# Patient Record
Sex: FEMALE | Race: BLACK OR AFRICAN AMERICAN | HISPANIC OR LATINO | Employment: UNEMPLOYED | URBAN - METROPOLITAN AREA
[De-identification: names, ages, dates, MRNs, and addresses within clinical notes are randomized per-mention and may not be internally consistent; named-entity substitution may affect disease eponyms.]

---

## 2017-01-19 ENCOUNTER — ALLSCRIPTS OFFICE VISIT (OUTPATIENT)
Dept: OTHER | Facility: OTHER | Age: 37
End: 2017-01-19

## 2017-01-19 DIAGNOSIS — M79.601 PAIN OF RIGHT ARM: ICD-10-CM

## 2017-02-17 ENCOUNTER — HOSPITAL ENCOUNTER (OUTPATIENT)
Dept: RADIOLOGY | Facility: HOSPITAL | Age: 37
Discharge: HOME | End: 2017-02-17
Payer: COMMERCIAL

## 2017-02-17 DIAGNOSIS — M79.601 PAIN OF RIGHT ARM: ICD-10-CM

## 2017-02-17 PROCEDURE — 72050 X-RAY EXAM NECK SPINE 4/5VWS: CPT

## 2017-02-17 PROCEDURE — 73030 X-RAY EXAM OF SHOULDER: CPT

## 2017-02-21 ENCOUNTER — GENERIC CONVERSION - ENCOUNTER (OUTPATIENT)
Dept: OTHER | Facility: OTHER | Age: 37
End: 2017-02-21

## 2017-04-27 ENCOUNTER — LAB REQUISITION (OUTPATIENT)
Dept: LAB | Facility: HOSPITAL | Age: 37
End: 2017-04-27
Payer: COMMERCIAL

## 2017-04-27 ENCOUNTER — ALLSCRIPTS OFFICE VISIT (OUTPATIENT)
Dept: OTHER | Facility: OTHER | Age: 37
End: 2017-04-27

## 2017-04-27 DIAGNOSIS — N92.6 IRREGULAR MENSTRUATION: ICD-10-CM

## 2017-04-27 DIAGNOSIS — R35.0 FREQUENCY OF MICTURITION: ICD-10-CM

## 2017-04-27 DIAGNOSIS — R63.1 POLYDIPSIA: ICD-10-CM

## 2017-04-27 DIAGNOSIS — R63.4 ABNORMAL WEIGHT LOSS: ICD-10-CM

## 2017-04-27 LAB
BILIRUB UR QL STRIP: NEGATIVE
BILIRUB UR QL STRIP: NORMAL
CLARITY UR: NORMAL
CLARITY UR: NORMAL
COLOR UR: YELLOW
COLOR UR: YELLOW
GLUCOSE (HISTORICAL): NORMAL
GLUCOSE UR STRIP-MCNC: NEGATIVE MG/DL
HGB UR QL STRIP.AUTO: NEGATIVE
HGB UR QL STRIP.AUTO: NORMAL
KETONES UR STRIP-MCNC: NEGATIVE MG/DL
KETONES UR STRIP-MCNC: NORMAL MG/DL
LEUKOCYTE ESTERASE UR QL STRIP: NEGATIVE
LEUKOCYTE ESTERASE UR QL STRIP: NORMAL
NITRITE UR QL STRIP: NEGATIVE
NITRITE UR QL STRIP: NORMAL
PH UR STRIP.AUTO: 7 [PH] (ref 4.5–8)
PH UR STRIP.AUTO: 7.5 [PH]
PROT UR STRIP-MCNC: NEGATIVE MG/DL
PROT UR STRIP-MCNC: NORMAL MG/DL
SP GR UR STRIP.AUTO: 1.01
SP GR UR STRIP.AUTO: 1.01 (ref 1–1.03)
UROBILINOGEN UR QL STRIP.AUTO: 0.2
UROBILINOGEN UR QL STRIP.AUTO: 0.2 E.U./DL

## 2017-04-27 PROCEDURE — 81003 URINALYSIS AUTO W/O SCOPE: CPT | Performed by: PHYSICIAN ASSISTANT

## 2017-04-28 ENCOUNTER — APPOINTMENT (OUTPATIENT)
Dept: LAB | Facility: CLINIC | Age: 37
End: 2017-04-28
Payer: COMMERCIAL

## 2017-04-28 ENCOUNTER — TRANSCRIBE ORDERS (OUTPATIENT)
Dept: LAB | Facility: CLINIC | Age: 37
End: 2017-04-28

## 2017-04-28 DIAGNOSIS — N92.6 IRREGULAR MENSTRUATION: ICD-10-CM

## 2017-04-28 DIAGNOSIS — R63.4 ABNORMAL WEIGHT LOSS: ICD-10-CM

## 2017-04-28 DIAGNOSIS — R63.1 POLYDIPSIA: ICD-10-CM

## 2017-04-28 LAB
ALBUMIN SERPL BCP-MCNC: 3.2 G/DL (ref 3.5–5)
ALP SERPL-CCNC: 90 U/L (ref 46–116)
ALT SERPL W P-5'-P-CCNC: 30 U/L (ref 12–78)
ANION GAP SERPL CALCULATED.3IONS-SCNC: 9 MMOL/L (ref 4–13)
AST SERPL W P-5'-P-CCNC: 13 U/L (ref 5–45)
BASOPHILS # BLD AUTO: 0.03 THOUSANDS/ΜL (ref 0–0.1)
BASOPHILS NFR BLD AUTO: 1 % (ref 0–1)
BILIRUB SERPL-MCNC: 0.42 MG/DL (ref 0.2–1)
BUN SERPL-MCNC: 10 MG/DL (ref 5–25)
CALCIUM SERPL-MCNC: 9 MG/DL (ref 8.3–10.1)
CHLORIDE SERPL-SCNC: 105 MMOL/L (ref 100–108)
CO2 SERPL-SCNC: 25 MMOL/L (ref 21–32)
CREAT SERPL-MCNC: 0.72 MG/DL (ref 0.6–1.3)
EOSINOPHIL # BLD AUTO: 0.15 THOUSAND/ΜL (ref 0–0.61)
EOSINOPHIL NFR BLD AUTO: 2 % (ref 0–6)
ERYTHROCYTE [DISTWIDTH] IN BLOOD BY AUTOMATED COUNT: 12.3 % (ref 11.6–15.1)
GFR SERPL CREATININE-BSD FRML MDRD: >60 ML/MIN/1.73SQ M
GLUCOSE P FAST SERPL-MCNC: 67 MG/DL (ref 65–99)
HCT VFR BLD AUTO: 39 % (ref 34.8–46.1)
HGB BLD-MCNC: 13 G/DL (ref 11.5–15.4)
LYMPHOCYTES # BLD AUTO: 1.44 THOUSANDS/ΜL (ref 0.6–4.47)
LYMPHOCYTES NFR BLD AUTO: 23 % (ref 14–44)
MCH RBC QN AUTO: 30.3 PG (ref 26.8–34.3)
MCHC RBC AUTO-ENTMCNC: 33.3 G/DL (ref 31.4–37.4)
MCV RBC AUTO: 91 FL (ref 82–98)
MONOCYTES # BLD AUTO: 0.81 THOUSAND/ΜL (ref 0.17–1.22)
MONOCYTES NFR BLD AUTO: 13 % (ref 4–12)
NEUTROPHILS # BLD AUTO: 3.86 THOUSANDS/ΜL (ref 1.85–7.62)
NEUTS SEG NFR BLD AUTO: 61 % (ref 43–75)
NRBC BLD AUTO-RTO: 0 /100 WBCS
PLATELET # BLD AUTO: 337 THOUSANDS/UL (ref 149–390)
PMV BLD AUTO: 11.7 FL (ref 8.9–12.7)
POTASSIUM SERPL-SCNC: 4.4 MMOL/L (ref 3.5–5.3)
PROT SERPL-MCNC: 7.6 G/DL (ref 6.4–8.2)
RBC # BLD AUTO: 4.29 MILLION/UL (ref 3.81–5.12)
SODIUM SERPL-SCNC: 139 MMOL/L (ref 136–145)
TSH SERPL DL<=0.05 MIU/L-ACNC: 1.02 UIU/ML (ref 0.36–3.74)
WBC # BLD AUTO: 6.3 THOUSAND/UL (ref 4.31–10.16)

## 2017-04-28 PROCEDURE — 85025 COMPLETE CBC W/AUTO DIFF WBC: CPT

## 2017-04-28 PROCEDURE — 36415 COLL VENOUS BLD VENIPUNCTURE: CPT

## 2017-04-28 PROCEDURE — 80053 COMPREHEN METABOLIC PANEL: CPT

## 2017-04-28 PROCEDURE — 84443 ASSAY THYROID STIM HORMONE: CPT

## 2017-07-24 ENCOUNTER — APPOINTMENT (EMERGENCY)
Dept: RADIOLOGY | Facility: HOSPITAL | Age: 37
End: 2017-07-24
Payer: COMMERCIAL

## 2017-07-24 ENCOUNTER — HOSPITAL ENCOUNTER (EMERGENCY)
Facility: HOSPITAL | Age: 37
Discharge: HOME | End: 2017-07-24
Admitting: EMERGENCY MEDICINE
Payer: COMMERCIAL

## 2017-07-24 VITALS
TEMPERATURE: 98.3 F | WEIGHT: 91.6 LBS | DIASTOLIC BLOOD PRESSURE: 82 MMHG | RESPIRATION RATE: 16 BRPM | OXYGEN SATURATION: 100 % | HEART RATE: 92 BPM | SYSTOLIC BLOOD PRESSURE: 136 MMHG

## 2017-07-24 DIAGNOSIS — M62.838 MUSCLE SPASMS OF NECK: Primary | ICD-10-CM

## 2017-07-24 DIAGNOSIS — M54.12 CERVICAL RADICULOPATHY: ICD-10-CM

## 2017-07-24 LAB — HCG UR QL: NEGATIVE

## 2017-07-24 PROCEDURE — 72040 X-RAY EXAM NECK SPINE 2-3 VW: CPT

## 2017-07-24 PROCEDURE — 96372 THER/PROPH/DIAG INJ SC/IM: CPT

## 2017-07-24 PROCEDURE — 99284 EMERGENCY DEPT VISIT MOD MDM: CPT

## 2017-07-24 PROCEDURE — 81025 URINE PREGNANCY TEST: CPT | Performed by: PHYSICIAN ASSISTANT

## 2017-07-24 RX ORDER — METHOCARBAMOL 500 MG/1
500 TABLET, FILM COATED ORAL ONCE
Status: COMPLETED | OUTPATIENT
Start: 2017-07-24 | End: 2017-07-24

## 2017-07-24 RX ORDER — KETOROLAC TROMETHAMINE 30 MG/ML
30 INJECTION, SOLUTION INTRAMUSCULAR; INTRAVENOUS ONCE
Status: COMPLETED | OUTPATIENT
Start: 2017-07-24 | End: 2017-07-24

## 2017-07-24 RX ORDER — METHOCARBAMOL 500 MG/1
500 TABLET, FILM COATED ORAL 2 TIMES DAILY PRN
Qty: 10 TABLET | Refills: 0 | Status: SHIPPED | OUTPATIENT
Start: 2017-07-24 | End: 2017-11-29

## 2017-07-24 RX ORDER — TRAMADOL HYDROCHLORIDE 50 MG/1
50 TABLET ORAL EVERY 6 HOURS PRN
Qty: 30 TABLET | Refills: 0 | Status: SHIPPED | OUTPATIENT
Start: 2017-07-24 | End: 2017-08-03

## 2017-07-24 RX ADMIN — METHOCARBAMOL 500 MG: 500 TABLET ORAL at 12:30

## 2017-07-24 RX ADMIN — KETOROLAC TROMETHAMINE 30 MG: 30 INJECTION, SOLUTION INTRAMUSCULAR at 12:30

## 2017-07-27 ENCOUNTER — HOSPITAL ENCOUNTER (EMERGENCY)
Facility: HOSPITAL | Age: 37
Discharge: HOME | End: 2017-07-28
Attending: EMERGENCY MEDICINE | Admitting: EMERGENCY MEDICINE
Payer: COMMERCIAL

## 2017-07-27 ENCOUNTER — GENERIC CONVERSION - ENCOUNTER (OUTPATIENT)
Dept: OTHER | Facility: OTHER | Age: 37
End: 2017-07-27

## 2017-07-27 VITALS
OXYGEN SATURATION: 100 % | RESPIRATION RATE: 16 BRPM | WEIGHT: 91 LBS | TEMPERATURE: 98.4 F | HEART RATE: 99 BPM | DIASTOLIC BLOOD PRESSURE: 69 MMHG | SYSTOLIC BLOOD PRESSURE: 135 MMHG

## 2017-07-27 DIAGNOSIS — M67.911 TENDINOPATHY OF RIGHT ROTATOR CUFF: ICD-10-CM

## 2017-07-27 DIAGNOSIS — M25.511 RIGHT SHOULDER PAIN: Primary | ICD-10-CM

## 2017-07-28 LAB
ATRIAL RATE: 75 BPM
BILIRUB UR QL STRIP: NEGATIVE
CLARITY UR: CLEAR
COLOR UR: YELLOW
COLOR, POC: NEGATIVE
GLUCOSE UR STRIP-MCNC: NEGATIVE MG/DL
HCG UR QL: NEGATIVE
HGB UR QL STRIP.AUTO: NEGATIVE
KETONES UR STRIP-MCNC: ABNORMAL MG/DL
LEUKOCYTE ESTERASE UR QL STRIP: NEGATIVE
NITRITE UR QL STRIP: NEGATIVE
P AXIS: 68 DEGREES
PH UR STRIP.AUTO: 6.5 [PH] (ref 4.5–8)
PR INTERVAL: 156 MS
PROT UR STRIP-MCNC: NEGATIVE MG/DL
QRS AXIS: 80 DEGREES
QRSD INTERVAL: 80 MS
QT INTERVAL: 378 MS
QTC INTERVAL: 422 MS
SP GR UR STRIP.AUTO: 1.01 (ref 1–1.03)
T WAVE AXIS: 49 DEGREES
UROBILINOGEN UR QL STRIP.AUTO: 1 E.U./DL
VENTRICULAR RATE: 75 BPM

## 2017-07-28 PROCEDURE — 99284 EMERGENCY DEPT VISIT MOD MDM: CPT

## 2017-07-28 PROCEDURE — 96372 THER/PROPH/DIAG INJ SC/IM: CPT

## 2017-07-28 PROCEDURE — 93005 ELECTROCARDIOGRAM TRACING: CPT | Performed by: EMERGENCY MEDICINE

## 2017-07-28 PROCEDURE — 81025 URINE PREGNANCY TEST: CPT | Performed by: EMERGENCY MEDICINE

## 2017-07-28 PROCEDURE — 81002 URINALYSIS NONAUTO W/O SCOPE: CPT | Performed by: EMERGENCY MEDICINE

## 2017-07-28 PROCEDURE — 81003 URINALYSIS AUTO W/O SCOPE: CPT

## 2017-07-28 RX ORDER — ACETAMINOPHEN 325 MG/1
650 TABLET ORAL EVERY 6 HOURS PRN
Qty: 30 TABLET | Refills: 0 | Status: SHIPPED | OUTPATIENT
Start: 2017-07-28 | End: 2017-11-29

## 2017-07-28 RX ORDER — MORPHINE SULFATE 15 MG/1
7.5 TABLET ORAL EVERY 6 HOURS PRN
Qty: 5 TABLET | Refills: 0 | Status: SHIPPED | OUTPATIENT
Start: 2017-07-28 | End: 2017-07-30

## 2017-07-28 RX ORDER — OXYCODONE HYDROCHLORIDE 5 MG/1
5 TABLET ORAL ONCE
Status: COMPLETED | OUTPATIENT
Start: 2017-07-28 | End: 2017-07-28

## 2017-07-28 RX ORDER — CYCLOBENZAPRINE HCL 10 MG
10 TABLET ORAL 2 TIMES DAILY PRN
Qty: 20 TABLET | Refills: 0 | Status: SHIPPED | OUTPATIENT
Start: 2017-07-28 | End: 2017-11-29

## 2017-07-28 RX ORDER — ACETAMINOPHEN 325 MG/1
975 TABLET ORAL ONCE
Status: COMPLETED | OUTPATIENT
Start: 2017-07-28 | End: 2017-07-28

## 2017-07-28 RX ORDER — KETOROLAC TROMETHAMINE 30 MG/ML
30 INJECTION, SOLUTION INTRAMUSCULAR; INTRAVENOUS ONCE
Status: COMPLETED | OUTPATIENT
Start: 2017-07-28 | End: 2017-07-28

## 2017-07-28 RX ORDER — DIAZEPAM 5 MG/1
5 TABLET ORAL ONCE
Status: COMPLETED | OUTPATIENT
Start: 2017-07-28 | End: 2017-07-28

## 2017-07-28 RX ORDER — DICLOFENAC POTASSIUM 50 MG/1
50 TABLET, FILM COATED ORAL 3 TIMES DAILY
Qty: 30 TABLET | Refills: 0 | Status: SHIPPED | OUTPATIENT
Start: 2017-07-28 | End: 2017-11-29

## 2017-07-28 RX ADMIN — ACETAMINOPHEN 975 MG: 325 TABLET, FILM COATED ORAL at 02:15

## 2017-07-28 RX ADMIN — OXYCODONE HYDROCHLORIDE 5 MG: 5 TABLET ORAL at 02:16

## 2017-07-28 RX ADMIN — KETOROLAC TROMETHAMINE 30 MG: 30 INJECTION, SOLUTION INTRAMUSCULAR at 02:17

## 2017-07-28 RX ADMIN — DIAZEPAM 5 MG: 5 TABLET ORAL at 02:16

## 2017-11-21 ENCOUNTER — APPOINTMENT (EMERGENCY)
Dept: CT IMAGING | Facility: HOSPITAL | Age: 37
End: 2017-11-21
Payer: COMMERCIAL

## 2017-11-21 ENCOUNTER — HOSPITAL ENCOUNTER (EMERGENCY)
Facility: HOSPITAL | Age: 37
Discharge: HOME | End: 2017-11-21
Attending: EMERGENCY MEDICINE | Admitting: EMERGENCY MEDICINE
Payer: COMMERCIAL

## 2017-11-21 VITALS
HEART RATE: 76 BPM | SYSTOLIC BLOOD PRESSURE: 105 MMHG | WEIGHT: 110 LBS | TEMPERATURE: 97.6 F | RESPIRATION RATE: 18 BRPM | OXYGEN SATURATION: 98 % | DIASTOLIC BLOOD PRESSURE: 55 MMHG

## 2017-11-21 DIAGNOSIS — R10.10 UPPER ABDOMINAL PAIN: ICD-10-CM

## 2017-11-21 DIAGNOSIS — K29.70 GASTRITIS: ICD-10-CM

## 2017-11-21 DIAGNOSIS — R11.2 NAUSEA & VOMITING: Primary | ICD-10-CM

## 2017-11-21 DIAGNOSIS — R55 VASOVAGAL NEAR-SYNCOPE: ICD-10-CM

## 2017-11-21 LAB
ALBUMIN SERPL BCP-MCNC: 3.1 G/DL (ref 3.5–5)
ALP SERPL-CCNC: 64 U/L (ref 46–116)
ALT SERPL W P-5'-P-CCNC: 21 U/L (ref 12–78)
ANION GAP SERPL CALCULATED.3IONS-SCNC: 13 MMOL/L (ref 4–13)
AST SERPL W P-5'-P-CCNC: 29 U/L (ref 5–45)
ATRIAL RATE: 81 BPM
ATRIAL RATE: 83 BPM
BACTERIA UR QL AUTO: ABNORMAL /HPF
BASOPHILS # BLD AUTO: 0.03 THOUSANDS/ΜL (ref 0–0.1)
BASOPHILS NFR BLD AUTO: 0 % (ref 0–1)
BILIRUB SERPL-MCNC: 0.51 MG/DL (ref 0.2–1)
BILIRUB UR QL STRIP: ABNORMAL
BUN SERPL-MCNC: 12 MG/DL (ref 5–25)
CALCIUM SERPL-MCNC: 8.2 MG/DL (ref 8.3–10.1)
CHLORIDE SERPL-SCNC: 105 MMOL/L (ref 100–108)
CLARITY UR: ABNORMAL
CO2 SERPL-SCNC: 21 MMOL/L (ref 21–32)
COLOR UR: YELLOW
CREAT SERPL-MCNC: 0.58 MG/DL (ref 0.6–1.3)
EOSINOPHIL # BLD AUTO: 0.15 THOUSAND/ΜL (ref 0–0.61)
EOSINOPHIL NFR BLD AUTO: 2 % (ref 0–6)
ERYTHROCYTE [DISTWIDTH] IN BLOOD BY AUTOMATED COUNT: 12.3 % (ref 11.6–15.1)
EXT PREG TEST URINE: NORMAL
GFR SERPL CREATININE-BSD FRML MDRD: 119 ML/MIN/1.73SQ M
GLUCOSE SERPL-MCNC: 125 MG/DL (ref 65–140)
GLUCOSE UR STRIP-MCNC: NEGATIVE MG/DL
HCT VFR BLD AUTO: 34.5 % (ref 34.8–46.1)
HGB BLD-MCNC: 12 G/DL (ref 11.5–15.4)
HGB UR QL STRIP.AUTO: NEGATIVE
KETONES UR STRIP-MCNC: ABNORMAL MG/DL
LACTATE SERPL-SCNC: 1.2 MMOL/L (ref 0.5–2)
LEUKOCYTE ESTERASE UR QL STRIP: NEGATIVE
LIPASE SERPL-CCNC: 452 U/L (ref 73–393)
LYMPHOCYTES # BLD AUTO: 4 THOUSANDS/ΜL (ref 0.6–4.47)
LYMPHOCYTES NFR BLD AUTO: 41 % (ref 14–44)
MCH RBC QN AUTO: 31.3 PG (ref 26.8–34.3)
MCHC RBC AUTO-ENTMCNC: 34.8 G/DL (ref 31.4–37.4)
MCV RBC AUTO: 90 FL (ref 82–98)
MONOCYTES # BLD AUTO: 0.82 THOUSAND/ΜL (ref 0.17–1.22)
MONOCYTES NFR BLD AUTO: 8 % (ref 4–12)
MUCOUS THREADS UR QL AUTO: ABNORMAL
NEUTROPHILS # BLD AUTO: 4.71 THOUSANDS/ΜL (ref 1.85–7.62)
NEUTS SEG NFR BLD AUTO: 49 % (ref 43–75)
NITRITE UR QL STRIP: NEGATIVE
NON-SQ EPI CELLS URNS QL MICRO: ABNORMAL /HPF
NRBC BLD AUTO-RTO: 0 /100 WBCS
P AXIS: 53 DEGREES
P AXIS: 63 DEGREES
PH UR STRIP.AUTO: 7 [PH] (ref 4.5–8)
PLATELET # BLD AUTO: 259 THOUSANDS/UL (ref 149–390)
PMV BLD AUTO: 11.4 FL (ref 8.9–12.7)
POTASSIUM SERPL-SCNC: 4 MMOL/L (ref 3.5–5.3)
PR INTERVAL: 146 MS
PR INTERVAL: 152 MS
PROT SERPL-MCNC: 6.8 G/DL (ref 6.4–8.2)
PROT UR STRIP-MCNC: ABNORMAL MG/DL
QRS AXIS: 90 DEGREES
QRS AXIS: 92 DEGREES
QRSD INTERVAL: 72 MS
QRSD INTERVAL: 78 MS
QT INTERVAL: 346 MS
QT INTERVAL: 348 MS
QTC INTERVAL: 401 MS
QTC INTERVAL: 408 MS
RBC # BLD AUTO: 3.84 MILLION/UL (ref 3.81–5.12)
RBC #/AREA URNS AUTO: ABNORMAL /HPF
SODIUM SERPL-SCNC: 139 MMOL/L (ref 136–145)
SP GR UR STRIP.AUTO: 1.02 (ref 1–1.03)
T WAVE AXIS: 43 DEGREES
T WAVE AXIS: 51 DEGREES
UROBILINOGEN UR QL STRIP.AUTO: >=8 E.U./DL
VENTRICULAR RATE: 81 BPM
VENTRICULAR RATE: 83 BPM
WBC # BLD AUTO: 9.71 THOUSAND/UL (ref 4.31–10.16)
WBC #/AREA URNS AUTO: ABNORMAL /HPF

## 2017-11-21 PROCEDURE — 96375 TX/PRO/DX INJ NEW DRUG ADDON: CPT

## 2017-11-21 PROCEDURE — 83690 ASSAY OF LIPASE: CPT

## 2017-11-21 PROCEDURE — 99284 EMERGENCY DEPT VISIT MOD MDM: CPT

## 2017-11-21 PROCEDURE — 96361 HYDRATE IV INFUSION ADD-ON: CPT

## 2017-11-21 PROCEDURE — 36415 COLL VENOUS BLD VENIPUNCTURE: CPT

## 2017-11-21 PROCEDURE — 83605 ASSAY OF LACTIC ACID: CPT | Performed by: EMERGENCY MEDICINE

## 2017-11-21 PROCEDURE — 80053 COMPREHEN METABOLIC PANEL: CPT

## 2017-11-21 PROCEDURE — 81001 URINALYSIS AUTO W/SCOPE: CPT

## 2017-11-21 PROCEDURE — 81002 URINALYSIS NONAUTO W/O SCOPE: CPT

## 2017-11-21 PROCEDURE — 81025 URINE PREGNANCY TEST: CPT

## 2017-11-21 PROCEDURE — 93005 ELECTROCARDIOGRAM TRACING: CPT

## 2017-11-21 PROCEDURE — 74177 CT ABD & PELVIS W/CONTRAST: CPT

## 2017-11-21 PROCEDURE — 96374 THER/PROPH/DIAG INJ IV PUSH: CPT

## 2017-11-21 PROCEDURE — 85025 COMPLETE CBC W/AUTO DIFF WBC: CPT

## 2017-11-21 RX ORDER — OMEPRAZOLE 20 MG/1
20 CAPSULE, DELAYED RELEASE ORAL DAILY
Qty: 30 CAPSULE | Refills: 0 | Status: SHIPPED | OUTPATIENT
Start: 2017-11-21 | End: 2017-11-29

## 2017-11-21 RX ORDER — ONDANSETRON 4 MG/1
4 TABLET, ORALLY DISINTEGRATING ORAL EVERY 8 HOURS PRN
Qty: 20 TABLET | Refills: 0 | Status: SHIPPED | OUTPATIENT
Start: 2017-11-21 | End: 2017-11-29

## 2017-11-21 RX ORDER — MORPHINE SULFATE 10 MG/ML
6 INJECTION, SOLUTION INTRAMUSCULAR; INTRAVENOUS ONCE
Status: COMPLETED | OUTPATIENT
Start: 2017-11-21 | End: 2017-11-21

## 2017-11-21 RX ORDER — KETOROLAC TROMETHAMINE 30 MG/ML
15 INJECTION, SOLUTION INTRAMUSCULAR; INTRAVENOUS ONCE
Status: COMPLETED | OUTPATIENT
Start: 2017-11-21 | End: 2017-11-21

## 2017-11-21 RX ORDER — ONDANSETRON 2 MG/ML
4 INJECTION INTRAMUSCULAR; INTRAVENOUS ONCE
Status: COMPLETED | OUTPATIENT
Start: 2017-11-21 | End: 2017-11-21

## 2017-11-21 RX ADMIN — ONDANSETRON 4 MG: 2 INJECTION INTRAMUSCULAR; INTRAVENOUS at 00:49

## 2017-11-21 RX ADMIN — SODIUM CHLORIDE 1000 ML: 0.9 INJECTION, SOLUTION INTRAVENOUS at 00:49

## 2017-11-21 RX ADMIN — FAMOTIDINE 20 MG: 10 INJECTION, SOLUTION INTRAVENOUS at 00:49

## 2017-11-21 RX ADMIN — IOHEXOL 85 ML: 350 INJECTION, SOLUTION INTRAVENOUS at 01:21

## 2017-11-21 RX ADMIN — MORPHINE SULFATE 6 MG: 10 INJECTION, SOLUTION INTRAMUSCULAR; INTRAVENOUS at 00:49

## 2017-11-21 RX ADMIN — KETOROLAC TROMETHAMINE 15 MG: 30 INJECTION, SOLUTION INTRAMUSCULAR at 02:28

## 2017-11-21 NOTE — ED PROVIDER NOTES
History  Chief Complaint   Patient presents with    Abdominal Pain     patient transported via EMS from home; patient is complaining of upper gastric pain that started tonight; patient also complaining of nausea and dizziness with movement     Pt is a 41 yo female who presents with 4 hours of epigastric pain and nausea  Pt states pain began around 2130 and worsened slowly, now crying out in pain  Pt had similar but milder episode in past after shrimp and interesting ate shrimp amina at 1500 today  Per  she had near syncopal episode during pain while attempting to throw up where she got lightheaded and pale and laid down on bed and appeared to almost pass out  History provided by:  Patient, EMS personnel, spouse and relative   used: No    Abdominal Pain   Pain location:  Epigastric  Pain quality: aching and gnawing    Pain radiates to:  Does not radiate  Pain severity:  Severe  Onset quality:  Gradual  Duration:  4 hours  Timing:  Constant  Progression:  Worsening  Chronicity:  New  Relieved by:  Nothing  Worsened by:  Nothing  Ineffective treatments:  None tried  Associated symptoms: anorexia and nausea    Associated symptoms: no chest pain, no chills, no diarrhea, no dysuria, no fatigue, no fever, no shortness of breath, no sore throat, no vaginal bleeding, no vaginal discharge and no vomiting        Prior to Admission Medications   Prescriptions Last Dose Informant Patient Reported?  Taking?   acetaminophen (TYLENOL) 325 mg tablet   No No   Sig: Take 2 tablets by mouth every 6 (six) hours as needed for mild pain or fever   cyclobenzaprine (FLEXERIL) 10 mg tablet   No No   Sig: Take 1 tablet by mouth 2 (two) times a day as needed for muscle spasms   diclofenac potassium (CATAFLAM) 50 mg tablet   No No   Sig: Take 1 tablet by mouth 3 (three) times a day for 10 days   meclizine (ANTIVERT) 25 mg tablet   No No   Sig: Take 1 tablet by mouth 3 (three) times a day as needed for dizziness   meloxicam (MOBIC) 7 5 mg tablet   No No   Sig: Take 1 tablet (7 5 mg total) by mouth daily  methocarbamol (ROBAXIN) 500 mg tablet   No No   Sig: Take 1 tablet by mouth 2 (two) times a day as needed for muscle spasms for up to 5 days   methocarbamol (ROBAXIN) 750 mg tablet   No No   Sig: Take 1 tablet (750 mg total) by mouth 3 (three) times a day as needed for muscle spasms  ondansetron (ZOFRAN-ODT) 4 mg disintegrating tablet   No No   Sig: Take 1 tablet by mouth every 8 (eight) hours as needed for nausea or vomiting      Facility-Administered Medications: None       Past Medical History:   Diagnosis Date    Anxiety     Migraine     Panic attack     Vertigo        Past Surgical History:   Procedure Laterality Date    ABDOMINAL SURGERY       SECTION      TUBAL LIGATION         History reviewed  No pertinent family history  I have reviewed and agree with the history as documented  Social History   Substance Use Topics    Smoking status: Never Smoker    Smokeless tobacco: Never Used    Alcohol use Yes      Comment: occsionally        Review of Systems   Constitutional: Negative for appetite change, chills, fatigue and fever  HENT: Negative for congestion and sore throat  Eyes: Negative for visual disturbance  Respiratory: Negative for shortness of breath  Cardiovascular: Negative for chest pain  Gastrointestinal: Positive for abdominal pain (epigastric), anorexia and nausea  Negative for diarrhea and vomiting  Genitourinary: Negative for dysuria, frequency, vaginal bleeding and vaginal discharge  Musculoskeletal: Negative for back pain, neck pain and neck stiffness  Skin: Negative for pallor and rash  Allergic/Immunologic: Negative for immunocompromised state  Neurological: Negative for light-headedness and headaches  Syncope: near syncopal episode  Psychiatric/Behavioral: Negative for confusion  All other systems reviewed and are negative        Physical Exam  ED Triage Vitals [11/21/17 0021]   Temperature Pulse Respirations Blood Pressure SpO2   97 6 °F (36 4 °C) 64 20 106/58 100 %      Temp Source Heart Rate Source Patient Position - Orthostatic VS BP Location FiO2 (%)   Oral Monitor Lying Right arm --      Pain Score       Worst Possible Pain           Orthostatic Vital Signs  Vitals:    11/21/17 0021 11/21/17 0228   BP: 106/58 105/55   Pulse: 64 76   Patient Position - Orthostatic VS: Lying Lying       Physical Exam   Constitutional: She is oriented to person, place, and time  She appears well-developed and well-nourished  She appears distressed (very uncomfortable)  HENT:   Head: Normocephalic and atraumatic  Mouth/Throat: Oropharynx is clear and moist    Eyes: EOM are normal  Pupils are equal, round, and reactive to light  Neck: Normal range of motion  Neck supple  Cardiovascular: Normal rate and regular rhythm  No murmur heard  Pulmonary/Chest: Effort normal and breath sounds normal  No respiratory distress  Abdominal: Soft  Bowel sounds are normal  There is tenderness (epigastric tenderness)  Musculoskeletal: Normal range of motion  Neurological: She is alert and oriented to person, place, and time  Skin: Skin is warm  No rash noted  No pallor  Nursing note and vitals reviewed        ED Medications  Medications   sodium chloride 0 9 % bolus 1,000 mL (0 mL Intravenous Stopped 11/21/17 0231)   ondansetron (ZOFRAN) injection 4 mg (4 mg Intravenous Given 11/21/17 0049)   morphine (PF) 10 mg/mL injection 6 mg (6 mg Intravenous Given 11/21/17 0049)   famotidine (PEPCID) injection 20 mg (20 mg Intravenous Given 11/21/17 0049)   iohexol (OMNIPAQUE) 350 MG/ML injection (SINGLE-DOSE) 85 mL (85 mL Intravenous Given 11/21/17 0121)   ketorolac (TORADOL) 30 mg/mL injection 15 mg (15 mg Intravenous Given 11/21/17 0228)       Diagnostic Studies  Results Reviewed     Procedure Component Value Units Date/Time    Urine Microscopic [[id number]]  (Abnormal) Collected:  11/21/17 0234    Lab Status:  Final result Specimen:  Urine from Urine, Clean Catch Updated:  11/21/17 0137     RBC, UA 0-1 (A) /hpf      WBC, UA 2-4 (A) /hpf      Epithelial Cells Occasional /hpf      Bacteria, UA Occasional /hpf      MUCOUS THREADS Occasional    POCT urinalysis dipstick [[id number]]  (Abnormal) Resulted:  11/21/17 0120    Lab Status:  Final result Specimen:  Urine from Urine, Other Updated:  11/21/17 0120    POCT pregnancy, urine [[id number]]  (Normal) Resulted:  11/21/17 0120    Lab Status:  Final result Specimen:  Urine Updated:  11/21/17 0120     EXT PREG TEST UR (Ref: Negative) negative(-)    ED Urine Macroscopic [[id number]]  (Abnormal) Collected:  11/21/17 0234    Lab Status:  Final result Specimen:  Urine Updated:  11/21/17 0118     Color, UA Yellow     Clarity, UA Cloudy     pH, UA 7 0     Leukocytes, UA Negative     Nitrite, UA Negative     Protein,  (2+) (A) mg/dl      Glucose, UA Negative mg/dl      Ketones, UA Trace (A) mg/dl      Urobilinogen, UA >=8 0 (A) E U /dl      Bilirubin, UA Interference- unable to analyze (A)     Blood, UA Negative     Specific Gravity, UA 1 020    Narrative:       CLINITEK RESULT    Lactic acid, plasma [[id number]]  (Normal) Collected:  11/21/17 0040    Lab Status:  Final result Specimen:  Blood from Arm, Left Updated:  11/21/17 0101     LACTIC ACID 1 2 mmol/L     Narrative:         Result may be elevated if tourniquet was used during collection      Comprehensive metabolic panel [[id number]]  (Abnormal) Collected:  11/21/17 0040    Lab Status:  Final result Specimen:  Blood from Arm, Left Updated:  11/21/17 0058     Sodium 139 mmol/L      Potassium 4 0 mmol/L      Chloride 105 mmol/L      CO2 21 mmol/L      Anion Gap 13 mmol/L      BUN 12 mg/dL      Creatinine 0 58 (L) mg/dL      Glucose 125 mg/dL      Calcium 8 2 (L) mg/dL      AST 29 U/L      ALT 21 U/L      Alkaline Phosphatase 64 U/L      Total Protein 6 8 g/dL      Albumin 3 1 (L) g/dL Total Bilirubin 0 51 mg/dL      eGFR 119 ml/min/1 73sq m     Narrative:         National Kidney Disease Education Program recommendations are as follows:  GFR calculation is accurate only with a steady state creatinine  Chronic Kidney disease less than 60 ml/min/1 73 sq  meters  Kidney failure less than 15 ml/min/1 73 sq  meters  Lipase [[id number]]  (Abnormal) Collected:  11/21/17 0040    Lab Status:  Final result Specimen:  Blood from Arm, Left Updated:  11/21/17 0058     Lipase 452 (H) u/L     CBC and differential [[id number]]  (Abnormal) Collected:  11/21/17 0040    Lab Status:  Final result Specimen:  Blood from Arm, Left Updated:  11/21/17 0043     WBC 9 71 Thousand/uL      RBC 3 84 Million/uL      Hemoglobin 12 0 g/dL      Hematocrit 34 5 (L) %      MCV 90 fL      MCH 31 3 pg      MCHC 34 8 g/dL      RDW 12 3 %      MPV 11 4 fL      Platelets 850 Thousands/uL      nRBC 0 /100 WBCs      Neutrophils Relative 49 %      Lymphocytes Relative 41 %      Monocytes Relative 8 %      Eosinophils Relative 2 %      Basophils Relative 0 %      Neutrophils Absolute 4 71 Thousands/µL      Lymphocytes Absolute 4 00 Thousands/µL      Monocytes Absolute 0 82 Thousand/µL      Eosinophils Absolute 0 15 Thousand/µL      Basophils Absolute 0 03 Thousands/µL                  CT abdomen pelvis with contrast    (Results Pending)              Procedures  Procedures       Phone Contacts  ED Phone Contact    ED Course  ED Course as of Nov 21 0241 Tue Nov 21, 2017   0022 Pt seen and examined  Pt is a 39 yo female who presents with 4 hours of epigastric pain and nausea  Pt states pain began around 2130 and worsened slowly, now crying out in pain  Pt had similar but milder episode in past after shrimp and interesting ate shrimp amina at 1500 today  Per  she had near syncopal episode during pain while attempting to throw up where she got lightheaded and pale and laid down on bed and appeared to almost pass out   Will give morphine, zofran and pepcid with IVF and check labs including lactate and lipase, plan on performing CT a/p     0050 CBC WNL  0105 Lipase mildly elevated at 452  Lactate nml at 1 2  LFT's nml as is rest of CMP  Will go shortly for CT a/p  Feeling much better after meds and IVF infusing  0215 CT neg for lalito or pancreatitis, shows Periportal edema in the liver, as well as a diffusely heterogeneous enhancement pattern of  the liver  No focal liver lesions seen  This constellation of findings can be seen in setting of  acute liver disease such as hepatitis  LFT's WNL  Pt feels much better and wants to go home, just hopes pain does not return  Disussed giving 15mg toradol and d/c home on zofran and pepcid  Pt fine with plan  Will f/u with PCP and GI regarding CT findings  MDM  CritCare Time    Disposition  Final diagnoses:   Nausea & vomiting   Vasovagal near-syncope   Upper abdominal pain   Gastritis     Time reflects when diagnosis was documented in both MDM as applicable and the Disposition within this note     Time User Action Codes Description Comment    11/21/2017  2:27 AM Rayna Crigler M Add [R11 2] Nausea & vomiting     11/21/2017  2:27 AM Rayna Crigler M Add [R55] Vasovagal near-syncope     11/21/2017  2:27 AM Rayna Crigler M Add [R10 10] Upper abdominal pain     11/21/2017  2:27 AM Rayna Crigler M Add [K29 70] Gastritis       ED Disposition     ED Disposition Condition Comment    Discharge  Louie Argueta discharge to home/self care      Condition at discharge: Good        Follow-up Information     Follow up With Specialties Details Why Contact Info    Yoana Plunkett [facility] Call  [facility]  [phone] [facility],[address]  [address] [facility]  Call As needed [address]  [phone]        Discharge Medication List as of 11/21/2017  2:28 AM      START taking these medications    Details   omeprazole (PriLOSEC) 20 mg delayed release capsule Take 1 capsule by mouth daily, Starting Tue 11/21/2017, Print      !! ondansetron (ZOFRAN-ODT) 4 mg disintegrating tablet Take 1 tablet by mouth every 8 (eight) hours as needed for nausea or vomiting for up to 7 days, Starting Tue 11/21/2017, Until Tue 11/28/2017, Print       !! - Potential duplicate medications found  Please discuss with provider  CONTINUE these medications which have NOT CHANGED    Details   acetaminophen (TYLENOL) 325 mg tablet Take 2 tablets by mouth every 6 (six) hours as needed for mild pain or fever, Starting Fri 7/28/2017, Print      cyclobenzaprine (FLEXERIL) 10 mg tablet Take 1 tablet by mouth 2 (two) times a day as needed for muscle spasms, Starting Fri 7/28/2017, Print      diclofenac potassium (CATAFLAM) 50 mg tablet Take 1 tablet by mouth 3 (three) times a day for 10 days, Starting Fri 7/28/2017, Until Mon 8/7/2017, Print      meclizine (ANTIVERT) 25 mg tablet Take 1 tablet by mouth 3 (three) times a day as needed for dizziness, Starting 11/17/2016, Until Discontinued, Print      meloxicam (MOBIC) 7 5 mg tablet Take 1 tablet (7 5 mg total) by mouth daily  , Starting 8/12/2016, Until Discontinued, Print      methocarbamol (ROBAXIN) 750 mg tablet Take 1 tablet (750 mg total) by mouth 3 (three) times a day as needed for muscle spasms  , Starting 8/12/2016, Until Discontinued, Print      !! ondansetron (ZOFRAN-ODT) 4 mg disintegrating tablet Take 1 tablet by mouth every 8 (eight) hours as needed for nausea or vomiting, Starting 11/17/2016, Until Discontinued, Print       !! - Potential duplicate medications found  Please discuss with provider  No discharge procedures on file      ED Provider  Electronically Signed by           Purnima Bertrand DO  11/21/17 7130

## 2017-11-21 NOTE — DISCHARGE INSTRUCTIONS
Acute Abdominal Pain   WHAT YOU NEED TO KNOW:   The cause of your abdominal pain may not be found  If a cause is found, treatment will depend on what the cause is  DISCHARGE INSTRUCTIONS:   Seek care immediately if:   · You vomit blood or cannot stop vomiting  · You have blood in your bowel movement or it looks like tar  · You have bleeding from your rectum  · Your abdomen is larger than usual, more painful, and hard  · You have severe pain in your abdomen  · You stop passing gas and having bowel movements  · You feel weak, dizzy, or faint  Contact your healthcare provider if:   · You have a fever  · You have new signs and symptoms  · Your symptoms do not get better with treatment  · You have questions or concerns about your condition or care  Medicines  may be given to decrease pain, treat an infection, and manage your symptoms  Take your medicine as directed  Call your healthcare provider if you think your medicine is not helping or if you have side effects  Tell him if you are allergic to any medicine  Keep a list of the medicines, vitamins, and herbs you take  Include the amounts, and when and why you take them  Bring the list or the pill bottles to follow-up visits  Carry your medicine list with you in case of an emergency  Manage your symptoms:   · Apply heat  on your abdomen for 20 to 30 minutes every 2 hours for as many days as directed  Heat helps decrease pain and muscle spasms  · Manage your stress  Stress may cause abdominal pain  Your healthcare provider may recommend relaxation techniques and deep breathing exercises to help decrease your stress  Your healthcare provider may recommend you talk to someone about your stress or anxiety, such as a counselor or a trusted friend  Get plenty of sleep and exercise regularly  · Limit or do not drink alcohol  Alcohol can make your abdominal pain worse   Ask your healthcare provider if it is safe for you to drink alcohol  Also ask how much is safe for you to drink  · Do not smoke  Nicotine and other chemicals in cigarettes can damage your esophagus and stomach  Ask your healthcare provider for information if you currently smoke and need help to quit  E-cigarettes or smokeless tobacco still contain nicotine  Talk to your healthcare provider before you use these products  Make changes to the food you eat as directed:  Do not eat foods that cause abdominal pain or other symptoms  Eat small meals more often  · Eat more high-fiber foods if you are constipated  High-fiber foods include fruits, vegetables, whole-grain foods, and legumes  · Do not eat foods that cause gas if you have bloating  Examples include broccoli, cabbage, and cauliflower  Do not drink soda or carbonated drinks, because these may also cause gas  · Do not eat foods or drinks that contain sorbitol or fructose if you have diarrhea and bloating  Some examples are fruit juices, candy, jelly, and sugar-free gum  · Do not eat high-fat foods, such as fried foods, cheeseburgers, hot dogs, and desserts  · Limit or do not drink caffeine  Caffeine may make symptoms, such as heart burn or nausea, worse  · Drink plenty of liquids to prevent dehydration from diarrhea or vomiting  Ask your healthcare provider how much liquid to drink each day and which liquids are best for you  Follow up with your healthcare provider as directed:  Write down your questions so you remember to ask them during your visits  © 2017 [address] Information is for End User's use only and may not be sold, redistributed or otherwise used for commercial purposes  All illustrations and images included in CareNotes® are the copyrighted property of [employer]  or [address]  The above information is an  only  It is not intended as medical advice for individual conditions or treatments   Talk to your doctor, nurse or pharmacist before following any medical regimen to see if it is safe and effective for you  Acute Nausea and Vomiting   WHAT YOU NEED TO KNOW:   Acute nausea and vomiting start suddenly, worsen quickly, and last a short time  DISCHARGE INSTRUCTIONS:   Seek care immediately if:   · You see blood in your vomit or your bowel movements  · You have sudden, severe pain in your chest and upper abdomen after hard vomiting or retching  · You have swelling in your neck and chest      · You are dizzy, cold, and thirsty and your eyes and mouth are dry  · You are urinating very little or not at all  · You have muscle weakness, leg cramps, and trouble breathing  · Your heart is beating much faster than normal      · You continue to vomit for more than 48 hours  Contact your healthcare provider if:   · You have frequent dry heaves (vomiting but nothing comes out)  · Your nausea and vomiting does not get better or go away after you use medicine  · You have questions or concerns about your condition or treatment  Medicines: You may need any of the following:  · Medicines  may be given to calm your stomach and stop your vomiting  You may also need medicines to help you feel more relaxed or to stop nausea and vomiting caused by motion sickness  · Gastrointestinal stimulants  are used to help empty your stomach and bowels  This may help decrease nausea and vomiting  · Take your medicine as directed  Contact your healthcare provider if you think your medicine is not helping or if you have side effects  Tell him or her if you are allergic to any medicine  Keep a list of the medicines, vitamins, and herbs you take  Include the amounts, and when and why you take them  Bring the list or the pill bottles to follow-up visits  Carry your medicine list with you in case of an emergency  Prevent or manage acute nausea and vomiting:   · Do not drink alcohol  Alcohol may upset or irritate your stomach   Too much alcohol can also cause acute nausea and vomiting  · Control stress  Headaches due to stress may cause nausea and vomiting  Find ways to relax and manage your stress  Get more rest and sleep  · Drink more liquids as directed  Vomiting can lead to dehydration  It is important to drink more liquids to help replace lost body fluids  Ask your healthcare provider how much liquid to drink each day and which liquids are best for you  Your provider may recommend that you drink an oral rehydration solution (ORS)  ORS contains water, salts, and sugar that are needed to replace the lost body fluids  Ask what kind of ORS to use, how much to drink, and where to get it  · Eat smaller meals, more often  Eat small amounts of food every 2 to 3 hours, even if you are not hungry  Food in your stomach may decrease your nausea  · Talk to your healthcare provider before you take over-the-counter (OTC) medicines  These medicines can cause serious problems if you use certain other medicines, or you have a medical condition  You may have problems if you use too much or use them for longer than the label says  Follow directions on the label carefully  Follow up with your healthcare provider as directed:  Write down your questions so you remember to ask them during your visits  © 2017 [address]  Information is for End User's use only and may not be sold, redistributed or otherwise used for commercial purposes  All illustrations and images included in CareNotes® are the copyrighted property of [employer]  or [employer] V[employer]  The above information is an  only  It is not intended as medical advice for individual conditions or treatments  Talk to your doctor, nurse or pharmacist before following any medical regimen to see if it is safe and effective for you  Gastritis   WHAT YOU NEED TO KNOW:   Gastritis is inflammation or irritation of the lining of your stomach          DISCHARGE INSTRUCTIONS:   Call 911 for any of the following:   · You develop chest pain or shortness of breath  Seek care immediately if:   · You vomit blood  · You have black or bloody bowel movements  · You have severe stomach or back pain  Contact your healthcare provider if:   · You have a fever  · You have new or worsening symptoms, even after treatment  · You have questions or concerns about your condition or care  Medicines:   · Medicines  may be given to help treat a bacterial infection or decrease stomach acid  · Take your medicine as directed  Contact your healthcare provider if you think your medicine is not helping or if you have side effects  Tell him or her if you are allergic to any medicine  Keep a list of the medicines, vitamins, and herbs you take  Include the amounts, and when and why you take them  Bring the list or the pill bottles to follow-up visits  Carry your medicine list with you in case of an emergency  Manage or prevent gastritis:   · Do not smoke  Nicotine and other chemicals in cigarettes and cigars can make your symptoms worse and cause lung damage  Ask your healthcare provider for information if you currently smoke and need help to quit  E-cigarettes or smokeless tobacco still contain nicotine  Talk to your healthcare provider before you use these products  · Do not drink alcohol  Alcohol can prevent healing and make your gastritis worse  Talk to your healthcare provider if you need help to stop drinking  · Do not take NSAIDs or aspirin unless directed  These and similar medicines can cause irritation  If your healthcare provider says it is okay to take NSAIDs, take them with food  · Do not eat foods that cause irritation  Foods such as oranges and salsa can cause burning or pain  Eat a variety of healthy foods  Examples include fruits (not citrus), vegetables, low-fat dairy products, beans, whole-grain breads, and lean meats and fish   Try to eat small meals, and drink water with your meals  Do not eat for at least 3 hours before you go to bed  · Find ways to relax and decrease stress  Stress can increase stomach acid and make gastritis worse  Activities such as yoga, meditation, or listening to music can help you relax  Spend time with friends, or do things you enjoy  Follow up with your healthcare provider as directed: You may need ongoing tests or treatment, or referral to a gastroenterologist  Write down your questions so you remember to ask them during your visits  © 2017 [address] Information is for End User's use only and may not be sold, redistributed or otherwise used for commercial purposes  All illustrations and images included in CareNotes® are the copyrighted property of A [employer]  or [employer]  The above information is an  only  It is not intended as medical advice for individual conditions or treatments  Talk to your doctor, nurse or pharmacist before following any medical regimen to see if it is safe and effective for you

## 2017-11-29 ENCOUNTER — HOSPITAL ENCOUNTER (EMERGENCY)
Facility: HOSPITAL | Age: 37
Discharge: HOME | End: 2017-11-29
Attending: EMERGENCY MEDICINE | Admitting: EMERGENCY MEDICINE
Payer: COMMERCIAL

## 2017-11-29 VITALS
WEIGHT: 88 LBS | SYSTOLIC BLOOD PRESSURE: 114 MMHG | HEIGHT: 59 IN | TEMPERATURE: 98.3 F | DIASTOLIC BLOOD PRESSURE: 76 MMHG | RESPIRATION RATE: 14 BRPM | OXYGEN SATURATION: 99 % | BODY MASS INDEX: 17.74 KG/M2 | HEART RATE: 65 BPM

## 2017-11-29 DIAGNOSIS — M25.511 RIGHT SHOULDER PAIN: Primary | ICD-10-CM

## 2017-11-29 PROCEDURE — 99283 EMERGENCY DEPT VISIT LOW MDM: CPT

## 2017-11-29 RX ORDER — CYCLOBENZAPRINE HCL 10 MG
10 TABLET ORAL 2 TIMES DAILY PRN
Qty: 20 TABLET | Refills: 0 | Status: SHIPPED | OUTPATIENT
Start: 2017-11-29 | End: 2018-02-01 | Stop reason: ALTCHOICE

## 2017-11-29 RX ORDER — NAPROXEN 500 MG/1
500 TABLET ORAL 2 TIMES DAILY WITH MEALS
Qty: 20 TABLET | Refills: 0 | Status: SHIPPED | OUTPATIENT
Start: 2017-11-29 | End: 2018-01-28

## 2017-11-29 NOTE — DISCHARGE INSTRUCTIONS
Take the Naprosyn regularly for discomfort, you can try the Flexeril again for spasm, be careful as this can make you drowsy  Try a heating pad to help loosen the muscles  Follow up with your orthopedist for further management of your recurrent shoulder pain  Shoulder Pain, Ambulatory Care   GENERAL INFORMATION:   Shoulder pain  is a common problem and can affect your daily activities  Pain can be caused by a problem within your shoulder  Shoulder pain may also be caused by pain that spreads to your shoulder from another part of your body  Seek immediate care for the following symptoms:   · Severe pain    · Inability to move your arm or shoulder    · Numbness or tingling in your shoulder or arm  Treatment for shoulder pain  may include any of the following:  · Acetaminophen  decreases pain and fever  It is available without a doctor's order  Ask how much to take and how often to take it  Follow directions  Acetaminophen can cause liver damage if not taken correctly  · NSAIDs  help decrease swelling and pain or fever  This medicine is available with or without a doctor's order  NSAIDs can cause stomach bleeding or kidney problems in certain people  If you take blood thinner medicine, always ask your healthcare provider if NSAIDs are safe for you  Always read the medicine label and follow directions  · A steroid injection  may help decrease pain and swelling  · Surgery  may be needed for long-term pain and loss of function  Manage your symptoms:   · Apply ice  on your shoulder for 20 to 30 minutes every 2 hours or as directed  Use an ice pack, or put crushed ice in a plastic bag  Cover it with a towel  Ice helps prevent tissue damage and decreases swelling and pain  · Apply heat if ice does not help your symptoms  Apply heat on your shoulder for 20 to 30 minutes every 2 hours for as many days as directed  Heat helps decrease pain and muscle spasms      · Go to physical or occupational therapy as directed  A physical therapist teaches you exercises to help improve movement and strength, and to decrease pain  An occupational therapist teaches you skills to help with your daily activities  Prevent shoulder pain:   · Stretch and strengthen your shoulder  Use proper technique during exercises and sports  · Limit activities as directed  Try to avoid repeated overhead movements  Follow up with your healthcare provider or orthopedist as directed:  Write down your questions so you remember to ask them during your visits  CARE AGREEMENT:   You have the right to help plan your care  Learn about your health condition and how it may be treated  Discuss treatment options with your caregivers to decide what care you want to receive  You always have the right to refuse treatment  The above information is an  only  It is not intended as medical advice for individual conditions or treatments  Talk to your doctor, nurse or pharmacist before following any medical regimen to see if it is safe and effective for you  © 2014 [address] is for End User's use only and may not be sold, redistributed or otherwise used for commercial purposes  All illustrations and images included in CareNotes® are the copyrighted property of A D A M [employer] Inc  or [employer] V[employer]

## 2017-11-29 NOTE — ED PROVIDER NOTES
History  Chief Complaint   Patient presents with    Shoulder Pain     right shoulder pain denies injury yesterday, recurrent  issues with rotator cuff       39 YO female presents with Right shoulder pain  Pt states this is located in the anterior shoulder, constant, worse with movement  Pain started gradually yesterday  States she has had similar pain in the past  Pt has seen her PCP as well as an orthopedic surgeon for this, states she has had an MRI  Notes she continues to have the pain every 3-4 months despite treatments including physical therapy, NSAIDs  Pt was given muscle relaxers in a previous visit that seemed to show some benefit  Pt denies weakness or numbness, no new or different symptoms  Pt denies CP/SOB/F/C/N/V/D/C, no dysuria, burning on urination or blood in urine  History provided by:  Patient   used: No    Shoulder Pain   Location:  Shoulder  Shoulder location:  R shoulder  Injury: no    Pain details:     Quality:  Aching    Radiates to:  Does not radiate    Severity:  Moderate    Onset quality:  Gradual    Duration:  1 day    Timing:  Constant    Progression:  Unchanged  Handedness:  Right-handed  Dislocation: no    Prior injury to area:  No  Relieved by:  Nothing  Worsened by: Movement  Ineffective treatments:  None tried  Associated symptoms: no back pain, no decreased range of motion, no fatigue, no fever, no numbness and no swelling        None       Past Medical History:   Diagnosis Date    Anxiety     Migraine     Panic attack     Vertigo        Past Surgical History:   Procedure Laterality Date    ABDOMINAL SURGERY       SECTION      TUBAL LIGATION         History reviewed  No pertinent family history  I have reviewed and agree with the history as documented      Social History   Substance Use Topics    Smoking status: Former Smoker    Smokeless tobacco: Never Used    Alcohol use Yes      Comment: occsionally        Review of Systems Constitutional: Negative for chills, fatigue and fever  HENT: Negative for dental problem  Eyes: Negative for visual disturbance  Respiratory: Negative for shortness of breath  Cardiovascular: Negative for chest pain  Gastrointestinal: Negative for abdominal pain, diarrhea and vomiting  Genitourinary: Negative for dysuria and frequency  Musculoskeletal: Negative for arthralgias and back pain  Skin: Negative for rash  Neurological: Negative for dizziness, weakness and light-headedness  Psychiatric/Behavioral: Negative for agitation, behavioral problems and confusion  All other systems reviewed and are negative  Physical Exam  ED Triage Vitals [11/29/17 0956]   Temperature Pulse Respirations Blood Pressure SpO2   98 3 °F (36 8 °C) 65 14 114/76 99 %      Temp Source Heart Rate Source Patient Position - Orthostatic VS BP Location FiO2 (%)   Temporal Monitor -- Left arm --      Pain Score       8           Orthostatic Vital Signs  Vitals:    11/29/17 0956   BP: 114/76   Pulse: 65       Physical Exam   Constitutional: She is oriented to person, place, and time  She appears well-developed and well-nourished  HENT:   Head: Normocephalic and atraumatic  Eyes: EOM are normal    Neck: Normal range of motion  Cardiovascular: Normal rate, regular rhythm and normal heart sounds  Pulmonary/Chest: Effort normal and breath sounds normal    Abdominal: Soft  Musculoskeletal: Normal range of motion  Arms:  Neurological: She is alert and oriented to person, place, and time  Skin: Skin is warm and dry  Psychiatric: She has a normal mood and affect  Her behavior is normal  Thought content normal    Nursing note and vitals reviewed        ED Medications  Medications - No data to display    Diagnostic Studies  Results Reviewed     None                 No orders to display              Procedures  Procedures       Phone Contacts  ED Phone Contact    ED Course  ED Course MDM  Number of Diagnoses or Management Options  Right shoulder pain: new and requires workup  Diagnosis management comments: 1  Right shoulder pain - Pt with recurrent pain in the Right shoulder, states symptoms starting yesterday are similar to previous  Pt notes muscle relaxer did previously seem to help, will prescribe flexeril, NSAIDs, instruct pt to follow back up with orthopedist     Patient Progress  Patient progress: stable    CritCare Time    Disposition  Final diagnoses:   Right shoulder pain     Time reflects when diagnosis was documented in both MDM as applicable and the Disposition within this note     Time User Action Codes Description Comment    11/29/2017 10:23 AM Jessika, [facility]       ED Disposition     ED Disposition Condition Comment    Discharge  Zoila Lopez discharge to home/self care  Condition at discharge: Stable        Follow-up Information    None       Discharge Medication List as of 11/29/2017 10:24 AM      START taking these medications    Details   cyclobenzaprine (FLEXERIL) 10 mg tablet Take 1 tablet by mouth 2 (two) times a day as needed for muscle spasms, Starting Wed 11/29/2017, Print      naproxen (NAPROSYN) 500 mg tablet Take 1 tablet by mouth 2 (two) times a day with meals for 10 days, Starting Wed 11/29/2017, Until Sat 12/9/2017, Print           No discharge procedures on file      ED Provider  Electronically Signed by           Alma Becerra MD  11/29/17 1037

## 2017-11-30 ENCOUNTER — GENERIC CONVERSION - ENCOUNTER (OUTPATIENT)
Dept: OTHER | Facility: OTHER | Age: 37
End: 2017-11-30

## 2017-12-01 ENCOUNTER — GENERIC CONVERSION - ENCOUNTER (OUTPATIENT)
Dept: FAMILY MEDICINE CLINIC | Facility: CLINIC | Age: 37
End: 2017-12-01

## 2017-12-05 ENCOUNTER — GENERIC CONVERSION - ENCOUNTER (OUTPATIENT)
Dept: OTHER | Facility: OTHER | Age: 37
End: 2017-12-05

## 2017-12-05 ENCOUNTER — APPOINTMENT (OUTPATIENT)
Dept: LAB | Facility: CLINIC | Age: 37
End: 2017-12-05
Payer: COMMERCIAL

## 2017-12-05 ENCOUNTER — TRANSCRIBE ORDERS (OUTPATIENT)
Dept: LAB | Facility: CLINIC | Age: 37
End: 2017-12-05

## 2017-12-05 DIAGNOSIS — R10.13 EPIGASTRIC PAIN: ICD-10-CM

## 2017-12-05 DIAGNOSIS — R93.2 NONVISUALIZATION OF GALLBLADDER: ICD-10-CM

## 2017-12-05 DIAGNOSIS — R10.13 ABDOMINAL PAIN, EPIGASTRIC: Primary | ICD-10-CM

## 2017-12-05 DIAGNOSIS — R63.4 ABNORMAL WEIGHT LOSS: ICD-10-CM

## 2017-12-05 DIAGNOSIS — R93.2 ABNORMAL FINDINGS ON DIAGNOSTIC IMAGING OF LIVER AND BILIARY TRACT: ICD-10-CM

## 2017-12-05 DIAGNOSIS — R63.4 LOSS OF WEIGHT: ICD-10-CM

## 2017-12-05 LAB
ALBUMIN SERPL BCP-MCNC: 3.8 G/DL (ref 3.5–5)
ALP SERPL-CCNC: 64 U/L (ref 46–116)
ALT SERPL W P-5'-P-CCNC: 16 U/L (ref 12–78)
ANION GAP SERPL CALCULATED.3IONS-SCNC: 6 MMOL/L (ref 4–13)
AST SERPL W P-5'-P-CCNC: 8 U/L (ref 5–45)
BILIRUB SERPL-MCNC: 0.51 MG/DL (ref 0.2–1)
BUN SERPL-MCNC: 15 MG/DL (ref 5–25)
CALCIUM SERPL-MCNC: 8.9 MG/DL (ref 8.3–10.1)
CHLORIDE SERPL-SCNC: 105 MMOL/L (ref 100–108)
CO2 SERPL-SCNC: 24 MMOL/L (ref 21–32)
CREAT SERPL-MCNC: 0.65 MG/DL (ref 0.6–1.3)
GFR SERPL CREATININE-BSD FRML MDRD: 115 ML/MIN/1.73SQ M
GLUCOSE P FAST SERPL-MCNC: 81 MG/DL (ref 65–99)
LIPASE SERPL-CCNC: 110 U/L (ref 73–393)
POTASSIUM SERPL-SCNC: 4.2 MMOL/L (ref 3.5–5.3)
PROT SERPL-MCNC: 7.7 G/DL (ref 6.4–8.2)
SODIUM SERPL-SCNC: 135 MMOL/L (ref 136–145)
TSH SERPL DL<=0.05 MIU/L-ACNC: 1.6 UIU/ML (ref 0.36–3.74)

## 2017-12-05 PROCEDURE — 80053 COMPREHEN METABOLIC PANEL: CPT

## 2017-12-05 PROCEDURE — 84443 ASSAY THYROID STIM HORMONE: CPT

## 2017-12-05 PROCEDURE — 83690 ASSAY OF LIPASE: CPT

## 2017-12-05 PROCEDURE — 80074 ACUTE HEPATITIS PANEL: CPT

## 2017-12-06 LAB
HAV IGM SER QL: NORMAL
HBV CORE IGM SER QL: NORMAL
HBV SURFACE AG SER QL: NORMAL
HCV AB SER QL: NORMAL

## 2017-12-12 ENCOUNTER — HOSPITAL ENCOUNTER (OUTPATIENT)
Dept: ULTRASOUND IMAGING | Facility: HOSPITAL | Age: 37
Discharge: HOME | End: 2017-12-12
Payer: COMMERCIAL

## 2017-12-12 DIAGNOSIS — R93.2 ABNORMAL FINDINGS ON DIAGNOSTIC IMAGING OF LIVER AND BILIARY TRACT: ICD-10-CM

## 2017-12-12 PROCEDURE — 76705 ECHO EXAM OF ABDOMEN: CPT

## 2018-01-11 NOTE — RESULT NOTES
Verified Results  * XR SPINE CERVICAL COMPLETE 4 OR 5 VW NON INJURY [id number] 02:10PM [employer] Order Number: [id number]     Test Name Result Flag Reference   XR SPINE CERVICAL COMPLETE 4 OR 5 VW (Report)     CERVICAL SPINE     INDICATION: Neck pain  COMPARISON: 4/7/2016     VIEWS: 5; 5 images     FINDINGS:     No evidence of fracture or subluxation  The intervertebral disc spaces are preserved  The neural foramina are patent  The prevertebral soft tissues are within normal limits  The lung apices are intact  IMPRESSION:     Unremarkable cervical spine  Workstation performed: [id number]     Signed by:   Lemuel Virk MD   2/20/17     * XR SHOULDER 2+ VIEW RIGHT [id number] 02:10PM [employer] Order Number: [id number]     Test Name Result Flag Reference   XR SHOULDER 2+ VW RIGHT (Report)     RIGHT SHOULDER     INDICATION: Right shoulder pain     COMPARISON: 2/26/2016     VIEWS: 3; 3 images     FINDINGS:     There is no acute fracture or dislocation  Mild degenerative changes of the acromioclavicular joint  No lytic or blastic lesions are seen  Soft tissues are unremarkable  IMPRESSION:     No acute osseous abnormality         Workstation performed: [id number]     Signed by:   Lemuel Virk MD   2/20/17

## 2018-01-12 VITALS
BODY MASS INDEX: 18.03 KG/M2 | HEART RATE: 78 BPM | OXYGEN SATURATION: 99 % | WEIGHT: 89.44 LBS | HEIGHT: 59 IN | DIASTOLIC BLOOD PRESSURE: 62 MMHG | SYSTOLIC BLOOD PRESSURE: 106 MMHG | TEMPERATURE: 97 F | RESPIRATION RATE: 16 BRPM

## 2018-01-14 VITALS
HEART RATE: 94 BPM | RESPIRATION RATE: 18 BRPM | SYSTOLIC BLOOD PRESSURE: 116 MMHG | WEIGHT: 93 LBS | HEIGHT: 59 IN | TEMPERATURE: 97.6 F | BODY MASS INDEX: 18.75 KG/M2 | OXYGEN SATURATION: 98 % | DIASTOLIC BLOOD PRESSURE: 72 MMHG

## 2018-01-18 ENCOUNTER — GENERIC CONVERSION - ENCOUNTER (OUTPATIENT)
Dept: OTHER | Facility: OTHER | Age: 38
End: 2018-01-18

## 2018-01-24 VITALS
WEIGHT: 85.13 LBS | HEART RATE: 92 BPM | SYSTOLIC BLOOD PRESSURE: 108 MMHG | RESPIRATION RATE: 18 BRPM | TEMPERATURE: 97.3 F | OXYGEN SATURATION: 100 % | HEIGHT: 59 IN | BODY MASS INDEX: 17.16 KG/M2 | DIASTOLIC BLOOD PRESSURE: 70 MMHG

## 2018-01-24 VITALS
WEIGHT: 88.56 LBS | BODY MASS INDEX: 17.85 KG/M2 | HEART RATE: 100 BPM | OXYGEN SATURATION: 96 % | SYSTOLIC BLOOD PRESSURE: 90 MMHG | TEMPERATURE: 97.4 F | RESPIRATION RATE: 18 BRPM | HEIGHT: 59 IN | DIASTOLIC BLOOD PRESSURE: 60 MMHG

## 2018-01-28 ENCOUNTER — HOSPITAL ENCOUNTER (EMERGENCY)
Facility: HOSPITAL | Age: 38
Discharge: HOME | End: 2018-01-28
Attending: EMERGENCY MEDICINE | Admitting: EMERGENCY MEDICINE
Payer: COMMERCIAL

## 2018-01-28 VITALS
SYSTOLIC BLOOD PRESSURE: 111 MMHG | DIASTOLIC BLOOD PRESSURE: 78 MMHG | OXYGEN SATURATION: 99 % | HEART RATE: 78 BPM | RESPIRATION RATE: 16 BRPM | TEMPERATURE: 98.3 F

## 2018-01-28 DIAGNOSIS — K80.50 BILIARY COLIC: Primary | ICD-10-CM

## 2018-01-28 LAB
ALBUMIN SERPL BCP-MCNC: 3.6 G/DL (ref 3.5–5)
ALP SERPL-CCNC: 87 U/L (ref 46–116)
ALT SERPL W P-5'-P-CCNC: 22 U/L (ref 12–78)
ANION GAP SERPL CALCULATED.3IONS-SCNC: 8 MMOL/L (ref 4–13)
AST SERPL W P-5'-P-CCNC: 11 U/L (ref 5–45)
BASOPHILS # BLD AUTO: 0.02 THOUSANDS/ΜL (ref 0–0.1)
BASOPHILS NFR BLD AUTO: 0 % (ref 0–1)
BILIRUB SERPL-MCNC: 0.33 MG/DL (ref 0.2–1)
BILIRUB UR QL STRIP: NEGATIVE
BUN SERPL-MCNC: 13 MG/DL (ref 5–25)
CALCIUM SERPL-MCNC: 8.2 MG/DL (ref 8.3–10.1)
CHLORIDE SERPL-SCNC: 103 MMOL/L (ref 100–108)
CLARITY UR: CLEAR
CO2 SERPL-SCNC: 26 MMOL/L (ref 21–32)
COLOR UR: YELLOW
COLOR, POC: YELLOW
CREAT SERPL-MCNC: 0.85 MG/DL (ref 0.6–1.3)
EOSINOPHIL # BLD AUTO: 0.13 THOUSAND/ΜL (ref 0–0.61)
EOSINOPHIL NFR BLD AUTO: 2 % (ref 0–6)
ERYTHROCYTE [DISTWIDTH] IN BLOOD BY AUTOMATED COUNT: 12 % (ref 11.6–15.1)
GFR SERPL CREATININE-BSD FRML MDRD: 88 ML/MIN/1.73SQ M
GLUCOSE SERPL-MCNC: 111 MG/DL (ref 65–140)
GLUCOSE UR STRIP-MCNC: NEGATIVE MG/DL
HCT VFR BLD AUTO: 34 % (ref 34.8–46.1)
HGB BLD-MCNC: 11.7 G/DL (ref 11.5–15.4)
HGB UR QL STRIP.AUTO: NEGATIVE
KETONES UR STRIP-MCNC: NEGATIVE MG/DL
LEUKOCYTE ESTERASE UR QL STRIP: NEGATIVE
LIPASE SERPL-CCNC: 388 U/L (ref 73–393)
LYMPHOCYTES # BLD AUTO: 1.64 THOUSANDS/ΜL (ref 0.6–4.47)
LYMPHOCYTES NFR BLD AUTO: 21 % (ref 14–44)
MCH RBC QN AUTO: 31.1 PG (ref 26.8–34.3)
MCHC RBC AUTO-ENTMCNC: 34.4 G/DL (ref 31.4–37.4)
MCV RBC AUTO: 90 FL (ref 82–98)
MONOCYTES # BLD AUTO: 0.83 THOUSAND/ΜL (ref 0.17–1.22)
MONOCYTES NFR BLD AUTO: 10 % (ref 4–12)
NEUTROPHILS # BLD AUTO: 5.37 THOUSANDS/ΜL (ref 1.85–7.62)
NEUTS SEG NFR BLD AUTO: 67 % (ref 43–75)
NITRITE UR QL STRIP: NEGATIVE
NRBC BLD AUTO-RTO: 0 /100 WBCS
PH UR STRIP.AUTO: 6 [PH] (ref 4.5–8)
PLATELET # BLD AUTO: 288 THOUSANDS/UL (ref 149–390)
PMV BLD AUTO: 11.1 FL (ref 8.9–12.7)
POTASSIUM SERPL-SCNC: 3.8 MMOL/L (ref 3.5–5.3)
PROT SERPL-MCNC: 7.2 G/DL (ref 6.4–8.2)
PROT UR STRIP-MCNC: NEGATIVE MG/DL
RBC # BLD AUTO: 3.76 MILLION/UL (ref 3.81–5.12)
SODIUM SERPL-SCNC: 137 MMOL/L (ref 136–145)
SP GR UR STRIP.AUTO: 1.01 (ref 1–1.03)
UROBILINOGEN UR QL STRIP.AUTO: 0.2 E.U./DL
WBC # BLD AUTO: 7.99 THOUSAND/UL (ref 4.31–10.16)

## 2018-01-28 PROCEDURE — 80053 COMPREHEN METABOLIC PANEL: CPT | Performed by: EMERGENCY MEDICINE

## 2018-01-28 PROCEDURE — 96361 HYDRATE IV INFUSION ADD-ON: CPT

## 2018-01-28 PROCEDURE — 96374 THER/PROPH/DIAG INJ IV PUSH: CPT

## 2018-01-28 PROCEDURE — 81003 URINALYSIS AUTO W/O SCOPE: CPT

## 2018-01-28 PROCEDURE — 81002 URINALYSIS NONAUTO W/O SCOPE: CPT | Performed by: EMERGENCY MEDICINE

## 2018-01-28 PROCEDURE — 85025 COMPLETE CBC W/AUTO DIFF WBC: CPT | Performed by: EMERGENCY MEDICINE

## 2018-01-28 PROCEDURE — 96375 TX/PRO/DX INJ NEW DRUG ADDON: CPT

## 2018-01-28 PROCEDURE — 83690 ASSAY OF LIPASE: CPT | Performed by: EMERGENCY MEDICINE

## 2018-01-28 PROCEDURE — 99284 EMERGENCY DEPT VISIT MOD MDM: CPT

## 2018-01-28 RX ORDER — DICYCLOMINE HYDROCHLORIDE 10 MG/1
10 CAPSULE ORAL
COMMUNITY
End: 2018-02-01 | Stop reason: ALTCHOICE

## 2018-01-28 RX ORDER — KETOROLAC TROMETHAMINE 30 MG/ML
15 INJECTION, SOLUTION INTRAMUSCULAR; INTRAVENOUS ONCE
Status: COMPLETED | OUTPATIENT
Start: 2018-01-28 | End: 2018-01-28

## 2018-01-28 RX ORDER — METOCLOPRAMIDE 10 MG/1
10 TABLET ORAL 3 TIMES DAILY PRN
Qty: 12 TABLET | Refills: 0 | Status: SHIPPED | OUTPATIENT
Start: 2018-01-28 | End: 2018-02-01 | Stop reason: ALTCHOICE

## 2018-01-28 RX ORDER — ONDANSETRON 2 MG/ML
4 INJECTION INTRAMUSCULAR; INTRAVENOUS ONCE
Status: COMPLETED | OUTPATIENT
Start: 2018-01-28 | End: 2018-01-28

## 2018-01-28 RX ORDER — SUCRALFATE 1 G/1
1 TABLET ORAL
Qty: 56 TABLET | Refills: 0 | Status: SHIPPED | OUTPATIENT
Start: 2018-01-28 | End: 2018-02-01 | Stop reason: ALTCHOICE

## 2018-01-28 RX ORDER — CITALOPRAM 10 MG/1
10 TABLET ORAL DAILY
COMMUNITY
End: 2018-02-19

## 2018-01-28 RX ADMIN — ONDANSETRON 4 MG: 2 INJECTION INTRAMUSCULAR; INTRAVENOUS at 21:48

## 2018-01-28 RX ADMIN — KETOROLAC TROMETHAMINE 15 MG: 30 INJECTION, SOLUTION INTRAMUSCULAR at 21:49

## 2018-01-28 RX ADMIN — SODIUM CHLORIDE 1000 ML: 0.9 INJECTION, SOLUTION INTRAVENOUS at 21:50

## 2018-01-29 NOTE — ED PROVIDER NOTES
History  Chief Complaint   Patient presents with    Abdominal Pain     Abdominal pain for 1-2 months secondary to gall stones; today feels weak  Pt is to have a cholecystectomy, however, must recover from hysterectomy she had to have on   History provided by:  Patient  Abdominal Pain   Pain location:  Epigastric and RUQ  Pain quality: aching and cramping    Pain radiates to:  Does not radiate  Onset quality:  Gradual  Duration:  8 weeks  Timing:  Intermittent  Progression:  Waxing and waning  Chronicity:  Recurrent  Relieved by:  Nothing  Worsened by:  Eating  Associated symptoms: fatigue and nausea    Associated symptoms: no chest pain, no diarrhea, no dysuria, no hematemesis, no hematochezia and no vomiting    Risk factors: multiple surgeries (ALEXA/BSO hysterectomy in December for fibroids, ovarian cysts) and recent hospitalization        Prior to Admission Medications   Prescriptions Last Dose Informant Patient Reported? Taking?   citalopram (CeleXA) 10 mg tablet   Yes Yes   Sig: Take 10 mg by mouth daily   cyclobenzaprine (FLEXERIL) 10 mg tablet   No No   Sig: Take 1 tablet by mouth 2 (two) times a day as needed for muscle spasms   dicyclomine (BENTYL) 10 mg capsule   Yes Yes   Sig: Take 10 mg by mouth 4 (four) times a day (before meals and at bedtime)   naproxen (NAPROSYN) 500 mg tablet   No No   Sig: Take 1 tablet by mouth 2 (two) times a day with meals for 10 days      Facility-Administered Medications: None       Past Medical History:   Diagnosis Date    Anxiety     Gallstones     Migraine     Panic attack     Vertigo        Past Surgical History:   Procedure Laterality Date    ABDOMINAL SURGERY       SECTION      HYSTERECTOMY      TUBAL LIGATION         History reviewed  No pertinent family history  I have reviewed and agree with the history as documented      Social History   Substance Use Topics    Smoking status: Former Smoker    Smokeless tobacco: Never Used    Alcohol use No      Comment: occsionally        Review of Systems   Constitutional: Positive for fatigue  Cardiovascular: Negative for chest pain  Gastrointestinal: Positive for abdominal pain and nausea  Negative for diarrhea, hematemesis, hematochezia and vomiting  Genitourinary: Negative for dysuria  Physical Exam  ED Triage Vitals [01/28/18 2036]   Temperature Pulse Respirations Blood Pressure SpO2   98 3 °F (36 8 °C) 75 16 106/72 99 %      Temp Source Heart Rate Source Patient Position - Orthostatic VS BP Location FiO2 (%)   Oral Monitor -- -- --      Pain Score       6           Orthostatic Vital Signs  Vitals:    01/28/18 2036   BP: 106/72   Pulse: 75       Physical Exam   Constitutional: She is oriented to person, place, and time  She appears well-developed and well-nourished  No distress  HENT:   Head: Normocephalic and atraumatic  Right Ear: External ear normal    Left Ear: External ear normal    Nose: Nose normal    Eyes: Conjunctivae and EOM are normal  Pupils are equal, round, and reactive to light  Neck: Normal range of motion  Neck supple  Cardiovascular: Normal rate and regular rhythm  Pulmonary/Chest: Effort normal    Abdominal: Soft  There is generalized tenderness  Musculoskeletal: Normal range of motion  Neurological: She is alert and oriented to person, place, and time  She has normal strength  Gait normal    Skin: Skin is warm and dry  No rash noted  She is not diaphoretic  Psychiatric: She has a normal mood and affect  Her behavior is normal  Judgment and thought content normal    Nursing note and vitals reviewed        ED Medications  Medications   ondansetron (ZOFRAN) injection 4 mg (4 mg Intravenous Given 1/28/18 2148)   ketorolac (TORADOL) injection 15 mg (15 mg Intravenous Given 1/28/18 2149)   sodium chloride 0 9 % bolus 1,000 mL (0 mL Intravenous Stopped 1/28/18 2248)       Diagnostic Studies  Results Reviewed     Procedure Component Value Units Date/Time Lipase [[id number]]  (Normal) Resulted:  01/28/18 2150    Lab Status:  Final result Specimen:  Blood Updated:  01/28/18 2150     Lipase 388 u/L     Comprehensive metabolic panel [[id number]]  (Abnormal) Resulted:  01/28/18 2150    Lab Status:  Final result Specimen:  Blood Updated:  01/28/18 2150     Sodium 137 mmol/L      Potassium 3 8 mmol/L      Chloride 103 mmol/L      CO2 26 mmol/L      Anion Gap 8 mmol/L      BUN 13 mg/dL      Creatinine 0 85 mg/dL      Glucose 111 mg/dL      Calcium 8 2 (L) mg/dL      AST 11 U/L      ALT 22 U/L      Alkaline Phosphatase 87 U/L      Total Protein 7 2 g/dL      Albumin 3 6 g/dL      Total Bilirubin 0 33 mg/dL      eGFR 88 ml/min/1 73sq m     Narrative:         National Kidney Disease Education Program recommendations are as follows:  GFR calculation is accurate only with a steady state creatinine  Chronic Kidney disease less than 60 ml/min/1 73 sq  meters  Kidney failure less than 15 ml/min/1 73 sq  meters      POCT urinalysis dipstick [[id number]]  (Normal) Resulted:  01/28/18 2150    Lab Status:  Final result Specimen:  Urine Updated:  01/28/18 2150     Color, UA yellow    ED Urine Macroscopic [[id number]]  (Normal) Collected:  01/28/18 2141    Lab Status:  Final result Specimen:  Urine Updated:  01/28/18 2142     Color, UA Yellow     Clarity, UA Clear     pH, UA 6 0     Leukocytes, UA Negative     Nitrite, UA Negative     Protein, UA Negative mg/dl      Glucose, UA Negative mg/dl      Ketones, UA Negative mg/dl      Urobilinogen, UA 0 2 E U /dl      Bilirubin, UA Negative     Blood, UA Negative     Specific Gravity, UA 1 010    Narrative:       CLINITEK RESULT    CBC and differential [[id number]]  (Abnormal) Resulted:  01/28/18 2139    Lab Status:  Final result Specimen:  Blood Updated:  01/28/18 2139     WBC 7 99 Thousand/uL      RBC 3 76 (L) Million/uL      Hemoglobin 11 7 g/dL      Hematocrit 34 0 (L) %      MCV 90 fL      MCH 31 1 pg      MCHC 34 4 g/dL      RDW 12 0 %      MPV 11 1 fL      Platelets 069 Thousands/uL      nRBC 0 /100 WBCs      Neutrophils Relative 67 %      Lymphocytes Relative 21 %      Monocytes Relative 10 %      Eosinophils Relative 2 %      Basophils Relative 0 %      Neutrophils Absolute 5 37 Thousands/µL      Lymphocytes Absolute 1 64 Thousands/µL      Monocytes Absolute 0 83 Thousand/µL      Eosinophils Absolute 0 13 Thousand/µL      Basophils Absolute 0 02 Thousands/µL                  No orders to display              Procedures  Procedures       Phone Contacts  ED Phone Contact    ED Course  ED Course as of Jan 28 2255   Sun Jan 28, 2018 2249 ED lab workup is reassuring  She just feels weak and fatigued, but she is otherwise well hydrated, nontoxic appearing  I am inclined to add some medications for her symptoms at home  MDM  CritCare Time    Disposition  Final diagnoses:   Biliary colic     Time reflects when diagnosis was documented in both MDM as applicable and the Disposition within this note     Time User Action Codes Description Comment    1/28/2018 10:54 PM Azra Bernard Add [T64 18] Biliary colic       ED Disposition     ED Disposition Condition Comment    Discharge  Lien Lama discharge to home/self care      Condition at discharge: Good        Follow-up Information     Follow up With Specialties Details Why Contact Info    Mala Oliva PA-C Family Medicine Go to For followup THE [address] [facility]  [phone]          Patient's Medications   Discharge Prescriptions    METOCLOPRAMIDE (REGLAN) 10 MG TABLET    Take 1 tablet (10 mg total) by mouth 3 (three) times a day as needed (nausea)       Start Date: 1/28/2018 End Date: --       Order Dose: 10 mg       Quantity: 12 tablet    Refills: 0    SUCRALFATE (CARAFATE) 1 G TABLET    Take 1 tablet (1 g total) by mouth 4 (four) times a day (before meals and at bedtime) for 14 days       Start Date: 1/28/2018 End Date: 2/11/2018       Order Dose: 1 g       Quantity: 56 tablet    Refills: 0     No discharge procedures on file      ED Provider  Electronically Signed by           Deanna Hernández MD  01/28/18 4997

## 2018-01-30 RX ORDER — ESTAZOLAM 2 MG/1
TABLET ORAL
Refills: 0 | COMMUNITY
Start: 2017-10-24 | End: 2018-02-01 | Stop reason: ALTCHOICE

## 2018-01-30 RX ORDER — NORETHINDRONE ACETATE AND ETHINYL ESTRADIOL 1.5-30(21)
KIT ORAL
COMMUNITY
Start: 2017-11-27 | End: 2018-02-01 | Stop reason: ALTCHOICE

## 2018-01-30 RX ORDER — GABAPENTIN 300 MG/1
CAPSULE ORAL
Refills: 1 | COMMUNITY
Start: 2017-11-30 | End: 2018-02-01 | Stop reason: ALTCHOICE

## 2018-01-30 RX ORDER — MECLIZINE HYDROCHLORIDE 25 MG/1
TABLET ORAL
COMMUNITY
Start: 2017-04-27 | End: 2018-02-01 | Stop reason: ALTCHOICE

## 2018-01-30 RX ORDER — DEXAMETHASONE 2 MG/1
TABLET ORAL
COMMUNITY
Start: 2017-04-27 | End: 2018-02-01 | Stop reason: ALTCHOICE

## 2018-01-30 RX ORDER — FAMOTIDINE 40 MG/1
1 TABLET, FILM COATED ORAL DAILY
COMMUNITY
Start: 2017-12-05 | End: 2018-02-05 | Stop reason: SDUPTHER

## 2018-01-30 RX ORDER — BUTALBITAL, ACETAMINOPHEN AND CAFFEINE 50; 325; 40 MG/1; MG/1; MG/1
1 TABLET ORAL
COMMUNITY
Start: 2017-04-27 | End: 2018-02-01 | Stop reason: ALTCHOICE

## 2018-01-30 RX ORDER — IBUPROFEN 800 MG/1
800 TABLET ORAL EVERY 8 HOURS
COMMUNITY
Start: 2018-01-04 | End: 2018-02-19

## 2018-01-30 RX ORDER — OMEPRAZOLE 20 MG/1
20 CAPSULE, DELAYED RELEASE ORAL
COMMUNITY
End: 2018-02-01 | Stop reason: ALTCHOICE

## 2018-02-01 ENCOUNTER — OFFICE VISIT (OUTPATIENT)
Dept: SURGERY | Facility: CLINIC | Age: 38
End: 2018-02-01
Payer: COMMERCIAL

## 2018-02-01 VITALS
BODY MASS INDEX: 17.14 KG/M2 | TEMPERATURE: 98.2 F | DIASTOLIC BLOOD PRESSURE: 60 MMHG | RESPIRATION RATE: 16 BRPM | HEIGHT: 59 IN | SYSTOLIC BLOOD PRESSURE: 98 MMHG | HEART RATE: 76 BPM | WEIGHT: 85 LBS

## 2018-02-01 DIAGNOSIS — R10.11 RIGHT UPPER QUADRANT ABDOMINAL PAIN: Primary | ICD-10-CM

## 2018-02-01 DIAGNOSIS — K80.00 CALCULUS OF GALLBLADDER WITH ACUTE CHOLECYSTITIS WITHOUT OBSTRUCTION: ICD-10-CM

## 2018-02-01 PROCEDURE — 99243 OFF/OP CNSLTJ NEW/EST LOW 30: CPT | Performed by: SURGERY

## 2018-02-01 RX ORDER — FAMOTIDINE 40 MG/1
TABLET, FILM COATED ORAL
COMMUNITY
Start: 2017-12-05 | End: 2018-02-01 | Stop reason: SDUPTHER

## 2018-02-01 RX ORDER — MECLIZINE HYDROCHLORIDE 25 MG/1
TABLET ORAL
COMMUNITY
Start: 2017-12-05 | End: 2018-10-15 | Stop reason: SDUPTHER

## 2018-02-01 NOTE — LETTER
February 1, 2018     Jesse Gunderson PA-C  [address]    Patient: Shyanne Dodd   YOB: 1980   Date of Visit: 2/1/2018       Dear Dr Herndon Areas: Thank you for referring Shyanne Dodd to me for evaluation  Below are my notes for this consultation  If you have questions, please do not hesitate to call me  I look forward to following your patient along with you  Sincerely,        Pedro Pablo Mosher MD        CC: No Recipients  Pedro Pablo Mosher MD  2/1/2018 11:31 AM  Sign at close encounter  Assessment/Plan:   Shyanne Dodd is a 40 y  o female who is here for The primary encounter diagnosis was Right upper quadrant abdominal pain  A diagnosis of Calculus of gallbladder with acute cholecystitis without obstruction was also pertinent to this visit  Upon evaluation today,  patient has symptomatic cholelithiasis    Plan: Laparoscopic Cholecystectomy with possible Intraoperative Cholangiogram      Ultrasound shows gallstones and sludge and a normal liver at 16 cm  LFTs normal         Preoperative Clearance: None  Recent hysterectomy through a Pfannenstiel incision (12/17)               ____________________________________________________    HPI:  Shyanne Dodd is a 40 y  o female who was referred for evaluation of The primary encounter diagnosis was Right upper quadrant abdominal pain  A diagnosis of Calculus of gallbladder with acute cholecystitis without obstruction was also pertinent to this visit  Abdominal pain Location: in the RUQ without radiation, worse with fatty foods and greasy foods  About 3-4 months in duration  no nausea and no vomiting,   regular bowel movement       Prior Colonoscopy : None     Prior Abdominal Surgery:     Anticoagulation: none    Imaging:   No orders to display           LABS:    Lab Results   Component Value Date     01/28/2018    K 3 8 01/28/2018     01/28/2018    CO2 26 01/28/2018 ANIONGAP 8 01/28/2018    BUN 13 01/28/2018    CREATININE 0 85 01/28/2018    GLUCOSE 111 01/28/2018    GLUF 81 12/05/2017    CALCIUM 8 2 (L) 01/28/2018    AST 11 01/28/2018    ALT 22 01/28/2018    ALKPHOS 87 01/28/2018    PROT 7 2 01/28/2018    BILITOT 0 33 01/28/2018    EGFR 88 01/28/2018       Lab Results   Component Value Date    WBC 7 99 01/28/2018    HGB 11 7 01/28/2018    HCT 34 0 (L) 01/28/2018    MCV 90 01/28/2018     01/28/2018     No results found for: INR, PROTIME  No results found for: HGBA1C        ROS:  General ROS: negative for - chills, fatigue, fever or night sweats, weight loss  Respiratory ROS: no cough, shortness of breath, or wheezing  Cardiovascular ROS: no chest pain or dyspnea on exertion  Genito-Urinary ROS: no dysuria, trouble voiding, or hematuria  Musculoskeletal ROS: negative for - gait disturbance, joint pain or muscle pain  Neurological ROS: no TIA or stroke symptoms  Gastrointestinal ROS: See HPI   GI ROS: see HPI  Skin ROS: no new rashes or lesions   Lymphatic ROS: no new adenopathy noted by pt  GYN ROS: see HPI, no new GYN history or bleeding noted  Psy ROS: no new mental or behavioral disturbances       There is no problem list on file for this patient  Allergies:  Patient has no known allergies        Current Outpatient Prescriptions:     butalbital-acetaminophen-caffeine (FIORICET,ESGIC) -40 mg per tablet, Take 1 tablet by mouth, Disp: , Rfl:     dexamethasone (DECADRON) 2 mg tablet, Take by mouth, Disp: , Rfl:     famotidine (PEPCID) 40 MG tablet, Take 1 tablet by mouth daily, Disp: , Rfl:     ibuprofen (MOTRIN) 800 mg tablet, Take 800 mg by mouth every 8 (eight) hours, Disp: , Rfl:     meclizine (ANTIVERT) 25 mg tablet, Take by mouth, Disp: , Rfl:     norethindrone-ethinyl estradiol-iron (BLISOVI FE 1 5/30) 1 5-30 MG-MCG tablet, Take by mouth, Disp: , Rfl:     citalopram (CeleXA) 10 mg tablet, Take 10 mg by mouth daily, Disp: , Rfl:    cyclobenzaprine (FLEXERIL) 10 mg tablet, Take 1 tablet by mouth 2 (two) times a day as needed for muscle spasms, Disp: 20 tablet, Rfl: 0    diclofenac sodium (VOLTAREN) 50 mg EC tablet, TK 1 T PO TID PRN, Disp: , Rfl: 2    dicyclomine (BENTYL) 10 mg capsule, Take 10 mg by mouth 4 (four) times a day (before meals and at bedtime), Disp: , Rfl:     gabapentin (NEURONTIN) 300 mg capsule, TK 1 C PO AT BEDTIME  INCREASE TO 1 C TID AS TOLERATED, Disp: , Rfl: 1    metoclopramide (REGLAN) 10 mg tablet, Take 1 tablet (10 mg total) by mouth 3 (three) times a day as needed (nausea), Disp: 12 tablet, Rfl: 0    MICROGESTIN 1-20 MG-MCG per tablet, , Disp: , Rfl: 0    omeprazole (PriLOSEC) 20 mg delayed release capsule, Take 20 mg by mouth, Disp: , Rfl:     sucralfate (CARAFATE) 1 g tablet, Take 1 tablet (1 g total) by mouth 4 (four) times a day (before meals and at bedtime) for 14 days, Disp: 56 tablet, Rfl: 0    Past Medical History:   Diagnosis Date    Anxiety     Gallstones     Migraine     Panic attack     Vertigo        Past Surgical History:   Procedure Laterality Date    ABDOMINAL SURGERY       SECTION      HYSTERECTOMY      TUBAL LIGATION         Family History   Problem Relation Age of Onset    No Known Problems Family         reports that she has quit smoking  She has never used smokeless tobacco  She reports that she does not drink alcohol or use drugs  Exam:   There were no vitals filed for this visit      PHYSICAL EXAM  General Appearance:    Alert, cooperative, no distress,    Head:    Normocephalic without obvious abnormality   Eyes:    PERRL, conjunctiva/corneas clear, EOM's intact        Neck:   Supple, no adenopathy, no JVD   Back:     Symmetric, no spinal or CVA tenderness   Lungs:     Clear to auscultation bilaterally, no wheezing or rhonchi   Heart:    Regular rate and rhythm, S1 and S2 normal, no murmur   Abdomen:     abdomen is soft without significant tenderness, masses, organomegaly or guarding Bowel sounds are normal   Incision well healed  Extremities:   Extremities normal  No clubbing, cyanosis or edema   Psych:   Normal Affect, AOx3  Neurologic:  Skin:   CNII-XII intact  Strength symmetric, speech intact    Warm, dry, intact, no visible rashes or lesions      Informed consent for procedure was personally discussed, reviewed, and signed by Dr Santos Zuñiga  Discussion by Dr Santos Zuñiga was carried out regarding risks, benefits, and alternatives with the patient  Risks include but are not limited to:  bleeding, infection, and delayed wound healing or an open wound, pulmonary embolus, leaks from bowel or bile ducts or other viscus, transfusions, death  Discussed in further detail the more common complications and their rates of occurrence   was used if necessary  Patient expressed understanding of the issues discussed and wished/consented to proceed  All questions were answered by Dr Santos Zuñiga  Discussion performed between patient and the provider signing below  Signature:   Shreya Mcclelland MD    Date: 2/1/2018 Time: 11:15 AM                          Some portions of this record may have been generated with voice recognition software  There may be translation, syntax,  or grammatical errors  Occasional wrong word or "sound-a-like" substitutions may have occurred due to the inherent limitations of the voice recognition software  Read the chart carefully and recognize, using context, where substitutions may have occurred  If you have any questions, please contact the dictating provider for clarification or correction, as needed  This encounter has been coded by a non-certified coder

## 2018-02-01 NOTE — LETTER
February 5, 2018     Bethanie Sotelo PA-C  [address]    Patient: Elma Moran   YOB: 1980   Date of Visit: 2/1/2018       Dear Dr Mane Chew: Thank you for referring Elma Moran to me for evaluation  Below are my notes for this consultation  If you have questions, please do not hesitate to call me  I look forward to following your patient along with you  Sincerely,        Modesta Meza MD        CC: No Recipients  Modesta Meza MD  2/1/2018 11:19 AM  Sign at close encounter  Assessment/Plan:   Elma Moran is a 40 y  o female who is here for The primary encounter diagnosis was Right upper quadrant abdominal pain  A diagnosis of Calculus of gallbladder with acute cholecystitis without obstruction was also pertinent to this visit  Upon evaluation today,  patient has symptomatic cholelithiasis    Plan: Laparoscopic Cholecystectomy with possible Intraoperative Cholangiogram      Ultrasound shows gallstones and sludge and a normal liver at 16 cm  LFTs normal         Preoperative Clearance: None                    ____________________________________________________    HPI:  Elma Moran is a 40 y  o female who was referred for evaluation of The primary encounter diagnosis was Right upper quadrant abdominal pain  A diagnosis of Calculus of gallbladder with acute cholecystitis without obstruction was also pertinent to this visit  Abdominal pain Location: in the RUQ without radiation,   no nausea and no vomiting,   regular bowel movement       Prior Colonoscopy : None     Prior Abdominal Surgery:     Anticoagulation: none    Imaging:   No orders to display           LABS:    Lab Results   Component Value Date     01/28/2018    K 3 8 01/28/2018     01/28/2018    CO2 26 01/28/2018    ANIONGAP 8 01/28/2018    BUN 13 01/28/2018    CREATININE 0 85 01/28/2018    GLUCOSE 111 01/28/2018    GLUF 81 12/05/2017 CALCIUM 8 2 (L) 01/28/2018    AST 11 01/28/2018    ALT 22 01/28/2018    ALKPHOS 87 01/28/2018    PROT 7 2 01/28/2018    BILITOT 0 33 01/28/2018    EGFR 88 01/28/2018       Lab Results   Component Value Date    WBC 7 99 01/28/2018    HGB 11 7 01/28/2018    HCT 34 0 (L) 01/28/2018    MCV 90 01/28/2018     01/28/2018     No results found for: INR, PROTIME  No results found for: HGBA1C        ROS:  General ROS: negative for - chills, fatigue, fever or night sweats, weight loss  Respiratory ROS: no cough, shortness of breath, or wheezing  Cardiovascular ROS: no chest pain or dyspnea on exertion  Genito-Urinary ROS: no dysuria, trouble voiding, or hematuria  Musculoskeletal ROS: negative for - gait disturbance, joint pain or muscle pain  Neurological ROS: no TIA or stroke symptoms  Gastrointestinal ROS: See HPI   GI ROS: see HPI  Skin ROS: no new rashes or lesions   Lymphatic ROS: no new adenopathy noted by pt  GYN ROS: see HPI, no new GYN history or bleeding noted  Psy ROS: no new mental or behavioral disturbances       There is no problem list on file for this patient  Allergies:  Patient has no known allergies        Current Outpatient Prescriptions:     butalbital-acetaminophen-caffeine (FIORICET,ESGIC) -40 mg per tablet, Take 1 tablet by mouth, Disp: , Rfl:     dexamethasone (DECADRON) 2 mg tablet, Take by mouth, Disp: , Rfl:     famotidine (PEPCID) 40 MG tablet, Take 1 tablet by mouth daily, Disp: , Rfl:     ibuprofen (MOTRIN) 800 mg tablet, Take 800 mg by mouth every 8 (eight) hours, Disp: , Rfl:     meclizine (ANTIVERT) 25 mg tablet, Take by mouth, Disp: , Rfl:     norethindrone-ethinyl estradiol-iron (BLISOVI FE 1 5/30) 1 5-30 MG-MCG tablet, Take by mouth, Disp: , Rfl:     citalopram (CeleXA) 10 mg tablet, Take 10 mg by mouth daily, Disp: , Rfl:     cyclobenzaprine (FLEXERIL) 10 mg tablet, Take 1 tablet by mouth 2 (two) times a day as needed for muscle spasms, Disp: 20 tablet, Rfl: 0    diclofenac sodium (VOLTAREN) 50 mg EC tablet, TK 1 T PO TID PRN, Disp: , Rfl: 2    dicyclomine (BENTYL) 10 mg capsule, Take 10 mg by mouth 4 (four) times a day (before meals and at bedtime), Disp: , Rfl:     gabapentin (NEURONTIN) 300 mg capsule, TK 1 C PO AT BEDTIME  INCREASE TO 1 C TID AS TOLERATED, Disp: , Rfl: 1    metoclopramide (REGLAN) 10 mg tablet, Take 1 tablet (10 mg total) by mouth 3 (three) times a day as needed (nausea), Disp: 12 tablet, Rfl: 0    MICROGESTIN 1-20 MG-MCG per tablet, , Disp: , Rfl: 0    omeprazole (PriLOSEC) 20 mg delayed release capsule, Take 20 mg by mouth, Disp: , Rfl:     sucralfate (CARAFATE) 1 g tablet, Take 1 tablet (1 g total) by mouth 4 (four) times a day (before meals and at bedtime) for 14 days, Disp: 56 tablet, Rfl: 0    Past Medical History:   Diagnosis Date    Anxiety     Gallstones     Migraine     Panic attack     Vertigo        Past Surgical History:   Procedure Laterality Date    ABDOMINAL SURGERY       SECTION      HYSTERECTOMY      TUBAL LIGATION         Family History   Problem Relation Age of Onset    No Known Problems Family         reports that she has quit smoking  She has never used smokeless tobacco  She reports that she does not drink alcohol or use drugs  Exam:   There were no vitals filed for this visit      PHYSICAL EXAM  General Appearance:    Alert, cooperative, no distress,    Head:    Normocephalic without obvious abnormality   Eyes:    PERRL, conjunctiva/corneas clear, EOM's intact        Neck:   Supple, no adenopathy, no JVD   Back:     Symmetric, no spinal or CVA tenderness   Lungs:     Clear to auscultation bilaterally, no wheezing or rhonchi   Heart:    Regular rate and rhythm, S1 and S2 normal, no murmur   Abdomen:     abdomen is soft without significant tenderness, masses, organomegaly or guarding Bowel sounds are normal      Extremities:   Extremities normal  No clubbing, cyanosis or edema   Psych:   Normal Affect, AOx3  Neurologic:  Skin:   CNII-XII intact  Strength symmetric, speech intact    Warm, dry, intact, no visible rashes or lesions      Informed consent for procedure was personally discussed, reviewed, and signed by Dr Carolann Lopez  Discussion by Dr Carolann Lopez was carried out regarding risks, benefits, and alternatives with the patient  Risks include but are not limited to:  bleeding, infection, and delayed wound healing or an open wound, pulmonary embolus, leaks from bowel or bile ducts or other viscus, transfusions, death  Discussed in further detail the more common complications and their rates of occurrence   was used if necessary  Patient expressed understanding of the issues discussed and wished/consented to proceed  All questions were answered by Dr Carolann Lopez  Discussion performed between patient and the provider signing below  Signature:   Claudio Olivares MD    Date: 2/1/2018 Time: 11:15 AM                          Some portions of this record may have been generated with voice recognition software  There may be translation, syntax,  or grammatical errors  Occasional wrong word or "sound-a-like" substitutions may have occurred due to the inherent limitations of the voice recognition software  Read the chart carefully and recognize, using context, where substitutions may have occurred  If you have any questions, please contact the dictating provider for clarification or correction, as needed  This encounter has been coded by a non-certified coder

## 2018-02-01 NOTE — PROGRESS NOTES
Assessment/Plan:   Sruthi Castillo is a 40 y  o female who is here for The primary encounter diagnosis was Right upper quadrant abdominal pain  A diagnosis of Calculus of gallbladder with acute cholecystitis without obstruction was also pertinent to this visit  Upon evaluation today,  patient has symptomatic cholelithiasis    Plan: Laparoscopic Cholecystectomy with possible Intraoperative Cholangiogram      Ultrasound shows gallstones and sludge and a normal liver at 16 cm  LFTs normal         Preoperative Clearance: None  Recent hysterectomy through a Pfannenstiel incision (12/17)               ____________________________________________________    HPI:  Sruthi Castillo is a 40 y  o female who was referred for evaluation of The primary encounter diagnosis was Right upper quadrant abdominal pain  A diagnosis of Calculus of gallbladder with acute cholecystitis without obstruction was also pertinent to this visit  Abdominal pain Location: in the RUQ without radiation, worse with fatty foods and greasy foods  About 3-4 months in duration  no nausea and no vomiting,   regular bowel movement       Prior Colonoscopy : None     Prior Abdominal Surgery:     Anticoagulation: none    Imaging:   No orders to display           LABS:    Lab Results   Component Value Date     01/28/2018    K 3 8 01/28/2018     01/28/2018    CO2 26 01/28/2018    ANIONGAP 8 01/28/2018    BUN 13 01/28/2018    CREATININE 0 85 01/28/2018    GLUCOSE 111 01/28/2018    GLUF 81 12/05/2017    CALCIUM 8 2 (L) 01/28/2018    AST 11 01/28/2018    ALT 22 01/28/2018    ALKPHOS 87 01/28/2018    PROT 7 2 01/28/2018    BILITOT 0 33 01/28/2018    EGFR 88 01/28/2018       Lab Results   Component Value Date    WBC 7 99 01/28/2018    HGB 11 7 01/28/2018    HCT 34 0 (L) 01/28/2018    MCV 90 01/28/2018     01/28/2018     No results found for: INR, PROTIME  No results found for: HGBA1C        ROS:  General ROS: negative for - chills, fatigue, fever or night sweats, weight loss  Respiratory ROS: no cough, shortness of breath, or wheezing  Cardiovascular ROS: no chest pain or dyspnea on exertion  Genito-Urinary ROS: no dysuria, trouble voiding, or hematuria  Musculoskeletal ROS: negative for - gait disturbance, joint pain or muscle pain  Neurological ROS: no TIA or stroke symptoms  Gastrointestinal ROS: See HPI   GI ROS: see HPI  Skin ROS: no new rashes or lesions   Lymphatic ROS: no new adenopathy noted by pt  GYN ROS: see HPI, no new GYN history or bleeding noted  Psy ROS: no new mental or behavioral disturbances       There is no problem list on file for this patient  Allergies:  Patient has no known allergies  Current Outpatient Prescriptions:     butalbital-acetaminophen-caffeine (FIORICET,ESGIC) -40 mg per tablet, Take 1 tablet by mouth, Disp: , Rfl:     dexamethasone (DECADRON) 2 mg tablet, Take by mouth, Disp: , Rfl:     famotidine (PEPCID) 40 MG tablet, Take 1 tablet by mouth daily, Disp: , Rfl:     ibuprofen (MOTRIN) 800 mg tablet, Take 800 mg by mouth every 8 (eight) hours, Disp: , Rfl:     meclizine (ANTIVERT) 25 mg tablet, Take by mouth, Disp: , Rfl:     norethindrone-ethinyl estradiol-iron (BLISOVI FE 1 5/30) 1 5-30 MG-MCG tablet, Take by mouth, Disp: , Rfl:     citalopram (CeleXA) 10 mg tablet, Take 10 mg by mouth daily, Disp: , Rfl:     cyclobenzaprine (FLEXERIL) 10 mg tablet, Take 1 tablet by mouth 2 (two) times a day as needed for muscle spasms, Disp: 20 tablet, Rfl: 0    diclofenac sodium (VOLTAREN) 50 mg EC tablet, TK 1 T PO TID PRN, Disp: , Rfl: 2    dicyclomine (BENTYL) 10 mg capsule, Take 10 mg by mouth 4 (four) times a day (before meals and at bedtime), Disp: , Rfl:     gabapentin (NEURONTIN) 300 mg capsule, TK 1 C PO AT BEDTIME   INCREASE TO 1 C TID AS TOLERATED, Disp: , Rfl: 1    metoclopramide (REGLAN) 10 mg tablet, Take 1 tablet (10 mg total) by mouth 3 (three) times a day as needed (nausea), Disp: 12 tablet, Rfl: 0    MICROGESTIN 1-20 MG-MCG per tablet, , Disp: , Rfl: 0    omeprazole (PriLOSEC) 20 mg delayed release capsule, Take 20 mg by mouth, Disp: , Rfl:     sucralfate (CARAFATE) 1 g tablet, Take 1 tablet (1 g total) by mouth 4 (four) times a day (before meals and at bedtime) for 14 days, Disp: 56 tablet, Rfl: 0    Past Medical History:   Diagnosis Date    Anxiety     Gallstones     Migraine     Panic attack     Vertigo        Past Surgical History:   Procedure Laterality Date    ABDOMINAL SURGERY       SECTION      HYSTERECTOMY      TUBAL LIGATION         Family History   Problem Relation Age of Onset    No Known Problems Family         reports that she has quit smoking  She has never used smokeless tobacco  She reports that she does not drink alcohol or use drugs  Exam:   There were no vitals filed for this visit  PHYSICAL EXAM  General Appearance:    Alert, cooperative, no distress,    Head:    Normocephalic without obvious abnormality   Eyes:    PERRL, conjunctiva/corneas clear, EOM's intact        Neck:   Supple, no adenopathy, no JVD   Back:     Symmetric, no spinal or CVA tenderness   Lungs:     Clear to auscultation bilaterally, no wheezing or rhonchi   Heart:    Regular rate and rhythm, S1 and S2 normal, no murmur   Abdomen:     abdomen is soft without significant tenderness, masses, organomegaly or guarding Bowel sounds are normal   Incision well healed  Extremities:   Extremities normal  No clubbing, cyanosis or edema   Psych:   Normal Affect, AOx3  Neurologic:  Skin:   CNII-XII intact  Strength symmetric, speech intact    Warm, dry, intact, no visible rashes or lesions      Informed consent for procedure was personally discussed, reviewed, and signed by Dr Melvina Raya  Discussion by Dr Melvina Raya was carried out regarding risks, benefits, and alternatives with the patient   Risks include but are not limited to:  bleeding, infection, and delayed wound healing or an open wound, pulmonary embolus, leaks from bowel or bile ducts or other viscus, transfusions, death  Discussed in further detail the more common complications and their rates of occurrence   was used if necessary  Patient expressed understanding of the issues discussed and wished/consented to proceed  All questions were answered by Dr Vero Arnold  Discussion performed between patient and the provider signing below  Signature:   Nato Toro MD    Date: 2/1/2018 Time: 11:15 AM                          Some portions of this record may have been generated with voice recognition software  There may be translation, syntax,  or grammatical errors  Occasional wrong word or "sound-a-like" substitutions may have occurred due to the inherent limitations of the voice recognition software  Read the chart carefully and recognize, using context, where substitutions may have occurred  If you have any questions, please contact the dictating provider for clarification or correction, as needed  This encounter has been coded by a non-certified coder

## 2018-02-05 DIAGNOSIS — K21.9 GASTROESOPHAGEAL REFLUX DISEASE, ESOPHAGITIS PRESENCE NOT SPECIFIED: Primary | ICD-10-CM

## 2018-02-05 RX ORDER — FAMOTIDINE 40 MG/1
TABLET, FILM COATED ORAL
Qty: 90 TABLET | Refills: 1 | Status: SHIPPED | OUTPATIENT
Start: 2018-02-05 | End: 2018-03-03 | Stop reason: SDUPTHER

## 2018-02-19 RX ORDER — SUCRALFATE 1 G/1
1 TABLET ORAL DAILY
COMMUNITY
End: 2018-10-07 | Stop reason: ALTCHOICE

## 2018-02-19 RX ORDER — ONDANSETRON 4 MG/1
4 TABLET, ORALLY DISINTEGRATING ORAL EVERY 6 HOURS PRN
COMMUNITY
End: 2018-10-07 | Stop reason: ALTCHOICE

## 2018-02-19 RX ORDER — DOCUSATE SODIUM 100 MG/1
100 CAPSULE, LIQUID FILLED ORAL 2 TIMES DAILY
COMMUNITY
End: 2018-10-07 | Stop reason: ALTCHOICE

## 2018-02-19 NOTE — PRE-PROCEDURE INSTRUCTIONS
Pre-Surgery Instructions:   Medication Instructions    docusate sodium (COLACE) 100 mg capsule Instructed patient per Anesthesia Guidelines   famotidine (PEPCID) 40 MG tablet Instructed patient per Anesthesia Guidelines   meclizine (ANTIVERT) 25 mg tablet Instructed patient per Anesthesia Guidelines   ondansetron (ZOFRAN-ODT) 4 mg disintegrating tablet Instructed patient per Anesthesia Guidelines   sucralfate (CARAFATE) 1 g tablet Instructed patient per Anesthesia Guidelines  Instructed to take pepcid am of surgery with sip ofw ater per anesthesia

## 2018-02-20 ENCOUNTER — ANESTHESIA EVENT (OUTPATIENT)
Dept: PERIOP | Facility: HOSPITAL | Age: 38
End: 2018-02-20
Payer: COMMERCIAL

## 2018-02-21 ENCOUNTER — ANESTHESIA (OUTPATIENT)
Dept: PERIOP | Facility: HOSPITAL | Age: 38
End: 2018-02-21
Payer: COMMERCIAL

## 2018-02-21 ENCOUNTER — APPOINTMENT (OUTPATIENT)
Dept: RADIOLOGY | Facility: HOSPITAL | Age: 38
End: 2018-02-21
Payer: COMMERCIAL

## 2018-02-21 ENCOUNTER — HOSPITAL ENCOUNTER (OUTPATIENT)
Facility: HOSPITAL | Age: 38
Setting detail: OUTPATIENT SURGERY
Discharge: HOME | End: 2018-02-21
Attending: SURGERY | Admitting: SURGERY
Payer: COMMERCIAL

## 2018-02-21 VITALS
HEIGHT: 59 IN | DIASTOLIC BLOOD PRESSURE: 74 MMHG | RESPIRATION RATE: 16 BRPM | TEMPERATURE: 97.6 F | WEIGHT: 83 LBS | BODY MASS INDEX: 16.73 KG/M2 | HEART RATE: 81 BPM | OXYGEN SATURATION: 100 % | SYSTOLIC BLOOD PRESSURE: 109 MMHG

## 2018-02-21 DIAGNOSIS — K80.00 CALCULUS OF GALLBLADDER WITH ACUTE CHOLECYSTITIS WITHOUT OBSTRUCTION: ICD-10-CM

## 2018-02-21 PROCEDURE — 88304 TISSUE EXAM BY PATHOLOGIST: CPT | Performed by: PATHOLOGY

## 2018-02-21 PROCEDURE — 88304 TISSUE EXAM BY PATHOLOGIST: CPT | Performed by: SURGERY

## 2018-02-21 PROCEDURE — 47562 LAPAROSCOPIC CHOLECYSTECTOMY: CPT | Performed by: PHYSICIAN ASSISTANT

## 2018-02-21 PROCEDURE — 47562 LAPAROSCOPIC CHOLECYSTECTOMY: CPT | Performed by: SURGERY

## 2018-02-21 RX ORDER — FENTANYL CITRATE 50 UG/ML
INJECTION, SOLUTION INTRAMUSCULAR; INTRAVENOUS AS NEEDED
Status: DISCONTINUED | OUTPATIENT
Start: 2018-02-21 | End: 2018-02-21 | Stop reason: SURG

## 2018-02-21 RX ORDER — ROCURONIUM BROMIDE 10 MG/ML
INJECTION, SOLUTION INTRAVENOUS AS NEEDED
Status: DISCONTINUED | OUTPATIENT
Start: 2018-02-21 | End: 2018-02-21 | Stop reason: SURG

## 2018-02-21 RX ORDER — ONDANSETRON 2 MG/ML
4 INJECTION INTRAMUSCULAR; INTRAVENOUS EVERY 4 HOURS PRN
Status: DISCONTINUED | OUTPATIENT
Start: 2018-02-21 | End: 2018-02-21 | Stop reason: HOSPADM

## 2018-02-21 RX ORDER — MIDAZOLAM HYDROCHLORIDE 1 MG/ML
INJECTION INTRAMUSCULAR; INTRAVENOUS AS NEEDED
Status: DISCONTINUED | OUTPATIENT
Start: 2018-02-21 | End: 2018-02-21 | Stop reason: SURG

## 2018-02-21 RX ORDER — ACETAMINOPHEN 325 MG/1
650 TABLET ORAL EVERY 6 HOURS PRN
Status: DISCONTINUED | OUTPATIENT
Start: 2018-02-21 | End: 2018-02-21 | Stop reason: HOSPADM

## 2018-02-21 RX ORDER — HYDROCODONE BITARTRATE AND ACETAMINOPHEN 5; 325 MG/1; MG/1
1-2 TABLET ORAL EVERY 4 HOURS PRN
Qty: 30 TABLET | Refills: 0 | Status: SHIPPED | OUTPATIENT
Start: 2018-02-21 | End: 2018-10-07 | Stop reason: ALTCHOICE

## 2018-02-21 RX ORDER — SODIUM CHLORIDE 9 MG/ML
125 INJECTION, SOLUTION INTRAVENOUS CONTINUOUS
Status: DISCONTINUED | OUTPATIENT
Start: 2018-02-21 | End: 2018-02-21 | Stop reason: HOSPADM

## 2018-02-21 RX ORDER — MAGNESIUM HYDROXIDE 1200 MG/15ML
LIQUID ORAL AS NEEDED
Status: DISCONTINUED | OUTPATIENT
Start: 2018-02-21 | End: 2018-02-21 | Stop reason: HOSPADM

## 2018-02-21 RX ORDER — ONDANSETRON 4 MG/1
4 TABLET, ORALLY DISINTEGRATING ORAL EVERY 6 HOURS PRN
Status: DISCONTINUED | OUTPATIENT
Start: 2018-02-21 | End: 2018-02-21 | Stop reason: HOSPADM

## 2018-02-21 RX ORDER — DEXTROSE AND SODIUM CHLORIDE 5; .45 G/100ML; G/100ML
80 INJECTION, SOLUTION INTRAVENOUS CONTINUOUS
Status: DISCONTINUED | OUTPATIENT
Start: 2018-02-21 | End: 2018-02-21 | Stop reason: HOSPADM

## 2018-02-21 RX ORDER — PROPOFOL 10 MG/ML
INJECTION, EMULSION INTRAVENOUS AS NEEDED
Status: DISCONTINUED | OUTPATIENT
Start: 2018-02-21 | End: 2018-02-21 | Stop reason: SURG

## 2018-02-21 RX ORDER — ONDANSETRON 2 MG/ML
INJECTION INTRAMUSCULAR; INTRAVENOUS AS NEEDED
Status: DISCONTINUED | OUTPATIENT
Start: 2018-02-21 | End: 2018-02-21 | Stop reason: SURG

## 2018-02-21 RX ORDER — MORPHINE SULFATE 2 MG/ML
2 INJECTION, SOLUTION INTRAMUSCULAR; INTRAVENOUS EVERY 2 HOUR PRN
Status: DISCONTINUED | OUTPATIENT
Start: 2018-02-21 | End: 2018-02-21 | Stop reason: HOSPADM

## 2018-02-21 RX ORDER — HYDROCODONE BITARTRATE AND ACETAMINOPHEN 5; 325 MG/1; MG/1
1 TABLET ORAL EVERY 4 HOURS PRN
Status: DISCONTINUED | OUTPATIENT
Start: 2018-02-21 | End: 2018-02-21 | Stop reason: HOSPADM

## 2018-02-21 RX ORDER — LIDOCAINE HYDROCHLORIDE 10 MG/ML
INJECTION, SOLUTION INFILTRATION; PERINEURAL AS NEEDED
Status: DISCONTINUED | OUTPATIENT
Start: 2018-02-21 | End: 2018-02-21 | Stop reason: SURG

## 2018-02-21 RX ORDER — ONDANSETRON 2 MG/ML
4 INJECTION INTRAMUSCULAR; INTRAVENOUS ONCE AS NEEDED
Status: COMPLETED | OUTPATIENT
Start: 2018-02-21 | End: 2018-02-21

## 2018-02-21 RX ORDER — GLYCOPYRROLATE 0.2 MG/ML
INJECTION INTRAMUSCULAR; INTRAVENOUS AS NEEDED
Status: DISCONTINUED | OUTPATIENT
Start: 2018-02-21 | End: 2018-02-21 | Stop reason: SURG

## 2018-02-21 RX ORDER — FENTANYL CITRATE/PF 50 MCG/ML
50 SYRINGE (ML) INJECTION
Status: DISCONTINUED | OUTPATIENT
Start: 2018-02-21 | End: 2018-02-21 | Stop reason: HOSPADM

## 2018-02-21 RX ADMIN — FENTANYL CITRATE 100 MCG: 50 INJECTION, SOLUTION INTRAMUSCULAR; INTRAVENOUS at 07:26

## 2018-02-21 RX ADMIN — DEXAMETHASONE SODIUM PHOSPHATE 4 MG: 10 INJECTION INTRAMUSCULAR; INTRAVENOUS at 07:48

## 2018-02-21 RX ADMIN — FENTANYL CITRATE 50 MCG: 50 INJECTION INTRAMUSCULAR; INTRAVENOUS at 09:07

## 2018-02-21 RX ADMIN — HYDROCODONE BITARTRATE AND ACETAMINOPHEN 1 TABLET: 5; 325 TABLET ORAL at 10:14

## 2018-02-21 RX ADMIN — FENTANYL CITRATE 50 MCG: 50 INJECTION INTRAMUSCULAR; INTRAVENOUS at 09:36

## 2018-02-21 RX ADMIN — GLYCOPYRROLATE 0.4 MG: 0.2 INJECTION, SOLUTION INTRAMUSCULAR; INTRAVENOUS at 08:26

## 2018-02-21 RX ADMIN — SODIUM CHLORIDE 125 ML/HR: 0.9 INJECTION, SOLUTION INTRAVENOUS at 09:08

## 2018-02-21 RX ADMIN — ONDANSETRON 4 MG: 2 INJECTION INTRAMUSCULAR; INTRAVENOUS at 08:53

## 2018-02-21 RX ADMIN — SODIUM CHLORIDE: 0.9 INJECTION, SOLUTION INTRAVENOUS at 08:07

## 2018-02-21 RX ADMIN — FENTANYL CITRATE 50 MCG: 50 INJECTION INTRAMUSCULAR; INTRAVENOUS at 08:50

## 2018-02-21 RX ADMIN — FENTANYL CITRATE 50 MCG: 50 INJECTION INTRAMUSCULAR; INTRAVENOUS at 09:00

## 2018-02-21 RX ADMIN — CEFAZOLIN SODIUM 1000 MG: 1 SOLUTION INTRAVENOUS at 07:31

## 2018-02-21 RX ADMIN — NEOSTIGMINE METHYLSULFATE 3 MG: 1 INJECTION, SOLUTION INTRAMUSCULAR; INTRAVENOUS; SUBCUTANEOUS at 08:26

## 2018-02-21 RX ADMIN — ONDANSETRON 4 MG: 2 INJECTION INTRAMUSCULAR; INTRAVENOUS at 10:53

## 2018-02-21 RX ADMIN — LIDOCAINE HYDROCHLORIDE 80 MG: 10 INJECTION, SOLUTION INFILTRATION; PERINEURAL at 07:30

## 2018-02-21 RX ADMIN — ROCURONIUM BROMIDE 30 MG: 10 INJECTION INTRAVENOUS at 07:30

## 2018-02-21 RX ADMIN — ONDANSETRON HYDROCHLORIDE 4 MG: 2 INJECTION, SOLUTION INTRAVENOUS at 07:48

## 2018-02-21 RX ADMIN — MIDAZOLAM HYDROCHLORIDE 2 MG: 1 INJECTION, SOLUTION INTRAMUSCULAR; INTRAVENOUS at 07:20

## 2018-02-21 RX ADMIN — SODIUM CHLORIDE 125 ML/HR: 0.9 INJECTION, SOLUTION INTRAVENOUS at 06:01

## 2018-02-21 RX ADMIN — PROPOFOL 150 MG: 10 INJECTION, EMULSION INTRAVENOUS at 07:30

## 2018-02-21 NOTE — ANESTHESIA PREPROCEDURE EVALUATION
Review of Systems/Medical History  Patient summary reviewed        Cardiovascular  Negative cardio ROS    Pulmonary  Negative pulmonary ROS        GI/Hepatic  Negative GI/hepatic ROS          Negative  ROS        Endo/Other  Negative endo/other ROS      GYN  Negative gynecology ROS          Hematology  Negative hematology ROS Anemia ,     Musculoskeletal  Negative musculoskeletal ROS        Neurology  Negative neurology ROS   Headaches,    Psychology   Negative psychology ROS Anxiety, Depression ,              Physical Exam    Airway    Mallampati score: II  TM Distance: >3 FB  Neck ROM: full     Dental   No notable dental hx     Cardiovascular  Comment: Negative ROS, Rhythm: regular, Rate: normal, Cardiovascular exam normal    Pulmonary  Pulmonary exam normal Breath sounds clear to auscultation,     Other Findings        Anesthesia Plan  ASA Score- 2     Anesthesia Type- general with ASA Monitors  Additional Monitors:   Airway Plan: ETT  Plan Factors-    Induction- intravenous  Postoperative Plan-     Informed Consent- Anesthetic plan and risks discussed with patient

## 2018-02-21 NOTE — ANESTHESIA POSTPROCEDURE EVALUATION
Post-Op Assessment Note      CV Status:  Stable    Mental Status:  Alert and awake    Hydration Status:  Euvolemic    PONV Controlled:  Controlled    Airway Patency:  Patent    Post Op Vitals Reviewed: Yes          Staff: Anesthesiologist           /58 (02/21/18 0922)    Temp 97 6 °F (36 4 °C) (02/21/18 0922)    Pulse 68 (02/21/18 0922)   Resp 21 (02/21/18 0922)    SpO2 98 % (02/21/18 0922)

## 2018-02-21 NOTE — DISCHARGE INSTRUCTIONS
[facility] Instructions  Dr Tatiana Watson MD, FACS    1  General: You will feel pulling sensations around the wound or funny aches and pains around the incisions  This is normal  Even minor surgery is a change in your body and this is your bodys way of reaction to it  If you have had abdominal surgery, it may help to support the incision with a small pillow or blanket for comfort when moving or coughing  2  Wound care: Make sure to remove the bandage in about 24 hours, unless instructed otherwise  You usually don't have to redress the wound after 24-48 hours, unless for comfort  Keep the incision clean and dry  Let air get to it  If this Steri-Strips fall off, just keep the wound clean  3  Water: You may shower over the wound, unless there are drain tubes left in place  Do not bathe or use a pool or hot tub until cleared by the physician  You may shower right over the staples or Steri-Strips and packing dry when you are done  4  Activity: You may go up and down stairs, walk as much as you are comfortable, but walk at least 3 times each day  If you have had abdominal surgery, do not lift anything heavier than 15 pounds for at least 2-4 weeks, unless cleared by the doctor  5  Diet: You may resume a regular diet  If you had a same-day surgery or overnight stay surgery, you may wish to eat lightly for a few days: soups, crackers, and sandwiches  You may resume a regular diet when ready  6  Medications: Resume all of your previous medications, unless told otherwise by the doctor  Avoid aspirin or ibuprofen (Advil, Motrin, etc ) products for 2-3 days after the date of surgery  You may, at that time, began to take them again  Tylenol is always fine, unless you are taking any narcotic pain medication containing Tylenol (such as Percocet, Darvocet, Vicodin, or anything containing acetaminophen)  Do not take Tylenol if you're taking these medications   You do not need to take the narcotic pain medications unless you are having significant pain and discomfort  7  Driving: You will need someone to drive you home on the day of surgery  Do not drive or make any important decisions while on narcotic pain medication or 24 hours and after anesthesia or sedation for surgery  Generally, you may drive when your off all narcotic pain medications  8  Upset Stomach: You may take Maalox, Tums, or similar items for an upset stomach  If your narcotic pain medication causes an upset stomach, do not take it on an empty stomach  Try taking it with at least some crackers or toast      9  Constipation: Patients often experienced constipation after surgery  You may take over-the-counter medication for this, such as Metamucil, Senokot, Dulcolax, milk of magnesia, etc  You may take a suppository unless you have had anorectal surgery such as a procedure on your hemorrhoids  If you experience significant nausea or vomiting after abdominal surgery, call the office before trying any of these medications  10  Call the office: If you are experiencing any of the following, fevers above 101 5°, significant nausea or vomiting, if the wound develops drainage and/or is excessive redness around the wound, or if you have significant diarrhea or other worsening symptoms  11  Pain: You may be given a prescription for pain  This will be given to the hospital, the day of surgery  12  Sexual Activity: You may resume sexual activity when you feel ready and comfortable and your incision is sealed and healed without apparent infection risk  13  Urination: If you haven't urinated in 6 hours, go directly to the ER for evaluation for urinary retention       [address]  [place], [address]                                                                                                                      Phone: [phone]

## 2018-02-21 NOTE — H&P (VIEW-ONLY)
Assessment/Plan:   Vance Head is a 40 y  o female who is here for The primary encounter diagnosis was Right upper quadrant abdominal pain  A diagnosis of Calculus of gallbladder with acute cholecystitis without obstruction was also pertinent to this visit  Upon evaluation today,  patient has symptomatic cholelithiasis    Plan: Laparoscopic Cholecystectomy with possible Intraoperative Cholangiogram      Ultrasound shows gallstones and sludge and a normal liver at 16 cm  LFTs normal         Preoperative Clearance: None  Recent hysterectomy through a Pfannenstiel incision (12/17)               ____________________________________________________    HPI:  Vance Head is a 40 y  o female who was referred for evaluation of The primary encounter diagnosis was Right upper quadrant abdominal pain  A diagnosis of Calculus of gallbladder with acute cholecystitis without obstruction was also pertinent to this visit  Abdominal pain Location: in the RUQ without radiation, worse with fatty foods and greasy foods  About 3-4 months in duration  no nausea and no vomiting,   regular bowel movement       Prior Colonoscopy : None     Prior Abdominal Surgery:     Anticoagulation: none    Imaging:   No orders to display           LABS:    Lab Results   Component Value Date     01/28/2018    K 3 8 01/28/2018     01/28/2018    CO2 26 01/28/2018    ANIONGAP 8 01/28/2018    BUN 13 01/28/2018    CREATININE 0 85 01/28/2018    GLUCOSE 111 01/28/2018    GLUF 81 12/05/2017    CALCIUM 8 2 (L) 01/28/2018    AST 11 01/28/2018    ALT 22 01/28/2018    ALKPHOS 87 01/28/2018    PROT 7 2 01/28/2018    BILITOT 0 33 01/28/2018    EGFR 88 01/28/2018       Lab Results   Component Value Date    WBC 7 99 01/28/2018    HGB 11 7 01/28/2018    HCT 34 0 (L) 01/28/2018    MCV 90 01/28/2018     01/28/2018     No results found for: INR, PROTIME  No results found for: HGBA1C        ROS:  General ROS: negative for - chills, fatigue, fever or night sweats, weight loss  Respiratory ROS: no cough, shortness of breath, or wheezing  Cardiovascular ROS: no chest pain or dyspnea on exertion  Genito-Urinary ROS: no dysuria, trouble voiding, or hematuria  Musculoskeletal ROS: negative for - gait disturbance, joint pain or muscle pain  Neurological ROS: no TIA or stroke symptoms  Gastrointestinal ROS: See HPI   GI ROS: see HPI  Skin ROS: no new rashes or lesions   Lymphatic ROS: no new adenopathy noted by pt  GYN ROS: see HPI, no new GYN history or bleeding noted  Psy ROS: no new mental or behavioral disturbances       There is no problem list on file for this patient  Allergies:  Patient has no known allergies  Current Outpatient Prescriptions:     butalbital-acetaminophen-caffeine (FIORICET,ESGIC) -40 mg per tablet, Take 1 tablet by mouth, Disp: , Rfl:     dexamethasone (DECADRON) 2 mg tablet, Take by mouth, Disp: , Rfl:     famotidine (PEPCID) 40 MG tablet, Take 1 tablet by mouth daily, Disp: , Rfl:     ibuprofen (MOTRIN) 800 mg tablet, Take 800 mg by mouth every 8 (eight) hours, Disp: , Rfl:     meclizine (ANTIVERT) 25 mg tablet, Take by mouth, Disp: , Rfl:     norethindrone-ethinyl estradiol-iron (BLISOVI FE 1 5/30) 1 5-30 MG-MCG tablet, Take by mouth, Disp: , Rfl:     citalopram (CeleXA) 10 mg tablet, Take 10 mg by mouth daily, Disp: , Rfl:     cyclobenzaprine (FLEXERIL) 10 mg tablet, Take 1 tablet by mouth 2 (two) times a day as needed for muscle spasms, Disp: 20 tablet, Rfl: 0    diclofenac sodium (VOLTAREN) 50 mg EC tablet, TK 1 T PO TID PRN, Disp: , Rfl: 2    dicyclomine (BENTYL) 10 mg capsule, Take 10 mg by mouth 4 (four) times a day (before meals and at bedtime), Disp: , Rfl:     gabapentin (NEURONTIN) 300 mg capsule, TK 1 C PO AT BEDTIME   INCREASE TO 1 C TID AS TOLERATED, Disp: , Rfl: 1    metoclopramide (REGLAN) 10 mg tablet, Take 1 tablet (10 mg total) by mouth 3 (three) times a day as needed (nausea), Disp: 12 tablet, Rfl: 0    MICROGESTIN 1-20 MG-MCG per tablet, , Disp: , Rfl: 0    omeprazole (PriLOSEC) 20 mg delayed release capsule, Take 20 mg by mouth, Disp: , Rfl:     sucralfate (CARAFATE) 1 g tablet, Take 1 tablet (1 g total) by mouth 4 (four) times a day (before meals and at bedtime) for 14 days, Disp: 56 tablet, Rfl: 0    Past Medical History:   Diagnosis Date    Anxiety     Gallstones     Migraine     Panic attack     Vertigo        Past Surgical History:   Procedure Laterality Date    ABDOMINAL SURGERY       SECTION      HYSTERECTOMY      TUBAL LIGATION         Family History   Problem Relation Age of Onset    No Known Problems Family         reports that she has quit smoking  She has never used smokeless tobacco  She reports that she does not drink alcohol or use drugs  Exam:   There were no vitals filed for this visit  PHYSICAL EXAM  General Appearance:    Alert, cooperative, no distress,    Head:    Normocephalic without obvious abnormality   Eyes:    PERRL, conjunctiva/corneas clear, EOM's intact        Neck:   Supple, no adenopathy, no JVD   Back:     Symmetric, no spinal or CVA tenderness   Lungs:     Clear to auscultation bilaterally, no wheezing or rhonchi   Heart:    Regular rate and rhythm, S1 and S2 normal, no murmur   Abdomen:     abdomen is soft without significant tenderness, masses, organomegaly or guarding Bowel sounds are normal   Incision well healed  Extremities:   Extremities normal  No clubbing, cyanosis or edema   Psych:   Normal Affect, AOx3  Neurologic:  Skin:   CNII-XII intact  Strength symmetric, speech intact    Warm, dry, intact, no visible rashes or lesions      Informed consent for procedure was personally discussed, reviewed, and signed by Dr Alessandro Dorsey  Discussion by Dr Alessandro Dorsey was carried out regarding risks, benefits, and alternatives with the patient   Risks include but are not limited to:  bleeding, infection, and delayed wound healing or an open wound, pulmonary embolus, leaks from bowel or bile ducts or other viscus, transfusions, death  Discussed in further detail the more common complications and their rates of occurrence   was used if necessary  Patient expressed understanding of the issues discussed and wished/consented to proceed  All questions were answered by Dr Melvina Raya  Discussion performed between patient and the provider signing below  Signature:   Antonino Olson MD    Date: 2/1/2018 Time: 11:15 AM                          Some portions of this record may have been generated with voice recognition software  There may be translation, syntax,  or grammatical errors  Occasional wrong word or "sound-a-like" substitutions may have occurred due to the inherent limitations of the voice recognition software  Read the chart carefully and recognize, using context, where substitutions may have occurred  If you have any questions, please contact the dictating provider for clarification or correction, as needed  This encounter has been coded by a non-certified coder

## 2018-02-21 NOTE — OP NOTE
CHOLECYSTECTOMY LAPAROSCOPIC with lysis of adhesions, attempted cholangiogram    Postoperative Note  PATIENT NAME: Sruthi Castillo  : 1980  MRN: [id number]  [facility] OR ROOM 04    Surgery Date: 2018      Pre-Op diagnoisis  Calculus of gallbladder with acute cholecystitis without obstruction [K80 00]    Operative Indications:  Symptomatic gallbladder disease      Consent:  The risks, benefits, and alternatives to the surgery were discussed with the patient and with the family prior to surgery if present, personally by Dr Carolann Lopez  If the consent was obtained by the physician assistant or other representative, the consent was reviewed once again personally by the operating physician  Common complications particular for this procedure as well as unusual complications were discussed, including but not limited to:  bleeding, wound infection, prolonged wound healing, open wounds, reoperation, leak from the bowel or viscus, leak from the bile duct or injury to adjacent or other organs or blood vessels in the abdomen  The significance of bile duct reconstruction was discussed  If the surgery was laparoscopic, it was discussed that possible open surgery could also occur during that same surgery and is always an option in laparoscopic surgery and/or possible reoperation  A  was used if necessary  The patient expressed understanding of the issues discussed and wished and consented to the procedure to proceed  All questions were answered  Dr Carolann Lopez personally discussed the informed consent with this patient  Operative Findings:  Good critical view  Adhesions      Post op diagnosis and findings:  Post-Op Diagnosis Codes:     * Calculus of gallbladder with acute cholecystitis without obstruction [K80 00]      Procedure:     Procedure(s):  CHOLECYSTECTOMY LAPAROSCOPIC lysis of adhesions    Surgeon(s) and Role:     * Jessica Goncalves MD - Primary     * Serafin Thomas PA-C - Assisting    The Physician Assistant was medically necessary for surgical safety the case including suturing, retraction, and hemostasis  No qualified resident was available  I was present for the entire procedure  Drains:       Specimens:  ID Type Source Tests Collected by Time Destination   1 : GALLBLADDER Tissue Gallbladder TISSUE EXAM Eliseo Jenkins MD 2/21/2018 2015        Estimated Blood Loss:   Minimal    Anesthesia Type:   Choice     Procedure: The patient was seen again in the Holding Room  The risks, benefits, complications, treatment options, and expected outcomes were discussed with the patient  The possibilities of reaction to medication, pulmonary aspiration, perforation of viscus, bleeding, recurrent infection, finding a normal gallbladder, the need for additional procedures, failure to diagnose a condition, the possible need to convert to an open procedure, and creating a complication requiring transfusion or operation were discussed with the patient  The patient and/or family concurred with the proposed plan, giving informed consent  The site of surgery properly noted/marked  The patient was taken to Operating Room, identified as Jolene Garcia  and the procedure verified as Laparoscopic Cholecystectomy with possible Intraoperative Cholangiogram  A Time Out was held after prepping and draping in sterile fashion  The above information was confirmed  Prior to the induction of general anesthesia, antibiotic prophylaxis was administered  General endotracheal anesthesia was then administered and tolerated well  After the induction, the abdomen was prepped in the usual sterile fashion  The patient was positioned in the supine position, along with some reverse Trendelenburg  Local anesthetic agent was injected into the skin near the umbilicus and an incision made  The midline fascia was incised and the open technique was used to introduce a  port under direct vision    Pneumoperitoneum was then created with CO2 and tolerated well without any adverse changes in the patient's vital signs  Additional trocars were introduced under direct vision  All skin incisions were infiltrated with a local anesthetic agent before making the incision and placing the trocars  The patient was placed in the head up, left side down position  The gallbladder was identified, the fundus grasped and retracted cephalad and laterally  Adhesions were lysed bluntly and with the electrocautery or harmonic scapel  where indicated, taking care not to injure any adjacent organs or viscus  The infundibulum was grasped and retracted laterally, exposing the peritoneum overlying the triangle of Calot  This was then dissected anteriorily and posteriorly from the gallbladder,  and exposed in a blunt fashion or using cautery or harmonic scapel where appropriate  The cystic duct was clearly identified and  dissected circumferentially, as was the cystic artery, as the only two tubular structures leading into the gallbladder  The critical view of the [address] was identified and opened  The posterior aspect of the gallbladder was lifted off the cystic plate, to ensure that there were no posterior structres behind the gallbladder or leading into the liver  An attempt was made for a cholangiogram   However, due to technical difficulties cholangiogram could not be performed  The critical view was Re ascertained in great detail both medially and laterally  Once this was all clearly identified, the cystic duct was then doubly ligated with Surgical Clips on the patient side and singly clipped on the gallbladder side and divided  The cystic artery was re-identified,  ligated with clips and divided as well  If necessary, the cystic duct was "miked" back of any stones felt in the cystic duct, prior to clipping the patient side of the duct       The gallbladder was dissected from the liver bed in retrograde fashion with the electrocautery and/or harmonic scalpel where appropriate  The gallbladder was removed, via an endopouch, through the epigastric port  The liver bed was irrigated and inspected  Hemostasis was achieved with the electrocautery  Copious irrigation was utilized and was repeatedly aspirated until clear  Pneumoperitoneum was completely reduced after viewing removal of the trocars under direct vision  The wound was thoroughly irrigated and any fascia defect was then closed with a figure of eight suture if necessary; the skin was then closed with 4-0 monocryl and a sterile dressings were applied  Instrument, sponge, and needle counts were correct at closure and at the conclusion of the case  Some portions of this record may have been generated with voice recognition software  There may be translation, syntax,  or grammatical errors  Occasional wrong word or "sound-a-like" substitutions may have occurred due to the inherent limitations of the voice recognition software  Read the chart carefully and recognize, using context, where substations may have occurred  If you have any questions, please contact the dictating provider for clarification or correction, as needed        Complications: None    Condition: Stable to PACU    SIGNATURE: Sangeetha Mejia MD   DATE: February 21, 2018   TIME: 8:34 AM

## 2018-02-22 ENCOUNTER — TELEPHONE (OUTPATIENT)
Dept: SURGERY | Facility: CLINIC | Age: 38
End: 2018-02-22

## 2018-02-23 ENCOUNTER — TELEPHONE (OUTPATIENT)
Dept: SURGERY | Facility: CLINIC | Age: 38
End: 2018-02-23

## 2018-03-03 DIAGNOSIS — K21.9 GASTROESOPHAGEAL REFLUX DISEASE, ESOPHAGITIS PRESENCE NOT SPECIFIED: ICD-10-CM

## 2018-03-05 ENCOUNTER — OFFICE VISIT (OUTPATIENT)
Dept: SURGERY | Facility: CLINIC | Age: 38
End: 2018-03-05

## 2018-03-05 VITALS
TEMPERATURE: 97 F | HEART RATE: 72 BPM | SYSTOLIC BLOOD PRESSURE: 100 MMHG | RESPIRATION RATE: 16 BRPM | DIASTOLIC BLOOD PRESSURE: 60 MMHG | BODY MASS INDEX: 16.69 KG/M2 | HEIGHT: 59 IN | WEIGHT: 82.8 LBS

## 2018-03-05 DIAGNOSIS — Z48.89 ENCOUNTER FOR SURGICAL FOLLOW-UP CARE: Primary | ICD-10-CM

## 2018-03-05 PROCEDURE — 99024 POSTOP FOLLOW-UP VISIT: CPT | Performed by: SURGERY

## 2018-03-05 RX ORDER — FAMOTIDINE 40 MG/1
TABLET, FILM COATED ORAL
Qty: 90 TABLET | Refills: 2 | Status: SHIPPED | OUTPATIENT
Start: 2018-03-05 | End: 2018-10-07 | Stop reason: ALTCHOICE

## 2018-03-05 RX ORDER — IBUPROFEN 800 MG/1
TABLET ORAL
COMMUNITY
Start: 2018-02-20 | End: 2018-10-07 | Stop reason: ALTCHOICE

## 2018-03-05 NOTE — PROGRESS NOTES
Assessment/Plan:   Matthew Stratton is a 40 y  o female who comes in today for postoperative check after laparoscopic cholecystectomy on 02/21/2018  Patient doing well without complaints    Pathology: Reviewed with patient, all questions answered  Pathology benign      ______________________________________________________  HPI:  Matthew Stratton is a 40 y  o female who comes in today for postoperative check after recent  on   Currently doing well without problems, no fever or chills,no nausea and no vomiting  Patient reports they have resumed their normal diet  denies biliary diarrhea  Reports mild bruising along incisions  ROS:  General ROS: negative for - chills, fatigue, fever or night sweats, weight loss  Respiratory ROS: no cough, shortness of breath, or wheezing  Cardiovascular ROS: no chest pain or dyspnea on exertion  Genito-Urinary ROS: no dysuria, trouble voiding, or hematuria  Musculoskeletal ROS: negative for - gait disturbance, joint pain or muscle pain  Neurological ROS: no TIA or stroke symptoms  GI ROS: see HPI  Skin ROS: no new rashes or lesions   Lymphatic ROS: no new adenopathy noted by pt  GYN ROS: see HPI, no new GYN history or bleeding noted  Psy ROS: no new mental or behavioral disturbances       Patient Active Problem List   Diagnosis    Calculus of gallbladder with acute cholecystitis without obstruction           Allergies:  Patient has no known allergies        Current Outpatient Prescriptions:     docusate sodium (COLACE) 100 mg capsule, Take 100 mg by mouth 2 (two) times a day, Disp: , Rfl:     famotidine (PEPCID) 40 MG tablet, TAKE 1 TABLET BY MOUTH EVERY DAY, Disp: 90 tablet, Rfl: 2    HYDROcodone-acetaminophen (NORCO) 5-325 mg per tablet, Take 1-2 tablets by mouth every 4 (four) hours as needed for pain for up to 30 doses Max Daily Amount: 12 tablets, Disp: 30 tablet, Rfl: 0    ibuprofen (MOTRIN) 800 mg tablet, , Disp: , Rfl:     meclizine (ANTIVERT) 25 mg tablet, TK 1 T PO TID PRF VERTIGO, Disp: , Rfl:     ondansetron (ZOFRAN-ODT) 4 mg disintegrating tablet, Take 4 mg by mouth every 6 (six) hours as needed for nausea or vomiting, Disp: , Rfl:     sucralfate (CARAFATE) 1 g tablet, Take 1 g by mouth daily, Disp: , Rfl:     Past Medical History:   Diagnosis Date    Anemia     in past    Anxiety     Depression     Gallstones     Migraine     Nausea     occ    Panic attack     Rotator cuff disorder, right     Vertigo        Past Surgical History:   Procedure Laterality Date    ABDOMINAL SURGERY      scar tissue removed     SECTION      HYSTERECTOMY      [place] LAP,CHOLECYSTECTOMY/GRAPH N/A 2018    Procedure: CHOLECYSTECTOMY LAPAROSCOPIC  WITH  ATTEMPTED IOC; Surgeon: Elisabeth Casey MD;  Location: [facility] OR;  Service: General    TUBAL LIGATION         Family History   Problem Relation Age of Onset    No Known Problems Family         reports that she quit smoking about 5 years ago  She has never used smokeless tobacco  She reports that she does not drink alcohol or use drugs  PHYSICAL EXAM  General: normal, cooperative, no distress  Abdominal: soft, nondistended or nontender  Incision: clean, dry, and intact and healing well      Some portions of this record may have been generated with voice recognition software  There may be translation, syntax,  or grammatical errors  Occasional wrong word or "sound-a-like" substitutions may have occurred due to the inherent limitations of the voice recognition software  Read the chart carefully and recognize, using context, where substitutions may have occurred  If you have any questions, please contact the dictating provider for clarification or correction, as needed  This encounter has been coded by a non-certified coder         Elisabeth Casey MD      Date: 3/5/2018 Time: 10:16 AM

## 2018-03-05 NOTE — LETTER
March 5, 2018     Marilynn Courtney PA-C  [facility]  [address] [place] [address]  [address]    Patient: Tommy Asher   YOB: 1980   Date of Visit: 3/5/2018       Dear Dr Colton Soto: Thank you for referring Tommy Asher to me for evaluation  Below are my notes for this consultation  If you have questions, please do not hesitate to call me  I look forward to following your patient along with you  Sincerely,        Emily Ellis MD        CC: No Recipients  Lindsay Estrada  3/5/2018 10:18 AM  Sign at close encounter  Assessment/Plan:   Tommy Asher is a 40 y  o female who comes in today for postoperative check after laparoscopic cholecystectomy on 02/21/2018  Patient doing well without complaints    Pathology: Reviewed with patient, all questions answered  Pathology benign      ______________________________________________________  HPI:  Tommy Asher is a 40 y  o female who comes in today for postoperative check after recent  on   Currently doing well without problems, no fever or chills,no nausea and no vomiting  Patient reports they have resumed their normal diet  denies biliary diarrhea  Reports mild bruising along incisions  ROS:  General ROS: negative for - chills, fatigue, fever or night sweats, weight loss  Respiratory ROS: no cough, shortness of breath, or wheezing  Cardiovascular ROS: no chest pain or dyspnea on exertion  Genito-Urinary ROS: no dysuria, trouble voiding, or hematuria  Musculoskeletal ROS: negative for - gait disturbance, joint pain or muscle pain  Neurological ROS: no TIA or stroke symptoms  GI ROS: see HPI  Skin ROS: no new rashes or lesions   Lymphatic ROS: no new adenopathy noted by pt     GYN ROS: see HPI, no new GYN history or bleeding noted  Psy ROS: no new mental or behavioral disturbances       Patient Active Problem List   Diagnosis    Calculus of gallbladder with acute cholecystitis without obstruction           Allergies:  Patient has no known allergies  Current Outpatient Prescriptions:     docusate sodium (COLACE) 100 mg capsule, Take 100 mg by mouth 2 (two) times a day, Disp: , Rfl:     famotidine (PEPCID) 40 MG tablet, TAKE 1 TABLET BY MOUTH EVERY DAY, Disp: 90 tablet, Rfl: 2    HYDROcodone-acetaminophen (NORCO) 5-325 mg per tablet, Take 1-2 tablets by mouth every 4 (four) hours as needed for pain for up to 30 doses Max Daily Amount: 12 tablets, Disp: 30 tablet, Rfl: 0    ibuprofen (MOTRIN) 800 mg tablet, , Disp: , Rfl:     meclizine (ANTIVERT) 25 mg tablet, TK 1 T PO TID PRF VERTIGO, Disp: , Rfl:     ondansetron (ZOFRAN-ODT) 4 mg disintegrating tablet, Take 4 mg by mouth every 6 (six) hours as needed for nausea or vomiting, Disp: , Rfl:     sucralfate (CARAFATE) 1 g tablet, Take 1 g by mouth daily, Disp: , Rfl:     Past Medical History:   Diagnosis Date    Anemia     in past    Anxiety     Depression     Gallstones     Migraine     Nausea     occ    Panic attack     Rotator cuff disorder, right     Vertigo        Past Surgical History:   Procedure Laterality Date    ABDOMINAL SURGERY      scar tissue removed     SECTION      HYSTERECTOMY      [place] LAP,CHOLECYSTECTOMY/GRAPH N/A 2018    Procedure: CHOLECYSTECTOMY LAPAROSCOPIC  WITH  ATTEMPTED IOC; Surgeon: Antonino Olson MD;  Location: [facility];  Service: General    TUBAL LIGATION         Family History   Problem Relation Age of Onset    No Known Problems Family         reports that she quit smoking about 5 years ago  She has never used smokeless tobacco  She reports that she does not drink alcohol or use drugs  PHYSICAL EXAM  General: normal, cooperative, no distress  Abdominal: soft, nondistended or nontender  Incision: clean, dry, and intact and healing well      Some portions of this record may have been generated with voice recognition software  There may be translation, syntax,  or grammatical errors   Occasional wrong word or "sound-a-like" substitutions may have occurred due to the inherent limitations of the voice recognition software  Read the chart carefully and recognize, using context, where substitutions may have occurred  If you have any questions, please contact the dictating provider for clarification or correction, as needed  This encounter has been coded by a non-certified coder         Patience Apple MD      Date: 3/5/2018 Time: 10:16 AM

## 2018-05-31 ENCOUNTER — OFFICE VISIT (OUTPATIENT)
Dept: FAMILY MEDICINE CLINIC | Facility: CLINIC | Age: 38
End: 2018-05-31
Payer: COMMERCIAL

## 2018-05-31 VITALS
RESPIRATION RATE: 18 BRPM | DIASTOLIC BLOOD PRESSURE: 72 MMHG | OXYGEN SATURATION: 97 % | HEIGHT: 59 IN | TEMPERATURE: 97 F | WEIGHT: 83.56 LBS | SYSTOLIC BLOOD PRESSURE: 120 MMHG | HEART RATE: 80 BPM | BODY MASS INDEX: 16.84 KG/M2

## 2018-05-31 DIAGNOSIS — M25.561 ARTHRALGIA OF BOTH KNEES: ICD-10-CM

## 2018-05-31 DIAGNOSIS — R53.83 FATIGUE, UNSPECIFIED TYPE: Primary | ICD-10-CM

## 2018-05-31 DIAGNOSIS — R63.4 WEIGHT LOSS: ICD-10-CM

## 2018-05-31 DIAGNOSIS — M25.562 ARTHRALGIA OF BOTH KNEES: ICD-10-CM

## 2018-05-31 PROCEDURE — 3008F BODY MASS INDEX DOCD: CPT | Performed by: PHYSICIAN ASSISTANT

## 2018-05-31 PROCEDURE — 99213 OFFICE O/P EST LOW 20 MIN: CPT | Performed by: PHYSICIAN ASSISTANT

## 2018-05-31 NOTE — ASSESSMENT & PLAN NOTE
Discussed with patient to continue with the high protein diet  Recommend routine smoothies and shakes over the ensures  Recommend nuts, peanut butter, chicken, fish, whole milk products to help with weight gain

## 2018-05-31 NOTE — ASSESSMENT & PLAN NOTE
Will start off with lab work for current symptoms  With concerns of possible gastroparesis may do a gastric emptying test   May also do a CT of chest and abdomen in future  Possibility of referral to GI if everything is coming back negative

## 2018-05-31 NOTE — PROGRESS NOTES
Assessment/Plan:    Weight loss  Discussed with patient to continue with the high protein diet  Recommend routine smoothies and shakes over the ensures  Recommend nuts, peanut butter, chicken, fish, whole milk products to help with weight gain  Fatigue  Will start off with lab work for current symptoms  With concerns of possible gastroparesis may do a gastric emptying test   May also do a CT of chest and abdomen in future  Possibility of referral to GI if everything is coming back negative  Diagnoses and all orders for this visit:    Fatigue, unspecified type  -     CBC and differential; Future  -     Comprehensive metabolic panel; Future  -     URI w/Reflex; Future  -     C3 complement; Future  -     C4 complement; Future  -     C-reactive protein; Future  -     Rheumatoid Arthritis Factor; Future  -     Sedimentation rate, automated; Future  -     Lyme Antibody Profile with reflex to WB; Future  -     UA w Reflex to Microscopic w Reflex to Culture -Lab Collect; Future    Weight loss  -     Lipid panel; Future    Arthralgia of both knees          Subjective:      Patient ID: Emmanuel Blake is a 40 y o  female  71-year-old female presenting for concerns of weight loss and fatigue  Patient states that she has been experiencing slow gradual weight loss over the last year  Patient had cholecystectomy February 2018, and states that afterwards she has been experiencing increased fatigue  She has been putting on weight, and was recommended to try ensures, high-protein foods, protein powders to make smoothies  States she has been trying these without any benefit to her weight  She does admit that she does seem to get full quickly  Is typically eating 3 meals a day  States that she gets roughly 8 hr of sleep a night  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, and states that she plays with her children, but does not do any other forms of exercise    Patient has also been diagnosed with endometriosis, had a hysterectomy 2017  Patient still has both her ovaries, but was told that there endometriosis cells on the ovaries  She has been experiencing abdominal pain in the left and right lower quadrants  Needs to make a follow-up appointment with her OBGYN  She also states that in the morning she has a dysuria sensation  Does not have similar symptoms throughout the day  Denies any fever, chills, night sweats, nausea, vomiting, diarrhea, constipation, blood in stool, increased urinary frequency, hematuria  Does have bilateral knee pain, and has notice some weakness in her legs  States that it is worse when she is climbing stairs  The following portions of the patient's history were reviewed and updated as appropriate: She  has a past medical history of Anemia; Anxiety; Depression; Gallstones; Migraine; Nausea; Panic attack; Rotator cuff disorder, right; and Vertigo  She   Patient Active Problem List    Diagnosis Date Noted    Fatigue 2018    Weight loss 2018    Calculus of gallbladder with acute cholecystitis without obstruction 2018     She  has a past surgical history that includes Tubal ligation;  section; Hysterectomy; Abdominal surgery; and pr lap,cholecystectomy/graph (N/A, 2018)  Her family history includes Diabetes type II in her father; Hypertension in her mother; No Known Problems in her family  She  reports that she quit smoking about 5 years ago  She has never used smokeless tobacco  She reports that she does not drink alcohol or use drugs    Current Outpatient Prescriptions   Medication Sig Dispense Refill    docusate sodium (COLACE) 100 mg capsule Take 100 mg by mouth 2 (two) times a day      famotidine (PEPCID) 40 MG tablet TAKE 1 TABLET BY MOUTH EVERY DAY 90 tablet 2    HYDROcodone-acetaminophen (NORCO) 5-325 mg per tablet Take 1-2 tablets by mouth every 4 (four) hours as needed for pain for up to 30 doses Max Daily Amount: 12 tablets 30 tablet 0    ibuprofen (MOTRIN) 800 mg tablet       meclizine (ANTIVERT) 25 mg tablet TK 1 T PO TID PRF VERTIGO      ondansetron (ZOFRAN-ODT) 4 mg disintegrating tablet Take 4 mg by mouth every 6 (six) hours as needed for nausea or vomiting      sucralfate (CARAFATE) 1 g tablet Take 1 g by mouth daily       No current facility-administered medications for this visit  She has No Known Allergies       Review of Systems   Constitutional: Positive for fatigue and unexpected weight change  Negative for activity change, appetite change, chills, diaphoresis and fever  Eyes: Negative for visual disturbance  Respiratory: Negative for cough, chest tightness, shortness of breath and wheezing  Cardiovascular: Negative for chest pain, palpitations and leg swelling  Gastrointestinal: Positive for abdominal pain  Negative for blood in stool, constipation, diarrhea, nausea and vomiting  Endocrine: Negative for cold intolerance, heat intolerance, polydipsia, polyphagia and polyuria  Genitourinary: Positive for dysuria  Negative for pelvic pain  Musculoskeletal: Positive for arthralgias  Negative for myalgias  Skin: Negative for rash and wound  Neurological: Negative for dizziness, weakness, light-headedness, numbness and headaches  Hematological: Negative for adenopathy  Psychiatric/Behavioral: Negative for dysphoric mood and sleep disturbance  The patient is not nervous/anxious  Objective:      /72 (BP Location: Left arm, Patient Position: Sitting, Cuff Size: Child)   Pulse 80   Temp (!) 97 °F (36 1 °C) (Tympanic)   Resp 18   Ht 4' 11" (1 499 m)   Wt 37 9 kg (83 lb 9 oz)   LMP 07/24/2017   SpO2 97%   BMI 16 88 kg/m²          Physical Exam   Constitutional: She is oriented to person, place, and time  She appears well-developed and well-nourished  No distress     Under weight   HENT:   Right Ear: Hearing, tympanic membrane, external ear and ear canal normal    Left Ear: Hearing, tympanic membrane, external ear and ear canal normal    Nose: Nose normal    Mouth/Throat: Oropharynx is clear and moist and mucous membranes are normal  No oropharyngeal exudate  Eyes: Conjunctivae, EOM and lids are normal  Pupils are equal, round, and reactive to light  Neck: Normal range of motion  Neck supple  Cardiovascular: Normal rate, regular rhythm, normal heart sounds and normal pulses  Exam reveals no gallop and no friction rub  No murmur heard  Pulmonary/Chest: Effort normal and breath sounds normal  No respiratory distress  She has no wheezes  She has no rales  Abdominal: Soft  Normal appearance and bowel sounds are normal  She exhibits no distension  There is tenderness in the right lower quadrant and left lower quadrant  There is no rigidity, no rebound and no guarding  Musculoskeletal: Normal range of motion  She exhibits no edema  Right knee: Normal         Left knee: Normal    Lymphadenopathy:     She has no cervical adenopathy  Neurological: She is alert and oriented to person, place, and time  She has normal reflexes  No cranial nerve deficit  Skin: Skin is warm and dry  No rash noted  She is not diaphoretic  Psychiatric: She has a normal mood and affect   Her behavior is normal  Thought content normal

## 2018-06-01 ENCOUNTER — APPOINTMENT (OUTPATIENT)
Dept: LAB | Facility: CLINIC | Age: 38
End: 2018-06-01
Payer: COMMERCIAL

## 2018-06-01 ENCOUNTER — TRANSCRIBE ORDERS (OUTPATIENT)
Dept: LAB | Facility: CLINIC | Age: 38
End: 2018-06-01

## 2018-06-01 DIAGNOSIS — R63.4 WEIGHT LOSS: ICD-10-CM

## 2018-06-01 DIAGNOSIS — R53.83 FATIGUE, UNSPECIFIED TYPE: ICD-10-CM

## 2018-06-01 LAB
ALBUMIN SERPL BCP-MCNC: 3.9 G/DL (ref 3.5–5)
ALP SERPL-CCNC: 82 U/L (ref 46–116)
ALT SERPL W P-5'-P-CCNC: 16 U/L (ref 12–78)
ANION GAP SERPL CALCULATED.3IONS-SCNC: 6 MMOL/L (ref 4–13)
AST SERPL W P-5'-P-CCNC: 11 U/L (ref 5–45)
BASOPHILS # BLD AUTO: 0.03 THOUSANDS/ΜL (ref 0–0.1)
BASOPHILS NFR BLD AUTO: 1 % (ref 0–1)
BILIRUB SERPL-MCNC: 0.74 MG/DL (ref 0.2–1)
BILIRUB UR QL STRIP: NEGATIVE
BUN SERPL-MCNC: 9 MG/DL (ref 5–25)
C3 SERPL-MCNC: 109 MG/DL (ref 90–180)
C4 SERPL-MCNC: 23 MG/DL (ref 10–40)
CALCIUM SERPL-MCNC: 9.2 MG/DL (ref 8.3–10.1)
CHLORIDE SERPL-SCNC: 106 MMOL/L (ref 100–108)
CHOLEST SERPL-MCNC: 121 MG/DL (ref 50–200)
CLARITY UR: NORMAL
CO2 SERPL-SCNC: 26 MMOL/L (ref 21–32)
COLOR UR: YELLOW
CREAT SERPL-MCNC: 0.69 MG/DL (ref 0.6–1.3)
CRP SERPL QL: <3 MG/L
EOSINOPHIL # BLD AUTO: 0.08 THOUSAND/ΜL (ref 0–0.61)
EOSINOPHIL NFR BLD AUTO: 1 % (ref 0–6)
ERYTHROCYTE [DISTWIDTH] IN BLOOD BY AUTOMATED COUNT: 12 % (ref 11.6–15.1)
ERYTHROCYTE [SEDIMENTATION RATE] IN BLOOD: 23 MM/HOUR (ref 0–20)
GFR SERPL CREATININE-BSD FRML MDRD: 112 ML/MIN/1.73SQ M
GLUCOSE P FAST SERPL-MCNC: 90 MG/DL (ref 65–99)
GLUCOSE UR STRIP-MCNC: NEGATIVE MG/DL
HCT VFR BLD AUTO: 41.3 % (ref 34.8–46.1)
HDLC SERPL-MCNC: 68 MG/DL (ref 40–60)
HGB BLD-MCNC: 13.4 G/DL (ref 11.5–15.4)
HGB UR QL STRIP.AUTO: NEGATIVE
IMM GRANULOCYTES # BLD AUTO: 0.02 THOUSAND/UL (ref 0–0.2)
IMM GRANULOCYTES NFR BLD AUTO: 0 % (ref 0–2)
KETONES UR STRIP-MCNC: NEGATIVE MG/DL
LDLC SERPL CALC-MCNC: 41 MG/DL (ref 0–100)
LEUKOCYTE ESTERASE UR QL STRIP: NEGATIVE
LYMPHOCYTES # BLD AUTO: 1.42 THOUSANDS/ΜL (ref 0.6–4.47)
LYMPHOCYTES NFR BLD AUTO: 25 % (ref 14–44)
MCH RBC QN AUTO: 30.1 PG (ref 26.8–34.3)
MCHC RBC AUTO-ENTMCNC: 32.4 G/DL (ref 31.4–37.4)
MCV RBC AUTO: 93 FL (ref 82–98)
MONOCYTES # BLD AUTO: 0.75 THOUSAND/ΜL (ref 0.17–1.22)
MONOCYTES NFR BLD AUTO: 13 % (ref 4–12)
NEUTROPHILS # BLD AUTO: 3.5 THOUSANDS/ΜL (ref 1.85–7.62)
NEUTS SEG NFR BLD AUTO: 60 % (ref 43–75)
NITRITE UR QL STRIP: NEGATIVE
NONHDLC SERPL-MCNC: 53 MG/DL
NRBC BLD AUTO-RTO: 0 /100 WBCS
PH UR STRIP.AUTO: 6 [PH] (ref 4.5–8)
PLATELET # BLD AUTO: 314 THOUSANDS/UL (ref 149–390)
PMV BLD AUTO: 11.6 FL (ref 8.9–12.7)
POTASSIUM SERPL-SCNC: 4.4 MMOL/L (ref 3.5–5.3)
PROT SERPL-MCNC: 8 G/DL (ref 6.4–8.2)
PROT UR STRIP-MCNC: NEGATIVE MG/DL
RBC # BLD AUTO: 4.45 MILLION/UL (ref 3.81–5.12)
SODIUM SERPL-SCNC: 138 MMOL/L (ref 136–145)
SP GR UR STRIP.AUTO: 1.02 (ref 1–1.03)
TRIGL SERPL-MCNC: 61 MG/DL
UROBILINOGEN UR QL STRIP.AUTO: 0.2 E.U./DL
WBC # BLD AUTO: 5.8 THOUSAND/UL (ref 4.31–10.16)

## 2018-06-01 PROCEDURE — 86160 COMPLEMENT ANTIGEN: CPT

## 2018-06-01 PROCEDURE — 85652 RBC SED RATE AUTOMATED: CPT

## 2018-06-01 PROCEDURE — 86618 LYME DISEASE ANTIBODY: CPT

## 2018-06-01 PROCEDURE — 86430 RHEUMATOID FACTOR TEST QUAL: CPT

## 2018-06-01 PROCEDURE — 80061 LIPID PANEL: CPT

## 2018-06-01 PROCEDURE — 36415 COLL VENOUS BLD VENIPUNCTURE: CPT

## 2018-06-01 PROCEDURE — 80053 COMPREHEN METABOLIC PANEL: CPT

## 2018-06-01 PROCEDURE — 86140 C-REACTIVE PROTEIN: CPT

## 2018-06-01 PROCEDURE — 81003 URINALYSIS AUTO W/O SCOPE: CPT

## 2018-06-01 PROCEDURE — 85025 COMPLETE CBC W/AUTO DIFF WBC: CPT

## 2018-06-01 PROCEDURE — 86038 ANTINUCLEAR ANTIBODIES: CPT

## 2018-06-04 LAB
B BURGDOR IGG SER IA-ACNC: 0.11
B BURGDOR IGM SER IA-ACNC: 0.15
RHEUMATOID FACT SER QL LA: NEGATIVE
RYE IGE QN: NEGATIVE

## 2018-06-06 DIAGNOSIS — R68.81 EARLY SATIETY: Primary | ICD-10-CM

## 2018-06-06 DIAGNOSIS — R63.4 WEIGHT LOSS: ICD-10-CM

## 2018-10-07 ENCOUNTER — HOSPITAL ENCOUNTER (EMERGENCY)
Facility: HOSPITAL | Age: 38
Discharge: HOME | End: 2018-10-08
Attending: EMERGENCY MEDICINE | Admitting: EMERGENCY MEDICINE
Payer: COMMERCIAL

## 2018-10-07 DIAGNOSIS — R42 VERTIGO: Primary | ICD-10-CM

## 2018-10-07 PROCEDURE — 99284 EMERGENCY DEPT VISIT MOD MDM: CPT

## 2018-10-07 RX ORDER — DIAZEPAM 5 MG/1
TABLET ORAL
Status: COMPLETED
Start: 2018-10-07 | End: 2018-10-07

## 2018-10-07 RX ORDER — MECLIZINE HCL 12.5 MG/1
TABLET ORAL
Status: COMPLETED
Start: 2018-10-07 | End: 2018-10-07

## 2018-10-07 RX ORDER — ONDANSETRON 4 MG/1
TABLET, ORALLY DISINTEGRATING ORAL
Status: COMPLETED
Start: 2018-10-07 | End: 2018-10-07

## 2018-10-07 RX ORDER — MECLIZINE HCL 12.5 MG/1
25 TABLET ORAL ONCE
Status: COMPLETED | OUTPATIENT
Start: 2018-10-07 | End: 2018-10-07

## 2018-10-07 RX ORDER — DIAZEPAM 5 MG/1
5 TABLET ORAL ONCE
Status: COMPLETED | OUTPATIENT
Start: 2018-10-07 | End: 2018-10-07

## 2018-10-07 RX ORDER — ONDANSETRON 4 MG/1
4 TABLET, ORALLY DISINTEGRATING ORAL ONCE
Status: COMPLETED | OUTPATIENT
Start: 2018-10-07 | End: 2018-10-07

## 2018-10-07 RX ADMIN — DIAZEPAM 5 MG: 5 TABLET ORAL at 22:10

## 2018-10-07 RX ADMIN — MECLIZINE HCL 25 MG: 12.5 TABLET ORAL at 23:19

## 2018-10-07 RX ADMIN — ONDANSETRON 4 MG: 4 TABLET, ORALLY DISINTEGRATING ORAL at 22:10

## 2018-10-07 RX ADMIN — MECLIZINE HYDROCHLORIDE 25 MG: 12.5 TABLET ORAL at 23:19

## 2018-10-08 ENCOUNTER — APPOINTMENT (EMERGENCY)
Dept: CT IMAGING | Facility: HOSPITAL | Age: 38
End: 2018-10-08
Payer: COMMERCIAL

## 2018-10-08 VITALS
DIASTOLIC BLOOD PRESSURE: 66 MMHG | BODY MASS INDEX: 18.28 KG/M2 | OXYGEN SATURATION: 99 % | TEMPERATURE: 96.4 F | HEART RATE: 83 BPM | WEIGHT: 90.5 LBS | RESPIRATION RATE: 18 BRPM | SYSTOLIC BLOOD PRESSURE: 100 MMHG

## 2018-10-08 LAB
ANION GAP SERPL CALCULATED.3IONS-SCNC: 11 MMOL/L (ref 5–14)
BASOPHILS # BLD AUTO: 0.1 THOUSANDS/ΜL (ref 0–0.1)
BASOPHILS NFR BLD AUTO: 1 % (ref 0–1)
BUN SERPL-MCNC: 14 MG/DL (ref 5–25)
CALCIUM SERPL-MCNC: 8.6 MG/DL (ref 8.4–10.2)
CHLORIDE SERPL-SCNC: 107 MMOL/L (ref 97–108)
CO2 SERPL-SCNC: 21 MMOL/L (ref 22–30)
CREAT SERPL-MCNC: 0.65 MG/DL (ref 0.6–1.2)
EOSINOPHIL # BLD AUTO: 0.2 THOUSAND/ΜL (ref 0–0.4)
EOSINOPHIL NFR BLD AUTO: 2 % (ref 0–6)
ERYTHROCYTE [DISTWIDTH] IN BLOOD BY AUTOMATED COUNT: 12.4 %
GFR SERPL CREATININE-BSD FRML MDRD: 114 ML/MIN/1.73SQ M
GLUCOSE SERPL-MCNC: 103 MG/DL (ref 70–99)
HCT VFR BLD AUTO: 37.3 % (ref 36–46)
HGB BLD-MCNC: 12.5 G/DL (ref 12–16)
LYMPHOCYTES # BLD AUTO: 3.3 THOUSANDS/ΜL (ref 0.5–4)
LYMPHOCYTES NFR BLD AUTO: 25 % (ref 20–50)
MCH RBC QN AUTO: 31.3 PG (ref 26–34)
MCHC RBC AUTO-ENTMCNC: 33.6 G/DL (ref 31–36)
MCV RBC AUTO: 93 FL (ref 80–100)
MONOCYTES # BLD AUTO: 1.3 THOUSAND/ΜL (ref 0.2–0.9)
MONOCYTES NFR BLD AUTO: 10 % (ref 1–10)
NEUTROPHILS # BLD AUTO: 8.5 THOUSANDS/ΜL (ref 1.8–7.8)
NEUTS SEG NFR BLD AUTO: 64 % (ref 45–65)
PLATELET # BLD AUTO: 293 THOUSANDS/UL (ref 150–450)
PMV BLD AUTO: 9.8 FL (ref 8.9–12.7)
POTASSIUM SERPL-SCNC: 3.9 MMOL/L (ref 3.6–5)
RBC # BLD AUTO: 4 MILLION/UL (ref 4–5.2)
SODIUM SERPL-SCNC: 139 MMOL/L (ref 137–147)
WBC # BLD AUTO: 13.5 THOUSAND/UL (ref 4.5–11)

## 2018-10-08 PROCEDURE — 85025 COMPLETE CBC W/AUTO DIFF WBC: CPT | Performed by: EMERGENCY MEDICINE

## 2018-10-08 PROCEDURE — 96374 THER/PROPH/DIAG INJ IV PUSH: CPT

## 2018-10-08 PROCEDURE — 96361 HYDRATE IV INFUSION ADD-ON: CPT

## 2018-10-08 PROCEDURE — 36415 COLL VENOUS BLD VENIPUNCTURE: CPT | Performed by: EMERGENCY MEDICINE

## 2018-10-08 PROCEDURE — 70450 CT HEAD/BRAIN W/O DYE: CPT

## 2018-10-08 PROCEDURE — 80048 BASIC METABOLIC PNL TOTAL CA: CPT | Performed by: EMERGENCY MEDICINE

## 2018-10-08 RX ORDER — DIAZEPAM 5 MG/1
5 TABLET ORAL EVERY 8 HOURS PRN
Qty: 10 TABLET | Refills: 0 | Status: SHIPPED | OUTPATIENT
Start: 2018-10-08 | End: 2018-10-15 | Stop reason: SDUPTHER

## 2018-10-08 RX ORDER — METOCLOPRAMIDE HYDROCHLORIDE 5 MG/ML
INJECTION INTRAMUSCULAR; INTRAVENOUS
Status: COMPLETED
Start: 2018-10-08 | End: 2018-10-08

## 2018-10-08 RX ORDER — METOCLOPRAMIDE HYDROCHLORIDE 5 MG/ML
10 INJECTION INTRAMUSCULAR; INTRAVENOUS ONCE
Status: COMPLETED | OUTPATIENT
Start: 2018-10-08 | End: 2018-10-08

## 2018-10-08 RX ADMIN — METOCLOPRAMIDE 10 MG: 5 INJECTION, SOLUTION INTRAMUSCULAR; INTRAVENOUS at 00:51

## 2018-10-08 RX ADMIN — METOCLOPRAMIDE HYDROCHLORIDE 10 MG: 5 INJECTION INTRAMUSCULAR; INTRAVENOUS at 00:51

## 2018-10-08 RX ADMIN — SODIUM CHLORIDE 1000 ML: 9 INJECTION, SOLUTION INTRAVENOUS at 01:05

## 2018-10-08 NOTE — ED PROVIDER NOTES
History  Chief Complaint   Patient presents with    Dizziness     starting at 0800 today, hx of vertigo, took meclizine this morning with no improvement  Patient vomiting upon arrival     16-year-old female with past medical history of there ago presents for evaluation of intractable vertigo which has been getting worse throughout the day but acutely got worse at 19:00 this evening  She states that she was diagnosed with vertigo 3 years ago and has been taking meclizine intermittently for this  She has never seen a neurologist or ever had any CT or MRI of her head but her vertigo has been relieved with meclizine until now  For the last 3 days she has been having a new symptom of buzzing/muffled hearing  She took meclizine every 1 of these days with relief of her symptoms  This morning she started with her vertigo symptoms which he describes as room spinning around her and took some meclizine which did relieve her symptoms  Since 19:00 she has been having worsening of her symptoms, she has been vomiting multiple times  She is unable to walk secondary to the sensation of spinning  She last took meclizine this morning  Otherwise no fevers no chills no weakness numbness or tingling extremities  No visual changes  Patient uncomfortable in the emergency department, vomiting during evaluation  Otherwise no known past medical history, no new medications  No recent illnesses            Prior to Admission Medications   Prescriptions Last Dose Informant Patient Reported?  Taking?   meclizine (ANTIVERT) 25 mg tablet 10/7/2018 at Unknown time  Yes Yes   Sig: TK 1 T PO TID PRF VERTIGO      Facility-Administered Medications: None       Past Medical History:   Diagnosis Date    Anemia     in past    Anxiety     Depression     Gallstones     Migraine     Nausea     occ    Panic attack     Rotator cuff disorder, right     Vertigo        Past Surgical History:   Procedure Laterality Date    ABDOMINAL SURGERY scar tissue removed     SECTION      last assessed: Oct 23, 2014     HYSTERECTOMY      CO LAP,CHOLECYSTECTOMY/GRAPH N/A 2018    Procedure: CHOLECYSTECTOMY LAPAROSCOPIC  WITH  ATTEMPTED IOC; Surgeon: Pedro Pablo Mosher MD;  Location: [facility];  Service: General    TUBAL LIGATION         Family History   Problem Relation Age of Onset    No Known Problems Family     Hypertension Mother     Diabetes type II Father         mellitus      I have reviewed and agree with the history as documented  Social History   Substance Use Topics    Smoking status: Former Smoker     Quit date: 2013    Smokeless tobacco: Never Used    Alcohol use Yes      Comment: beer occasionally, reports drinking 4-6 beers today        Review of Systems   Constitutional: Negative for appetite change and fever  HENT: Negative for rhinorrhea and sore throat  Eyes: Negative for photophobia and visual disturbance  Respiratory: Negative for cough, chest tightness and wheezing  Cardiovascular: Negative for chest pain, palpitations and leg swelling  Gastrointestinal: Negative for abdominal distention, abdominal pain, blood in stool, constipation and diarrhea  Genitourinary: Negative for dysuria, flank pain, frequency, hematuria and urgency  Musculoskeletal: Negative for back pain  Skin: Negative for rash  Neurological: Positive for dizziness  Negative for weakness and headaches  All other systems reviewed and are negative  Physical Exam  Physical Exam   Constitutional: She is oriented to person, place, and time  She appears well-developed and well-nourished  HENT:   Head: Normocephalic and atraumatic  Eyes: Pupils are equal, round, and reactive to light  EOM are normal    Neck: Normal range of motion  Neck supple  Cardiovascular: Normal rate and regular rhythm  Exam reveals no gallop and no friction rub  No murmur heard  Pulmonary/Chest: Effort normal  She has no wheezes   She has no rales  She exhibits no tenderness  Abdominal: Soft  She exhibits no distension and no mass  There is no rebound and no guarding  Neurological: She is alert and oriented to person, place, and time  Grossly nonfocal neurologic exam, horizontal nystagmus, fatigable when looking to the left  Otherwise pupils equal round and reactive, muscle strength 5/5 bilateral upper and lower extremities   Skin: Skin is warm and dry  Psychiatric: She has a normal mood and affect  Nursing note and vitals reviewed        Vital Signs  ED Triage Vitals [10/07/18 2202]   Temperature Pulse Respirations Blood Pressure SpO2   (!) 96 4 °F (35 8 °C) (!) 109 18 128/83 100 %      Temp Source Heart Rate Source Patient Position - Orthostatic VS BP Location FiO2 (%)   Tympanic Monitor Sitting Left arm --      Pain Score       --           Vitals:    10/07/18 2202 10/08/18 0043   BP: 128/83 100/66   Pulse: (!) 109 83   Patient Position - Orthostatic VS: Sitting Lying       Visual Acuity      ED Medications  Medications   diazepam (VALIUM) tablet 5 mg (5 mg Oral Given 10/7/18 2210)   ondansetron (ZOFRAN-ODT) dispersible tablet 4 mg (4 mg Oral Given 10/7/18 2210)   meclizine (ANTIVERT) tablet 25 mg (25 mg Oral Given 10/7/18 2319)   sodium chloride 0 9 % bolus 1,000 mL (0 mL Intravenous Stopped 10/8/18 0211)   metoclopramide (REGLAN) injection 10 mg (10 mg Intravenous Given 10/8/18 0051)       Diagnostic Studies  Results Reviewed     Procedure Component Value Units Date/Time    Basic metabolic panel [[id number]]  (Abnormal) Collected:  10/08/18 0039    Lab Status:  Final result Specimen:  Blood from Arm, Right Updated:  10/08/18 0057     Sodium 139 mmol/L      Potassium 3 9 mmol/L      Chloride 107 mmol/L      CO2 21 (L) mmol/L      ANION GAP 11 mmol/L      BUN 14 mg/dL      Creatinine 0 65 mg/dL      Glucose 103 (H) mg/dL      Calcium 8 6 mg/dL      eGFR 114 ml/min/1 73sq m     Narrative:         National Kidney Disease Education Program recommendations are as follows:  GFR calculation is accurate only with a steady state creatinine  Chronic Kidney disease less than 60 ml/min/1 73 sq  meters  Kidney failure less than 15 ml/min/1 73 sq  meters  CBC and differential [[id number]]  (Abnormal) Collected:  10/08/18 0039    Lab Status:  Final result Specimen:  Blood from Arm, Right Updated:  10/08/18 0050     WBC 13 50 (H) Thousand/uL      RBC 4 00 Million/uL      Hemoglobin 12 5 g/dL      Hematocrit 37 3 %      MCV 93 fL      MCH 31 3 pg      MCHC 33 6 g/dL      RDW 12 4 %      MPV 9 8 fL      Platelets 318 Thousands/uL      Neutrophils Relative 64 %      Lymphocytes Relative 25 %      Monocytes Relative 10 %      Eosinophils Relative 2 %      Basophils Relative 1 %      Neutrophils Absolute 8 50 (H) Thousands/µL      Lymphocytes Absolute 3 30 Thousands/µL      Monocytes Absolute 1 30 (H) Thousand/µL      Eosinophils Absolute 0 20 Thousand/µL      Basophils Absolute 0 10 Thousands/µL                  CT head without contrast   Final Result by Oneil Bueno MD (10/08 0129)      No acute intracranial abnormality  Workstation performed: [id number]                    Procedures  Procedures       Phone Contacts  ED Phone Contact    ED Course  ED Course as of Oct 08 2249   Rosalino Edmonds Oct 07, 2018   2311 On evaluation patient states that her symptoms are much improved, she is no longer vomiting, resting comfortably in bed, no hearing complaints at this time  Mon Oct 08, 2018   0021 Patient on re-evaluation states she is feeling better however is unable to walk secondary to continued vertigo symptoms  Will obtain CT head and lab work and discuss admission with Internal Medicine team    0100 Patient with history of hysterectomy, no pregnancy test done    0221 On evaluation patient resting comfortably, able to ambulate without assistance to the CT scan at back, states that the dizziness now improved  She does not want to stay for further evaluation  Discussed careful return precautions and patient will follow up with Neurology for further care                                [facility]  Number of Diagnoses or Management Options  Diagnosis management comments: 60-year-old female presents for evaluation of acute onset vertigo  Similar to previous symptoms however much worse in intensity  Will trial Valium and Zofran if no improvement will proceed with CT of the head to evaluate for vascular etiology of her symptoms    CritCare Time    Disposition  Final diagnoses:   Vertigo     Time reflects when diagnosis was documented in both MDM as applicable and the Disposition within this note     Time User Action Codes Description Comment    10/8/2018  2:22 AM Emaline Arbour Add [R42] Vertigo       ED Disposition     ED Disposition Condition Comment    Discharge  Jerome Artist discharge to home/self care  Condition at discharge: Stable        Follow-up Information     Follow up With Specialties Details Why [facility] Emergency Department Emergency Medicine  If symptoms worsen [address] [facility] [id number] [address], [place] Family Medicine  As needed [address] [place] [address] Neurology [facility] Neurology Schedule an appointment as soon as possible for a visit  [facility] [id number]  [address] [facility] ENT Otolaryngology Schedule an appointment as soon as possible for a visit  [address], [address] [facility] [address] [phone]          Discharge Medication List as of 10/8/2018  2:23 AM      CONTINUE these medications which have NOT CHANGED    Details   meclizine (ANTIVERT) 25 mg tablet TK 1 T PO TID PRF VERTIGO, Historical Med           No discharge procedures on file      ED Provider  Electronically Signed by           Jay Herron MD  10/08/18 2954

## 2018-10-08 NOTE — DISCHARGE INSTRUCTIONS
Please follow up with Neurology for further care, if symptoms worsen please return to the emergency department  Dizziness   WHAT YOU NEED TO KNOW:   Dizziness is a feeling of being off balance or unsteady  Common causes of dizziness are an inner ear fluid imbalance or a lack of oxygen in your blood  Dizziness may be acute (lasts 3 days or less) or chronic (lasts longer than 3 days)  You may have dizzy spells that last from seconds to a few hours  DISCHARGE INSTRUCTIONS:   Return to the emergency department if:   · You have a headache and a stiff neck  · You have shaking chills and a fever  · You vomit over and over with no relief  · Your vomit or bowel movements are red or black  · You have pain in your chest, back, or abdomen  · You have numbness, especially in your face, arms, or legs  · You have trouble moving your arms or legs  · You are confused  Contact your healthcare provider if:   · You have a fever  · Your symptoms do not get better with treatment  · You have questions or concerns about your condition or care  Manage your symptoms:   · Do not drive  or operate heavy machinery when you are dizzy  · Get up slowly  from sitting or lying down  · Drink plenty of liquids  Liquids help prevent dehydration  Ask how much liquid to drink each day and which liquids are best for you  Follow up with your healthcare provider as directed:  Write down your questions so you remember to ask them during your visits  © 2017 [facility] [employer] Information is for End User's use only and may not be sold, redistributed or otherwise used for commercial purposes  All illustrations and images included in CareNotes® are the copyrighted property of A D A M , Inc  or Alejandro Vargas  The above information is an  only  It is not intended as medical advice for individual conditions or treatments   Talk to your doctor, nurse or pharmacist before following any medical regimen to see if it is safe and effective for you

## 2018-10-15 ENCOUNTER — OFFICE VISIT (OUTPATIENT)
Dept: FAMILY MEDICINE CLINIC | Facility: CLINIC | Age: 38
End: 2018-10-15
Payer: COMMERCIAL

## 2018-10-15 VITALS
SYSTOLIC BLOOD PRESSURE: 104 MMHG | HEART RATE: 93 BPM | DIASTOLIC BLOOD PRESSURE: 68 MMHG | RESPIRATION RATE: 18 BRPM | WEIGHT: 95.44 LBS | OXYGEN SATURATION: 97 % | HEIGHT: 59 IN | BODY MASS INDEX: 19.24 KG/M2 | TEMPERATURE: 97.4 F

## 2018-10-15 DIAGNOSIS — R42 VERTIGO: ICD-10-CM

## 2018-10-15 PROCEDURE — 99213 OFFICE O/P EST LOW 20 MIN: CPT | Performed by: PHYSICIAN ASSISTANT

## 2018-10-15 RX ORDER — DIAZEPAM 5 MG/1
5 TABLET ORAL EVERY 8 HOURS PRN
Qty: 10 TABLET | Refills: 0 | Status: SHIPPED | OUTPATIENT
Start: 2018-10-15 | End: 2018-10-22 | Stop reason: SDUPTHER

## 2018-10-15 RX ORDER — MECLIZINE HYDROCHLORIDE 25 MG/1
25 TABLET ORAL EVERY 8 HOURS PRN
Qty: 60 TABLET | Refills: 1 | Status: SHIPPED | OUTPATIENT
Start: 2018-10-15 | End: 2021-12-07 | Stop reason: SDUPTHER

## 2018-10-15 RX ORDER — DIAZEPAM 5 MG/1
5 TABLET ORAL EVERY 8 HOURS PRN
Qty: 10 TABLET | Refills: 0 | Status: SHIPPED | OUTPATIENT
Start: 2018-10-15 | End: 2018-10-15 | Stop reason: SDUPTHER

## 2018-10-15 NOTE — PROGRESS NOTES
Assessment/Plan:    No problem-specific Assessment & Plan notes found for this encounter  Problem List Items Addressed This Visit        Other    Vertigo    Relevant Medications    meclizine (ANTIVERT) 25 mg tablet    diazepam (VALIUM) 5 mg tablet    Other Relevant Orders    Ambulatory Referral to Otolaryngology    Ambulatory referral to Physical Therapy            Subjective:      Patient ID: Dewey Waters is a 41 y o  female  HPI 40 y/o F here for follow up from the ED on 10/7 for vertigo  She has a hx of vertigo and was taking meclizine for this  She started to get decreased hearing with this episode which has never had prior  Vertigo is described as room spinning  She is gettig ringing of left ear also  She did have vomiting also  She did have CT scan which was negative  She has been taking meclizine in day and valium at night and she has improved slighlty  She does still feel unstable though    The following portions of the patient's history were reviewed and updated as appropriate:   She  has a past medical history of Anemia; Anxiety; Depression; Gallstones; Migraine; Nausea; Panic attack; Rotator cuff disorder, right; and Vertigo  She   Patient Active Problem List    Diagnosis Date Noted    Vertigo 10/15/2018    Fatigue 2018    Weight loss 2018    Calculus of gallbladder with acute cholecystitis without obstruction 2018     She  has a past surgical history that includes Tubal ligation;  section; Hysterectomy; Abdominal surgery; and pr lap,cholecystectomy/graph (N/A, 2018)  Her family history includes Diabetes type II in her father; Hypertension in her mother; No Known Problems in her family  She  reports that she quit smoking about 5 years ago  She has never used smokeless tobacco  She reports that she does not drink alcohol or use drugs    Current Outpatient Prescriptions   Medication Sig Dispense Refill    diazepam (VALIUM) 5 mg tablet Take 1 tablet (5 mg total) by mouth every 8 (eight) hours as needed (vertigo) 10 tablet 0    meclizine (ANTIVERT) 25 mg tablet Take 1 tablet (25 mg total) by mouth every 8 (eight) hours as needed for dizziness 60 tablet 1     No current facility-administered medications for this visit  No current outpatient prescriptions on file prior to visit  No current facility-administered medications on file prior to visit  She has No Known Allergies       Review of Systems   Constitutional: Negative for activity change, appetite change, chills, fatigue and unexpected weight change  HENT: Negative for dental problem, ear pain, hearing loss and sore throat  Eyes: Negative for visual disturbance  Respiratory: Negative for cough and wheezing  Cardiovascular: Negative for chest pain  Gastrointestinal: Positive for nausea  Negative for abdominal pain, constipation, diarrhea and vomiting  Genitourinary: Negative for difficulty urinating and dysuria  Musculoskeletal: Negative for arthralgias, back pain and myalgias  Skin: Negative for rash  Neurological: Positive for dizziness  Negative for headaches  Psychiatric/Behavioral: Negative for behavioral problems  Objective:      /68 (BP Location: Right arm, Patient Position: Sitting, Cuff Size: Standard)   Pulse 93   Temp (!) 97 4 °F (36 3 °C) (Tympanic)   Resp 18   Ht 4' 11" (1 499 m)   Wt 43 3 kg (95 lb 7 oz)   LMP 07/24/2017   SpO2 97%   BMI 19 28 kg/m²          Physical Exam   Constitutional: She is oriented to person, place, and time  She appears well-developed and well-nourished  No distress  HENT:   Head: Normocephalic and atraumatic  Right Ear: External ear normal    Left Ear: External ear normal    Eyes: Conjunctivae are normal    Neck: Normal range of motion  Neck supple  No thyromegaly present  Cardiovascular: Normal rate, regular rhythm and normal heart sounds  No murmur heard    Pulmonary/Chest: Breath sounds normal  No respiratory distress  She has no wheezes  Lymphadenopathy:     She has no cervical adenopathy  Neurological: She is alert and oriented to person, place, and time  Psychiatric: She has a normal mood and affect  Her behavior is normal    Nursing note and vitals reviewed

## 2018-10-15 NOTE — PATIENT INSTRUCTIONS
Benign Paroxysmal Positional Vertigo   AMBULATORY CARE:   Benign paroxysmal positional vertigo (BPPV)  is an inner ear condition that causes you to suddenly feel dizzy  Benign means it is not serious or life-threatening  BPPV is caused by a problem with the nerves and structure of your inner ear  BPPV happens when small pieces of calcium break loose and lump together in one of your inner ear canals  Common symptoms include the following: You may feel that you or the room is moving or spinning  Turning your head, rolling over in bed, getting up or lying down may lead to sudden vertigo  You may also have any of the following symptoms:  · Nystagmus (quick shaky eye movement that you cannot control)    · Nausea    · Poor balance and feeling unsteady when you walk  Seek care immediately if:   · You fall during a BPPV episode and are injured  · You have a severe headache that does not go away  · You have new changes in your vision or feel weak or confused  · You have problems hearing, or you have ringing or buzzing in your ears  Contact your healthcare provider if:   · Your BPPV symptoms do not go away or they return  · You have problems with your balance, or you are falling often  · You have new or increased nausea or vomiting with vertigo  · You feel anxious or depressed and do not want to leave your home  · You have questions or concerns about your condition or care  Management of BPPV:   · Your healthcare provider will teach you how to move your head and body to prevent symptoms  For example, he or she may teach you certain ways to move your head or body  These movements usually help relieve your symptoms and keep the dizziness from returning  The exercises help move the calcium pieces to a different part of your ear  Do the movements only as directed  · Vestibular and balance rehabilitation therapy (VBRT)  is used to teach you exercises to improve your balance and strength   VBRT may help decrease your dizziness and prevent injuries if you are at risk for falls  · Medicines  may be recommended or prescribed to treat dizziness or nausea  Prevent your symptoms:   · Try to avoid sudden head movements  Stand up and lie down slowly  · Raise and support your head when you lie down  Place pillows under your upper back and head or rest in a recliner  · Change your position often when you are lying down  Try not to lie with your head on the same side for long periods of time  Roll over slowly  · Wear protective gear  when you ride a bike or play sports  A helmet helps protect your head from injury  Follow up with your healthcare provider as directed: You may need to return in 1 month to check the progress of your treatment  Write down your questions so you remember to ask them during your visits  © 2017 [address] Information is for End User's use only and may not be sold, redistributed or otherwise used for commercial purposes  All illustrations and images included in CareNotes® are the copyrighted property of A [employer] M , Inc  or Alejandro Vargas  The above information is an  only  It is not intended as medical advice for individual conditions or treatments  Talk to your doctor, nurse or pharmacist before following any medical regimen to see if it is safe and effective for you

## 2018-10-22 DIAGNOSIS — R42 VERTIGO: ICD-10-CM

## 2018-10-23 RX ORDER — DIAZEPAM 5 MG/1
5 TABLET ORAL EVERY 8 HOURS PRN
Qty: 10 TABLET | Refills: 0 | Status: SHIPPED | OUTPATIENT
Start: 2018-10-23 | End: 2022-02-21

## 2018-10-25 ENCOUNTER — OFFICE VISIT (OUTPATIENT)
Dept: OTOLARYNGOLOGY | Facility: CLINIC | Age: 38
End: 2018-10-25
Payer: COMMERCIAL

## 2018-10-25 VITALS
SYSTOLIC BLOOD PRESSURE: 102 MMHG | HEIGHT: 58 IN | BODY MASS INDEX: 20.87 KG/M2 | DIASTOLIC BLOOD PRESSURE: 70 MMHG | HEART RATE: 80 BPM | WEIGHT: 99.4 LBS

## 2018-10-25 DIAGNOSIS — E83.51 HYPOCALCEMIA: Primary | ICD-10-CM

## 2018-10-25 DIAGNOSIS — R42 VERTIGO: ICD-10-CM

## 2018-10-25 PROCEDURE — 99204 OFFICE O/P NEW MOD 45 MIN: CPT | Performed by: OTOLARYNGOLOGY

## 2018-10-25 RX ORDER — FAMOTIDINE 40 MG/1
40 TABLET, FILM COATED ORAL DAILY
Refills: 2 | COMMUNITY
Start: 2018-10-18 | End: 2021-12-07

## 2018-10-25 NOTE — PROGRESS NOTES
Otolaryngology Head and Neck Surgery History and Physical    Chief complaint    dizziness    History of the Present Illness    Sindy Beavers is a 41 y o  who presents with 2 years history of episodes described as spinning sensation, lasting approximately 10 minutes that are very recurrent  When interrogated she does mention that episodes are present when she stands up but also when she lays down particularly on the left lateral supine position  She is able to sleep on the right side without having significant symptoms  She has been treated chronically with meclizine more recently prescribed Valium  She describes that her hearing is distorted when she is having the cluster of episodes  Review of Systems    Past Medical History:   Diagnosis Date    Anemia     in past    Anxiety     Depression     Gallstones     Migraine     Nausea     occ    Panic attack     Rotator cuff disorder, right     Vertigo        Past Surgical History:   Procedure Laterality Date    ABDOMINAL SURGERY      scar tissue removed     SECTION      last assessed: Oct 23, 2014     HYSTERECTOMY      [place] LAP,CHOLECYSTECTOMY/GRAPH N/A 2018    Procedure: CHOLECYSTECTOMY LAPAROSCOPIC  WITH  ATTEMPTED IOC; Surgeon: Jonathan Quiñonez MD;  Location: [facility];  Service: General    TUBAL LIGATION         Social History     Social History    Marital status: /Civil Union     Spouse name: N/A    Number of children: N/A    Years of education: N/A     Occupational History    Not on file       Social History Main Topics    Smoking status: Former Smoker     Quit date: 2013    Smokeless tobacco: Never Used    Alcohol use No      Comment: beer occasionally, reports drinking 4-6 beers today    Drug use: No    Sexual activity: Not on file     Other Topics Concern    Not on file     Social History Narrative    No narrative on file       Family History   Problem Relation Age of Onset    No Known Problems Family     Hypertension Mother     Diabetes type II Father         mellitus            /70   Pulse 80   Ht 4' 10" (1 473 m)   Wt 45 1 kg (99 lb 6 4 oz)   LMP 07/24/2017   BMI 20 77 kg/m²     Physical Exam    Physical Exam   Constitutional: Oriented to person, place, and time  Well-developed and well-nourished, no apparent distress, non-toxic appearance  Cooperative, able to hear and answer questions without difficulty  Voice: Normal voice quality  Head: Normocephalic, atraumatic  No scars, masses or lesions  Face: Symmetric, no edema, no sinus tenderness  Eyes: Vision grossly intact, extra-ocular movement intact  Right Ear: External ear normal   Auditory canal clear  Tympanic membrane well-appearing, without retraction or scarring  No fluid present  No post-auricular erythema or tenderness  Left Ear: External ear normal   Auditory canal clear  Tympanic membrane well-appearing, without retraction or scarring  No fluid present  No post-auricular erythema or tenderness  Nose: Septum midline, nares clear  Mucosa moist, turbinates well appearing  No crusting, polyps or discharge evident  Oral cavity: Dentition intact  Mucosa moist, lips normal   Tongue mobile, floor of mouth normal   Hard palate unremarkable  No masses or lesions  Oropharynx: Uvula is midline, sot palate normal   Unremarkable oropharyngeal inlet  Tonsils unremarkable  Posterior pharyngeal wall clear  No masses or lesions  Salivary glands:  Parotid glands and submandibular glands symmetric, no enlargement or tenderness  Neck: Normal laryngeal elevation with swallow  Trachea midline  No masses or lesions  No palpable adenopathy  Thyroid: normal in size, unremarkable without tenderness or palpable nodules  Pulmonary/Chest: Normal effort and rate  No respiratory distress  Musculoskeletal: Normal range of motion  Neurological: Cranial nerves 2-12 intact    Vestibular exam:  No spontaneous nystagmus, no gaze nystagmus, head shaking test is negative, Caney-Hallpike test negative  Fakuda test : no rotation  Skin: Skin is warm and dry  Psychiatric: Normal mood and affect  Procedure:      Imaging studies:  Patient had a CT scan head in the emergency room in her recent visit  It is completely normal, it is not a CT scan with contrast       Pertinent laboratory data:  Patient has had hypercalcemia and several of her blood tests, intermittently goes back to low normal value  Assessment and plan:  Patient reports intermittent vertigo it appears to be very severe in some of his of the attacks, in those episodes she does have apparently distortion of hearing level no significant change on tinnitus that is a permanent symptom for her  Clinical examination of vestibular system is completely negative today  She did take Valium that was prescribed in the ER  This might be masking any clinical findings  With the above symptoms I suspect that many years could be in the differential   Recommend restriction of sodium  Request the electronystagmography and audiometry  Of note she has significant intolerance to daily since her cholecystectomy  This might be facilitating the low calcium levels  I request new measurement of calcium and vitamin-D  No diagnosis found

## 2018-10-25 NOTE — PROGRESS NOTES
[facility] Otolaryngology New Patient Visit    Jonathon Carrero is a 41 y o  who presents with a chief complaint of ***    Pertinent elements of the history include:  ***      Review of Systems    Results reviewed; images from any scan have been personally reviewed:    ***    The past medical, surgical, social and family history have been reviewed as documented in today's record  Physical exam: (abnormal findings appear in bold and supercede any conflicting normal findings listed below)    /70   Pulse 80   Ht 4' 10" (1 473 m)   Wt 45 1 kg (99 lb 6 4 oz)   LMP 07/24/2017   BMI 20 77 kg/m²     Constitutional:  Well developed, well nourished and groomed, in no acute distress  ***    Eyes:  Extra-ocular movements intact, pupils equally round and reactive to light and accommodation, the lids and conjunctivae are normal in appearance  Head: Atraumatic, normocephalic, no visible scalp lesions, bony palpation unremarkable without stepoffs, parotid and submandibular salivary glands non-tender to palpation and without masses bilaterally  ***    Ears:  Auricles normal in appearance bilaterally, mastoid prominence non-tender, external auditory canals clear bilaterally, tympanic membranes intact bilaterally without evidence of middle ear effusion or masses, normal appearing ossicles  ***    Nose/Sinuses:  External appearance unremarkable, no maxillary or frontal sinus tenderness to palpation bilaterally  Anterior rhinoscopy reveals: ***     Oral Cavity:  Moist mucus membranes, gums and dentition unremarkable, no oral mucosal masses or lesions, floor of mouth soft, tongue mobile without masses or lesions  ***    Oropharynx:  Base of tongue soft and without masses, tonsils bilaterally unremarkable, soft palate mucosa unremarkable, laryngeal mirror exam unrevealing   ***    Neck:  No visible or palpable cervical lesions or lymphadenopathy, thyroid gland is normal in size and symmetry and without masses, normal laryngeal elevation with swallowing  ***    Cardiovascular:  Normal rate and rhythm, no palpable thrills, no jugulovenous distension observed  Respiratory:  Normal respiratory effort without evidence of retractions or use of accessory muscles  Integument:  Normal appearing without observed masses or lesions  Neurologic:  Cranial nerves II-XII intact bilaterally  Psychiatric:  Alert and oriented to time, place and person, normal affect  Procedures  ***    Assessment:   No diagnosis found  Orders  No orders of the defined types were placed in this encounter  Discussion/Plan:    1  ***        Thank you for allowing me to participate in the care of your patient

## 2018-10-30 ENCOUNTER — LAB (OUTPATIENT)
Dept: LAB | Facility: HOSPITAL | Age: 38
End: 2018-10-30
Attending: OTOLARYNGOLOGY
Payer: COMMERCIAL

## 2018-10-30 DIAGNOSIS — E83.51 HYPOCALCEMIA: Primary | ICD-10-CM

## 2018-10-30 LAB
25(OH)D3 SERPL-MCNC: 17.6 NG/ML (ref 30–100)
ALBUMIN SERPL BCP-MCNC: 4.3 G/DL (ref 3–5.2)
ALP SERPL-CCNC: 69 U/L (ref 43–122)
ALT SERPL W P-5'-P-CCNC: 18 U/L (ref 9–52)
ANION GAP SERPL CALCULATED.3IONS-SCNC: 9 MMOL/L (ref 5–14)
AST SERPL W P-5'-P-CCNC: 15 U/L (ref 14–36)
BILIRUB SERPL-MCNC: 0.6 MG/DL
BUN SERPL-MCNC: 12 MG/DL (ref 5–25)
CALCIUM SERPL-MCNC: 9.4 MG/DL (ref 8.4–10.2)
CHLORIDE SERPL-SCNC: 103 MMOL/L (ref 97–108)
CO2 SERPL-SCNC: 28 MMOL/L (ref 22–30)
CREAT SERPL-MCNC: 0.63 MG/DL (ref 0.6–1.2)
GFR SERPL CREATININE-BSD FRML MDRD: 115 ML/MIN/1.73SQ M
GLUCOSE SERPL-MCNC: 92 MG/DL (ref 70–99)
POTASSIUM SERPL-SCNC: 4.3 MMOL/L (ref 3.6–5)
PROT SERPL-MCNC: 7.4 G/DL (ref 5.9–8.4)
SODIUM SERPL-SCNC: 140 MMOL/L (ref 137–147)

## 2018-10-30 PROCEDURE — 80053 COMPREHEN METABOLIC PANEL: CPT

## 2018-10-30 PROCEDURE — 36415 COLL VENOUS BLD VENIPUNCTURE: CPT

## 2018-10-30 PROCEDURE — 82306 VITAMIN D 25 HYDROXY: CPT

## 2018-10-31 DIAGNOSIS — E55.9 VITAMIN D DEFICIENCY: Primary | ICD-10-CM

## 2018-10-31 DIAGNOSIS — E55.9 VITAMIN D DEFICIENCY: ICD-10-CM

## 2018-10-31 RX ORDER — ERGOCALCIFEROL 1.25 MG/1
50000 CAPSULE ORAL WEEKLY
Qty: 4 CAPSULE | Refills: 0 | Status: SHIPPED | OUTPATIENT
Start: 2018-10-31 | End: 2018-10-31 | Stop reason: SDUPTHER

## 2018-10-31 RX ORDER — ERGOCALCIFEROL 1.25 MG/1
CAPSULE ORAL
Qty: 13 CAPSULE | Refills: 0 | Status: SHIPPED | OUTPATIENT
Start: 2018-10-31

## 2018-10-31 NOTE — PROGRESS NOTES
Vitamin D was low  Prescription vitamin D-ergocalciferol- will be called into pharmacy  Take it 1 time a week for 3 months and then after we will transition to daily vitamin d  Call in 3 months to start daily vitamin d  Vitamin D deficiency can lead to osteoporosis over time  It can also can cause muscle aches and fatigue

## 2020-08-03 ENCOUNTER — TELEPHONE (OUTPATIENT)
Dept: FAMILY MEDICINE CLINIC | Facility: CLINIC | Age: 40
End: 2020-08-03

## 2020-08-03 NOTE — TELEPHONE ENCOUNTER
Pt called left msg stating she needs to be tested for Covid because she will be traveling  As well as her 3 kids  Pt should be scheduled for a virtual visit and the pts 3 children  Thank you     I called pt left msg to call us back so we can assist with scheduling virtual appointments   Thank you

## 2020-11-19 DIAGNOSIS — Z20.822 CLOSE EXPOSURE TO 2019 NOVEL CORONAVIRUS: Primary | ICD-10-CM

## 2020-11-20 ENCOUNTER — TELEMEDICINE (OUTPATIENT)
Dept: FAMILY MEDICINE CLINIC | Facility: CLINIC | Age: 40
End: 2020-11-20

## 2020-11-20 DIAGNOSIS — Z20.822 CLOSE EXPOSURE TO 2019 NOVEL CORONAVIRUS: ICD-10-CM

## 2020-11-20 DIAGNOSIS — Z20.822 SUSPECTED COVID-19 VIRUS INFECTION: Primary | ICD-10-CM

## 2020-11-20 PROCEDURE — 87637 SARSCOV2&INF A&B&RSV AMP PRB: CPT | Performed by: NURSE PRACTITIONER

## 2020-11-20 PROCEDURE — G2012 BRIEF CHECK IN BY MD/QHP: HCPCS | Performed by: FAMILY MEDICINE

## 2020-11-25 LAB
FLUAV RNA NPH QL NAA+PROBE: NOT DETECTED
FLUBV RNA NPH QL NAA+PROBE: NOT DETECTED
RSV RNA NPH QL NAA+PROBE: NOT DETECTED
SARS-COV-2 RNA NPH QL NAA+PROBE: DETECTED

## 2021-04-19 ENCOUNTER — TELEPHONE (OUTPATIENT)
Dept: FAMILY MEDICINE CLINIC | Facility: CLINIC | Age: 41
End: 2021-04-19

## 2021-04-19 NOTE — TELEPHONE ENCOUNTER
----- Message from Daria Berry PA-C sent at 4/19/2021 11:57 AM EDT -----  Regarding: physical  Recommend yearly pe

## 2021-11-18 ENCOUNTER — HOSPITAL ENCOUNTER (EMERGENCY)
Facility: HOSPITAL | Age: 41
Discharge: HOME | End: 2021-11-18
Attending: EMERGENCY MEDICINE
Payer: COMMERCIAL

## 2021-11-18 ENCOUNTER — APPOINTMENT (EMERGENCY)
Dept: CT IMAGING | Facility: HOSPITAL | Age: 41
End: 2021-11-18
Payer: COMMERCIAL

## 2021-11-18 VITALS
DIASTOLIC BLOOD PRESSURE: 60 MMHG | BODY MASS INDEX: 19.95 KG/M2 | WEIGHT: 95.46 LBS | SYSTOLIC BLOOD PRESSURE: 112 MMHG | TEMPERATURE: 98.6 F | RESPIRATION RATE: 16 BRPM | HEART RATE: 85 BPM | OXYGEN SATURATION: 100 %

## 2021-11-18 DIAGNOSIS — K52.9 COLITIS: Primary | ICD-10-CM

## 2021-11-18 LAB
ANION GAP SERPL CALCULATED.3IONS-SCNC: 15 MMOL/L (ref 4–13)
ATRIAL RATE: 84 BPM
BACTERIA UR QL AUTO: ABNORMAL /HPF
BASOPHILS # BLD AUTO: 0.02 THOUSANDS/ΜL (ref 0–0.1)
BASOPHILS NFR BLD AUTO: 0 % (ref 0–1)
BILIRUB UR QL STRIP: NEGATIVE
BUN SERPL-MCNC: 9 MG/DL (ref 5–25)
CALCIUM SERPL-MCNC: 8.8 MG/DL (ref 8.3–10.1)
CHLORIDE SERPL-SCNC: 103 MMOL/L (ref 100–108)
CLARITY UR: CLEAR
CO2 SERPL-SCNC: 22 MMOL/L (ref 21–32)
COLOR UR: YELLOW
CREAT SERPL-MCNC: 0.66 MG/DL (ref 0.6–1.3)
EOSINOPHIL # BLD AUTO: 0.02 THOUSAND/ΜL (ref 0–0.61)
EOSINOPHIL NFR BLD AUTO: 0 % (ref 0–6)
ERYTHROCYTE [DISTWIDTH] IN BLOOD BY AUTOMATED COUNT: 11.8 % (ref 11.6–15.1)
GFR SERPL CREATININE-BSD FRML MDRD: 128 ML/MIN/1.73SQ M
GLUCOSE SERPL-MCNC: 101 MG/DL (ref 65–140)
GLUCOSE UR STRIP-MCNC: NEGATIVE MG/DL
HCT VFR BLD AUTO: 36.8 % (ref 34.8–46.1)
HGB BLD-MCNC: 12.7 G/DL (ref 11.5–15.4)
HGB UR QL STRIP.AUTO: NEGATIVE
IMM GRANULOCYTES # BLD AUTO: 0.04 THOUSAND/UL (ref 0–0.2)
IMM GRANULOCYTES NFR BLD AUTO: 1 % (ref 0–2)
KETONES UR STRIP-MCNC: ABNORMAL MG/DL
LEUKOCYTE ESTERASE UR QL STRIP: NEGATIVE
LYMPHOCYTES # BLD AUTO: 1.22 THOUSANDS/ΜL (ref 0.6–4.47)
LYMPHOCYTES NFR BLD AUTO: 16 % (ref 14–44)
MCH RBC QN AUTO: 31.7 PG (ref 26.8–34.3)
MCHC RBC AUTO-ENTMCNC: 34.5 G/DL (ref 31.4–37.4)
MCV RBC AUTO: 92 FL (ref 82–98)
MONOCYTES # BLD AUTO: 1.08 THOUSAND/ΜL (ref 0.17–1.22)
MONOCYTES NFR BLD AUTO: 14 % (ref 4–12)
NEUTROPHILS # BLD AUTO: 5.48 THOUSANDS/ΜL (ref 1.85–7.62)
NEUTS SEG NFR BLD AUTO: 69 % (ref 43–75)
NITRITE UR QL STRIP: NEGATIVE
NON-SQ EPI CELLS URNS QL MICRO: ABNORMAL /HPF
NRBC BLD AUTO-RTO: 0 /100 WBCS
P AXIS: 69 DEGREES
PH UR STRIP.AUTO: 5 [PH] (ref 4.5–8)
PLATELET # BLD AUTO: 189 THOUSANDS/UL (ref 149–390)
PMV BLD AUTO: 11.2 FL (ref 8.9–12.7)
POTASSIUM SERPL-SCNC: 3.6 MMOL/L (ref 3.5–5.3)
PR INTERVAL: 142 MS
PROT UR STRIP-MCNC: ABNORMAL MG/DL
QRS AXIS: 92 DEGREES
QRSD INTERVAL: 72 MS
QT INTERVAL: 378 MS
QTC INTERVAL: 446 MS
RBC # BLD AUTO: 4.01 MILLION/UL (ref 3.81–5.12)
RBC #/AREA URNS AUTO: ABNORMAL /HPF
SODIUM SERPL-SCNC: 140 MMOL/L (ref 136–145)
SP GR UR STRIP.AUTO: 1.01 (ref 1–1.03)
T WAVE AXIS: 52 DEGREES
UROBILINOGEN UR QL STRIP.AUTO: 0.2 E.U./DL
VENTRICULAR RATE: 84 BPM
WBC # BLD AUTO: 7.86 THOUSAND/UL (ref 4.31–10.16)
WBC #/AREA URNS AUTO: ABNORMAL /HPF

## 2021-11-18 PROCEDURE — G1004 CDSM NDSC: HCPCS

## 2021-11-18 PROCEDURE — 93005 ELECTROCARDIOGRAM TRACING: CPT

## 2021-11-18 PROCEDURE — 85025 COMPLETE CBC W/AUTO DIFF WBC: CPT | Performed by: EMERGENCY MEDICINE

## 2021-11-18 PROCEDURE — 99285 EMERGENCY DEPT VISIT HI MDM: CPT

## 2021-11-18 PROCEDURE — 74177 CT ABD & PELVIS W/CONTRAST: CPT

## 2021-11-18 PROCEDURE — 36415 COLL VENOUS BLD VENIPUNCTURE: CPT | Performed by: EMERGENCY MEDICINE

## 2021-11-18 PROCEDURE — 80048 BASIC METABOLIC PNL TOTAL CA: CPT | Performed by: EMERGENCY MEDICINE

## 2021-11-18 PROCEDURE — 96374 THER/PROPH/DIAG INJ IV PUSH: CPT

## 2021-11-18 PROCEDURE — 81001 URINALYSIS AUTO W/SCOPE: CPT

## 2021-11-18 PROCEDURE — 99285 EMERGENCY DEPT VISIT HI MDM: CPT | Performed by: EMERGENCY MEDICINE

## 2021-11-18 PROCEDURE — 93010 ELECTROCARDIOGRAM REPORT: CPT | Performed by: INTERNAL MEDICINE

## 2021-11-18 RX ORDER — DICYCLOMINE HCL 20 MG
20 TABLET ORAL EVERY 6 HOURS PRN
Qty: 10 TABLET | Refills: 0 | Status: SHIPPED | OUTPATIENT
Start: 2021-11-18 | End: 2021-12-07 | Stop reason: SDUPTHER

## 2021-11-18 RX ORDER — ONDANSETRON 2 MG/ML
4 INJECTION INTRAMUSCULAR; INTRAVENOUS ONCE
Status: COMPLETED | OUTPATIENT
Start: 2021-11-18 | End: 2021-11-18

## 2021-11-18 RX ORDER — ONDANSETRON 4 MG/1
4 TABLET, ORALLY DISINTEGRATING ORAL EVERY 8 HOURS PRN
Qty: 10 TABLET | Refills: 0 | Status: SHIPPED | OUTPATIENT
Start: 2021-11-18 | End: 2021-12-07 | Stop reason: SDUPTHER

## 2021-11-18 RX ORDER — HYDROXYZINE HYDROCHLORIDE 25 MG/1
25 TABLET, FILM COATED ORAL ONCE
Status: COMPLETED | OUTPATIENT
Start: 2021-11-18 | End: 2021-11-18

## 2021-11-18 RX ADMIN — ONDANSETRON 4 MG: 2 INJECTION INTRAMUSCULAR; INTRAVENOUS at 12:45

## 2021-11-18 RX ADMIN — HYDROXYZINE HYDROCHLORIDE 25 MG: 25 TABLET ORAL at 12:45

## 2021-11-18 RX ADMIN — IOHEXOL 85 ML: 350 INJECTION, SOLUTION INTRAVENOUS at 10:52

## 2021-12-07 ENCOUNTER — OFFICE VISIT (OUTPATIENT)
Dept: FAMILY MEDICINE CLINIC | Facility: CLINIC | Age: 41
End: 2021-12-07

## 2021-12-07 ENCOUNTER — APPOINTMENT (OUTPATIENT)
Dept: LAB | Facility: CLINIC | Age: 41
End: 2021-12-07
Payer: COMMERCIAL

## 2021-12-07 VITALS
OXYGEN SATURATION: 98 % | HEIGHT: 59 IN | HEART RATE: 96 BPM | WEIGHT: 95.1 LBS | TEMPERATURE: 98 F | SYSTOLIC BLOOD PRESSURE: 100 MMHG | RESPIRATION RATE: 16 BRPM | BODY MASS INDEX: 19.17 KG/M2 | DIASTOLIC BLOOD PRESSURE: 58 MMHG

## 2021-12-07 DIAGNOSIS — Z11.59 ENCOUNTER FOR HEPATITIS C SCREENING TEST FOR LOW RISK PATIENT: ICD-10-CM

## 2021-12-07 DIAGNOSIS — K52.9 COLITIS: ICD-10-CM

## 2021-12-07 DIAGNOSIS — Z11.4 ENCOUNTER FOR SCREENING FOR HIV: ICD-10-CM

## 2021-12-07 DIAGNOSIS — M25.50 POLYARTHRALGIA: ICD-10-CM

## 2021-12-07 DIAGNOSIS — R42 VERTIGO: ICD-10-CM

## 2021-12-07 DIAGNOSIS — R63.4 WEIGHT LOSS: ICD-10-CM

## 2021-12-07 DIAGNOSIS — G43.109 MIGRAINE WITH AURA AND WITHOUT STATUS MIGRAINOSUS, NOT INTRACTABLE: Primary | ICD-10-CM

## 2021-12-07 LAB
25(OH)D3 SERPL-MCNC: 23.7 NG/ML (ref 30–100)
ALBUMIN SERPL BCP-MCNC: 3.9 G/DL (ref 3.5–5)
ALP SERPL-CCNC: 65 U/L (ref 46–116)
ALT SERPL W P-5'-P-CCNC: 13 U/L (ref 12–78)
ANION GAP SERPL CALCULATED.3IONS-SCNC: 9 MMOL/L (ref 4–13)
AST SERPL W P-5'-P-CCNC: 10 U/L (ref 5–45)
BILIRUB SERPL-MCNC: 0.52 MG/DL (ref 0.2–1)
BUN SERPL-MCNC: 6 MG/DL (ref 5–25)
CALCIUM SERPL-MCNC: 9.9 MG/DL (ref 8.3–10.1)
CHLORIDE SERPL-SCNC: 107 MMOL/L (ref 100–108)
CHOLEST SERPL-MCNC: 112 MG/DL
CO2 SERPL-SCNC: 23 MMOL/L (ref 21–32)
CREAT SERPL-MCNC: 0.62 MG/DL (ref 0.6–1.3)
CRP SERPL QL: 3.5 MG/L
FERRITIN SERPL-MCNC: 61 NG/ML (ref 8–388)
GFR SERPL CREATININE-BSD FRML MDRD: 130 ML/MIN/1.73SQ M
GLUCOSE P FAST SERPL-MCNC: 83 MG/DL (ref 65–99)
HCV AB SER QL: NORMAL
HDLC SERPL-MCNC: 50 MG/DL
IRON SATN MFR SERPL: 13 % (ref 15–50)
IRON SERPL-MCNC: 38 UG/DL (ref 50–170)
LDLC SERPL CALC-MCNC: 52 MG/DL (ref 0–100)
NONHDLC SERPL-MCNC: 62 MG/DL
POTASSIUM SERPL-SCNC: 3.9 MMOL/L (ref 3.5–5.3)
PROT SERPL-MCNC: 7.9 G/DL (ref 6.4–8.2)
SL AMB  POCT GLUCOSE, UA: NORMAL
SL AMB LEUKOCYTE ESTERASE,UA: NEGATIVE
SL AMB POCT BILIRUBIN,UA: NEGATIVE
SL AMB POCT BLOOD,UA: NEGATIVE
SL AMB POCT CLARITY,UA: CLEAR
SL AMB POCT COLOR,UA: YELLOW
SL AMB POCT KETONES,UA: NEGATIVE
SL AMB POCT NITRITE,UA: NEGATIVE
SL AMB POCT PH,UA: 5
SL AMB POCT SPECIFIC GRAVITY,UA: 1.03
SL AMB POCT URINE PROTEIN: NORMAL
SL AMB POCT UROBILINOGEN: NORMAL
SODIUM SERPL-SCNC: 139 MMOL/L (ref 136–145)
TIBC SERPL-MCNC: 304 UG/DL (ref 250–450)
TRIGL SERPL-MCNC: 52 MG/DL
TSH SERPL DL<=0.05 MIU/L-ACNC: 0.94 UIU/ML (ref 0.36–3.74)
VIT B12 SERPL-MCNC: 207 PG/ML (ref 100–900)

## 2021-12-07 PROCEDURE — 84443 ASSAY THYROID STIM HORMONE: CPT

## 2021-12-07 PROCEDURE — 80053 COMPREHEN METABOLIC PANEL: CPT

## 2021-12-07 PROCEDURE — 83036 HEMOGLOBIN GLYCOSYLATED A1C: CPT

## 2021-12-07 PROCEDURE — 83540 ASSAY OF IRON: CPT

## 2021-12-07 PROCEDURE — 81002 URINALYSIS NONAUTO W/O SCOPE: CPT | Performed by: NURSE PRACTITIONER

## 2021-12-07 PROCEDURE — 86038 ANTINUCLEAR ANTIBODIES: CPT

## 2021-12-07 PROCEDURE — 83550 IRON BINDING TEST: CPT

## 2021-12-07 PROCEDURE — 86200 CCP ANTIBODY: CPT

## 2021-12-07 PROCEDURE — 82607 VITAMIN B-12: CPT

## 2021-12-07 PROCEDURE — 36415 COLL VENOUS BLD VENIPUNCTURE: CPT

## 2021-12-07 PROCEDURE — 99214 OFFICE O/P EST MOD 30 MIN: CPT | Performed by: NURSE PRACTITIONER

## 2021-12-07 PROCEDURE — 82728 ASSAY OF FERRITIN: CPT

## 2021-12-07 PROCEDURE — 87389 HIV-1 AG W/HIV-1&-2 AB AG IA: CPT

## 2021-12-07 PROCEDURE — 86430 RHEUMATOID FACTOR TEST QUAL: CPT

## 2021-12-07 PROCEDURE — 86140 C-REACTIVE PROTEIN: CPT

## 2021-12-07 PROCEDURE — 85025 COMPLETE CBC W/AUTO DIFF WBC: CPT

## 2021-12-07 PROCEDURE — 82306 VITAMIN D 25 HYDROXY: CPT

## 2021-12-07 PROCEDURE — 80061 LIPID PANEL: CPT

## 2021-12-07 PROCEDURE — 86803 HEPATITIS C AB TEST: CPT

## 2021-12-07 RX ORDER — FAMOTIDINE 40 MG/1
40 TABLET, FILM COATED ORAL DAILY
Qty: 60 TABLET | Refills: 2 | Status: SHIPPED | OUTPATIENT
Start: 2021-12-07 | End: 2022-07-18

## 2021-12-07 RX ORDER — DICYCLOMINE HCL 20 MG
20 TABLET ORAL EVERY 6 HOURS PRN
Qty: 40 TABLET | Refills: 0 | Status: SHIPPED | OUTPATIENT
Start: 2021-12-07 | End: 2022-02-10

## 2021-12-07 RX ORDER — ONDANSETRON 4 MG/1
4 TABLET, ORALLY DISINTEGRATING ORAL EVERY 8 HOURS PRN
Qty: 10 TABLET | Refills: 0 | Status: SHIPPED | OUTPATIENT
Start: 2021-12-07 | End: 2021-12-07 | Stop reason: SDUPTHER

## 2021-12-07 RX ORDER — MECLIZINE HYDROCHLORIDE 25 MG/1
25 TABLET ORAL EVERY 8 HOURS PRN
Qty: 60 TABLET | Refills: 1 | Status: SHIPPED | OUTPATIENT
Start: 2021-12-07 | End: 2022-03-01

## 2021-12-07 RX ORDER — HYDROCORTISONE ACETATE 25 MG/1
25 SUPPOSITORY RECTAL 2 TIMES DAILY
Qty: 12 SUPPOSITORY | Refills: 0 | Status: SHIPPED | OUTPATIENT
Start: 2021-12-07

## 2021-12-07 RX ORDER — ONDANSETRON 4 MG/1
4 TABLET, ORALLY DISINTEGRATING ORAL EVERY 8 HOURS PRN
Qty: 20 TABLET | Refills: 0 | Status: SHIPPED | OUTPATIENT
Start: 2021-12-07 | End: 2022-02-10

## 2021-12-08 PROBLEM — K52.9 COLITIS: Status: ACTIVE | Noted: 2021-12-08

## 2021-12-08 PROBLEM — M25.50 POLYARTHRALGIA: Status: ACTIVE | Noted: 2021-12-08

## 2021-12-08 PROBLEM — G43.109 MIGRAINE WITH AURA AND WITHOUT STATUS MIGRAINOSUS, NOT INTRACTABLE: Status: ACTIVE | Noted: 2021-12-08

## 2021-12-08 LAB
BASOPHILS # BLD AUTO: 0.02 THOUSANDS/ΜL (ref 0–0.1)
BASOPHILS NFR BLD AUTO: 0 % (ref 0–1)
EOSINOPHIL # BLD AUTO: 0.02 THOUSAND/ΜL (ref 0–0.61)
EOSINOPHIL NFR BLD AUTO: 0 % (ref 0–6)
ERYTHROCYTE [DISTWIDTH] IN BLOOD BY AUTOMATED COUNT: 12.5 % (ref 11.6–15.1)
EST. AVERAGE GLUCOSE BLD GHB EST-MCNC: 114 MG/DL
HBA1C MFR BLD: 5.6 %
HCT VFR BLD AUTO: 40 % (ref 34.8–46.1)
HGB BLD-MCNC: 12.8 G/DL (ref 11.5–15.4)
HIV 1+2 AB+HIV1 P24 AG SERPL QL IA: NORMAL
IMM GRANULOCYTES # BLD AUTO: 0.02 THOUSAND/UL (ref 0–0.2)
IMM GRANULOCYTES NFR BLD AUTO: 0 % (ref 0–2)
LYMPHOCYTES # BLD AUTO: 1.54 THOUSANDS/ΜL (ref 0.6–4.47)
LYMPHOCYTES NFR BLD AUTO: 31 % (ref 14–44)
MCH RBC QN AUTO: 30.8 PG (ref 26.8–34.3)
MCHC RBC AUTO-ENTMCNC: 32 G/DL (ref 31.4–37.4)
MCV RBC AUTO: 96 FL (ref 82–98)
MONOCYTES # BLD AUTO: 0.87 THOUSAND/ΜL (ref 0.17–1.22)
MONOCYTES NFR BLD AUTO: 17 % (ref 4–12)
NEUTROPHILS # BLD AUTO: 2.57 THOUSANDS/ΜL (ref 1.85–7.62)
NEUTS SEG NFR BLD AUTO: 52 % (ref 43–75)
NRBC BLD AUTO-RTO: 0 /100 WBCS
PLATELET # BLD AUTO: 227 THOUSANDS/UL (ref 149–390)
PMV BLD AUTO: 12.1 FL (ref 8.9–12.7)
RBC # BLD AUTO: 4.16 MILLION/UL (ref 3.81–5.12)
RHEUMATOID FACT SER QL LA: NEGATIVE
RYE IGE QN: NEGATIVE
WBC # BLD AUTO: 5.04 THOUSAND/UL (ref 4.31–10.16)

## 2021-12-08 RX ORDER — SUMATRIPTAN 25 MG/1
25 TABLET, FILM COATED ORAL ONCE AS NEEDED
Qty: 9 TABLET | Refills: 0 | Status: SHIPPED | OUTPATIENT
Start: 2021-12-08 | End: 2022-01-28

## 2021-12-10 LAB — CCP AB SER IA-ACNC: 1.2

## 2021-12-14 ENCOUNTER — CLINICAL SUPPORT (OUTPATIENT)
Dept: FAMILY MEDICINE CLINIC | Facility: CLINIC | Age: 41
End: 2021-12-14

## 2021-12-14 DIAGNOSIS — E53.8 VITAMIN B12 DEFICIENCY: Primary | ICD-10-CM

## 2021-12-14 PROCEDURE — 96372 THER/PROPH/DIAG INJ SC/IM: CPT | Performed by: FAMILY MEDICINE

## 2021-12-14 RX ORDER — CYANOCOBALAMIN 1000 UG/ML
1000 INJECTION INTRAMUSCULAR; SUBCUTANEOUS
Status: DISCONTINUED | OUTPATIENT
Start: 2021-12-14 | End: 2022-08-01

## 2021-12-14 RX ADMIN — CYANOCOBALAMIN 1000 MCG: 1000 INJECTION INTRAMUSCULAR; SUBCUTANEOUS at 09:05

## 2021-12-30 ENCOUNTER — OFFICE VISIT (OUTPATIENT)
Dept: GASTROENTEROLOGY | Facility: MEDICAL CENTER | Age: 41
End: 2021-12-30
Payer: COMMERCIAL

## 2021-12-30 ENCOUNTER — TELEPHONE (OUTPATIENT)
Dept: GASTROENTEROLOGY | Facility: MEDICAL CENTER | Age: 41
End: 2021-12-30

## 2021-12-30 VITALS
WEIGHT: 90.8 LBS | HEART RATE: 93 BPM | BODY MASS INDEX: 18.34 KG/M2 | DIASTOLIC BLOOD PRESSURE: 68 MMHG | SYSTOLIC BLOOD PRESSURE: 110 MMHG

## 2021-12-30 DIAGNOSIS — R10.84 GENERALIZED ABDOMINAL PAIN: Primary | ICD-10-CM

## 2021-12-30 DIAGNOSIS — E61.1 IRON DEFICIENCY: ICD-10-CM

## 2021-12-30 DIAGNOSIS — K59.00 CONSTIPATION, UNSPECIFIED CONSTIPATION TYPE: ICD-10-CM

## 2021-12-30 DIAGNOSIS — R93.5 ABNORMAL CT OF THE ABDOMEN: ICD-10-CM

## 2021-12-30 PROCEDURE — 99204 OFFICE O/P NEW MOD 45 MIN: CPT | Performed by: INTERNAL MEDICINE

## 2021-12-30 RX ORDER — DOCUSATE SODIUM 100 MG/1
100 CAPSULE, LIQUID FILLED ORAL DAILY
Qty: 60 CAPSULE | Refills: 2 | Status: SHIPPED | OUTPATIENT
Start: 2021-12-30 | End: 2022-07-18

## 2022-01-07 ENCOUNTER — TELEPHONE (OUTPATIENT)
Dept: FAMILY MEDICINE CLINIC | Facility: CLINIC | Age: 42
End: 2022-01-07

## 2022-01-14 ENCOUNTER — CLINICAL SUPPORT (OUTPATIENT)
Dept: FAMILY MEDICINE CLINIC | Facility: CLINIC | Age: 42
End: 2022-01-14

## 2022-01-14 DIAGNOSIS — E53.8 B12 DEFICIENCY: ICD-10-CM

## 2022-01-14 PROCEDURE — 96372 THER/PROPH/DIAG INJ SC/IM: CPT | Performed by: NURSE PRACTITIONER

## 2022-01-14 RX ADMIN — CYANOCOBALAMIN 1000 MCG: 1000 INJECTION INTRAMUSCULAR; SUBCUTANEOUS at 09:10

## 2022-01-27 ENCOUNTER — TELEPHONE (OUTPATIENT)
Dept: GASTROENTEROLOGY | Facility: MEDICAL CENTER | Age: 42
End: 2022-01-27

## 2022-02-01 ENCOUNTER — TELEPHONE (OUTPATIENT)
Dept: GASTROENTEROLOGY | Facility: MEDICAL CENTER | Age: 42
End: 2022-02-01

## 2022-02-02 ENCOUNTER — ANESTHESIA (OUTPATIENT)
Dept: GASTROENTEROLOGY | Facility: MEDICAL CENTER | Age: 42
End: 2022-02-02

## 2022-02-02 ENCOUNTER — HOSPITAL ENCOUNTER (OUTPATIENT)
Dept: GASTROENTEROLOGY | Facility: MEDICAL CENTER | Age: 42
Setting detail: OUTPATIENT SURGERY
Discharge: HOME | End: 2022-02-02
Admitting: INTERNAL MEDICINE
Payer: COMMERCIAL

## 2022-02-02 ENCOUNTER — ANESTHESIA EVENT (OUTPATIENT)
Dept: GASTROENTEROLOGY | Facility: MEDICAL CENTER | Age: 42
End: 2022-02-02

## 2022-02-02 VITALS
DIASTOLIC BLOOD PRESSURE: 73 MMHG | BODY MASS INDEX: 17.94 KG/M2 | HEIGHT: 59 IN | HEART RATE: 72 BPM | TEMPERATURE: 98.4 F | WEIGHT: 89 LBS | OXYGEN SATURATION: 100 % | RESPIRATION RATE: 18 BRPM | SYSTOLIC BLOOD PRESSURE: 119 MMHG

## 2022-02-02 DIAGNOSIS — E61.1 IRON DEFICIENCY: ICD-10-CM

## 2022-02-02 DIAGNOSIS — R63.4 WEIGHT LOSS: ICD-10-CM

## 2022-02-02 DIAGNOSIS — R10.84 GENERALIZED ABDOMINAL PAIN: ICD-10-CM

## 2022-02-02 DIAGNOSIS — R19.7 DIARRHEA, UNSPECIFIED TYPE: ICD-10-CM

## 2022-02-02 DIAGNOSIS — R93.5 ABNORMAL CT OF THE ABDOMEN: ICD-10-CM

## 2022-02-02 DIAGNOSIS — R10.84 GENERALIZED ABDOMINAL PAIN: Primary | ICD-10-CM

## 2022-02-02 PROCEDURE — 88305 TISSUE EXAM BY PATHOLOGIST: CPT | Performed by: SPECIALIST

## 2022-02-02 PROCEDURE — 43239 EGD BIOPSY SINGLE/MULTIPLE: CPT | Performed by: INTERNAL MEDICINE

## 2022-02-02 PROCEDURE — 45380 COLONOSCOPY AND BIOPSY: CPT | Performed by: INTERNAL MEDICINE

## 2022-02-02 RX ORDER — PROPOFOL 10 MG/ML
INJECTION, EMULSION INTRAVENOUS CONTINUOUS PRN
Status: DISCONTINUED | OUTPATIENT
Start: 2022-02-02 | End: 2022-02-02

## 2022-02-02 RX ORDER — LIDOCAINE HYDROCHLORIDE 20 MG/ML
INJECTION, SOLUTION EPIDURAL; INFILTRATION; INTRACAUDAL; PERINEURAL AS NEEDED
Status: DISCONTINUED | OUTPATIENT
Start: 2022-02-02 | End: 2022-02-02

## 2022-02-02 RX ORDER — SODIUM CHLORIDE 9 MG/ML
125 INJECTION, SOLUTION INTRAVENOUS CONTINUOUS
Status: DISCONTINUED | OUTPATIENT
Start: 2022-02-02 | End: 2022-02-06 | Stop reason: HOSPADM

## 2022-02-02 RX ORDER — PROPOFOL 10 MG/ML
INJECTION, EMULSION INTRAVENOUS AS NEEDED
Status: DISCONTINUED | OUTPATIENT
Start: 2022-02-02 | End: 2022-02-02

## 2022-02-02 RX ORDER — MIDAZOLAM HYDROCHLORIDE 2 MG/2ML
INJECTION, SOLUTION INTRAMUSCULAR; INTRAVENOUS AS NEEDED
Status: DISCONTINUED | OUTPATIENT
Start: 2022-02-02 | End: 2022-02-02

## 2022-02-02 RX ADMIN — Medication 40 MG: at 13:31

## 2022-02-02 RX ADMIN — LIDOCAINE HYDROCHLORIDE 100 MG: 20 INJECTION, SOLUTION EPIDURAL; INFILTRATION; INTRACAUDAL; PERINEURAL at 13:13

## 2022-02-02 RX ADMIN — PROPOFOL 120 MCG/KG/MIN: 10 INJECTION, EMULSION INTRAVENOUS at 13:13

## 2022-02-02 RX ADMIN — PROPOFOL 130 MG: 10 INJECTION, EMULSION INTRAVENOUS at 13:13

## 2022-02-02 RX ADMIN — MIDAZOLAM 2 MG: 1 INJECTION INTRAMUSCULAR; INTRAVENOUS at 13:10

## 2022-02-02 RX ADMIN — SODIUM CHLORIDE 125 ML/HR: 9 INJECTION, SOLUTION INTRAVENOUS at 13:05

## 2022-02-02 NOTE — DISCHARGE INSTRUCTIONS
Colonoscopy   WHAT YOU NEED TO KNOW:   A colonoscopy is a procedure to examine the inside of your colon (intestine) with a scope  Polyps or tissue growths may have been removed during your colonoscopy  It is normal to feel bloated and to have some abdominal discomfort  You should be passing gas  If you have hemorrhoids or you had polyps removed, you may have a small amount of bleeding  DISCHARGE INSTRUCTIONS:   Seek care immediately if:   · You have a large amount of bright red blood in your bowel movements  · Your abdomen is hard and firm and you have severe pain  · You have sudden trouble breathing  Call your doctor if:   · You develop a rash or hives  · You have a fever within 24 hours of your procedure  · You have nausea and vomiting  · You feel anesthesia effects greater than 24 hours  · You have not had a bowel movement for 3 days after your procedure  · You have questions or concerns about your condition or care  After your colonoscopy:   · Do not lift, strain, or run  until your healthcare provider says it is okay  · Rest as much as possible  You have been given medicine to relax you  Do not  drive or make important decisions for at least 24 hours  Return to your normal activity as directed  · Relieve gas and discomfort from bloating  by lying on your left side with a heating pad on your abdomen  You may need to take short walks to help the gas move out  Eat small meals until bloating is relieved  If you had polyps removed: For 7 days after your procedure:  · Do not  take aspirin  · Do not  go on long car rides  Help prevent constipation:   · Eat a variety of healthy foods  Healthy foods include fruit, vegetables, whole-grain breads, low-fat dairy products, beans, lean meat, and fish  Ask if you need to be on a special diet  Your healthcare provider may recommend that you eat high-fiber foods such as cooked beans   Fiber helps you have regular bowel movements  · Drink liquids as directed  Adults should drink between 9 and 13 eight-ounce cups of liquid every day  Ask what amount is best for you  For most people, good liquids to drink are water, juice, and milk  · Exercise as directed  Talk to your healthcare provider about the best exercise plan for you  Exercise can help prevent constipation, decrease your blood pressure and improve your health  Follow up with your doctor as directed:  Write down your questions so you remember to ask them during your visits  © Copyright [employer] 2021 Information is for End User's use only and may not be sold, redistributed or otherwise used for commercial purposes  All illustrations and images included in CareNotes® are the copyrighted property of A D A M , Inc  or [facility] [employer]   The above information is an  only  It is not intended as medical advice for individual conditions or treatments  Talk to your doctor, nurse or pharmacist before following any medical regimen to see if it is safe and effective for you

## 2022-02-02 NOTE — ANESTHESIA PREPROCEDURE EVALUATION
Procedure:  COLONOSCOPY  EGD    Relevant Problems   CARDIO   (+) Migraine with aura and without status migrainosus, not intractable      NEURO/PSYCH   (+) Anxiety   (+) Depression   (+) Migraine with aura and without status migrainosus, not intractable   (+) Panic attack        Physical Exam    Airway    Mallampati score: I  TM Distance: <3 FB  Neck ROM: full     Dental       Cardiovascular  Rhythm: regular, Rate: normal, Cardiovascular exam normal    Pulmonary  Pulmonary exam normal     Other Findings        Anesthesia Plan  ASA Score- 2     Anesthesia Type- IV sedation with anesthesia with ASA Monitors  Additional Monitors:   Airway Plan:           Plan Factors-Exercise tolerance (METS): >4 METS  Chart reviewed  Existing labs reviewed  Patient summary reviewed  Patient is not a current smoker  Patient did not smoke on day of surgery  Obstructive sleep apnea risk education given perioperatively  Induction- intravenous  Postoperative Plan-     Informed Consent- Anesthetic plan and risks discussed with patient

## 2022-02-02 NOTE — H&P
History and Physical - [facility]  Linda Cisneros 37 y o  female MRN: [id number]                  HPI: Linda Cisneros is a 37y o  year old female who presents for iron deficiency and abnormal imaging  REVIEW OF SYSTEMS: Per the HPI, and otherwise unremarkable  Historical Information   Past Medical History:   Diagnosis Date    Anemia     in past    Anxiety     Depression     Gallstones     Migraine     Nausea     occ    Panic attack     Rotator cuff disorder, right     Vertigo      Past Surgical History:   Procedure Laterality Date    ABDOMINAL SURGERY      scar tissue removed     SECTION      last assessed: Oct 23, 2014     HYSTERECTOMY      [place] LAP,CHOLECYSTECTOMY/GRAPH N/A 2018    Procedure: CHOLECYSTECTOMY LAPAROSCOPIC  WITH  ATTEMPTED IOC; Surgeon: Clive Velarde MD;  Location: [facility];  Service: General    TUBAL LIGATION       Social History   Social History     Substance and Sexual Activity   Alcohol Use No    Comment: beer occasionally, reports drinking 4-6 beers today     Social History     Substance and Sexual Activity   Drug Use No     Social History     Tobacco Use   Smoking Status Former Smoker    Quit date: 2013    Years since quittin 9   Smokeless Tobacco Never Used     Family History   Problem Relation Age of Onset    No Known Problems Family     Hypertension Mother     Diabetes type II Father         mellitus        Meds/Allergies     (Not in a hospital admission)      No Known Allergies    Objective     Last menstrual period 2017, not currently breastfeeding  PHYSICAL EXAMINATION:    General Appearance:   Alert, cooperative, no distress   HEENT:  Normocephalic, atraumatic, anicteric  Neck supple, symmetrical, trachea midline  Lungs:   Equal chest rise and unlabored breathing, normal effort, no coughing  Cardiovascular:   No visualized JVD     Abdomen:   No abdominal distension; LLQ ttp   Skin:   No jaundice, rashes, or lesions  Musculoskeletal:   Normal range of motion visualized  Psych:  Normal affect and normal insight  Neuro:  Alert and appropriate  ASSESSMENT/PLAN:  This is a 37y o  year old female here for EGD and colonsocopy, and she is stable and optimized for her procedure

## 2022-02-02 NOTE — ANESTHESIA POSTPROCEDURE EVALUATION
Post-Op Assessment Note    CV Status:  Stable    Pain management: adequate     Mental Status:  Alert and awake   Hydration Status:  Euvolemic   PONV Controlled:  Controlled   Airway Patency:  Patent      Post Op Vitals Reviewed: Yes      Staff: Anesthesiologist         No complications documented      /73 (02/02/22 1406)    Temp      Pulse 72 (02/02/22 1406)   Resp 18 (02/02/22 1406)    SpO2 100 % (02/02/22 1406)

## 2022-02-09 DIAGNOSIS — K52.9 COLITIS: ICD-10-CM

## 2022-02-09 RX ORDER — FAMOTIDINE 40 MG/1
TABLET, FILM COATED ORAL
Qty: 90 TABLET | OUTPATIENT
Start: 2022-02-09

## 2022-02-10 DIAGNOSIS — K52.9 COLITIS: ICD-10-CM

## 2022-02-10 DIAGNOSIS — F41.9 ANXIETY: ICD-10-CM

## 2022-02-10 DIAGNOSIS — G43.109 MIGRAINE WITH AURA AND WITHOUT STATUS MIGRAINOSUS, NOT INTRACTABLE: ICD-10-CM

## 2022-02-10 RX ORDER — HYDROXYZINE HYDROCHLORIDE 25 MG/1
TABLET, FILM COATED ORAL
Qty: 40 TABLET | Refills: 0 | Status: SHIPPED | OUTPATIENT
Start: 2022-02-10 | End: 2022-03-14 | Stop reason: SDUPTHER

## 2022-02-10 RX ORDER — BACLOFEN 10 MG/1
TABLET ORAL
Qty: 10 TABLET | Refills: 0 | Status: SHIPPED | OUTPATIENT
Start: 2022-02-10 | End: 2022-08-08

## 2022-02-10 RX ORDER — ONDANSETRON 4 MG/1
TABLET, ORALLY DISINTEGRATING ORAL
Qty: 20 TABLET | Refills: 0 | Status: SHIPPED | OUTPATIENT
Start: 2022-02-10 | End: 2022-08-08

## 2022-02-10 RX ORDER — DICYCLOMINE HCL 20 MG
TABLET ORAL
Qty: 40 TABLET | Refills: 0 | Status: SHIPPED | OUTPATIENT
Start: 2022-02-10 | End: 2022-08-01

## 2022-02-14 ENCOUNTER — CLINICAL SUPPORT (OUTPATIENT)
Dept: FAMILY MEDICINE CLINIC | Facility: CLINIC | Age: 42
End: 2022-02-14

## 2022-02-14 ENCOUNTER — APPOINTMENT (OUTPATIENT)
Dept: LAB | Facility: CLINIC | Age: 42
End: 2022-02-14
Payer: COMMERCIAL

## 2022-02-14 DIAGNOSIS — R63.4 WEIGHT LOSS: ICD-10-CM

## 2022-02-14 DIAGNOSIS — E53.8 B12 DEFICIENCY: ICD-10-CM

## 2022-02-14 DIAGNOSIS — E61.1 IRON DEFICIENCY: ICD-10-CM

## 2022-02-14 DIAGNOSIS — R19.7 DIARRHEA, UNSPECIFIED TYPE: ICD-10-CM

## 2022-02-14 DIAGNOSIS — R10.84 GENERALIZED ABDOMINAL PAIN: ICD-10-CM

## 2022-02-14 LAB
ALBUMIN SERPL BCP-MCNC: 4.3 G/DL (ref 3.5–5)
ALP SERPL-CCNC: 69 U/L (ref 46–116)
ALT SERPL W P-5'-P-CCNC: 18 U/L (ref 12–78)
ANION GAP SERPL CALCULATED.3IONS-SCNC: 6 MMOL/L (ref 4–13)
AST SERPL W P-5'-P-CCNC: 8 U/L (ref 5–45)
BILIRUB SERPL-MCNC: 0.4 MG/DL (ref 0.2–1)
BUN SERPL-MCNC: 5 MG/DL (ref 5–25)
CALCIUM SERPL-MCNC: 9.2 MG/DL (ref 8.3–10.1)
CHLORIDE SERPL-SCNC: 106 MMOL/L (ref 100–108)
CO2 SERPL-SCNC: 27 MMOL/L (ref 21–32)
CREAT SERPL-MCNC: 0.64 MG/DL (ref 0.6–1.3)
CRP SERPL QL: <3 MG/L
ERYTHROCYTE [DISTWIDTH] IN BLOOD BY AUTOMATED COUNT: 12.7 % (ref 11.6–15.1)
FERRITIN SERPL-MCNC: 79 NG/ML (ref 8–388)
FOLATE SERPL-MCNC: 13 NG/ML (ref 3.1–17.5)
GFR SERPL CREATININE-BSD FRML MDRD: 111 ML/MIN/1.73SQ M
GLUCOSE P FAST SERPL-MCNC: 104 MG/DL (ref 65–99)
HCT VFR BLD AUTO: 42.4 % (ref 34.8–46.1)
HGB BLD-MCNC: 14.1 G/DL (ref 11.5–15.4)
IRON SATN MFR SERPL: 15 % (ref 15–50)
IRON SERPL-MCNC: 40 UG/DL (ref 50–170)
MCH RBC QN AUTO: 30.3 PG (ref 26.8–34.3)
MCHC RBC AUTO-ENTMCNC: 33.3 G/DL (ref 31.4–37.4)
MCV RBC AUTO: 91 FL (ref 82–98)
PLATELET # BLD AUTO: 199 THOUSANDS/UL (ref 149–390)
PMV BLD AUTO: 12 FL (ref 8.9–12.7)
POTASSIUM SERPL-SCNC: 3.9 MMOL/L (ref 3.5–5.3)
PROT SERPL-MCNC: 8.2 G/DL (ref 6.4–8.2)
RBC # BLD AUTO: 4.65 MILLION/UL (ref 3.81–5.12)
SODIUM SERPL-SCNC: 139 MMOL/L (ref 136–145)
TIBC SERPL-MCNC: 266 UG/DL (ref 250–450)
VIT B12 SERPL-MCNC: 371 PG/ML (ref 100–900)
WBC # BLD AUTO: 4.59 THOUSAND/UL (ref 4.31–10.16)

## 2022-02-14 PROCEDURE — 80053 COMPREHEN METABOLIC PANEL: CPT

## 2022-02-14 PROCEDURE — 82746 ASSAY OF FOLIC ACID SERUM: CPT

## 2022-02-14 PROCEDURE — 83550 IRON BINDING TEST: CPT

## 2022-02-14 PROCEDURE — 85027 COMPLETE CBC AUTOMATED: CPT

## 2022-02-14 PROCEDURE — 83540 ASSAY OF IRON: CPT

## 2022-02-14 PROCEDURE — 82728 ASSAY OF FERRITIN: CPT

## 2022-02-14 PROCEDURE — 86140 C-REACTIVE PROTEIN: CPT

## 2022-02-14 PROCEDURE — 96372 THER/PROPH/DIAG INJ SC/IM: CPT | Performed by: NURSE PRACTITIONER

## 2022-02-14 PROCEDURE — 82607 VITAMIN B-12: CPT

## 2022-02-14 PROCEDURE — 36415 COLL VENOUS BLD VENIPUNCTURE: CPT

## 2022-02-14 RX ADMIN — CYANOCOBALAMIN 1000 MCG: 1000 INJECTION INTRAMUSCULAR; SUBCUTANEOUS at 09:13

## 2022-02-15 ENCOUNTER — APPOINTMENT (OUTPATIENT)
Dept: LAB | Facility: CLINIC | Age: 42
End: 2022-02-15
Payer: COMMERCIAL

## 2022-02-15 DIAGNOSIS — R63.4 WEIGHT LOSS: ICD-10-CM

## 2022-02-15 DIAGNOSIS — R19.7 DIARRHEA, UNSPECIFIED TYPE: ICD-10-CM

## 2022-02-15 DIAGNOSIS — R10.84 GENERALIZED ABDOMINAL PAIN: ICD-10-CM

## 2022-02-15 DIAGNOSIS — E61.1 IRON DEFICIENCY: ICD-10-CM

## 2022-02-15 PROCEDURE — 87209 SMEAR COMPLEX STAIN: CPT

## 2022-02-15 PROCEDURE — 87177 OVA AND PARASITES SMEARS: CPT

## 2022-02-15 PROCEDURE — 83993 ASSAY FOR CALPROTECTIN FECAL: CPT

## 2022-02-15 PROCEDURE — 87505 NFCT AGENT DETECTION GI: CPT

## 2022-02-16 LAB — C DIFF TOX GENS STL QL NAA+PROBE: NEGATIVE

## 2022-02-17 LAB
CALPROTECTIN STL-MCNT: 24 UG/G (ref 0–120)
CAMPYLOBACTER DNA SPEC NAA+PROBE: NORMAL
SALMONELLA DNA SPEC QL NAA+PROBE: NORMAL
SHIGA TOXIN STX GENE SPEC NAA+PROBE: NORMAL
SHIGELLA DNA SPEC QL NAA+PROBE: NORMAL

## 2022-02-21 ENCOUNTER — OFFICE VISIT (OUTPATIENT)
Dept: FAMILY MEDICINE CLINIC | Facility: CLINIC | Age: 42
End: 2022-02-21

## 2022-02-21 VITALS
DIASTOLIC BLOOD PRESSURE: 70 MMHG | BODY MASS INDEX: 16.53 KG/M2 | WEIGHT: 82 LBS | HEART RATE: 66 BPM | RESPIRATION RATE: 18 BRPM | OXYGEN SATURATION: 98 % | TEMPERATURE: 97.6 F | SYSTOLIC BLOOD PRESSURE: 118 MMHG | HEIGHT: 59 IN

## 2022-02-21 DIAGNOSIS — F41.9 ANXIETY: Primary | ICD-10-CM

## 2022-02-21 DIAGNOSIS — F33.1 MODERATE EPISODE OF RECURRENT MAJOR DEPRESSIVE DISORDER (HCC): ICD-10-CM

## 2022-02-21 DIAGNOSIS — R63.6 UNDERWEIGHT DUE TO INADEQUATE CALORIC INTAKE: ICD-10-CM

## 2022-02-21 PROCEDURE — 99214 OFFICE O/P EST MOD 30 MIN: CPT | Performed by: NURSE PRACTITIONER

## 2022-02-21 RX ORDER — MIRTAZAPINE 7.5 MG/1
7.5 TABLET, FILM COATED ORAL
Qty: 30 TABLET | Refills: 0 | Status: SHIPPED | OUTPATIENT
Start: 2022-02-21 | End: 2022-03-14

## 2022-02-21 NOTE — PROGRESS NOTES
Assessment/Plan:    Anxiety  She is on the waiting list to establish with psych   Feels that signficant anxiety is contributing to her difficulty with appetite/ weight loss   Was not able to tolerate Lexapro - D/C  Will start mirtazapine HS     Yasmin Sibley was seen today for lab results  Diagnoses and all orders for this visit:    Anxiety  -     mirtazapine (REMERON) 7 5 MG tablet; Take 1 tablet (7 5 mg total) by mouth daily at bedtime    Moderate episode of recurrent major depressive disorder (HCC)  -     mirtazapine (REMERON) 7 5 MG tablet; Take 1 tablet (7 5 mg total) by mouth daily at bedtime    Underweight due to inadequate caloric intake  -     Ambulatory Referral to Nutrition Services; Future      Return in about 4 weeks (around 3/21/2022) for anxiety   Patient Instructions     Gas and Bloating   WHAT YOU NEED TO KNOW:   What is gas and bloating? Gas forms inside your body when you eat certain foods or swallow too much air  Bloating is the tight, full feeling you get from too much gas  What causes gas and bloating? · Vegetables, such as beans, cabbage, and broccoli    · Starches, such as potatoes, corn, wheat, and pasta    · Dairy products    · High-fiber cereals and breads    · High-fat foods    · Carbonated drinks    · Chewing gum, smoking, and loose dentures    What are the symptoms of gas and bloating? · Abdominal pain    · Bloating that is worse during the day and better at night    · Burping or passing gas more often than usual    · Constipation    How is gas and bloating diagnosed? Your healthcare provider will ask about what you eat and examine your abdomen  You may need any of the following tests to check for a medical condition that may be causing your symptoms:  · Blood tests: You may need blood taken to give healthcare providers information about how your body is working  The blood may be taken from your hand, arm, or IV  · Imaging tests:   These tests can help healthcare providers find a blockage in your bowels  · Endoscopy: This test uses a scope to see the inside of your digestive system  A scope is a long, flexible tube with a light and tiny camera on the end  This test is used to check for blockage or other problems  Samples may be taken from your bowels and sent to a lab for tests  How is gas and bloating treated? Gas relief medicines may help decrease gas pain and bloating  These can be bought without a doctor's order  How do I manage my symptoms? · Keep a log:  Write down what you eat and drink and how often you pass gas each day  · Eat and drink slowly:  Choose foods that do not cause gas, such as meat, poultry, fish, and eggs  Avoid high-fat foods and vegetables or starches that can cause gas  Do not drink carbonated drinks  Add foods back into your diet one at a time after 1 week  If the food causes symptoms, avoid it  · Exercise:  Exercise can help relieve gas  · Do not smoke or chew gum: This can cause you to swallow air  · Make sure your dentures fit properly:  Have your dentures fixed if they are loose  Loose dentures can cause you to swallow too much air  When should I call my healthcare provider? · You have a fever  · You vomit or have diarrhea  · You lose weight without trying  · You have questions or concerns about your condition or care  When should I seek immediate care? · You have severe abdominal pain  · You have blood in your bowel movement  CARE AGREEMENT:   You have the right to help plan your care  Learn about your health condition and how it may be treated  Discuss treatment options with your healthcare providers to decide what care you want to receive  You always have the right to refuse treatment  The above information is an  only  It is not intended as medical advice for individual conditions or treatments   Talk to your doctor, nurse or pharmacist before following any medical regimen to see if it is safe and effective for you  © Copyright [employer] 2021 Information is for End User's use only and may not be sold, redistributed or otherwise used for commercial purposes  All illustrations and images included in CareNotes® are the copyrighted property of A D A M , Inc  or June Menchaca  Benefits of an Active Lifestyle   AMBULATORY CARE:   An active lifestyle  means you do physical activity throughout the day  Any activity that gets you up and moving is part of an active lifestyle  Physical activity includes exercise such as walking or lifting weights  It also includes playing sports  Physical activity is different from other kinds of activity, such as reading a book  This kind of activity is called sedentary  A sedentary lifestyle means you sit or do not move much during the day  An active lifestyle has many benefits, such as helping you prevent or manage health conditions  Call your doctor if:   · You notice changes in your health, such as new or worsening shortness of breath  · You have questions or concerns about your condition or care  Benefits of an active lifestyle:   · You may be able to do daily activities more easily  Activity helps condition your heart, lungs, and muscles  This can help you get through your daily activities without feeling tired  · You can help control your weight  Activity helps your body use the calories you eat instead of storing them as fat  Your body continues to burn calories at a higher rate after you are active  · Activity can increase your health  Activity helps lower your risk for cancer, heart disease, diabetes, and stroke  Activity can help you control your blood pressure and blood sugar levels, and lower your cholesterol  If you have arthritis, activity can help your joints move more easily and with less pain  · Your bones and muscles will get stronger  This will help prevent osteoporosis and reduce your risk for falls      · Activity can help improve your mood  Activity can reduce or prevent depression and stress  Activity can also help improve your sleep  Risks of a sedentary lifestyle:  A sedentary lifestyle increases your risk for diseases such as diabetes, high blood pressure, and heart disease  Your immune system also becomes weaker  This means it cannot fight infections well  How much activity you need:  Any activity is better than no activity at all  When you go from being mostly inactive to adding some activity, you will see health benefits  The following are general guidelines:  · Do aerobic activity several days each week  Aerobic activity includes walking, bicycling, dancing, swimming, and raking leaves  Aim for 150 to 300 minutes of moderate activity, or 75 to 150 of vigorous activity each week  You can also do a combination of moderate and vigorous activity  · Do strength training at least 2 times each week  Strength training helps you keep the muscles you have and build new muscles  Strength training includes pushups, yoga, jimmie chi, and weightlifting  If you do not have access to weights, you can lift items around your house  Try to work all the major muscle groups, such as your legs, arms, abdomen, and chest  Do 2 or 3 sets on each area  Use a weight that is slightly heavier than you can lift easily  You can work up to [id number]  You can also use resistance bands instead of weights  Steps you can take to become more active:   · Set goals  Set some long-term goals and some short-term goals  For example, you may want to be able to walk for 30 minutes without becoming short of breath  Try not to put time requirements on your goals  For example, do not think you should reach your goal in a month  Set smaller goals, such as walking a little longer each week, or feeling less shortness of breath  · Be active all day  Activity does not have to mean structured exercise each day   You can be more active by making small changes all day  For example, try parking as far from the entrance of buildings as you can when you run errands  If possible, walk or ride a bike instead of driving  Take the stairs instead of the elevator  · Keep a record of your activity and your progress  You can do this by writing down your daily activity  Include the kind of activity and how long you did it  You can also use a program on your phone or other device that will track activity for you  Also record your progress  You may be doing daily activities more easily, sleeping better, or building muscles  · Step counting can help you monitor activity  A general guide is to take 10,000 steps each day  A pedometer is a device you can wear to track your steps  Some phones have programs that will count and record steps  You may need to work up to 10,000 steps  Start by finding out how many steps you usually take in a day  Then try to take more steps each day than you took the day before  Tips to help you stay on track:   · Start slowly and work up  You do not have to do 30 minutes of activity at one time  You can break the activity up and do a few minutes at a time  Remember that some physical activity is better than none  Stand up during the day, even if you cannot walk around  Your body uses more energy when you stand  You may be able to get a desk that allows you to stand while you type or make phone calls for work  Aim for a speed or intensity that is challenging but not too difficult  You should be able to speak a few words at a time but not be able to sing  · Plan activities you enjoy  Do a variety of activities so you do not become bored and you stay challenged  Include activities that strengthen your bones  These activities are called weight-bearing exercises  Examples include tennis, jumping rope, and running  Swimming, riding a bike, and similar exercises keep weight off your bones   They will not help strengthen bones, but they will help your heart and lungs work better  · Ask for support from the people in your life  Go for a walk after dinner with your family  Meet friends at the park  Take a break with a coworker and walk around  Find someone who likes to go to the gym at the same time you do  You may be more likely to go if you know another person is counting on you  Get involved in community events, such as cleaning a community park  Ask someone to help you stay on track  For example, you can tell the person about your daily or weekly activity  · Treat yourself to a reward when you reach a goal   The rewards can be for activity done for a certain amount of time each day or days each week  Rewards can also be for progress you make  Have rewards that are not food, such as a new clothing item or book  What you need to know about nutrition and activity:  Healthy foods will give you the energy you need to be active  Activity and good nutrition work together to help you reach or maintain a healthy weight  Healthy foods include fruits, vegetables, lean meats, fish, cooked beans, whole-grain breads, and low-fat dairy products  Your healthcare provider can help you create a healthy meal plan  He or she can tell you how many calories you need to stay active and still lose weight if needed  Follow up with your doctor as directed:  Write down your questions so you remember to ask them during your visits  © Copyright [employer] 2021 Information is for End User's use only and may not be sold, redistributed or otherwise used for commercial purposes  All illustrations and images included in CareNotes® are the copyrighted property of A D A M , Inc  or [address] [place], [place] [id number]   The above information is an  only  It is not intended as medical advice for individual conditions or treatments  Talk to your doctor, nurse or pharmacist before following any medical regimen to see if it is safe and effective for you    Heart Healthy Diet   AMBULATORY CARE:   A heart healthy diet  is an eating plan low in unhealthy fats and sodium (salt)  The plan is high in healthy fats and fiber  A heart healthy diet helps improve your cholesterol levels and lowers your risk for heart disease and stroke  A dietitian will teach you how to read and understand food labels  Heart healthy diet guidelines to follow:   · Choose foods that contain healthy fats  ? Unsaturated fats  include monounsaturated and polyunsaturated fats  Unsaturated fat is found in foods such as soybean, canola, olive, corn, and safflower oils  It is also found in soft tub margarine that is made with liquid vegetable oil  ? Omega-3 fat  is found in certain fish, such as salmon, tuna, and trout, and in walnuts and flaxseed  Eat fish high in omega-3 fats at least 2 times a week  · Get 20 to 30 grams of fiber each day  Fruits, vegetables, whole-grain foods, and legumes (cooked beans) are good sources of fiber  · Limit or do not have unhealthy fats  ? Cholesterol  is found in animal foods, such as eggs and lobster, and in dairy products made from whole milk  Limit cholesterol to less than 200 mg each day  ? Saturated fat  is found in meats, such as rodriguez and hamburger  It is also found in chicken or turkey skin, whole milk, and butter  Limit saturated fat to less than 7% of your total daily calories  ? Trans fat  is found in packaged foods, such as potato chips and cookies  It is also in hard margarine, some fried foods, and shortening  Do not eat foods that contain trans fats  · Limit sodium as directed  You may be told to limit sodium to 2,000 to 2,300 mg each day  Choose low-sodium or no-salt-added foods  Add little or no salt to food you prepare  Use herbs and spices in place of salt         Include the following in your heart healthy plan:  Ask your dietitian or healthcare provider how many servings to have from each of the following food groups:  · Grains:      ? Whole-wheat breads, cereals, and pastas, and brown rice    ? Low-fat, low-sodium crackers and chips    · Vegetables:      ? Broccoli, green beans, green peas, and spinach    ? Collards, kale, and lima beans    ? Carrots, sweet potatoes, tomatoes, and peppers    ? Canned vegetables with no salt added    · Fruits:      ? Bananas, peaches, pears, and pineapple    ? Grapes, raisins, and dates    ? Oranges, tangerines, grapefruit, orange juice, and grapefruit juice    ? Apricots, mangoes, melons, and papaya    ? Raspberries and strawberries    ? Canned fruit with no added sugar    · Low-fat dairy:      ? Nonfat (skim) milk, 1% milk, and low-fat almond, cashew, or soy milks fortified with calcium    ? Low-fat cheese, regular or frozen yogurt, and cottage cheese    · Meats and proteins:      ? Lean cuts of beef and pork (loin, leg, round), skinless chicken and turkey    ? Legumes, soy products, egg whites, or nuts    Limit or do not include the following in your heart healthy plan:   · Unhealthy fats and oils:      ? Whole or 2% milk, cream cheese, sour cream, or cheese    ? High-fat cuts of beef (T-bone steaks, ribs), chicken or turkey with skin, and organ meats such as liver    ? Butter, stick margarine, shortening, and cooking oils such as coconut or palm oil    · Foods and liquids high in sodium:      ? Packaged foods, such as frozen dinners, cookies, macaroni and cheese, and cereals with more than 300 mg of sodium per serving    ? Vegetables with added sodium, such as instant potatoes, vegetables with added sauces, or regular canned vegetables    ? Cured or smoked meats, such as hot dogs, rodriguez, and sausage    ? High-sodium ketchup, barbecue sauce, salad dressing, pickles, olives, soy sauce, or miso    · Foods and liquids high in sugar:      ? Candy, cake, cookies, pies, or doughnuts    ? Soft drinks (soda), sports drinks, or sweetened tea    ?  Canned or dry mixes for cakes, soups, sauces, or gravies    Other healthy heart guidelines:   · Do not smoke  Nicotine and other chemicals in cigarettes and cigars can cause lung and heart damage  Ask your healthcare provider for information if you currently smoke and need help to quit  E-cigarettes or smokeless tobacco still contain nicotine  Talk to your healthcare provider before you use these products  · Limit or do not drink alcohol as directed  Alcohol can damage your heart and raise your blood pressure  Your healthcare provider may give you specific daily and weekly limits  The general recommended limit is 1 drink a day for women 21 or older and for men 72 or older  Do not have more than 3 drinks in a day or 7 in a week  The recommended limit is 2 drinks a day for men 24to 59years of age  Do not have more than 4 drinks in a day or 14 in a week  A drink of alcohol is 12 ounces of beer, 5 ounces of wine, or 1½ ounces of liquor  · Exercise regularly  Exercise can help you maintain a healthy weight and improve your blood pressure and cholesterol levels  Regular exercise can also decrease your risk for heart problems  Ask your healthcare provider about the best exercise plan for you  Do not start an exercise program without asking your healthcare provider  Follow up with your doctor or cardiologist as directed:  Write down your questions so you remember to ask them during your visits  © [employer] 2021 Information is for End User's use only and may not be sold, redistributed or otherwise used for commercial purposes  All illustrations and images included in CareNotes® are the copyrighted property of A D A M , Inc  or [facility] Christina Petit   The above information is an  only  It is not intended as medical advice for individual conditions or treatments  Talk to your doctor, nurse or pharmacist before following any medical regimen to see if it is safe and effective for you  Subjective:      Tommy Asher is a 37 y o  female who  has a past medical history of Anemia, Anxiety, Depression, Gallstones, Migraine, Nausea, Panic attack, Rotator cuff disorder, right, and Vertigo  who presented to the office today for follow up  Has re-established with GI and is undergoing stool studies and labs  Has lost more weight since last visit  Feels that her panic/ anxiety is not well controlled and this contributes to decreased appetite  Was not able to tolerate Lexapro, took it 3 x and stopped due to "jittery feeling"       The following portions of the patient's history were reviewed and updated as appropriate: allergies, current medications, past family history, past medical history, past social history, past surgical history and problem list     Current Outpatient Medications on File Prior to Visit   Medication Sig Dispense Refill    baclofen 10 mg tablet TAKE 1 TABLET(10 MG) BY MOUTH THREE TIMES DAILY AS NEEDED FOR MUSCLE SPASMS 10 tablet 0    cholecalciferol (VITAMIN D3) 1,000 units tablet Take 1 tablet (1,000 Units total) by mouth daily 90 tablet 3    dicyclomine (BENTYL) 20 mg tablet TAKE 1 TABLET(20 MG) BY MOUTH EVERY 6 HOURS AS NEEDED FOR PAIN 40 tablet 0    docusate sodium (COLACE) 100 mg capsule Take 1 capsule (100 mg total) by mouth in the morning 60 capsule 2    ergocalciferol (VITAMIN D2) 50,000 units TAKE 1 CAPSULE BY MOUTH 1 TIME A WEEK 13 capsule 0    famotidine (PEPCID) 40 MG tablet Take 1 tablet (40 mg total) by mouth daily 60 tablet 2    ferrous sulfate 324 (65 Fe) mg Take 1 tablet (324 mg total) by mouth daily before breakfast 90 tablet 3    hydrocortisone (ANUSOL-HC) 25 mg suppository Insert 1 suppository (25 mg total) into the rectum 2 (two) times a day (Patient not taking: Reported on 12/30/2021 ) 12 suppository 0    hydrOXYzine HCL (ATARAX) 25 mg tablet TAKE 1 TABLET(25 MG) BY MOUTH EVERY 6 HOURS AS NEEDED FOR ANXIETY 40 tablet 0    meclizine (ANTIVERT) 25 mg tablet Take 1 tablet (25 mg total) by mouth every 8 (eight) hours as needed for dizziness 60 tablet 1    ondansetron (ZOFRAN-ODT) 4 mg disintegrating tablet DISSOLVE 1 TABLET(4 MG) ON THE TONGUE EVERY 8 HOURS AS NEEDED FOR NAUSEA OR VOMITING 20 tablet 0    SUMAtriptan (Imitrex) 50 mg tablet Take 1 tablet (50 mg total) by mouth once as needed for migraine for up to 1 dose 9 tablet 0     Current Facility-Administered Medications on File Prior to Visit   Medication Dose Route Frequency Provider Last Rate Last Admin    cyanocobalamin injection 1,000 mcg  1,000 mcg Intramuscular Q30 Days [place] ANNAMARIE Tinsley   1,000 mcg at 02/14/22 0913       Review of Systems   Constitutional: Positive for appetite change, fatigue and unexpected weight change  Negative for chills and fever  HENT: Negative for ear pain and sore throat  Eyes: Negative for pain and visual disturbance  Respiratory: Negative for cough and shortness of breath  Cardiovascular: Negative for chest pain and palpitations  Gastrointestinal: Positive for abdominal pain and diarrhea  Negative for vomiting  Genitourinary: Negative for difficulty urinating, dysuria, frequency, hematuria and urgency  Musculoskeletal: Negative for arthralgias, back pain and myalgias  Skin: Negative for color change and rash  Neurological: Negative for seizures and syncope  Psychiatric/Behavioral: The patient is nervous/anxious  All other systems reviewed and are negative  BMI Counseling: Body mass index is 16 56 kg/m²  The BMI is below normal  Patient advised to gain weight  Patient referred to nutritionist  Rationale for BMI follow-up plan is due to patient being underweight  Objective:    /70 (BP Location: Left arm, Patient Position: Sitting, Cuff Size: Adult)   Pulse 66   Temp 97 6 °F (36 4 °C) (Temporal)   Resp 18   Ht 4' 11" (1 499 m)   Wt 37 2 kg (82 lb)   LMP 07/24/2017   SpO2 98%   BMI 16 56 kg/m²     Physical Exam  Vitals and nursing note reviewed     Constitutional: General: She is not in acute distress  Appearance: She is underweight  She is not diaphoretic  HENT:      Head: Normocephalic and atraumatic  Eyes:      Conjunctiva/sclera: Conjunctivae normal       Pupils: Pupils are equal, round, and reactive to light  Cardiovascular:      Rate and Rhythm: Normal rate and regular rhythm  Heart sounds: No friction rub  Pulmonary:      Effort: Pulmonary effort is normal  No respiratory distress  Breath sounds: Normal breath sounds  No wheezing  Abdominal:      General: Bowel sounds are normal  There is no distension  Palpations: Abdomen is soft  Tenderness: There is no abdominal tenderness  Musculoskeletal:         General: No deformity  Normal range of motion  Cervical back: Normal range of motion and neck supple  Lymphadenopathy:      Cervical: No cervical adenopathy  Skin:     General: Skin is warm and dry  Capillary Refill: Capillary refill takes less than 2 seconds  Findings: No rash  Neurological:      Mental Status: She is alert and oriented to person, place, and time  Psychiatric:         Mood and Affect: Mood is anxious           ANNAMARIE Kirby  02/22/22  8:33 AM

## 2022-02-21 NOTE — PATIENT INSTRUCTIONS
Gas and Bloating   WHAT YOU NEED TO KNOW:   What is gas and bloating? Gas forms inside your body when you eat certain foods or swallow too much air  Bloating is the tight, full feeling you get from too much gas  What causes gas and bloating? · Vegetables, such as beans, cabbage, and broccoli    · Starches, such as potatoes, corn, wheat, and pasta    · Dairy products    · High-fiber cereals and breads    · High-fat foods    · Carbonated drinks    · Chewing gum, smoking, and loose dentures    What are the symptoms of gas and bloating? · Abdominal pain    · Bloating that is worse during the day and better at night    · Burping or passing gas more often than usual    · Constipation    How is gas and bloating diagnosed? Your healthcare provider will ask about what you eat and examine your abdomen  You may need any of the following tests to check for a medical condition that may be causing your symptoms:  · Blood tests: You may need blood taken to give healthcare providers information about how your body is working  The blood may be taken from your hand, arm, or IV  · Imaging tests: These tests can help healthcare providers find a blockage in your bowels  · Endoscopy: This test uses a scope to see the inside of your digestive system  A scope is a long, flexible tube with a light and tiny camera on the end  This test is used to check for blockage or other problems  Samples may be taken from your bowels and sent to a lab for tests  How is gas and bloating treated? Gas relief medicines may help decrease gas pain and bloating  These can be bought without a doctor's order  How do I manage my symptoms? · Keep a log:  Write down what you eat and drink and how often you pass gas each day  · Eat and drink slowly:  Choose foods that do not cause gas, such as meat, poultry, fish, and eggs  Avoid high-fat foods and vegetables or starches that can cause gas  Do not drink carbonated drinks   Add foods back into your diet one at a time after 1 week  If the food causes symptoms, avoid it  · Exercise:  Exercise can help relieve gas  · Do not smoke or chew gum: This can cause you to swallow air  · Make sure your dentures fit properly:  Have your dentures fixed if they are loose  Loose dentures can cause you to swallow too much air  When should I call my healthcare provider? · You have a fever  · You vomit or have diarrhea  · You lose weight without trying  · You have questions or concerns about your condition or care  When should I seek immediate care? · You have severe abdominal pain  · You have blood in your bowel movement  CARE AGREEMENT:   You have the right to help plan your care  Learn about your health condition and how it may be treated  Discuss treatment options with your healthcare providers to decide what care you want to receive  You always have the right to refuse treatment  The above information is an  only  It is not intended as medical advice for individual conditions or treatments  Talk to your doctor, nurse or pharmacist before following any medical regimen to see if it is safe and effective for you  © Copyright [employer] 2021 Information is for End User's use only and may not be sold, redistributed or otherwise used for commercial purposes  All illustrations and images included in CareNotes® are the copyrighted property of A "[employer]  or [address] [place] [address]  Benefits of an Active Lifestyle   AMBULATORY CARE:   An active lifestyle  means you do physical activity throughout the day  Any activity that gets you up and moving is part of an active lifestyle  Physical activity includes exercise such as walking or lifting weights  It also includes playing sports  Physical activity is different from other kinds of activity, such as reading a book  This kind of activity is called sedentary   A sedentary lifestyle means you sit or do not move much during the day  An active lifestyle has many benefits, such as helping you prevent or manage health conditions  Call your doctor if:   · You notice changes in your health, such as new or worsening shortness of breath  · You have questions or concerns about your condition or care  Benefits of an active lifestyle:   · You may be able to do daily activities more easily  Activity helps condition your heart, lungs, and muscles  This can help you get through your daily activities without feeling tired  · You can help control your weight  Activity helps your body use the calories you eat instead of storing them as fat  Your body continues to burn calories at a higher rate after you are active  · Activity can increase your health  Activity helps lower your risk for cancer, heart disease, diabetes, and stroke  Activity can help you control your blood pressure and blood sugar levels, and lower your cholesterol  If you have arthritis, activity can help your joints move more easily and with less pain  · Your bones and muscles will get stronger  This will help prevent osteoporosis and reduce your risk for falls  · Activity can help improve your mood  Activity can reduce or prevent depression and stress  Activity can also help improve your sleep  Risks of a sedentary lifestyle:  A sedentary lifestyle increases your risk for diseases such as diabetes, high blood pressure, and heart disease  Your immune system also becomes weaker  This means it cannot fight infections well  How much activity you need:  Any activity is better than no activity at all  When you go from being mostly inactive to adding some activity, you will see health benefits  The following are general guidelines:  · Do aerobic activity several days each week  Aerobic activity includes walking, bicycling, dancing, swimming, and raking leaves  Aim for 150 to 300 minutes of moderate activity, or 75 to 150 of vigorous activity each week   You can also do a combination of moderate and vigorous activity  · Do strength training at least 2 times each week  Strength training helps you keep the muscles you have and build new muscles  Strength training includes pushups, yoga, jimmie chi, and weightlifting  If you do not have access to weights, you can lift items around your house  Try to work all the major muscle groups, such as your legs, arms, abdomen, and chest  Do 2 or 3 sets on each area  Use a weight that is slightly heavier than you can lift easily  You can work up to [employer]  You can also use resistance bands instead of weights  Steps you can take to become more active:   · Set goals  Set some long-term goals and some short-term goals  For example, you may want to be able to walk for 30 minutes without becoming short of breath  Try not to put time requirements on your goals  For example, do not think you should reach your goal in a month  Set smaller goals, such as walking a little longer each week, or feeling less shortness of breath  · Be active all day  Activity does not have to mean structured exercise each day  You can be more active by making small changes all day  For example, try parking as far from the entrance of buildings as you can when you run errands  If possible, walk or ride a bike instead of driving  Take the stairs instead of the elevator  · Keep a record of your activity and your progress  You can do this by writing down your daily activity  Include the kind of activity and how long you did it  You can also use a program on your phone or other device that will track activity for you  Also record your progress  You may be doing daily activities more easily, sleeping better, or building muscles  · Step counting can help you monitor activity  A general guide is to take 10,000 steps each day  A pedometer is a device you can wear to track your steps  Some phones have programs that will count and record steps   You may need to work up to 10,000 steps  Start by finding out how many steps you usually take in a day  Then try to take more steps each day than you took the day before  Tips to help you stay on track:   · Start slowly and work up  You do not have to do 30 minutes of activity at one time  You can break the activity up and do a few minutes at a time  Remember that some physical activity is better than none  Stand up during the day, even if you cannot walk around  Your body uses more energy when you stand  You may be able to get a desk that allows you to stand while you type or make phone calls for work  Aim for a speed or intensity that is challenging but not too difficult  You should be able to speak a few words at a time but not be able to sing  · Plan activities you enjoy  Do a variety of activities so you do not become bored and you stay challenged  Include activities that strengthen your bones  These activities are called weight-bearing exercises  Examples include tennis, jumping rope, and running  Swimming, riding a bike, and similar exercises keep weight off your bones  They will not help strengthen bones, but they will help your heart and lungs work better  · Ask for support from the people in your life  Go for a walk after dinner with your family  Meet friends at the park  Take a break with a coworker and walk around  Find someone who likes to go to the gym at the same time you do  You may be more likely to go if you know another person is counting on you  Get involved in community events, such as cleaning a community park  Ask someone to help you stay on track  For example, you can tell the person about your daily or weekly activity  · Treat yourself to a reward when you reach a goal   The rewards can be for activity done for a certain amount of time each day or days each week  Rewards can also be for progress you make   Have rewards that are not food, such as a new clothing item or book     What you need to know about nutrition and activity:  Healthy foods will give you the energy you need to be active  Activity and good nutrition work together to help you reach or maintain a healthy weight  Healthy foods include fruits, vegetables, lean meats, fish, cooked beans, whole-grain breads, and low-fat dairy products  Your healthcare provider can help you create a healthy meal plan  He or she can tell you how many calories you need to stay active and still lose weight if needed  Follow up with your doctor as directed:  Write down your questions so you remember to ask them during your visits  © Copyright [employer] 2021 Information is for End User's use only and may not be sold, redistributed or otherwise used for commercial purposes  All illustrations and images included in CareNotes® are the copyrighted property of A D A M , Inc  or [facility] Christina Petit   The above information is an  only  It is not intended as medical advice for individual conditions or treatments  Talk to your doctor, nurse or pharmacist before following any medical regimen to see if it is safe and effective for you  Heart Healthy Diet   AMBULATORY CARE:   A heart healthy diet  is an eating plan low in unhealthy fats and sodium (salt)  The plan is high in healthy fats and fiber  A heart healthy diet helps improve your cholesterol levels and lowers your risk for heart disease and stroke  A dietitian will teach you how to read and understand food labels  Heart healthy diet guidelines to follow:   · Choose foods that contain healthy fats  ? Unsaturated fats  include monounsaturated and polyunsaturated fats  Unsaturated fat is found in foods such as soybean, canola, olive, corn, and safflower oils  It is also found in soft tub margarine that is made with liquid vegetable oil  ? Omega-3 fat  is found in certain fish, such as salmon, tuna, and trout, and in walnuts and flaxseed   Eat fish high in omega-3 fats at least 2 times a week  · Get 20 to 30 grams of fiber each day  Fruits, vegetables, whole-grain foods, and legumes (cooked beans) are good sources of fiber  · Limit or do not have unhealthy fats  ? Cholesterol  is found in animal foods, such as eggs and lobster, and in dairy products made from whole milk  Limit cholesterol to less than 200 mg each day  ? Saturated fat  is found in meats, such as rodriguez and hamburger  It is also found in chicken or turkey skin, whole milk, and butter  Limit saturated fat to less than 7% of your total daily calories  ? Trans fat  is found in packaged foods, such as potato chips and cookies  It is also in hard margarine, some fried foods, and shortening  Do not eat foods that contain trans fats  · Limit sodium as directed  You may be told to limit sodium to 2,000 to 2,300 mg each day  Choose low-sodium or no-salt-added foods  Add little or no salt to food you prepare  Use herbs and spices in place of salt  Include the following in your heart healthy plan:  Ask your dietitian or healthcare provider how many servings to have from each of the following food groups:  · Grains:      ? Whole-wheat breads, cereals, and pastas, and brown rice    ? Low-fat, low-sodium crackers and chips    · Vegetables:      ? Broccoli, green beans, green peas, and spinach    ? Collards, kale, and lima beans    ? Carrots, sweet potatoes, tomatoes, and peppers    ? Canned vegetables with no salt added    · Fruits:      ? Bananas, peaches, pears, and pineapple    ? Grapes, raisins, and dates    ? Oranges, tangerines, grapefruit, orange juice, and grapefruit juice    ? Apricots, mangoes, melons, and papaya    ? Raspberries and strawberries    ? Canned fruit with no added sugar    · Low-fat dairy:      ? Nonfat (skim) milk, 1% milk, and low-fat almond, cashew, or soy milks fortified with calcium    ?  Low-fat cheese, regular or frozen yogurt, and cottage cheese    · Meats and proteins:      ? Lean cuts of beef and pork (loin, leg, round), skinless chicken and turkey    ? Legumes, soy products, egg whites, or nuts    Limit or do not include the following in your heart healthy plan:   · Unhealthy fats and oils:      ? Whole or 2% milk, cream cheese, sour cream, or cheese    ? High-fat cuts of beef (T-bone steaks, ribs), chicken or turkey with skin, and organ meats such as liver    ? Butter, stick margarine, shortening, and cooking oils such as coconut or palm oil    · Foods and liquids high in sodium:      ? Packaged foods, such as frozen dinners, cookies, macaroni and cheese, and cereals with more than 300 mg of sodium per serving    ? Vegetables with added sodium, such as instant potatoes, vegetables with added sauces, or regular canned vegetables    ? Cured or smoked meats, such as hot dogs, rodriguez, and sausage    ? High-sodium ketchup, barbecue sauce, salad dressing, pickles, olives, soy sauce, or miso    · Foods and liquids high in sugar:      ? Candy, cake, cookies, pies, or doughnuts    ? Soft drinks (soda), sports drinks, or sweetened tea    ? Canned or dry mixes for cakes, soups, sauces, or gravies    Other healthy heart guidelines:   · Do not smoke  Nicotine and other chemicals in cigarettes and cigars can cause lung and heart damage  Ask your healthcare provider for information if you currently smoke and need help to quit  E-cigarettes or smokeless tobacco still contain nicotine  Talk to your healthcare provider before you use these products  · Limit or do not drink alcohol as directed  Alcohol can damage your heart and raise your blood pressure  Your healthcare provider may give you specific daily and weekly limits  The general recommended limit is 1 drink a day for women 21 or older and for men 72 or older  Do not have more than 3 drinks in a day or 7 in a week  The recommended limit is 2 drinks a day for men 24to 59years of age   Do not have more than 4 drinks in a day or 14 in a week  A drink of alcohol is 12 ounces of beer, 5 ounces of wine, or 1½ ounces of liquor  · Exercise regularly  Exercise can help you maintain a healthy weight and improve your blood pressure and cholesterol levels  Regular exercise can also decrease your risk for heart problems  Ask your healthcare provider about the best exercise plan for you  Do not start an exercise program without asking your healthcare provider  Follow up with your doctor or cardiologist as directed:  Write down your questions so you remember to ask them during your visits  © Copyright [employer] 2021 Information is for End User's use only and may not be sold, redistributed or otherwise used for commercial purposes  All illustrations and images included in CareNotes® are the copyrighted property of A [employer]  or June Petit   The above information is an  only  It is not intended as medical advice for individual conditions or treatments  Talk to your doctor, nurse or pharmacist before following any medical regimen to see if it is safe and effective for you

## 2022-02-22 NOTE — ASSESSMENT & PLAN NOTE
She is on the waiting list to establish with psych   Feels that signficant anxiety is contributing to her difficulty with appetite/ weight loss   Was not able to tolerate Lexapro - D/C  Will start mirtazapine HS

## 2022-02-25 DIAGNOSIS — G43.109 MIGRAINE WITH AURA AND WITHOUT STATUS MIGRAINOSUS, NOT INTRACTABLE: ICD-10-CM

## 2022-02-25 RX ORDER — SUMATRIPTAN 50 MG/1
TABLET, FILM COATED ORAL
Qty: 9 TABLET | Refills: 0 | Status: SHIPPED | OUTPATIENT
Start: 2022-02-25 | End: 2022-04-19

## 2022-03-01 DIAGNOSIS — R42 VERTIGO: ICD-10-CM

## 2022-03-01 RX ORDER — MECLIZINE HYDROCHLORIDE 25 MG/1
TABLET ORAL
Qty: 60 TABLET | Refills: 1 | Status: SHIPPED | OUTPATIENT
Start: 2022-03-01

## 2022-03-14 ENCOUNTER — OFFICE VISIT (OUTPATIENT)
Dept: FAMILY MEDICINE CLINIC | Facility: CLINIC | Age: 42
End: 2022-03-14

## 2022-03-14 VITALS
SYSTOLIC BLOOD PRESSURE: 98 MMHG | RESPIRATION RATE: 18 BRPM | HEIGHT: 59 IN | TEMPERATURE: 97.8 F | HEART RATE: 93 BPM | WEIGHT: 88.4 LBS | OXYGEN SATURATION: 99 % | BODY MASS INDEX: 17.82 KG/M2 | DIASTOLIC BLOOD PRESSURE: 70 MMHG

## 2022-03-14 DIAGNOSIS — F33.1 MODERATE EPISODE OF RECURRENT MAJOR DEPRESSIVE DISORDER (HCC): ICD-10-CM

## 2022-03-14 DIAGNOSIS — F41.9 ANXIETY: ICD-10-CM

## 2022-03-14 PROCEDURE — 99214 OFFICE O/P EST MOD 30 MIN: CPT | Performed by: NURSE PRACTITIONER

## 2022-03-14 RX ORDER — BUSPIRONE HYDROCHLORIDE 7.5 MG/1
7.5 TABLET ORAL 2 TIMES DAILY
Qty: 60 TABLET | Refills: 0 | Status: SHIPPED | OUTPATIENT
Start: 2022-03-14 | End: 2022-04-11

## 2022-03-14 RX ORDER — MIRTAZAPINE 15 MG/1
15 TABLET, FILM COATED ORAL
Qty: 90 TABLET | Refills: 0 | Status: SHIPPED | OUTPATIENT
Start: 2022-03-14 | End: 2022-06-20

## 2022-03-14 RX ORDER — HYDROXYZINE HYDROCHLORIDE 25 MG/1
25 TABLET, FILM COATED ORAL EVERY 6 HOURS PRN
Qty: 40 TABLET | Refills: 0 | Status: SHIPPED | OUTPATIENT
Start: 2022-03-14

## 2022-03-14 RX ADMIN — CYANOCOBALAMIN 1000 MCG: 1000 INJECTION INTRAMUSCULAR; SUBCUTANEOUS at 09:23

## 2022-03-14 NOTE — PROGRESS NOTES
Assessment/Plan:    Anxiety  Improvement with mirtazapine 7 5 mg ---- increase to 15 mg HS   Will also start buspar 7 5 mg bid       Emaorrjuma Degroot was seen today for anxiety and b12 injection  Diagnoses and all orders for this visit:    Anxiety  -     busPIRone (BUSPAR) 7 5 mg tablet; Take 1 tablet (7 5 mg total) by mouth 2 (two) times a day  -     mirtazapine (REMERON) 15 mg tablet; Take 1 tablet (15 mg total) by mouth daily at bedtime  -     hydrOXYzine HCL (ATARAX) 25 mg tablet; Take 1 tablet (25 mg total) by mouth every 6 (six) hours as needed for itching    Moderate episode of recurrent major depressive disorder (HCC)  -     mirtazapine (REMERON) 15 mg tablet; Take 1 tablet (15 mg total) by mouth daily at bedtime          Return in about 4 weeks (around 4/11/2022) for anxiety  Patient Instructions     Benefits of an Active Lifestyle   AMBULATORY CARE:   An active lifestyle  means you do physical activity throughout the day  Any activity that gets you up and moving is part of an active lifestyle  Physical activity includes exercise such as walking or lifting weights  It also includes playing sports  Physical activity is different from other kinds of activity, such as reading a book  This kind of activity is called sedentary  A sedentary lifestyle means you sit or do not move much during the day  An active lifestyle has many benefits, such as helping you prevent or manage health conditions  Call your doctor if:   · You notice changes in your health, such as new or worsening shortness of breath  · You have questions or concerns about your condition or care  Benefits of an active lifestyle:   · You may be able to do daily activities more easily  Activity helps condition your heart, lungs, and muscles  This can help you get through your daily activities without feeling tired  · You can help control your weight  Activity helps your body use the calories you eat instead of storing them as fat   Your body continues to burn calories at a higher rate after you are active  · Activity can increase your health  Activity helps lower your risk for cancer, heart disease, diabetes, and stroke  Activity can help you control your blood pressure and blood sugar levels, and lower your cholesterol  If you have arthritis, activity can help your joints move more easily and with less pain  · Your bones and muscles will get stronger  This will help prevent osteoporosis and reduce your risk for falls  · Activity can help improve your mood  Activity can reduce or prevent depression and stress  Activity can also help improve your sleep  Risks of a sedentary lifestyle:  A sedentary lifestyle increases your risk for diseases such as diabetes, high blood pressure, and heart disease  Your immune system also becomes weaker  This means it cannot fight infections well  How much activity you need:  Any activity is better than no activity at all  When you go from being mostly inactive to adding some activity, you will see health benefits  The following are general guidelines:  · Do aerobic activity several days each week  Aerobic activity includes walking, bicycling, dancing, swimming, and raking leaves  Aim for 150 to 300 minutes of moderate activity, or 75 to 150 of vigorous activity each week  You can also do a combination of moderate and vigorous activity  · Do strength training at least 2 times each week  Strength training helps you keep the muscles you have and build new muscles  Strength training includes pushups, yoga, jimmie chi, and weightlifting  If you do not have access to weights, you can lift items around your house  Try to work all the major muscle groups, such as your legs, arms, abdomen, and chest  Do 2 or 3 sets on each area  Use a weight that is slightly heavier than you can lift easily  You can work up to [employer]  You can also use resistance bands instead of weights         Steps you can take to become more active:   · Set goals  Set some long-term goals and some short-term goals  For example, you may want to be able to walk for 30 minutes without becoming short of breath  Try not to put time requirements on your goals  For example, do not think you should reach your goal in a month  Set smaller goals, such as walking a little longer each week, or feeling less shortness of breath  · Be active all day  Activity does not have to mean structured exercise each day  You can be more active by making small changes all day  For example, try parking as far from the entrance of buildings as you can when you run errands  If possible, walk or ride a bike instead of driving  Take the stairs instead of the elevator  · Keep a record of your activity and your progress  You can do this by writing down your daily activity  Include the kind of activity and how long you did it  You can also use a program on your phone or other device that will track activity for you  Also record your progress  You may be doing daily activities more easily, sleeping better, or building muscles  · Step counting can help you monitor activity  A general guide is to take 10,000 steps each day  A pedometer is a device you can wear to track your steps  Some phones have programs that will count and record steps  You may need to work up to 10,000 steps  Start by finding out how many steps you usually take in a day  Then try to take more steps each day than you took the day before  Tips to help you stay on track:   · Start slowly and work up  You do not have to do 30 minutes of activity at one time  You can break the activity up and do a few minutes at a time  Remember that some physical activity is better than none  Stand up during the day, even if you cannot walk around  Your body uses more energy when you stand  You may be able to get a desk that allows you to stand while you type or make phone calls for work   Aim for a speed or intensity that is challenging but not too difficult  You should be able to speak a few words at a time but not be able to sing  · Plan activities you enjoy  Do a variety of activities so you do not become bored and you stay challenged  Include activities that strengthen your bones  These activities are called weight-bearing exercises  Examples include tennis, jumping rope, and running  Swimming, riding a bike, and similar exercises keep weight off your bones  They will not help strengthen bones, but they will help your heart and lungs work better  · Ask for support from the people in your life  Go for a walk after dinner with your family  Meet friends at the park  Take a break with a coworker and walk around  Find someone who likes to go to the gym at the same time you do  You may be more likely to go if you know another person is counting on you  Get involved in community events, such as cleaning a community park  Ask someone to help you stay on track  For example, you can tell the person about your daily or weekly activity  · Treat yourself to a reward when you reach a goal   The rewards can be for activity done for a certain amount of time each day or days each week  Rewards can also be for progress you make  Have rewards that are not food, such as a new clothing item or book  What you need to know about nutrition and activity:  Healthy foods will give you the energy you need to be active  Activity and good nutrition work together to help you reach or maintain a healthy weight  Healthy foods include fruits, vegetables, lean meats, fish, cooked beans, whole-grain breads, and low-fat dairy products  Your healthcare provider can help you create a healthy meal plan  He or she can tell you how many calories you need to stay active and still lose weight if needed  Follow up with your doctor as directed:  Write down your questions so you remember to ask them during your visits     © Copyright [employer] 2022 Information is for [employer] use only and may not be sold, redistributed or otherwise used for commercial purposes  All illustrations and images included in CareNotes® are the copyrighted property of [employer] M [employer]  or June Menchaca  The above information is an  only  It is not intended as medical advice for individual conditions or treatments  Talk to your doctor, nurse or pharmacist before following any medical regimen to see if it is safe and effective for you  Subjective: Parker Gregory is a 37 y o  female who  has a past medical history of Anemia, Anxiety, Depression, Gallstones, Migraine, Nausea, Panic attack, Rotator cuff disorder, right, and Vertigo  who presented to the office today for follow up  She is accompanied by her   She reports that since last visit symptoms have improved with the start of mirtazapine  Her appetite and sleep are better  She has gained 8 lb since last visit  Notes that currently anxiety is ongoing concern  Continues to be on waiting list for Adult Psychiatry      The following portions of the patient's history were reviewed and updated as appropriate: allergies, current medications, past family history, past medical history, past social history, past surgical history and problem list     Current Outpatient Medications on File Prior to Visit   Medication Sig Dispense Refill    baclofen 10 mg tablet TAKE 1 TABLET(10 MG) BY MOUTH THREE TIMES DAILY AS NEEDED FOR MUSCLE SPASMS 10 tablet 0    cholecalciferol (VITAMIN D3) 1,000 units tablet Take 1 tablet (1,000 Units total) by mouth daily 90 tablet 3    dicyclomine (BENTYL) 20 mg tablet TAKE 1 TABLET(20 MG) BY MOUTH EVERY 6 HOURS AS NEEDED FOR PAIN 40 tablet 0    docusate sodium (COLACE) 100 mg capsule Take 1 capsule (100 mg total) by mouth in the morning 60 capsule 2    ergocalciferol (VITAMIN D2) 50,000 units TAKE 1 CAPSULE BY MOUTH 1 TIME A WEEK 13 capsule 0    famotidine (PEPCID) 40 MG tablet Take 1 tablet (40 mg total) by mouth daily 60 tablet 2    ferrous sulfate 324 (65 Fe) mg Take 1 tablet (324 mg total) by mouth daily before breakfast 90 tablet 3    hydrocortisone (ANUSOL-HC) 25 mg suppository Insert 1 suppository (25 mg total) into the rectum 2 (two) times a day (Patient not taking: Reported on 12/30/2021 ) 12 suppository 0    meclizine (ANTIVERT) 25 mg tablet TAKE 1 TABLET(25 MG) BY MOUTH EVERY 8 HOURS AS NEEDED FOR DIZZINESS 60 tablet 1    ondansetron (ZOFRAN-ODT) 4 mg disintegrating tablet DISSOLVE 1 TABLET(4 MG) ON THE TONGUE EVERY 8 HOURS AS NEEDED FOR NAUSEA OR VOMITING 20 tablet 0    SUMAtriptan (IMITREX) 50 mg tablet TAKE 1 TABLET(50 MG) BY MOUTH 1 TIME FOR UP TO 1 DOSE AS NEEDED FOR MIGRAINE 9 tablet 0    [DISCONTINUED] hydrOXYzine HCL (ATARAX) 25 mg tablet TAKE 1 TABLET(25 MG) BY MOUTH EVERY 6 HOURS AS NEEDED FOR ANXIETY 40 tablet 0    [DISCONTINUED] mirtazapine (REMERON) 7 5 MG tablet Take 1 tablet (7 5 mg total) by mouth daily at bedtime 30 tablet 0     Current Facility-Administered Medications on File Prior to Visit   Medication Dose Route Frequency Provider Last Rate Last Admin    cyanocobalamin injection 1,000 mcg  1,000 mcg Intramuscular Q30 Days ANNAMARIE Sherwood   1,000 mcg at 03/14/22 3362       Review of Systems   Constitutional: Negative for chills and fever  HENT: Negative for ear pain and sore throat  Eyes: Negative for pain and visual disturbance  Respiratory: Negative for cough and shortness of breath  Cardiovascular: Negative for chest pain and palpitations  Gastrointestinal: Negative for abdominal pain and vomiting  Genitourinary: Negative for dysuria and hematuria  Musculoskeletal: Negative for arthralgias and back pain  Skin: Negative for color change and rash  Neurological: Negative for seizures and syncope  Psychiatric/Behavioral: Negative for dysphoric mood and sleep disturbance   The patient is nervous/anxious  All other systems reviewed and are negative  Objective:    BP 98/70 (BP Location: Left arm, Patient Position: Sitting, Cuff Size: Standard)   Pulse 93   Temp 97 8 °F (36 6 °C) (Temporal)   Resp 18   Ht 4' 11" (1 499 m)   Wt 40 1 kg (88 lb 6 4 oz)   LMP 07/24/2017   SpO2 99%   BMI 17 85 kg/m²     Physical Exam  Vitals and nursing note reviewed  Constitutional:       General: She is not in acute distress  Appearance: She is underweight  She is not diaphoretic  HENT:      Head: Normocephalic and atraumatic  Eyes:      Conjunctiva/sclera: Conjunctivae normal       Pupils: Pupils are equal, round, and reactive to light  Cardiovascular:      Rate and Rhythm: Normal rate and regular rhythm  Heart sounds: No friction rub  Pulmonary:      Effort: Pulmonary effort is normal  No respiratory distress  Breath sounds: Normal breath sounds  No wheezing  Abdominal:      General: Bowel sounds are normal  There is no distension  Palpations: Abdomen is soft  Tenderness: There is no abdominal tenderness  Musculoskeletal:         General: No deformity  Normal range of motion  Cervical back: Normal range of motion and neck supple  Lymphadenopathy:      Cervical: No cervical adenopathy  Skin:     General: Skin is warm and dry  Capillary Refill: Capillary refill takes less than 2 seconds  Findings: No rash  Neurological:      Mental Status: She is alert and oriented to person, place, and time  Psychiatric:         Mood and Affect: Mood is anxious           ANNAMARIE Castaneda  03/14/22  11:02 AM

## 2022-03-14 NOTE — PATIENT INSTRUCTIONS
Benefits of an Active Lifestyle   AMBULATORY CARE:   An active lifestyle  means you do physical activity throughout the day  Any activity that gets you up and moving is part of an active lifestyle  Physical activity includes exercise such as walking or lifting weights  It also includes playing sports  Physical activity is different from other kinds of activity, such as reading a book  This kind of activity is called sedentary  A sedentary lifestyle means you sit or do not move much during the day  An active lifestyle has many benefits, such as helping you prevent or manage health conditions  Call your doctor if:   · You notice changes in your health, such as new or worsening shortness of breath  · You have questions or concerns about your condition or care  Benefits of an active lifestyle:   · You may be able to do daily activities more easily  Activity helps condition your heart, lungs, and muscles  This can help you get through your daily activities without feeling tired  · You can help control your weight  Activity helps your body use the calories you eat instead of storing them as fat  Your body continues to burn calories at a higher rate after you are active  · Activity can increase your health  Activity helps lower your risk for cancer, heart disease, diabetes, and stroke  Activity can help you control your blood pressure and blood sugar levels, and lower your cholesterol  If you have arthritis, activity can help your joints move more easily and with less pain  · Your bones and muscles will get stronger  This will help prevent osteoporosis and reduce your risk for falls  · Activity can help improve your mood  Activity can reduce or prevent depression and stress  Activity can also help improve your sleep  Risks of a sedentary lifestyle:  A sedentary lifestyle increases your risk for diseases such as diabetes, high blood pressure, and heart disease   Your immune system also becomes weaker  This means it cannot fight infections well  How much activity you need:  Any activity is better than no activity at all  When you go from being mostly inactive to adding some activity, you will see health benefits  The following are general guidelines:  · Do aerobic activity several days each week  Aerobic activity includes walking, bicycling, dancing, swimming, and raking leaves  Aim for 150 to 300 minutes of moderate activity, or 75 to 150 of vigorous activity each week  You can also do a combination of moderate and vigorous activity  · Do strength training at least 2 times each week  Strength training helps you keep the muscles you have and build new muscles  Strength training includes pushups, yoga, jimmie chi, and weightlifting  If you do not have access to weights, you can lift items around your house  Try to work all the major muscle groups, such as your legs, arms, abdomen, and chest  Do 2 or 3 sets on each area  Use a weight that is slightly heavier than you can lift easily  You can work up to [employer]  You can also use resistance bands instead of weights  Steps you can take to become more active:   · Set goals  Set some long-term goals and some short-term goals  For example, you may want to be able to walk for 30 minutes without becoming short of breath  Try not to put time requirements on your goals  For example, do not think you should reach your goal in a month  Set smaller goals, such as walking a little longer each week, or feeling less shortness of breath  · Be active all day  Activity does not have to mean structured exercise each day  You can be more active by making small changes all day  For example, try parking as far from the entrance of buildings as you can when you run errands  If possible, walk or ride a bike instead of driving  Take the stairs instead of the elevator  · Keep a record of your activity and your progress    You can do this by writing down your daily activity  Include the kind of activity and how long you did it  You can also use a program on your phone or other device that will track activity for you  Also record your progress  You may be doing daily activities more easily, sleeping better, or building muscles  · Step counting can help you monitor activity  A general guide is to take 10,000 steps each day  A pedometer is a device you can wear to track your steps  Some phones have programs that will count and record steps  You may need to work up to 10,000 steps  Start by finding out how many steps you usually take in a day  Then try to take more steps each day than you took the day before  Tips to help you stay on track:   · Start slowly and work up  You do not have to do 30 minutes of activity at one time  You can break the activity up and do a few minutes at a time  Remember that some physical activity is better than none  Stand up during the day, even if you cannot walk around  Your body uses more energy when you stand  You may be able to get a desk that allows you to stand while you type or make phone calls for work  Aim for a speed or intensity that is challenging but not too difficult  You should be able to speak a few words at a time but not be able to sing  · Plan activities you enjoy  Do a variety of activities so you do not become bored and you stay challenged  Include activities that strengthen your bones  These activities are called weight-bearing exercises  Examples include tennis, jumping rope, and running  Swimming, riding a bike, and similar exercises keep weight off your bones  They will not help strengthen bones, but they will help your heart and lungs work better  · Ask for support from the people in your life  Go for a walk after dinner with your family  Meet friends at the park  Take a break with a coworker and walk around  Find someone who likes to go to the gym at the same time you do   You may be more likely to go if you know another person is counting on you  Get involved in community events, such as cleaning a community park  Ask someone to help you stay on track  For example, you can tell the person about your daily or weekly activity  · Treat yourself to a reward when you reach a goal   The rewards can be for activity done for a certain amount of time each day or days each week  Rewards can also be for progress you make  Have rewards that are not food, such as a new clothing item or book  What you need to know about nutrition and activity:  Healthy foods will give you the energy you need to be active  Activity and good nutrition work together to help you reach or maintain a healthy weight  Healthy foods include fruits, vegetables, lean meats, fish, cooked beans, whole-grain breads, and low-fat dairy products  Your healthcare provider can help you create a healthy meal plan  He or she can tell you how many calories you need to stay active and still lose weight if needed  Follow up with your doctor as directed:  Write down your questions so you remember to ask them during your visits  © Copyright [employer] 2022 Information is for End User's use only and may not be sold, redistributed or otherwise used for commercial purposes  All illustrations and images included in CareNotes® are the copyrighted property of [employer]  or [facility] Petit   The above information is an  only  It is not intended as medical advice for individual conditions or treatments  Talk to your doctor, nurse or pharmacist before following any medical regimen to see if it is safe and effective for you

## 2022-04-11 ENCOUNTER — OFFICE VISIT (OUTPATIENT)
Dept: FAMILY MEDICINE CLINIC | Facility: CLINIC | Age: 42
End: 2022-04-11

## 2022-04-11 VITALS
DIASTOLIC BLOOD PRESSURE: 72 MMHG | TEMPERATURE: 98.7 F | SYSTOLIC BLOOD PRESSURE: 108 MMHG | HEART RATE: 92 BPM | HEIGHT: 59 IN | BODY MASS INDEX: 17.74 KG/M2 | OXYGEN SATURATION: 98 % | RESPIRATION RATE: 19 BRPM | WEIGHT: 88 LBS

## 2022-04-11 DIAGNOSIS — F41.9 ANXIETY: Primary | ICD-10-CM

## 2022-04-11 DIAGNOSIS — R63.4 WEIGHT LOSS: ICD-10-CM

## 2022-04-11 PROCEDURE — 99214 OFFICE O/P EST MOD 30 MIN: CPT | Performed by: NURSE PRACTITIONER

## 2022-04-11 NOTE — PATIENT INSTRUCTIONS
Anxiety   WHAT YOU NEED TO KNOW:   Anxiety is a condition that causes you to feel extremely worried or nervous  The feelings are so strong that they can cause problems with your daily activities or sleep  Anxiety may be triggered by something you fear, or it may happen without a cause  Family or work stress, smoking, caffeine, and alcohol can increase your risk for anxiety  Certain medicines or health conditions can also increase your risk  Anxiety can become a long-term condition if it is not managed or treated  DISCHARGE INSTRUCTIONS:   Call your local emergency number (911 in the [address]) if:   · You have chest pain, tightness, or heaviness that may spread to your shoulders, arms, jaw, neck, or back  · You feel like hurting yourself or someone else  Call your doctor if:   · Your symptoms get worse or do not get better with treatment  · Your anxiety keeps you from doing your regular daily activities  · You have new symptoms since your last visit  · You have questions or concerns about your condition or care  Medicines:   · Medicines  may be given to help you feel more calm and relaxed, and decrease your symptoms  · Take your medicine as directed  Contact your healthcare provider if you think your medicine is not helping or if you have side effects  Tell him of her if you are allergic to any medicine  Keep a list of the medicines, vitamins, and herbs you take  Include the amounts, and when and why you take them  Bring the list or the pill bottles to follow-up visits  Carry your medicine list with you in case of an emergency  Manage anxiety:   · Talk to someone about your anxiety  Your healthcare provider may suggest counseling  Cognitive behavioral therapy can help you understand and change how you react to events that trigger your symptoms  You might feel more comfortable talking with a friend or family member about your anxiety   Choose someone you know will be supportive and encouraging  · Find ways to relax  Activities such as exercise, meditation, or listening to music can help you relax  Spend time with friends, or do things you enjoy  · Practice deep breathing  Deep breathing can help you relax when you feel anxious  Focus on taking slow, deep breaths several times a day, or during an anxiety attack  Breathe in through your nose and out through your mouth  · Create a regular sleep routine  Regular sleep can help you feel calmer during the day  Go to sleep and wake up at the same times every day  Do not watch television or use the computer right before bed  Your room should be comfortable, dark, and quiet  · Eat a variety of healthy foods  Healthy foods include fruits, vegetables, low-fat dairy products, lean meats, fish, whole-grain breads, and cooked beans  Healthy foods can help you feel less anxious and have more energy  · Exercise regularly  Exercise can increase your energy level  Exercise may also lift your mood and help you sleep better  Your healthcare provider can help you create an exercise plan  · Do not smoke  Nicotine and other chemicals in cigarettes and cigars can increase anxiety  Ask your healthcare provider for information if you currently smoke and need help to quit  E-cigarettes or smokeless tobacco still contain nicotine  Talk to your healthcare provider before you use these products  · Do not have caffeine  Caffeine can make your symptoms worse  Do not have foods or drinks that are meant to increase your energy level  · Limit or do not drink alcohol  Ask your healthcare provider if alcohol is safe for you  You may not be able to drink alcohol if you take certain anxiety or depression medicines  Limit alcohol to 1 drink per day if you are a woman  Limit alcohol to 2 drinks per day if you are a man  A drink of alcohol is 12 ounces of beer, 5 ounces of wine, or 1½ ounces of liquor  · Do not use drugs    Drugs can make your anxiety worse  It can also make anxiety hard to manage  Talk to your healthcare provider if you use drugs and want help to quit  Follow up with your doctor within 2 weeks or as directed:  Write down your questions so you remember to ask them during your visits  © Copyright [employer] 2022 Information is for End User's use only and may not be sold, redistributed or otherwise used for commercial purposes  All illustrations and images included in CareNotes® are the copyrighted property of [employer]  or June Petit   The above information is an  only  It is not intended as medical advice for individual conditions or treatments  Talk to your doctor, nurse or pharmacist before following any medical regimen to see if it is safe and effective for you

## 2022-04-11 NOTE — PROGRESS NOTES
Assessment/Plan:    Anxiety  Did not tolerate Buspar, D/C   Continue mirtazapine 15 mg HS   Defer starting any additional medications   She is on psych wait list, expects to be called this week     Weight loss  Weight is stable     Weight (last 2 days)     Date/Time Weight    04/11/22 [id number] (88)            Shelton Ahumada was seen today for anxiety  Diagnoses and all orders for this visit:    Anxiety    Weight loss        Return for RTC in 4 weeks for weight check and B12 inj w/ nurse and 3 months with me   Patient Instructions     Anxiety   WHAT YOU NEED TO KNOW:   Anxiety is a condition that causes you to feel extremely worried or nervous  The feelings are so strong that they can cause problems with your daily activities or sleep  Anxiety may be triggered by something you fear, or it may happen without a cause  Family or work stress, smoking, caffeine, and alcohol can increase your risk for anxiety  Certain medicines or health conditions can also increase your risk  Anxiety can become a long-term condition if it is not managed or treated  DISCHARGE INSTRUCTIONS:   Call your local emergency number (911 in the [address] [place] [address],[address]) if:   · You have chest pain, tightness, or heaviness that may spread to your shoulders, arms, jaw, neck, or back  · You feel like hurting yourself or someone else  Call your doctor if:   · Your symptoms get worse or do not get better with treatment  · Your anxiety keeps you from doing your regular daily activities  · You have new symptoms since your last visit  · You have questions or concerns about your condition or care  Medicines:   · Medicines  may be given to help you feel more calm and relaxed, and decrease your symptoms  · Take your medicine as directed  Contact your healthcare provider if you think your medicine is not helping or if you have side effects  Tell him of her if you are allergic to any medicine  Keep a list of the medicines, vitamins, and herbs you take  Include the amounts, and when and why you take them  Bring the list or the pill bottles to follow-up visits  Carry your medicine list with you in case of an emergency  Manage anxiety:   · Talk to someone about your anxiety  Your healthcare provider may suggest counseling  Cognitive behavioral therapy can help you understand and change how you react to events that trigger your symptoms  You might feel more comfortable talking with a friend or family member about your anxiety  Choose someone you know will be supportive and encouraging  · Find ways to relax  Activities such as exercise, meditation, or listening to music can help you relax  Spend time with friends, or do things you enjoy  · Practice deep breathing  Deep breathing can help you relax when you feel anxious  Focus on taking slow, deep breaths several times a day, or during an anxiety attack  Breathe in through your nose and out through your mouth  · Create a regular sleep routine  Regular sleep can help you feel calmer during the day  Go to sleep and wake up at the same times every day  Do not watch television or use the computer right before bed  Your room should be comfortable, dark, and quiet  · Eat a variety of healthy foods  Healthy foods include fruits, vegetables, low-fat dairy products, lean meats, fish, whole-grain breads, and cooked beans  Healthy foods can help you feel less anxious and have more energy  · Exercise regularly  Exercise can increase your energy level  Exercise may also lift your mood and help you sleep better  Your healthcare provider can help you create an exercise plan  · Do not smoke  Nicotine and other chemicals in cigarettes and cigars can increase anxiety  Ask your healthcare provider for information if you currently smoke and need help to quit  E-cigarettes or smokeless tobacco still contain nicotine  Talk to your healthcare provider before you use these products      · Do not have caffeine  Caffeine can make your symptoms worse  Do not have foods or drinks that are meant to increase your energy level  · Limit or do not drink alcohol  Ask your healthcare provider if alcohol is safe for you  You may not be able to drink alcohol if you take certain anxiety or depression medicines  Limit alcohol to 1 drink per day if you are a woman  Limit alcohol to 2 drinks per day if you are a man  A drink of alcohol is 12 ounces of beer, 5 ounces of wine, or 1½ ounces of liquor  · Do not use drugs  Drugs can make your anxiety worse  It can also make anxiety hard to manage  Talk to your healthcare provider if you use drugs and want help to quit  Follow up with your doctor within 2 weeks or as directed:  Write down your questions so you remember to ask them during your visits  © Copyright [facility] 2022 Information is for End User's use only and may not be sold, redistributed or otherwise used for commercial purposes  All illustrations and images included in CareNotes® are the copyrighted property of A [employer]  or [facility] Christina Petit   The above information is an  only  It is not intended as medical advice for individual conditions or treatments  Talk to your doctor, nurse or pharmacist before following any medical regimen to see if it is safe and effective for you  Subjective: Blanca Hardwick is a 37 y o  female who  has a past medical history of Anemia, Anxiety, Depression, Gallstones, Migraine, Nausea, Panic attack, Rotator cuff disorder, right, and Vertigo  who presented to the office today for follow up  Reports that she stopped Buspar because she had side effects  Has continued the Remeron and feels that this has helped her sleep, anxiety, depression, and weight loss       The following portions of the patient's history were reviewed and updated as appropriate: allergies, current medications, past family history, past medical history, past social history, past surgical history and problem list     Current Outpatient Medications on File Prior to Visit   Medication Sig Dispense Refill    baclofen 10 mg tablet TAKE 1 TABLET(10 MG) BY MOUTH THREE TIMES DAILY AS NEEDED FOR MUSCLE SPASMS 10 tablet 0    cholecalciferol (VITAMIN D3) 1,000 units tablet Take 1 tablet (1,000 Units total) by mouth daily 90 tablet 3    dicyclomine (BENTYL) 20 mg tablet TAKE 1 TABLET(20 MG) BY MOUTH EVERY 6 HOURS AS NEEDED FOR PAIN 40 tablet 0    docusate sodium (COLACE) 100 mg capsule Take 1 capsule (100 mg total) by mouth in the morning 60 capsule 2    ergocalciferol (VITAMIN D2) 50,000 units TAKE 1 CAPSULE BY MOUTH 1 TIME A WEEK 13 capsule 0    famotidine (PEPCID) 40 MG tablet Take 1 tablet (40 mg total) by mouth daily 60 tablet 2    ferrous sulfate 324 (65 Fe) mg Take 1 tablet (324 mg total) by mouth daily before breakfast 90 tablet 3    hydrocortisone (ANUSOL-HC) 25 mg suppository Insert 1 suppository (25 mg total) into the rectum 2 (two) times a day (Patient not taking: Reported on 12/30/2021 ) 12 suppository 0    hydrOXYzine HCL (ATARAX) 25 mg tablet Take 1 tablet (25 mg total) by mouth every 6 (six) hours as needed for itching 40 tablet 0    meclizine (ANTIVERT) 25 mg tablet TAKE 1 TABLET(25 MG) BY MOUTH EVERY 8 HOURS AS NEEDED FOR DIZZINESS 60 tablet 1    mirtazapine (REMERON) 15 mg tablet Take 1 tablet (15 mg total) by mouth daily at bedtime 90 tablet 0    ondansetron (ZOFRAN-ODT) 4 mg disintegrating tablet DISSOLVE 1 TABLET(4 MG) ON THE TONGUE EVERY 8 HOURS AS NEEDED FOR NAUSEA OR VOMITING 20 tablet 0    SUMAtriptan (IMITREX) 50 mg tablet TAKE 1 TABLET(50 MG) BY MOUTH 1 TIME FOR UP TO 1 DOSE AS NEEDED FOR MIGRAINE 9 tablet 0    [DISCONTINUED] busPIRone (BUSPAR) 7 5 mg tablet Take 1 tablet (7 5 mg total) by mouth 2 (two) times a day 60 tablet 0     Current Facility-Administered Medications on File Prior to Visit   Medication Dose Route Frequency Provider Last Rate Last Admin    cyanocobalamin injection 1,000 mcg  1,000 mcg Intramuscular Q30 Days Mekhi Garcia ANNAMARIE   1,000 mcg at 03/14/22 5000       Review of Systems   Constitutional: Negative for chills and fever  HENT: Negative for ear pain and sore throat  Eyes: Negative for pain and visual disturbance  Respiratory: Negative for cough and shortness of breath  Cardiovascular: Negative for chest pain and palpitations  Gastrointestinal: Negative for abdominal pain and vomiting  Genitourinary: Negative for dysuria and hematuria  Musculoskeletal: Negative for arthralgias and back pain  Skin: Negative for color change and rash  Neurological: Negative for seizures and syncope  All other systems reviewed and are negative  Objective:    /72 (BP Location: Left arm, Patient Position: Sitting, Cuff Size: Adult)   Pulse 92   Temp 98 7 °F (37 1 °C) (Temporal)   Resp 19   Ht 4' 11" (1 499 m)   Wt 39 9 kg (88 lb)   LMP 07/24/2017   SpO2 98%   BMI 17 77 kg/m²     Physical Exam  Vitals and nursing note reviewed  Constitutional:       General: She is not in acute distress  Appearance: She is well-developed  She is not diaphoretic  HENT:      Head: Normocephalic and atraumatic  Eyes:      Conjunctiva/sclera: Conjunctivae normal       Pupils: Pupils are equal, round, and reactive to light  Cardiovascular:      Rate and Rhythm: Normal rate and regular rhythm  Pulmonary:      Effort: Pulmonary effort is normal  No respiratory distress  Breath sounds: Normal breath sounds  No wheezing  Abdominal:      General: Bowel sounds are normal  There is no distension  Palpations: Abdomen is soft  Tenderness: There is no abdominal tenderness  Musculoskeletal:         General: No deformity  Normal range of motion  Cervical back: Normal range of motion and neck supple  Lymphadenopathy:      Cervical: No cervical adenopathy  Skin:     General: Skin is warm and dry        Capillary Refill: Capillary refill takes less than 2 seconds  Findings: No rash  Neurological:      Mental Status: She is alert and oriented to person, place, and time     Psychiatric:         Behavior: Behavior normal          ANNAMARIE Alejandre  04/11/22  12:21 PM

## 2022-04-11 NOTE — ASSESSMENT & PLAN NOTE
Did not tolerate Buspar, D/C   Continue mirtazapine 15 mg HS   Defer starting any additional medications   She is on psych wait list, expects to be called this week

## 2022-04-13 ENCOUNTER — TELEPHONE (OUTPATIENT)
Dept: ADMINISTRATIVE | Facility: OTHER | Age: 42
End: 2022-04-13

## 2022-04-13 NOTE — TELEPHONE ENCOUNTER
----- Message from Kaushal Cavazos sent at 4/13/2022  8:19 AM EDT -----  04/13/22 8:19 AM    Hello, our patient Easter Caller has had mammogram completed/performed  At [address] [place] [address] in Feb 2022  Please assist in updating the patient chart by  The date of service is feb 2022      Thank you,  Jillian Samaniego, [address] [place] [address] Drive

## 2022-04-13 NOTE — TELEPHONE ENCOUNTER
Upon review of the In Basket request we were able to locate, review, and update the patient chart as requested for Mammogram     Any additional questions or concerns should be emailed to the Practice Liaisons via [email or web address] email, please do not reply via In Basket      Thank you  Brandon Harada, MA

## 2022-04-19 DIAGNOSIS — G43.109 MIGRAINE WITH AURA AND WITHOUT STATUS MIGRAINOSUS, NOT INTRACTABLE: ICD-10-CM

## 2022-04-19 RX ORDER — SUMATRIPTAN 50 MG/1
TABLET, FILM COATED ORAL
Qty: 9 TABLET | Refills: 0 | Status: SHIPPED | OUTPATIENT
Start: 2022-04-19 | End: 2022-05-19

## 2022-05-16 ENCOUNTER — CLINICAL SUPPORT (OUTPATIENT)
Dept: FAMILY MEDICINE CLINIC | Facility: CLINIC | Age: 42
End: 2022-05-16

## 2022-05-16 DIAGNOSIS — E53.8 B12 DEFICIENCY: ICD-10-CM

## 2022-05-16 DIAGNOSIS — R63.4 UNINTENDED WEIGHT LOSS: Primary | ICD-10-CM

## 2022-05-16 PROCEDURE — 96372 THER/PROPH/DIAG INJ SC/IM: CPT | Performed by: NURSE PRACTITIONER

## 2022-05-16 RX ADMIN — CYANOCOBALAMIN 1000 MCG: 1000 INJECTION INTRAMUSCULAR; SUBCUTANEOUS at 09:20

## 2022-05-19 DIAGNOSIS — G43.109 MIGRAINE WITH AURA AND WITHOUT STATUS MIGRAINOSUS, NOT INTRACTABLE: ICD-10-CM

## 2022-05-19 RX ORDER — SUMATRIPTAN 50 MG/1
TABLET, FILM COATED ORAL
Qty: 9 TABLET | Refills: 0 | Status: SHIPPED | OUTPATIENT
Start: 2022-05-19 | End: 2022-06-20

## 2022-06-02 ENCOUNTER — APPOINTMENT (OUTPATIENT)
Dept: LAB | Facility: CLINIC | Age: 42
End: 2022-06-02
Payer: COMMERCIAL

## 2022-06-02 ENCOUNTER — OFFICE VISIT (OUTPATIENT)
Dept: GASTROENTEROLOGY | Facility: MEDICAL CENTER | Age: 42
End: 2022-06-02
Payer: COMMERCIAL

## 2022-06-02 VITALS
DIASTOLIC BLOOD PRESSURE: 78 MMHG | BODY MASS INDEX: 17.09 KG/M2 | WEIGHT: 84.6 LBS | HEART RATE: 68 BPM | SYSTOLIC BLOOD PRESSURE: 127 MMHG | TEMPERATURE: 97.8 F

## 2022-06-02 DIAGNOSIS — E61.1 IRON DEFICIENCY: ICD-10-CM

## 2022-06-02 DIAGNOSIS — R63.4 UNINTENDED WEIGHT LOSS: Primary | ICD-10-CM

## 2022-06-02 DIAGNOSIS — R11.0 NAUSEA: ICD-10-CM

## 2022-06-02 LAB
FERRITIN SERPL-MCNC: 81 NG/ML (ref 8–388)
HGB BLD-MCNC: 13.6 G/DL (ref 11.5–15.4)
IRON SATN MFR SERPL: 24 % (ref 15–50)
IRON SERPL-MCNC: 73 UG/DL (ref 50–170)
TIBC SERPL-MCNC: 308 UG/DL (ref 250–450)

## 2022-06-02 PROCEDURE — 85018 HEMOGLOBIN: CPT

## 2022-06-02 PROCEDURE — 99214 OFFICE O/P EST MOD 30 MIN: CPT | Performed by: PHYSICIAN ASSISTANT

## 2022-06-02 PROCEDURE — 82728 ASSAY OF FERRITIN: CPT

## 2022-06-02 PROCEDURE — 36415 COLL VENOUS BLD VENIPUNCTURE: CPT

## 2022-06-02 PROCEDURE — 83540 ASSAY OF IRON: CPT

## 2022-06-02 PROCEDURE — 83550 IRON BINDING TEST: CPT

## 2022-06-02 NOTE — PROGRESS NOTES
[facility] - Outpatient Follow-up Note  Hulan Najjar 37 y o  female MRN: [id number]  Encounter: [id number]          ASSESSMENT AND PLAN:      1  Unintended weight loss  2  Nausea:  She admits to nausea without vomiting, decreased appetite, early satiety  She continues to complain of abnormal weight loss  She normally weighs closer to 100 pound she weighs 84 pounds here today  She states that she has lost 8 pounds over the past month  She did have EGD and colonoscopy with no etiology for her symptoms found  CT in November 2021 showing at that time nonspecific enteritis and proctocolitis however this was prior to her colonoscopy  Will check gastric emptying study  - Ambulatory Referral to Gastroenterology  - NM gastric emptying; Future  -small frequent meals  -encourage adding meal supplements such as boost or Ensure    3  Iron deficiency:  She does have a history of iron deficiency anemia  Her most recent iron panel is from February which showed mild iron deficiency with an iron level of 40 with a normal TIBC and iron saturation of 15 percent  She is on oral iron supplementation  EGD and colonoscopy without any source of GI bleeding  Will repeat hemoglobin and iron panel  If iron deficiency persists, could consider capsule endoscopy for further evaluation of the small bowel  - Hemoglobin; Future  - Iron Panel (Includes Ferritin, Iron Sat%, Iron, and TIBC); Future    ______________________________________________________________________    SUBJECTIVE:  Hulan Najjar is a 56-year-old female here for follow-up after EGD and colonoscopy  She was seen in the office in December 2021 with generalized abdominal pain, iron deficiency and abnormal CT scan of the abdomen as well as constipation  Her CT scan was done in the emergency room for abdominal pain, rectal bleeding and diarrhea  At that time it showed nonspecific enteritis and proctocolitis    Fortunately the symptoms had resolved  She underwent EGD and colonoscopy in 2022  Both of these were normal with no source of bleeding found  Her most recent iron panel was from February which did continue to show mild iron deficiency with iron saturation of 15 percent, normal total iron binding capacity and iron level of 40  She is on oral iron supplementation  She was previously requiring Colace for constipation but she states she uses MiraLax to help given starting oral iron  She now has 2-3 bowel movements daily that are soft  Her main complaint today is that she is having early satiety, decreased appetite and poor oral intake  She admits to continued weight loss previously being over 100 pounds, now weighing 84 pounds  She admits that she lost a pound since last month  She does admit to nausea without vomiting  She denies any melena or hematochezia      REVIEW OF SYSTEMS IS OTHERWISE NEGATIVE  Historical Information   Past Medical History:   Diagnosis Date    Anemia     in past    Anxiety     Depression     Gallstones     Migraine     Nausea     occ    Panic attack     Rotator cuff disorder, right     Vertigo      Past Surgical History:   Procedure Laterality Date    ABDOMINAL SURGERY      scar tissue removed     SECTION      last assessed: Oct 23, 2014     HYSTERECTOMY      WI LAP,CHOLECYSTECTOMY/GRAPH N/A 2018    Procedure: CHOLECYSTECTOMY LAPAROSCOPIC  WITH  ATTEMPTED IOC;   Surgeon: Allison Mendez MD;  Location: [facility];  Service: General    TUBAL LIGATION       Social History   Social History     Substance and Sexual Activity   Alcohol Use No    Comment: none since 2021     Social History     Substance and Sexual Activity   Drug Use No     Social History     Tobacco Use   Smoking Status Former Smoker    Quit date: 2013    Years since quittin 2   Smokeless Tobacco Never Used     Family History   Problem Relation Age of Onset    No Known Problems Family     Hypertension Mother     Diabetes type II Father         mellitus        Meds/Allergies       Current Outpatient Medications:     baclofen 10 mg tablet    cholecalciferol (VITAMIN D3) 1,000 units tablet    dicyclomine (BENTYL) 20 mg tablet    docusate sodium (COLACE) 100 mg capsule    ergocalciferol (VITAMIN D2) 50,000 units    famotidine (PEPCID) 40 MG tablet    ferrous sulfate 324 (65 Fe) mg    hydrOXYzine HCL (ATARAX) 25 mg tablet    meclizine (ANTIVERT) 25 mg tablet    mirtazapine (REMERON) 15 mg tablet    ondansetron (ZOFRAN-ODT) 4 mg disintegrating tablet    SUMAtriptan (IMITREX) 50 mg tablet    hydrocortisone (ANUSOL-HC) 25 mg suppository    Current Facility-Administered Medications:     cyanocobalamin injection 1,000 mcg, 1,000 mcg, Intramuscular, Q30 Days, 1,000 mcg at 05/16/22 0920    No Known Allergies        Objective     Blood pressure 127/78, pulse 68, temperature 97 8 °F (36 6 °C), weight 38 4 kg (84 lb 9 6 oz), last menstrual period 07/24/2017, not currently breastfeeding  Body mass index is 17 09 kg/m²  PHYSICAL EXAM:      General Appearance:   Alert, cooperative, no distress   HEENT:   Normocephalic, atraumatic, anicteric      Neck:  Supple, symmetrical, trachea midline   Lungs:   Clear to auscultation bilaterally; no rales, rhonchi or wheezing; respirations unlabored    Heart[de-identified]   Regular rate and rhythm; no murmur, rub, or gallop  Abdomen:   Soft, non-tender, non-distended; normal bowel sounds; no masses, no organomegaly    Genitalia:   Deferred    Rectal:   Deferred    Extremities:  No cyanosis, clubbing or edema    Pulses:  2+ and symmetric    Skin:  No jaundice, rashes, or lesions    Lymph nodes:  No palpable cervical lymphadenopathy        Lab Results:   No visits with results within 1 Day(s) from this visit     Latest known visit with results is:   Appointment on 02/15/2022   Component Date Value    Calprotectin 02/15/2022 24     C difficile toxin by PCR 02/15/2022 Negative     Salmonella sp PCR 02/15/2022 None Detected     Shigella sp/Enteroinvasi* 02/15/2022 None Detected     Campylobacter sp (jejuni* 02/15/2022 None Detected     Shiga toxin 1/Shiga toxi* 02/15/2022 None Detected          Radiology Results:   No results found

## 2022-06-18 DIAGNOSIS — F41.9 ANXIETY: ICD-10-CM

## 2022-06-18 DIAGNOSIS — F33.1 MODERATE EPISODE OF RECURRENT MAJOR DEPRESSIVE DISORDER (HCC): ICD-10-CM

## 2022-06-18 DIAGNOSIS — G43.109 MIGRAINE WITH AURA AND WITHOUT STATUS MIGRAINOSUS, NOT INTRACTABLE: ICD-10-CM

## 2022-06-20 ENCOUNTER — TELEPHONE (OUTPATIENT)
Dept: PSYCHIATRY | Facility: CLINIC | Age: 42
End: 2022-06-20

## 2022-06-20 ENCOUNTER — CLINICAL SUPPORT (OUTPATIENT)
Dept: FAMILY MEDICINE CLINIC | Facility: CLINIC | Age: 42
End: 2022-06-20

## 2022-06-20 DIAGNOSIS — E53.8 B12 DEFICIENCY: Primary | ICD-10-CM

## 2022-06-20 PROCEDURE — 96372 THER/PROPH/DIAG INJ SC/IM: CPT | Performed by: FAMILY MEDICINE

## 2022-06-20 RX ORDER — CYANOCOBALAMIN 1000 UG/ML
1000 INJECTION INTRAMUSCULAR; SUBCUTANEOUS
Status: DISCONTINUED | OUTPATIENT
Start: 2022-06-20 | End: 2022-08-01

## 2022-06-20 RX ORDER — MIRTAZAPINE 15 MG/1
TABLET, FILM COATED ORAL
Qty: 90 TABLET | Refills: 0 | Status: SHIPPED | OUTPATIENT
Start: 2022-06-20

## 2022-06-20 RX ORDER — SUMATRIPTAN 50 MG/1
TABLET, FILM COATED ORAL
Qty: 9 TABLET | Refills: 0 | Status: SHIPPED | OUTPATIENT
Start: 2022-06-20

## 2022-06-20 RX ADMIN — CYANOCOBALAMIN 1000 MCG: 1000 INJECTION INTRAMUSCULAR; SUBCUTANEOUS at 09:08

## 2022-06-20 NOTE — TELEPHONE ENCOUNTER
Prudencio Lawton left a message today at 7:42am  She would like to know where she is on the waitlist  Please give patient a call back

## 2022-06-20 NOTE — TELEPHONE ENCOUNTER
I spoke with patient and made her aware that we re working on our wait lists daily and will be reaching out ASAP

## 2022-07-18 DIAGNOSIS — K52.9 COLITIS: ICD-10-CM

## 2022-07-18 DIAGNOSIS — K59.00 CONSTIPATION, UNSPECIFIED CONSTIPATION TYPE: ICD-10-CM

## 2022-07-18 RX ORDER — DOCUSATE SODIUM 100 MG/1
CAPSULE, LIQUID FILLED ORAL
Qty: 90 CAPSULE | Refills: 1 | Status: SHIPPED | OUTPATIENT
Start: 2022-07-18

## 2022-07-18 RX ORDER — FAMOTIDINE 40 MG/1
TABLET, FILM COATED ORAL
Qty: 60 TABLET | Refills: 2 | Status: SHIPPED | OUTPATIENT
Start: 2022-07-18

## 2022-07-18 RX ORDER — DOCUSATE SODIUM 100 MG/1
CAPSULE, LIQUID FILLED ORAL
Qty: 60 CAPSULE | Refills: 2 | Status: SHIPPED | OUTPATIENT
Start: 2022-07-18 | End: 2022-07-18

## 2022-07-20 ENCOUNTER — CLINICAL SUPPORT (OUTPATIENT)
Dept: FAMILY MEDICINE CLINIC | Facility: CLINIC | Age: 42
End: 2022-07-20

## 2022-07-20 DIAGNOSIS — E53.8 B12 DEFICIENCY: Primary | ICD-10-CM

## 2022-07-20 PROCEDURE — 96372 THER/PROPH/DIAG INJ SC/IM: CPT | Performed by: NURSE PRACTITIONER

## 2022-07-20 RX ADMIN — CYANOCOBALAMIN 1000 MCG: 1000 INJECTION, SOLUTION INTRAMUSCULAR; SUBCUTANEOUS at 09:07

## 2022-07-26 ENCOUNTER — HOSPITAL ENCOUNTER (OUTPATIENT)
Dept: NUCLEAR MEDICINE | Facility: HOSPITAL | Age: 42
Discharge: HOME | End: 2022-07-26
Payer: COMMERCIAL

## 2022-07-26 DIAGNOSIS — R11.0 NAUSEA: ICD-10-CM

## 2022-07-26 PROCEDURE — A9541 TC99M SULFUR COLLOID: HCPCS

## 2022-07-26 PROCEDURE — 78264 GASTRIC EMPTYING IMG STUDY: CPT

## 2022-07-27 ENCOUNTER — TELEPHONE (OUTPATIENT)
Dept: PSYCHIATRY | Facility: CLINIC | Age: 42
End: 2022-07-27

## 2022-07-27 NOTE — TELEPHONE ENCOUNTER
returned call to pt to let her know where she was on wait list  Pt was added in Feb 2022 and I explained that we are working daily fro our lists and will reach out As soon as we can to schedule

## 2022-08-01 ENCOUNTER — OFFICE VISIT (OUTPATIENT)
Dept: FAMILY MEDICINE CLINIC | Facility: CLINIC | Age: 42
End: 2022-08-01

## 2022-08-01 VITALS
WEIGHT: 87.7 LBS | BODY MASS INDEX: 17.68 KG/M2 | DIASTOLIC BLOOD PRESSURE: 64 MMHG | HEIGHT: 59 IN | TEMPERATURE: 97.6 F | SYSTOLIC BLOOD PRESSURE: 104 MMHG | HEART RATE: 90 BPM

## 2022-08-01 DIAGNOSIS — M25.50 POLYARTHRALGIA: ICD-10-CM

## 2022-08-01 DIAGNOSIS — E53.8 B12 DEFICIENCY: Primary | ICD-10-CM

## 2022-08-01 PROCEDURE — 99214 OFFICE O/P EST MOD 30 MIN: CPT | Performed by: NURSE PRACTITIONER

## 2022-08-01 NOTE — PROGRESS NOTES
Assessment/Plan:    B12 deficiency  Completed monthly injections, transition to oral supplement   Check serum B12 level in 3 months     Anxiety  Continue with mirtazapine HS at this time   She is on wait list for psych       Polyarthralgia  Bilateral knees are worst pain   Trial topical NSAID   Check x-rays     Leigh Cisneros was seen today for weight check  Diagnoses and all orders for this visit:    B12 deficiency  -     cyanocobalamin (VITAMIN B-12) 1000 MCG tablet; Take 1 tablet (1,000 mcg total) by mouth daily    Polyarthralgia  -     Diclofenac Sodium (VOLTAREN) 1 %; Apply 2 g topically 4 (four) times a day  -     XR knee 3 vw right non injury; Future  -     XR knee 3 vw left non injury; Future        Return in about 3 months (around 11/1/2022) for joint pain   Subjective: Lien Lama is a 37 y o  female who  has a past medical history of Anemia, Anxiety, Depression, Gallstones, Migraine, Nausea, Panic attack, Rotator cuff disorder, right, and Vertigo  who presented to the office today for follow up         The following portions of the patient's history were reviewed and updated as appropriate: allergies, current medications, past family history, past medical history, past social history, past surgical history and problem list     Current Outpatient Medications on File Prior to Visit   Medication Sig Dispense Refill    baclofen 10 mg tablet TAKE 1 TABLET(10 MG) BY MOUTH THREE TIMES DAILY AS NEEDED FOR MUSCLE SPASMS 10 tablet 0    cholecalciferol (VITAMIN D3) 1,000 units tablet Take 1 tablet (1,000 Units total) by mouth daily 90 tablet 3    docusate sodium (COLACE) 100 mg capsule TAKE 1 CAPSULE BY MOUTH EVERY MORNING 90 capsule 1    ergocalciferol (VITAMIN D2) 50,000 units TAKE 1 CAPSULE BY MOUTH 1 TIME A WEEK 13 capsule 0    famotidine (PEPCID) 40 MG tablet TAKE 1 TABLET(40 MG) BY MOUTH DAILY 60 tablet 2    ferrous sulfate 324 (65 Fe) mg Take 1 tablet (324 mg total) by mouth daily before breakfast 90 tablet 3    hydrocortisone (ANUSOL-HC) 25 mg suppository Insert 1 suppository (25 mg total) into the rectum 2 (two) times a day (Patient not taking: No sig reported) 12 suppository 0    hydrOXYzine HCL (ATARAX) 25 mg tablet Take 1 tablet (25 mg total) by mouth every 6 (six) hours as needed for itching 40 tablet 0    meclizine (ANTIVERT) 25 mg tablet TAKE 1 TABLET(25 MG) BY MOUTH EVERY 8 HOURS AS NEEDED FOR DIZZINESS 60 tablet 1    mirtazapine (REMERON) 15 mg tablet TAKE 1 TABLET(15 MG) BY MOUTH DAILY AT BEDTIME 90 tablet 0    ondansetron (ZOFRAN-ODT) 4 mg disintegrating tablet DISSOLVE 1 TABLET(4 MG) ON THE TONGUE EVERY 8 HOURS AS NEEDED FOR NAUSEA OR VOMITING 20 tablet 0    SUMAtriptan (IMITREX) 50 mg tablet TAKE 1 TABLET(50 MG) BY MOUTH 1 TIME FOR UP TO 1 DOSE AS NEEDED FOR MIGRAINE 9 tablet 0    [DISCONTINUED] dicyclomine (BENTYL) 20 mg tablet TAKE 1 TABLET(20 MG) BY MOUTH EVERY 6 HOURS AS NEEDED FOR PAIN 40 tablet 0     Current Facility-Administered Medications on File Prior to Visit   Medication Dose Route Frequency Provider Last Rate Last Admin    [DISCONTINUED] cyanocobalamin injection 1,000 mcg  1,000 mcg Intramuscular Q30 Days ANNAMARIE Bianchi   1,000 mcg at 06/20/22 0908    [DISCONTINUED] cyanocobalamin injection 1,000 mcg  1,000 mcg Intramuscular Q30 Days ANNAMARIE Bianchi   1,000 mcg at 07/20/22 1473       Review of Systems   Constitutional: Positive for fatigue  Negative for chills and fever  HENT: Negative for ear pain and sore throat  Eyes: Negative for pain and visual disturbance  Respiratory: Negative for cough and shortness of breath  Cardiovascular: Negative for chest pain and palpitations  Gastrointestinal: Negative for abdominal pain and vomiting  Genitourinary: Negative for dysuria and hematuria  Musculoskeletal: Positive for arthralgias  Negative for back pain  Skin: Negative for color change and rash     Neurological: Negative for seizures and syncope  Psychiatric/Behavioral: The patient is nervous/anxious  All other systems reviewed and are negative  Objective:    /64 (BP Location: Left arm, Patient Position: Sitting, Cuff Size: Standard)   Pulse 90   Temp 97 6 °F (36 4 °C) (Temporal)   Ht 4' 11" (1 499 m)   Wt 39 8 kg (87 lb 11 2 oz)   LMP 07/24/2017   BMI 17 71 kg/m²     Physical Exam  Vitals and nursing note reviewed  Constitutional:       General: She is not in acute distress  Appearance: She is well-developed  She is not diaphoretic  HENT:      Head: Normocephalic and atraumatic  Eyes:      Conjunctiva/sclera: Conjunctivae normal       Pupils: Pupils are equal, round, and reactive to light  Cardiovascular:      Rate and Rhythm: Normal rate and regular rhythm  Pulmonary:      Effort: Pulmonary effort is normal  No respiratory distress  Breath sounds: Normal breath sounds  No wheezing  Abdominal:      General: Bowel sounds are normal  There is no distension  Palpations: Abdomen is soft  Tenderness: There is no abdominal tenderness  Musculoskeletal:         General: No deformity  Cervical back: Normal range of motion and neck supple  Right knee: Decreased range of motion  Tenderness present over the patellar tendon  Left knee: Decreased range of motion  Tenderness present over the patellar tendon  Lymphadenopathy:      Cervical: No cervical adenopathy  Skin:     General: Skin is warm and dry  Capillary Refill: Capillary refill takes less than 2 seconds  Findings: No rash  Neurological:      Mental Status: She is alert and oriented to person, place, and time     Psychiatric:         Behavior: Behavior normal          Harper PlaceANNAMARIE  08/01/22  12:30 PM

## 2022-08-05 DIAGNOSIS — G43.109 MIGRAINE WITH AURA AND WITHOUT STATUS MIGRAINOSUS, NOT INTRACTABLE: ICD-10-CM

## 2022-08-05 DIAGNOSIS — K52.9 COLITIS: ICD-10-CM

## 2022-08-08 RX ORDER — ONDANSETRON 4 MG/1
TABLET, ORALLY DISINTEGRATING ORAL
Qty: 20 TABLET | Refills: 0 | Status: SHIPPED | OUTPATIENT
Start: 2022-08-08 | End: 2022-08-20

## 2022-08-08 RX ORDER — BACLOFEN 10 MG/1
TABLET ORAL
Qty: 10 TABLET | Refills: 0 | Status: SHIPPED | OUTPATIENT
Start: 2022-08-08

## 2022-08-18 ENCOUNTER — TELEPHONE (OUTPATIENT)
Dept: PSYCHIATRY | Facility: CLINIC | Age: 42
End: 2022-08-18

## 2022-08-20 DIAGNOSIS — R11.0 NAUSEA: Primary | ICD-10-CM

## 2022-08-20 RX ORDER — PROCHLORPERAZINE MALEATE 5 MG/1
5 TABLET ORAL EVERY 6 HOURS PRN
Qty: 30 TABLET | Refills: 0 | Status: SHIPPED | OUTPATIENT
Start: 2022-08-20 | End: 2023-01-17

## 2022-08-25 ENCOUNTER — OFFICE VISIT (OUTPATIENT)
Dept: GASTROENTEROLOGY | Facility: MEDICAL CENTER | Age: 42
End: 2022-08-25
Payer: COMMERCIAL

## 2022-08-25 ENCOUNTER — APPOINTMENT (OUTPATIENT)
Dept: LAB | Facility: MEDICAL CENTER | Age: 42
End: 2022-08-25
Payer: COMMERCIAL

## 2022-08-25 VITALS
DIASTOLIC BLOOD PRESSURE: 64 MMHG | BODY MASS INDEX: 17.26 KG/M2 | OXYGEN SATURATION: 99 % | SYSTOLIC BLOOD PRESSURE: 108 MMHG | HEART RATE: 96 BPM | HEIGHT: 59 IN | WEIGHT: 85.6 LBS

## 2022-08-25 DIAGNOSIS — K59.00 CONSTIPATION, UNSPECIFIED CONSTIPATION TYPE: ICD-10-CM

## 2022-08-25 DIAGNOSIS — E61.1 IRON DEFICIENCY: Primary | ICD-10-CM

## 2022-08-25 DIAGNOSIS — R10.13 EPIGASTRIC PAIN: ICD-10-CM

## 2022-08-25 DIAGNOSIS — E61.1 IRON DEFICIENCY: ICD-10-CM

## 2022-08-25 DIAGNOSIS — R63.4 WEIGHT LOSS: ICD-10-CM

## 2022-08-25 LAB — IGA SERPL-MCNC: 584 MG/DL (ref 70–400)

## 2022-08-25 PROCEDURE — 81382 HLA II TYPING 1 LOC HR: CPT

## 2022-08-25 PROCEDURE — 86364 TISS TRNSGLTMNASE EA IG CLAS: CPT

## 2022-08-25 PROCEDURE — 81376 HLA II TYPING 1 LOCUS LR: CPT

## 2022-08-25 PROCEDURE — 99214 OFFICE O/P EST MOD 30 MIN: CPT | Performed by: INTERNAL MEDICINE

## 2022-08-25 PROCEDURE — 36415 COLL VENOUS BLD VENIPUNCTURE: CPT

## 2022-08-25 PROCEDURE — 82784 ASSAY IGA/IGD/IGG/IGM EACH: CPT

## 2022-08-25 RX ORDER — POLYETHYLENE GLYCOL 3350 17 G/17G
17 POWDER, FOR SOLUTION ORAL DAILY
Qty: 255 G | Refills: 0 | Status: SHIPPED | OUTPATIENT
Start: 2022-08-25

## 2022-08-25 NOTE — PROGRESS NOTES
[facility] - Outpatient Follow-up Note  Sameer Ramirez 37 y o  female MRN: [id number]  Encounter: [id number]          ASSESSMENT AND PLAN:  43-year-old female with history of migraine headaches who presents for follow-up evaluation  1  Iron deficiency  2  Weight loss  3  Constipation, unspecified constipation type  4  Epigastric pain  She was previously evaluated for abdominal pain, unintentional weight loss, low appetite, alternating constipation diarrhea  She underwent EGD and colonoscopy showing increased intraepithelial lymphocytes in the duodenum otherwise normal endoscopic evaluation  Gastric emptying study showed delayed gastric emptying  She continues to have low appetite, constipation, bloating  I have ordered celiac serologies in addition to HLA testing given her intraepithelial lymphocytes on duodenal biopsies  Her weight loss, bowel habits, iron deficiency could be explained by celiac disease     She also reports constipation epigastric pain and constipation may be contributing to her symptoms  I recommend she start MiraLax once daily and increase or decrease as needed to have a soft, formed bowel movement per day  Her gastric emptying study did show delayed gastric emptying  Etiology of this is unclear she does not have risk factors for gastroparesis including no diabetes, narcotic use  She has been using antiemetics for her nausea and if she use these medications when completing the gastric emptying study and may have skew the results  If her symptoms remain unchanged the above workup is negative consider repeating the gastric emptying study off all antiemetics to verify gastroparesis  I provided her handout of the gastroparesis diet today and recommend that she trial smaller, more frequent meals in the interim  Her iron studies have normalized on iron supplementation she should continue daily supplementation       Tissue transglutaminase, IgA; Future  - IgA; Future  - CELIAC HLA-DQ,BLOOD; Future  - polyethylene glycol (GLYCOLAX) 17 GM/SCOOP powder; Take 17 g by mouth daily  Dispense: 255 g; Refill: 0        ______________________________________________________________________    SUBJECTIVE:  70-year-old female with history of migraine headaches who presents for follow-up evaluation  She was previously evaluated for rectal bleeding, watery diarrhea alternating with constipation, unintentional weight loss in addition to iron deficiency  She underwent EGD and colonoscopy February 2022  Her endoscopy was normal   Her colonoscopy was normal and she was recommended repeat in 5 years for colon cancer screening  Gastric biopsy showed chronic gastritis negative for H pylori  Duodenal biopsies had patchy increased intraepithelial lymphocytes without villous blunting  Colonic biopsies were unremarkable    Interval history:  She continues to have low appetite  She notes epigastric abdominal pain which radiates to her left upper quadrant  She has overall lost 20 lb since November of last year, unintentionally  She has nausea without vomiting  She uses Pepcid daily and denies heartburn symptoms  She continues to take her iron supplement  There are certain foods like greasy foods which can make her symptoms worse  She reports constipation with hard and difficult to pass bowel movements, [place] type 1  She denies rectal bleeding     She underwent gastric emptying study showing delayed gastric emptying, at the 4 hour fuentes, 64% was emptied, when normal is greater than 90%    June 2022 iron saturation is 24%, increased from 13% in December 2021, hemoglobin is 13 6, previously 12 7 in November 2021    REVIEW OF SYSTEMS IS OTHERWISE NEGATIVE    Ten point review of systems is negative other than stated as per HPI    Historical Information   Past Medical History:   Diagnosis Date    Anemia     in past    Anxiety     Depression     Gallstones     Migraine     Nausea     occ    Panic attack     Rotator cuff disorder, right     Vertigo      Past Surgical History:   Procedure Laterality Date    ABDOMINAL SURGERY      scar tissue removed     SECTION      last assessed: Oct 23, 2014     HYSTERECTOMY      NE LAP,CHOLECYSTECTOMY/GRAPH N/A 2018    Procedure: CHOLECYSTECTOMY LAPAROSCOPIC  WITH  ATTEMPTED IOC; Surgeon: Carroll Lucas MD;  Location: [facility];  Service: General    TUBAL LIGATION       Social History   Social History     Substance and Sexual Activity   Alcohol Use No    Comment: none since 2021     Social History     Substance and Sexual Activity   Drug Use No     Social History     Tobacco Use   Smoking Status Former Smoker    Quit date: 2013    Years since quittin 5   Smokeless Tobacco Never Used     Family History   Problem Relation Age of Onset    No Known Problems Family     Hypertension Mother     Diabetes type II Father         mellitus        Meds/Allergies       Current Outpatient Medications:     baclofen 10 mg tablet    cholecalciferol (VITAMIN D3) 1,000 units tablet    cyanocobalamin (VITAMIN B-12) 1000 MCG tablet    Diclofenac Sodium (VOLTAREN) 1 %    docusate sodium (COLACE) 100 mg capsule    ergocalciferol (VITAMIN D2) 50,000 units    famotidine (PEPCID) 40 MG tablet    ferrous sulfate 324 (65 Fe) mg    hydrocortisone (ANUSOL-HC) 25 mg suppository    hydrOXYzine HCL (ATARAX) 25 mg tablet    mirtazapine (REMERON) 15 mg tablet    prochlorperazine (COMPAZINE) 5 mg tablet    SUMAtriptan (IMITREX) 50 mg tablet    No Known Allergies        Objective     Last menstrual period 2017, not currently breastfeeding  There is no height or weight on file to calculate BMI  PHYSICAL EXAM:      General Appearance:   Alert, cooperative, no distress   HEENT:   Normocephalic, atraumatic, anicteric       Neck:  Supple, symmetrical, trachea midline   Lungs:   Clear to auscultation bilaterally; no rales, rhonchi or wheezing; respirations unlabored    Heart[de-identified]   Regular rate and rhythm; no murmur, rub, or gallop  Abdomen:   Soft, epigastric tenderness to deep palpation without rebound or guarding non-distended; normal bowel sounds; no masses, no organomegaly    Genitalia:   Deferred    Rectal:   Deferred    Extremities:  No cyanosis, clubbing or edema    Pulses:  2+ and symmetric    Skin:  No jaundice, rashes, or lesions    Lymph nodes:  No palpable cervical lymphadenopathy        Lab Results:   No visits with results within 1 Day(s) from this visit  Latest known visit with results is:   Appointment on 06/02/2022   Component Date Value    Hemoglobin 06/02/2022 13 6     Iron Saturation 06/02/2022 24     TIBC 06/02/2022 308     Iron 06/02/2022 73     Ferritin 06/02/2022 81          Radiology Results:   NM gastric emptying    Result Date: 7/26/2022  Narrative: GASTRIC EMPTYING STUDY INDICATION: R11 [id number]: Nausea COMPARISON:  None available TECHNIQUE:   The study was performed following the oral administration of 1 1 mCi Tc-99m sulfur colloid combined with scrambled eggs, as part of a standard meal   Patient ingested the egg portion of the meal but only half of the toast   Following the meal, one minute anterior and posterior images were obtained immediately and at 0 25 hours, 0 5 hour, 1 hour, 1 5 hour, 2 hour, 3 hour and 4 hour intervals from the time of ingestion  Geometric mean analyses were then performed  FINDINGS: Gastric emptying at 0 5 hours = 18 (N < 30%) Gastric emptying at 1 hour = 18 % (N = 10 - 70%) Gastric emptying at 2 hours = 33 % (N = > 40%) Gastric emptying at 3 hours = 47 % (N = > 70%) Gastric emptying at 4 hours = 64 % (N = >90%) Linear T-1/2 = 188 minutes Gastric emptying is delayed at the 2, 3 and 4 hour time points  Impression: 1  Delayed rate of gastric emptying   Workstation performed: [id number]

## 2022-08-26 LAB — TTG IGA SER-ACNC: <2 U/ML (ref 0–3)

## 2022-09-02 LAB
ANNOTATION COMMENT IMP: NORMAL
HLA-DQ2 QL: NEGATIVE
HLA-DQ8 QL: NEGATIVE
REF LAB TEST METHOD: NORMAL

## 2022-09-08 ENCOUNTER — PATIENT MESSAGE (OUTPATIENT)
Dept: GASTROENTEROLOGY | Facility: MEDICAL CENTER | Age: 42
End: 2022-09-08

## 2022-09-09 DIAGNOSIS — R10.13 EPIGASTRIC PAIN: Primary | ICD-10-CM

## 2022-09-09 RX ORDER — DICYCLOMINE HCL 20 MG
20 TABLET ORAL EVERY 6 HOURS
Qty: 60 TABLET | Refills: 1 | Status: SHIPPED | OUTPATIENT
Start: 2022-09-09

## 2022-09-19 ENCOUNTER — HOSPITAL ENCOUNTER (OUTPATIENT)
Dept: RADIOLOGY | Facility: HOSPITAL | Age: 42
Discharge: HOME | End: 2022-09-19
Payer: COMMERCIAL

## 2022-09-19 DIAGNOSIS — M25.50 POLYARTHRALGIA: ICD-10-CM

## 2022-09-19 DIAGNOSIS — F41.9 ANXIETY: ICD-10-CM

## 2022-09-19 DIAGNOSIS — F33.1 MODERATE EPISODE OF RECURRENT MAJOR DEPRESSIVE DISORDER (HCC): ICD-10-CM

## 2022-09-19 PROCEDURE — 73562 X-RAY EXAM OF KNEE 3: CPT

## 2022-09-20 RX ORDER — MIRTAZAPINE 15 MG/1
TABLET, FILM COATED ORAL
Qty: 90 TABLET | Refills: 0 | Status: SHIPPED | OUTPATIENT
Start: 2022-09-20

## 2022-09-21 ENCOUNTER — HOSPITAL ENCOUNTER (EMERGENCY)
Facility: HOSPITAL | Age: 42
Discharge: HOME | End: 2022-09-21
Attending: EMERGENCY MEDICINE
Payer: COMMERCIAL

## 2022-09-21 VITALS
HEART RATE: 68 BPM | DIASTOLIC BLOOD PRESSURE: 60 MMHG | RESPIRATION RATE: 18 BRPM | TEMPERATURE: 98.2 F | OXYGEN SATURATION: 100 % | SYSTOLIC BLOOD PRESSURE: 95 MMHG

## 2022-09-21 DIAGNOSIS — R11.0 NAUSEA: ICD-10-CM

## 2022-09-21 DIAGNOSIS — R10.84 GENERALIZED ABDOMINAL PAIN: Primary | ICD-10-CM

## 2022-09-21 LAB
ALBUMIN SERPL BCP-MCNC: 3.6 G/DL (ref 3.5–5)
ALP SERPL-CCNC: 60 U/L (ref 46–116)
ALT SERPL W P-5'-P-CCNC: 17 U/L (ref 12–78)
ANION GAP SERPL CALCULATED.3IONS-SCNC: 8 MMOL/L (ref 4–13)
AST SERPL W P-5'-P-CCNC: 13 U/L (ref 5–45)
BASOPHILS # BLD AUTO: 0.03 THOUSANDS/ΜL (ref 0–0.1)
BASOPHILS NFR BLD AUTO: 0 % (ref 0–1)
BILIRUB SERPL-MCNC: 0.62 MG/DL (ref 0.2–1)
BILIRUB UR QL STRIP: NEGATIVE
BUN SERPL-MCNC: 11 MG/DL (ref 5–25)
CALCIUM SERPL-MCNC: 8.7 MG/DL (ref 8.3–10.1)
CHLORIDE SERPL-SCNC: 102 MMOL/L (ref 96–108)
CLARITY UR: CLEAR
CO2 SERPL-SCNC: 29 MMOL/L (ref 21–32)
COLOR UR: ABNORMAL
CREAT SERPL-MCNC: 0.73 MG/DL (ref 0.6–1.3)
EOSINOPHIL # BLD AUTO: 0.07 THOUSAND/ΜL (ref 0–0.61)
EOSINOPHIL NFR BLD AUTO: 1 % (ref 0–6)
ERYTHROCYTE [DISTWIDTH] IN BLOOD BY AUTOMATED COUNT: 12 % (ref 11.6–15.1)
FLUAV RNA RESP QL NAA+PROBE: NEGATIVE
FLUBV RNA RESP QL NAA+PROBE: NEGATIVE
GFR SERPL CREATININE-BSD FRML MDRD: 102 ML/MIN/1.73SQ M
GLUCOSE SERPL-MCNC: 100 MG/DL (ref 65–140)
GLUCOSE UR STRIP-MCNC: NEGATIVE MG/DL
HCT VFR BLD AUTO: 34.2 % (ref 34.8–46.1)
HGB BLD-MCNC: 11.5 G/DL (ref 11.5–15.4)
HGB UR QL STRIP.AUTO: NEGATIVE
IMM GRANULOCYTES # BLD AUTO: 0.05 THOUSAND/UL (ref 0–0.2)
IMM GRANULOCYTES NFR BLD AUTO: 1 % (ref 0–2)
KETONES UR STRIP-MCNC: NEGATIVE MG/DL
LEUKOCYTE ESTERASE UR QL STRIP: NEGATIVE
LIPASE SERPL-CCNC: 78 U/L (ref 73–393)
LYMPHOCYTES # BLD AUTO: 2.31 THOUSANDS/ΜL (ref 0.6–4.47)
LYMPHOCYTES NFR BLD AUTO: 24 % (ref 14–44)
MCH RBC QN AUTO: 31.3 PG (ref 26.8–34.3)
MCHC RBC AUTO-ENTMCNC: 33.6 G/DL (ref 31.4–37.4)
MCV RBC AUTO: 93 FL (ref 82–98)
MONOCYTES # BLD AUTO: 1.03 THOUSAND/ΜL (ref 0.17–1.22)
MONOCYTES NFR BLD AUTO: 11 % (ref 4–12)
NEUTROPHILS # BLD AUTO: 6.26 THOUSANDS/ΜL (ref 1.85–7.62)
NEUTS SEG NFR BLD AUTO: 63 % (ref 43–75)
NITRITE UR QL STRIP: NEGATIVE
NRBC BLD AUTO-RTO: 0 /100 WBCS
PH UR STRIP.AUTO: 8.5 [PH]
PLATELET # BLD AUTO: 212 THOUSANDS/UL (ref 149–390)
PMV BLD AUTO: 10.8 FL (ref 8.9–12.7)
POTASSIUM SERPL-SCNC: 3.7 MMOL/L (ref 3.5–5.3)
PROT SERPL-MCNC: 7.4 G/DL (ref 6.4–8.4)
PROT UR STRIP-MCNC: NEGATIVE MG/DL
RBC # BLD AUTO: 3.68 MILLION/UL (ref 3.81–5.12)
RSV RNA RESP QL NAA+PROBE: NEGATIVE
SARS-COV-2 RNA RESP QL NAA+PROBE: NEGATIVE
SODIUM SERPL-SCNC: 139 MMOL/L (ref 135–147)
SP GR UR STRIP.AUTO: 1.01 (ref 1–1.03)
UROBILINOGEN UR QL STRIP.AUTO: 0.2 E.U./DL
WBC # BLD AUTO: 9.75 THOUSAND/UL (ref 4.31–10.16)

## 2022-09-21 PROCEDURE — 96365 THER/PROPH/DIAG IV INF INIT: CPT

## 2022-09-21 PROCEDURE — 83690 ASSAY OF LIPASE: CPT | Performed by: STUDENT IN AN ORGANIZED HEALTH CARE EDUCATION/TRAINING PROGRAM

## 2022-09-21 PROCEDURE — 96375 TX/PRO/DX INJ NEW DRUG ADDON: CPT

## 2022-09-21 PROCEDURE — 0241U HB NFCT DS VIR RESP RNA 4 TRGT: CPT | Performed by: STUDENT IN AN ORGANIZED HEALTH CARE EDUCATION/TRAINING PROGRAM

## 2022-09-21 PROCEDURE — 81003 URINALYSIS AUTO W/O SCOPE: CPT | Performed by: STUDENT IN AN ORGANIZED HEALTH CARE EDUCATION/TRAINING PROGRAM

## 2022-09-21 PROCEDURE — 99284 EMERGENCY DEPT VISIT MOD MDM: CPT

## 2022-09-21 PROCEDURE — 36415 COLL VENOUS BLD VENIPUNCTURE: CPT | Performed by: STUDENT IN AN ORGANIZED HEALTH CARE EDUCATION/TRAINING PROGRAM

## 2022-09-21 PROCEDURE — 85025 COMPLETE CBC W/AUTO DIFF WBC: CPT | Performed by: STUDENT IN AN ORGANIZED HEALTH CARE EDUCATION/TRAINING PROGRAM

## 2022-09-21 PROCEDURE — 80053 COMPREHEN METABOLIC PANEL: CPT | Performed by: STUDENT IN AN ORGANIZED HEALTH CARE EDUCATION/TRAINING PROGRAM

## 2022-09-21 PROCEDURE — 99284 EMERGENCY DEPT VISIT MOD MDM: CPT | Performed by: EMERGENCY MEDICINE

## 2022-09-21 PROCEDURE — 96366 THER/PROPH/DIAG IV INF ADDON: CPT

## 2022-09-21 RX ORDER — KETOROLAC TROMETHAMINE 30 MG/ML
15 INJECTION, SOLUTION INTRAMUSCULAR; INTRAVENOUS ONCE
Status: COMPLETED | OUTPATIENT
Start: 2022-09-21 | End: 2022-09-21

## 2022-09-21 RX ORDER — METOCLOPRAMIDE HYDROCHLORIDE 5 MG/ML
10 INJECTION INTRAMUSCULAR; INTRAVENOUS ONCE
Status: COMPLETED | OUTPATIENT
Start: 2022-09-21 | End: 2022-09-21

## 2022-09-21 RX ORDER — TROSPIUM CHLORIDE 20 MG/1
20 TABLET, FILM COATED ORAL 2 TIMES DAILY
COMMUNITY
Start: 2022-05-06

## 2022-09-21 RX ORDER — SODIUM CHLORIDE, SODIUM GLUCONATE, SODIUM ACETATE, POTASSIUM CHLORIDE, MAGNESIUM CHLORIDE, SODIUM PHOSPHATE, DIBASIC, AND POTASSIUM PHOSPHATE .53; .5; .37; .037; .03; .012; .00082 G/100ML; G/100ML; G/100ML; G/100ML; G/100ML; G/100ML; G/100ML
1000 INJECTION, SOLUTION INTRAVENOUS ONCE
Status: COMPLETED | OUTPATIENT
Start: 2022-09-21 | End: 2022-09-21

## 2022-09-21 RX ORDER — DICYCLOMINE HCL 20 MG
20 TABLET ORAL 2 TIMES DAILY
Qty: 20 TABLET | Refills: 0 | Status: SHIPPED | OUTPATIENT
Start: 2022-09-21 | End: 2022-10-01

## 2022-09-21 RX ORDER — ONDANSETRON 4 MG/1
4 TABLET, ORALLY DISINTEGRATING ORAL EVERY 6 HOURS PRN
Qty: 20 TABLET | Refills: 0 | Status: SHIPPED | OUTPATIENT
Start: 2022-09-21 | End: 2022-10-01

## 2022-09-21 RX ORDER — DICYCLOMINE HCL 20 MG
20 TABLET ORAL ONCE
Status: COMPLETED | OUTPATIENT
Start: 2022-09-21 | End: 2022-09-21

## 2022-09-21 RX ADMIN — DICYCLOMINE HYDROCHLORIDE 20 MG: 20 TABLET ORAL at 19:38

## 2022-09-21 RX ADMIN — KETOROLAC TROMETHAMINE 15 MG: 30 INJECTION, SOLUTION INTRAMUSCULAR; INTRAVENOUS at 19:38

## 2022-09-21 RX ADMIN — METOCLOPRAMIDE 10 MG: 5 INJECTION, SOLUTION INTRAMUSCULAR; INTRAVENOUS at 19:41

## 2022-09-21 RX ADMIN — SODIUM CHLORIDE, SODIUM GLUCONATE, SODIUM ACETATE, POTASSIUM CHLORIDE, MAGNESIUM CHLORIDE, SODIUM PHOSPHATE, DIBASIC, AND POTASSIUM PHOSPHATE 1000 ML: .53; .5; .37; .037; .03; .012; .00082 INJECTION, SOLUTION INTRAVENOUS at 20:02

## 2022-09-21 NOTE — ED ATTENDING ATTESTATION
9/21/2022  IMundo MD, saw and evaluated the patient  I have discussed the patient with the resident/non-physician practitioner and agree with the resident's/non-physician practitioner's findings, Plan of Care, and MDM as documented in the resident's/non-physician practitioner's note, except where noted  All available labs and Radiology studies were reviewed  I was present for key portions of any procedure(s) performed by the resident/non-physician practitioner and I was immediately available to provide assistance  At this point I agree with the current assessment done in the Emergency Department  I have conducted an independent evaluation of this patient a history and physical is as follows:  Patient is 41 yo female, hx of probable delayed gastric emptying on recent imaging, comes with recurrent abdominal pain, nausea, diarrhea, symptoms going on for past month, recurrent, worse in the past 3 days  Hysterectomy, Cholecystectomy  On exam, no acute distress, VSS, Abd soft, mild tenderness in epigastrium, no resp distress, CVS RRR  Impression: Viral GI illness, Enteritis, we will check labs, give IVF, nausea meds, pain meds, re-evaluate  ED Course     CBC, CMP, lipase results reviewed, no significant acute abnormality  Patient felt better after IV fluids, Bentyl, Toradol, stable for discharge, advised to return to ED for recurrent or worsening symptoms      Critical Care Time  Procedures

## 2022-09-21 NOTE — ED PROVIDER NOTES
History  Chief Complaint   Patient presents with    Abdominal Pain     Patient arrives via EMS c/o mid generalized stabbing intermittent abdominal pain x 1 year  Patient was diagnosed with gastroparesis but states the pain is now unbearable  HPI  78-year-old female with a past medical history of gastric emptying delay as well as chronic abdominal pain presents with complaint of her chronic abdominal pain  Patient states that her pain is the same pain that she always has but got acutely worse over the last 3 days  Patient describes the pain as sharp and is located in epigastric left upper quadrant and lower left quadrant  She denies any chest pain, shortness of breath, vomiting  Patient endorses nonbloody diarrhea  She denies fever, chills, cough or vomiting  Prior to Admission Medications   Prescriptions Last Dose Informant Patient Reported? Taking?    Cyanocobalamin 1000 MCG/ML KIT   Yes Yes   Diclofenac Sodium (VOLTAREN) 1 %   No Yes   Sig: Apply 2 g topically 4 (four) times a day   SUMAtriptan (IMITREX) 50 mg tablet   No Yes   Sig: TAKE 1 TABLET(50 MG) BY MOUTH 1 TIME FOR UP TO 1 DOSE AS NEEDED FOR MIGRAINE   baclofen 10 mg tablet   No Yes   Sig: TAKE 1 TABLET(10 MG) BY MOUTH THREE TIMES DAILY AS NEEDED FOR MUSCLE SPASMS   cholecalciferol (VITAMIN D3) 1,000 units tablet   No Yes   Sig: Take 1 tablet (1,000 Units total) by mouth daily   cyanocobalamin (VITAMIN B-12) 1000 MCG tablet   No Yes   Sig: Take 1 tablet (1,000 mcg total) by mouth daily   dicyclomine (BENTYL) 20 mg tablet   No Yes   Sig: Take 1 tablet (20 mg total) by mouth every 6 (six) hours As needed for abdominal pain   docusate sodium (COLACE) 100 mg capsule   No Yes   Sig: TAKE 1 CAPSULE BY MOUTH EVERY MORNING   ergocalciferol (VITAMIN D2) 50,000 units   No Yes   Sig: TAKE 1 CAPSULE BY MOUTH 1 TIME A WEEK   famotidine (PEPCID) 40 MG tablet   No Yes   Sig: TAKE 1 TABLET(40 MG) BY MOUTH DAILY   ferrous sulfate 324 (65 Fe) mg   No Yes   Sig: Take 1 tablet (324 mg total) by mouth daily before breakfast   mirtazapine (REMERON) 15 mg tablet   No Yes   Sig: TAKE 1 TABLET(15 MG) BY MOUTH DAILY AT BEDTIME   polyethylene glycol (GLYCOLAX) 17 GM/SCOOP powder   No Yes   Sig: Take 17 g by mouth daily   prochlorperazine (COMPAZINE) 5 mg tablet   No Yes   Sig: Take 1 tablet (5 mg total) by mouth every 6 (six) hours as needed for nausea or vomiting   trospium chloride (SANCTURA) 20 mg tablet   Yes Yes   Sig: Take 20 mg by mouth 2 (two) times a day      Facility-Administered Medications: None       Past Medical History:   Diagnosis Date    Anemia     in past    Anxiety     Depression     Gallstones     Migraine     Nausea     occ    Panic attack     Rotator cuff disorder, right     Vertigo        Past Surgical History:   Procedure Laterality Date    ABDOMINAL SURGERY      scar tissue removed     SECTION      last assessed: Oct 23, 2014     HYSTERECTOMY      MI LAP,CHOLECYSTECTOMY/GRAPH N/A 2018    Procedure: CHOLECYSTECTOMY LAPAROSCOPIC  WITH  ATTEMPTED IOC; Surgeon: Greg Anne MD;  Location: [facility];  Service: General    TUBAL LIGATION         Family History   Problem Relation Age of Onset    No Known Problems Family     Hypertension Mother     Diabetes type II Father         mellitus      I have reviewed and agree with the history as documented  E-Cigarette/Vaping    E-Cigarette Use Never User      E-Cigarette/Vaping Substances     Social History     Tobacco Use    Smoking status: Former Smoker     Quit date: 2013     Years since quittin 5    Smokeless tobacco: Never Used   Vaping Use    Vaping Use: Never used   Substance Use Topics    Alcohol use: No     Comment: none since 2021    Drug use: No        Review of Systems   Constitutional: Negative for chills and fever  HENT: Negative for congestion, ear pain, rhinorrhea and sore throat  Eyes: Negative for pain     Respiratory: Negative for apnea, cough, choking, chest tightness, shortness of breath, wheezing and stridor  Cardiovascular: Negative for chest pain, palpitations and leg swelling  Gastrointestinal: Positive for abdominal pain, diarrhea and nausea  Negative for constipation and vomiting  Genitourinary: Negative for hematuria  Musculoskeletal: Negative for arthralgias and back pain  Skin: Negative for rash and wound  Neurological: Negative for dizziness  Psychiatric/Behavioral: Negative for agitation and hallucinations  All other systems reviewed and are negative  Physical Exam  ED Triage Vitals [09/21/22 1905]   Temperature Pulse Respirations Blood Pressure SpO2   98 2 °F (36 8 °C) 94 18 120/77 99 %      Temp Source Heart Rate Source Patient Position - Orthostatic VS BP Location FiO2 (%)   Oral Monitor Lying Right arm --      Pain Score       8             Orthostatic Vital Signs  Vitals:    09/21/22 1905 09/21/22 2141   BP: 120/77 95/60   Pulse: 94 68   Patient Position - Orthostatic VS: Lying Lying       Physical Exam  Vitals reviewed  Constitutional:       General: She is not in acute distress  Appearance: She is well-developed  HENT:      Head: Normocephalic and atraumatic  Right Ear: External ear normal       Left Ear: External ear normal       Nose: Nose normal  No congestion or rhinorrhea  Mouth/Throat:      Mouth: Mucous membranes are moist       Pharynx: No oropharyngeal exudate or posterior oropharyngeal erythema  Eyes:      General:         Right eye: No discharge  Left eye: No discharge  Extraocular Movements: Extraocular movements intact  Conjunctiva/sclera: Conjunctivae normal       Pupils: Pupils are equal, round, and reactive to light  Cardiovascular:      Rate and Rhythm: Normal rate and regular rhythm  Pulses: Normal pulses  Heart sounds: Normal heart sounds  Pulmonary:      Effort: Pulmonary effort is normal  No respiratory distress        Breath sounds: Normal breath sounds  No wheezing or rales  Abdominal:      Palpations: Abdomen is soft  Tenderness: There is generalized abdominal tenderness  Musculoskeletal:         General: No tenderness  Normal range of motion  Cervical back: Normal range of motion and neck supple  No rigidity  Skin:     General: Skin is warm  Findings: No erythema or rash  Neurological:      General: No focal deficit present  Mental Status: She is alert and oriented to person, place, and time  Cranial Nerves: No cranial nerve deficit  Sensory: No sensory deficit  Motor: No weakness  Coordination: Coordination normal    Psychiatric:         Mood and Affect: Mood normal          ED Medications  Medications   dicyclomine (BENTYL) tablet 20 mg (20 mg Oral Given 9/21/22 1938)   metoclopramide (REGLAN) injection 10 mg (10 mg Intravenous Given 9/21/22 1941)   ketorolac (TORADOL) injection 15 mg (15 mg Intravenous Given 9/21/22 1938)   multi-electrolyte (ISOLYTE-S PH 7 4) bolus 1,000 mL (0 mL Intravenous Stopped 9/21/22 2141)       Diagnostic Studies  Results Reviewed     Procedure Component Value Units Date/Time    COVID/FLU/RSV [[id number]]  (Normal) Collected: 09/21/22 1930    Lab Status: Final result Specimen: Nares from Nose Updated: 09/21/22 2030     SARS-CoV-2 Negative     INFLUENZA A PCR Negative     INFLUENZA B PCR Negative     RSV PCR Negative    Narrative:      FOR PEDIATRIC PATIENTS - copy/paste COVID Guidelines URL to browser: [email or web address]    SARS-CoV-2 assay is a Nucleic Acid Amplification assay intended for the  qualitative detection of nucleic acid from SARS-CoV-2 in nasopharyngeal  swabs  Results are for the presumptive identification of SARS-CoV-2 RNA  Positive results are indicative of infection with SARS-CoV-2, the virus  causing COVID-19, but do not rule out bacterial infection or co-infection  with other viruses   Laboratories within the [place] and its  territories are required to report all positive results to the appropriate  public health authorities  Negative results do not preclude SARS-CoV-2  infection and should not be used as the sole basis for treatment or other  patient management decisions  Negative results must be combined with  clinical observations, patient history, and epidemiological information  This test has not been FDA cleared or approved  This test has been authorized by FDA under an Emergency Use Authorization  (EUA)  This test is only authorized for the duration of time the  declaration that circumstances exist justifying the authorization of the  emergency use of an in vitro diagnostic tests for detection of SARS-CoV-2  virus and/or diagnosis of COVID-19 infection under section 564(b)(1) of  the Act, 21 U  S C  567AEP-3(I)(5), unless the authorization is terminated  or revoked sooner  The test has been validated but independent review by FDA  and CLIA is pending  Test performed using [employer] GeneXpert: This RT-PCR assay targets N2,  a region unique to SARS-CoV-2  A conserved region in the E-gene was chosen  for pan-Sarbecovirus detection which includes SARS-CoV-2  According to CMS-2020-01-R, this platform meets the definition of high-throughput technology      [facility] [[id number]] Collected: 09/21/22 1943    Lab Status: Final result Specimen: Blood from Arm, Left Updated: 09/21/22 2024     Sodium 139 mmol/L      Potassium 3 7 mmol/L      Chloride 102 mmol/L      CO2 29 mmol/L      ANION GAP 8 mmol/L      BUN 11 mg/dL      Creatinine 0 73 mg/dL      Glucose 100 mg/dL      Calcium 8 7 mg/dL      AST 13 U/L      ALT 17 U/L      Alkaline Phosphatase 60 U/L      Total Protein 7 4 g/dL      Albumin 3 6 g/dL      Total Bilirubin 0 62 mg/dL      eGFR 102 ml/min/1 73sq m     Narrative:      Gayla guidelines for Chronic Kidney Disease (CKD):     Stage 1 with normal or high GFR (GFR > 90 mL/min/1 73 square meters)    Stage 2 Mild CKD (GFR = 60-89 mL/min/1 73 square meters)    Stage 3A Moderate CKD (GFR = 45-59 mL/min/1 73 square meters)    Stage 3B Moderate CKD (GFR = 30-44 mL/min/1 73 square meters)    Stage 4 Severe CKD (GFR = 15-29 mL/min/1 73 square meters)    Stage 5 End Stage CKD (GFR <15 mL/min/1 73 square meters)  Note: GFR calculation is accurate only with a steady state creatinine    Lipase [[id number]]  (Normal) Collected: 09/21/22 1943    Lab Status: Final result Specimen: Blood from Arm, Left Updated: 09/21/22 2024     Lipase 78 u/L     UA w Reflex to Microscopic w Reflex to Culture [[id number]]  (Abnormal) Collected: 09/21/22 1929    Lab Status: Final result Specimen: Urine, Clean Catch Updated: 09/21/22 2013     Color, UA Light Yellow     Clarity, UA Clear     Specific Gravity, UA 1 010     pH, UA 8 5     Leukocytes, UA Negative     Nitrite, UA Negative     Protein, UA Negative mg/dl      Glucose, UA Negative mg/dl      Ketones, UA Negative mg/dl      Urobilinogen, UA 0 2 E U /dl      Bilirubin, UA Negative     Occult Blood, UA Negative    CBC and differential [[id number]]  (Abnormal) Collected: 09/21/22 1943    Lab Status: Final result Specimen: Blood from Arm, Left Updated: 09/21/22 1953     WBC 9 75 Thousand/uL      RBC 3 68 Million/uL      Hemoglobin 11 5 g/dL      Hematocrit 34 2 %      MCV 93 fL      MCH 31 3 pg      MCHC 33 6 g/dL      RDW 12 0 %      MPV 10 8 fL      Platelets 307 Thousands/uL      nRBC 0 /100 WBCs      Neutrophils Relative 63 %      Immat GRANS % 1 %      Lymphocytes Relative 24 %      Monocytes Relative 11 %      Eosinophils Relative 1 %      Basophils Relative 0 %      Neutrophils Absolute 6 26 Thousands/µL      Immature Grans Absolute 0 05 Thousand/uL      Lymphocytes Absolute 2 31 Thousands/µL      Monocytes Absolute 1 03 Thousand/µL      Eosinophils Absolute 0 07 Thousand/µL      Basophils Absolute 0 03 Thousands/µL                  No orders to display         Procedures  Procedures      ED Course                             SBIRT 20yo+    Flowsheet Row Most Recent Value   SBIRT (23 yo +)    In order to provide better care to our patients, we are screening all of our patients for alcohol and drug use  Would it be okay to ask you these screening questions? No Filed at: 09/21/2022 1905                [facility]  Number of Diagnoses or Management Options  50-year-old female with a past medical history of gastric emptying delay as well as chronic abdominal pain presents with complaint of her chronic abdominal pain  Patient presents with her Typical abdominal pain  Negative workup  Patient is discharged given follow-up and return precautions  Disposition  Final diagnoses:   Generalized abdominal pain   Nausea     Time reflects when diagnosis was documented in both MDM as applicable and the Disposition within this note     Time User Action Codes Description Comment    9/21/2022  8:52 PM Ryne Cintron Add [R10 84] Generalized abdominal pain     9/21/2022  8:52 PM Ryne Cintron Add [R11 0] Nausea       ED Disposition     ED Disposition   Discharge    Condition   Stable    Date/Time   Wed Sep 21, 2022  8:52 PM    [address] discharge to home/self care                 Follow-up Information     Follow up With Specialties Details Why Contact Info Additional [address] [facility] Emergency Department Emergency Medicine Go to  If symptoms worsen or if you have additional concerns [facility] [id number]  [address] [place] [address] Emergency Department, [address] [facility] , [place], [place], [address] [place] [address], [address] [place] [address], Nurse Practitioner Schedule an appointment as soon as possible for a visit   [facility] [place] [facility]  [address] [facility]  Edilson Ybarra   [address]  [phone]  [phone]             Discharge Medication List as of 9/21/2022  9:36 PM      START taking these medications    Details   !! dicyclomine (BENTYL) 20 mg tablet Take 1 tablet (20 mg total) by mouth 2 (two) times a day for 10 days, Starting Wed 9/21/2022, Until Sat 10/1/2022, Normal      ondansetron (Zofran ODT) 4 mg disintegrating tablet Take 1 tablet (4 mg total) by mouth every 6 (six) hours as needed for nausea or vomiting for up to 10 days, Starting Wed 9/21/2022, Until Sat 10/1/2022 at 2359, Normal       !! - Potential duplicate medications found  Please discuss with provider        CONTINUE these medications which have NOT CHANGED    Details   baclofen 10 mg tablet TAKE 1 TABLET(10 MG) BY MOUTH THREE TIMES DAILY AS NEEDED FOR MUSCLE SPASMS, Normal      cholecalciferol (VITAMIN D3) 1,000 units tablet Take 1 tablet (1,000 Units total) by mouth daily, Starting Thu 12/9/2021, Normal      cyanocobalamin (VITAMIN B-12) 1000 MCG tablet Take 1 tablet (1,000 mcg total) by mouth daily, Starting Mon 8/1/2022, Normal      Cyanocobalamin 1000 MCG/ML KIT Starting Tue 12/7/2021, Historical Med      Diclofenac Sodium (VOLTAREN) 1 % Apply 2 g topically 4 (four) times a day, Starting Mon 8/1/2022, Normal      !! dicyclomine (BENTYL) 20 mg tablet Take 1 tablet (20 mg total) by mouth every 6 (six) hours As needed for abdominal pain, Starting Fri 9/9/2022, Normal      docusate sodium (COLACE) 100 mg capsule TAKE 1 CAPSULE BY MOUTH EVERY MORNING, Normal      ergocalciferol (VITAMIN D2) 50,000 units TAKE 1 CAPSULE BY MOUTH 1 TIME A WEEK, Normal      famotidine (PEPCID) 40 MG tablet TAKE 1 TABLET(40 MG) BY MOUTH DAILY, Normal      ferrous sulfate 324 (65 Fe) mg Take 1 tablet (324 mg total) by mouth daily before breakfast, Starting Thu 12/9/2021, Normal      mirtazapine (REMERON) 15 mg tablet TAKE 1 TABLET(15 MG) BY MOUTH DAILY AT BEDTIME, Normal      polyethylene glycol (GLYCOLAX) 17 GM/SCOOP powder Take 17 g by mouth daily, Starting Thu 8/25/2022, Normal      prochlorperazine (COMPAZINE) 5 mg tablet Take 1 tablet (5 mg total) by mouth every 6 (six) hours as needed for nausea or vomiting, Starting Sat 8/20/2022, Normal      SUMAtriptan (IMITREX) 50 mg tablet TAKE 1 TABLET(50 MG) BY MOUTH 1 TIME FOR UP TO 1 DOSE AS NEEDED FOR MIGRAINE, Normal      trospium chloride (SANCTURA) 20 mg tablet Take 20 mg by mouth 2 (two) times a day, Starting Fri 5/6/2022, Historical Med       !! - Potential duplicate medications found  Please discuss with provider  No discharge procedures on file  PDMP Review     None           ED Provider  Attending physically available and evaluated Bravo Spenser QUEZADA managed the patient along with the ED Attending      Electronically Signed by         Susan Martin DO  09/23/22 5168

## 2022-10-01 DIAGNOSIS — M25.50 POLYARTHRALGIA: ICD-10-CM

## 2022-10-03 ENCOUNTER — TELEPHONE (OUTPATIENT)
Dept: PSYCHIATRY | Facility: CLINIC | Age: 42
End: 2022-10-03

## 2022-10-03 NOTE — TELEPHONE ENCOUNTER
Behavorial Health Outpatient Intake Questions    Referred by:Pcp    Please advised interviewee that they need to answer all questions truthfully to allow for best care and any misrepresentations of information may affect their ability to be seen at this clinic   => Was this discussed? Yes     [facility] -     Presenting Problem (in patient's words):   Depression and anxiety    Are there any developmental disabilities? ? If yes, can they speak to you on the phone? If they are too limited to speak to you on phone, refer out No    Are you taking any psychiatric medications? Yes    => If yes, who prescribes? If yes, are they injectable medications? remeron    Does the patient have a language barrier or hearing impairment? No    Have you been treated at [facility] by a therapist or a doctor in the past? If yes, who? Yes  Both through [facility]    Has the patient been hospitalized for mental health? No   If yes, how long ago was last hospitalization and where was it? Do you actively use alcohol or marijuana or illegal substances? If yes, what and how much - refer out to Drug and alcohol treatment if use is excessive or daily use of illegal substances No concerns of substance abuse are reported  Do you have a community treatment team or ? No    Legal History-     Does the patient have any history of arrests, FPC/detention time, or DUIs? No  If Yes-  1) What types of charges? 2) When were they last incarcerated? 3) Are they currently on parole or probation? Minor Child-    Who has custody of the child? Is there a custody agreement? If there is a custody agreement remind parent that they must bring a copy to the first appt or they will not be seen       Intake Team, please check with provider before scheduling if flags come up such as:  - complex case  - legal history (other than DUI)  - communication barrier concerns are present  - if, in your judgment, this needs further review    ACCEPTED as a patient Yes  => Appointment Date: 11/3/22 at 10am with Felicitas Aguilera    Referred Elsewhere? No    Name of [address] [place] [address]#[phone]  Insurance Phone #  If ins is primary or secondary:Primary  If patient is a minor, parents information such as Name, D  O B of guarantor

## 2022-10-05 ENCOUNTER — OFFICE VISIT (OUTPATIENT)
Dept: GASTROENTEROLOGY | Facility: MEDICAL CENTER | Age: 42
End: 2022-10-05
Payer: COMMERCIAL

## 2022-10-05 ENCOUNTER — CLINICAL SUPPORT (OUTPATIENT)
Dept: URGENT CARE | Facility: MEDICAL CENTER | Age: 42
End: 2022-10-05
Payer: COMMERCIAL

## 2022-10-05 VITALS
HEART RATE: 77 BPM | SYSTOLIC BLOOD PRESSURE: 108 MMHG | WEIGHT: 87.2 LBS | DIASTOLIC BLOOD PRESSURE: 66 MMHG | BODY MASS INDEX: 17.58 KG/M2 | HEIGHT: 59 IN | OXYGEN SATURATION: 99 %

## 2022-10-05 DIAGNOSIS — R63.4 WEIGHT LOSS: ICD-10-CM

## 2022-10-05 DIAGNOSIS — K31.84 GASTROPARESIS: Primary | ICD-10-CM

## 2022-10-05 DIAGNOSIS — K31.84 GASTROPARESIS: ICD-10-CM

## 2022-10-05 DIAGNOSIS — R11.0 NAUSEA: ICD-10-CM

## 2022-10-05 DIAGNOSIS — R10.13 EPIGASTRIC PAIN: ICD-10-CM

## 2022-10-05 PROCEDURE — 93005 ELECTROCARDIOGRAM TRACING: CPT

## 2022-10-05 PROCEDURE — 99214 OFFICE O/P EST MOD 30 MIN: CPT | Performed by: PHYSICIAN ASSISTANT

## 2022-10-05 PROCEDURE — 93010 ELECTROCARDIOGRAM REPORT: CPT | Performed by: INTERNAL MEDICINE

## 2022-10-05 NOTE — PROGRESS NOTES
[facility] - Outpatient Follow-up Note  Maryana Garcia 40 y o  female MRN: [id number]  Encounter: [id number]          ASSESSMENT AND PLAN:      1  Gastroparesis  2  Weight loss  3  Nausea  4  Epigastric pain:  She initially presented with abdominal pain, unintentional weight loss, decreased appetite  She underwent EGD and colonoscopy that did show increased intraepithelial lymphocytes in the duodenum but otherwise was normal   Celiac tissue transglutaminase and celiac HLA DQ blood testing were both negative  Fortunately her iron deficiency has resolved  She did have a gastric emptying study that showed delayed gastric emptying  She continues to complain of epigastric abdominal pain, weight loss and nausea  She is currently on Compazine but she denies any real significant improvement in her symptoms  Her symptoms have been ongoing for about 1 year  She does admit to current marijuana use but states that this has only been for 2 months  We did discuss that this could be actually aggravating his symptoms and encouraged her to discontinue  We discussed potential treatment options including EGD with Botox injection, Reglan, domperidone  She is very hesitant to try any of these medications but would like to discuss domperidone  Will plan for her to have an EKG and follow-up with motility specialist  - ECG 12 lead; Future  -discuss domperidone with motility specialist     5  Constipation: This was resolved with daily MiraLax use    ______________________________________________________________________    SUBJECTIVE:  Maryana Garcia is a 27-year-old female here for follow-up of abdominal pain, weight loss, nausea  She was seen in the office in December 2021 with generalized abdominal pain, iron deficiency and abnormal CT scan of the abdomen as well as constipation  Her CT scan was done in the emergency room for abdominal pain, rectal bleeding and diarrhea    At that time it showed nonspecific enteritis and proctocolitis  Fortunately the symptoms had resolved  She underwent EGD and colonoscopy in 2022  Both of these were normal with no source of bleeding found  Her most recent iron panel was from February which did continue to show mild iron deficiency with iron saturation of 15 percent, normal total iron binding capacity and iron level of 40  She is on oral iron supplementation and most recent iron panel and Hgb are WNL  She was previously requiring Colace for constipation but she states she uses MiraLax to help given starting oral iron  constipation resolved with miralax  She also has had HLA DQ celiac testing and tTG IgA, which were both negative  She does continue to complain of significant epigastric abdominal pain that is worse after meals and significant nausea  She did have gastric emptying study showing delayed gastric emptying  She does admit to smoking marijuana 1-2 times per day  She states that she has been doing this for about 2 months, her symptoms have been present for about 1 year  She does state that she is continuing to lose weight from fear of eating  REVIEW OF SYSTEMS IS OTHERWISE NEGATIVE  Historical Information   Past Medical History:   Diagnosis Date    Anemia     in past    Anxiety     Depression     Gallstones     Migraine     Nausea     occ    Panic attack     Rotator cuff disorder, right     Vertigo      Past Surgical History:   Procedure Laterality Date    ABDOMINAL SURGERY      scar tissue removed     SECTION      last assessed: Oct 23, 2014     HYSTERECTOMY      [place] LAP,CHOLECYSTECTOMY/GRAPH N/A 2018    Procedure: CHOLECYSTECTOMY LAPAROSCOPIC  WITH  ATTEMPTED IOC;   Surgeon: Carlos Dawson MD;  Location: [facility];  Service: General    TUBAL LIGATION       Social History   Social History     Substance and Sexual Activity   Alcohol Use No    Comment: none since 2021     Social History     Substance and Sexual Activity   Drug Use No     Social History     Tobacco Use   Smoking Status Former Smoker    Quit date: 2013    Years since quittin 6   Smokeless Tobacco Never Used     Family History   Problem Relation Age of Onset    No Known Problems Family     Hypertension Mother     Diabetes type II Father         mellitus        Meds/Allergies       Current Outpatient Medications:     baclofen 10 mg tablet    cholecalciferol (VITAMIN D3) 1,000 units tablet    cyanocobalamin (VITAMIN B-12) 1000 MCG tablet    Cyanocobalamin 1000 MCG/ML KIT    Diclofenac Sodium (VOLTAREN) 1 %    dicyclomine (BENTYL) 20 mg tablet    docusate sodium (COLACE) 100 mg capsule    ergocalciferol (VITAMIN D2) 50,000 units    famotidine (PEPCID) 40 MG tablet    ferrous sulfate 324 (65 Fe) mg    mirtazapine (REMERON) 15 mg tablet    polyethylene glycol (GLYCOLAX) 17 GM/SCOOP powder    prochlorperazine (COMPAZINE) 5 mg tablet    SUMAtriptan (IMITREX) 50 mg tablet    trospium chloride (SANCTURA) 20 mg tablet    dicyclomine (BENTYL) 20 mg tablet    ondansetron (Zofran ODT) 4 mg disintegrating tablet    No Known Allergies        Objective     Blood pressure 108/66, pulse 77, height 4' 11" (1 499 m), weight 39 6 kg (87 lb 3 2 oz), last menstrual period 2017, SpO2 99 %, not currently breastfeeding  Body mass index is 17 61 kg/m²  PHYSICAL EXAM:      General Appearance:   Alert, cooperative, no distress   HEENT:   Normocephalic, atraumatic, anicteric      Neck:  Supple, symmetrical, trachea midline   Lungs:   Clear to auscultation bilaterally; no rales, rhonchi or wheezing; respirations unlabored    Heart[de-identified]   Regular rate and rhythm; no murmur, rub, or gallop     Abdomen:   Soft, epigastric tenderness, non-distended; normal bowel sounds; no masses, no organomegaly    Genitalia:   Deferred    Rectal:   Deferred    Extremities:  No cyanosis, clubbing or edema        Skin:  No jaundice, rashes, or lesions  Lab Results:   No visits with results within 1 Day(s) from this visit     Latest known visit with results is:   Admission on 09/21/2022, Discharged on 09/21/2022   Component Date Value    WBC 09/21/2022 9 75     RBC 09/21/2022 3 68 (A)    Hemoglobin 09/21/2022 11 5     Hematocrit 09/21/2022 34 2 (A)    MCV 09/21/2022 93     MCH 09/21/2022 31 3     MCHC 09/21/2022 33 6     RDW 09/21/2022 12 0     MPV 09/21/2022 10 8     Platelets 62/63/8715 212     nRBC 09/21/2022 0     Neutrophils Relative 09/21/2022 63     Immat GRANS % 09/21/2022 1     Lymphocytes Relative 09/21/2022 24     Monocytes Relative 09/21/2022 11     Eosinophils Relative 09/21/2022 1     Basophils Relative 09/21/2022 0     Neutrophils Absolute 09/21/2022 6 26     Immature Grans Absolute 09/21/2022 0 05     Lymphocytes Absolute 09/21/2022 2 31     Monocytes Absolute 09/21/2022 1 03     Eosinophils Absolute 09/21/2022 0 07     Basophils Absolute 09/21/2022 0 03     Sodium 09/21/2022 139     Potassium 09/21/2022 3 7     Chloride 09/21/2022 102     CO2 09/21/2022 29     ANION GAP 09/21/2022 8     BUN 09/21/2022 11     Creatinine 09/21/2022 0 73     Glucose 09/21/2022 100     Calcium 09/21/2022 8 7     AST 09/21/2022 13     ALT 09/21/2022 17     Alkaline Phosphatase 09/21/2022 60     Total Protein 09/21/2022 7 4     Albumin 09/21/2022 3 6     Total Bilirubin 09/21/2022 0 62     eGFR 09/21/2022 102     Lipase 09/21/2022 78     Color, UA 09/21/2022 Light Yellow     Clarity, UA 09/21/2022 Clear     Specific Gravity, UA 09/21/2022 1 010     pH, UA 09/21/2022 8 5 (A)    Leukocytes, UA 09/21/2022 Negative     Nitrite, UA 09/21/2022 Negative     Protein, UA 09/21/2022 Negative     Glucose, UA 09/21/2022 Negative     Ketones, UA 09/21/2022 Negative     Urobilinogen, UA 09/21/2022 0 2     Bilirubin, UA 09/21/2022 Negative     Occult Blood, UA 09/21/2022 Negative     SARS-CoV-2 09/21/2022 Negative     INFLUENZA A PCR 09/21/2022 Negative     INFLUENZA B PCR 09/21/2022 Negative     RSV PCR 09/21/2022 Negative          Radiology Results:   XR knee 3 vw left non injury    Result Date: 9/23/2022  Narrative: LEFT KNEE INDICATION:   M25 50: Pain in unspecified joint  COMPARISON:  None VIEWS:  XR KNEE 3 VW LEFT NON INJURY Images: 3 FINDINGS: There is no acute fracture or dislocation  There is no joint effusion  Mild patellofemoral degenerative arthritis No lytic or blastic osseous lesion  Soft tissues are unremarkable  Impression: Mild patellofemoral degenerative arthritis No acute osseous abnormality  Workstation performed: [id number]     XR knee 3 vw right non injury    Result Date: 9/23/2022  Narrative: RIGHT KNEE INDICATION:   M25 50: Pain in unspecified joint  COMPARISON:  None VIEWS:  XR KNEE 3 VW RIGHT NON INJURY Images: 3 FINDINGS: There is no acute fracture or dislocation  There is no joint effusion  No significant degenerative changes  No lytic or blastic osseous lesion  Soft tissues are unremarkable  Impression: No acute osseous abnormality

## 2022-10-12 LAB
ATRIAL RATE: 73 BPM
P AXIS: 68 DEGREES
PR INTERVAL: 154 MS
QRS AXIS: 82 DEGREES
QRSD INTERVAL: 74 MS
QT INTERVAL: 372 MS
QTC INTERVAL: 409 MS
T WAVE AXIS: 42 DEGREES
VENTRICULAR RATE: 73 BPM

## 2022-10-14 DIAGNOSIS — G43.109 MIGRAINE WITH AURA AND WITHOUT STATUS MIGRAINOSUS, NOT INTRACTABLE: ICD-10-CM

## 2022-10-14 RX ORDER — SUMATRIPTAN 50 MG/1
TABLET, FILM COATED ORAL
Qty: 9 TABLET | Refills: 0 | Status: SHIPPED | OUTPATIENT
Start: 2022-10-14

## 2022-10-21 ENCOUNTER — OFFICE VISIT (OUTPATIENT)
Dept: GASTROENTEROLOGY | Facility: CLINIC | Age: 42
End: 2022-10-21
Payer: COMMERCIAL

## 2022-10-21 VITALS
OXYGEN SATURATION: 99 % | BODY MASS INDEX: 17.94 KG/M2 | HEART RATE: 92 BPM | WEIGHT: 89 LBS | HEIGHT: 59 IN | DIASTOLIC BLOOD PRESSURE: 60 MMHG | TEMPERATURE: 98.2 F | SYSTOLIC BLOOD PRESSURE: 100 MMHG

## 2022-10-21 DIAGNOSIS — K52.9 COLITIS: Primary | ICD-10-CM

## 2022-10-21 DIAGNOSIS — K31.84 GASTROPARESIS: ICD-10-CM

## 2022-10-21 PROCEDURE — 99214 OFFICE O/P EST MOD 30 MIN: CPT | Performed by: INTERNAL MEDICINE

## 2022-10-21 RX ORDER — METOCLOPRAMIDE 5 MG/1
5 TABLET ORAL 3 TIMES DAILY
Qty: 135 TABLET | Refills: 0 | Status: SHIPPED | OUTPATIENT
Start: 2022-10-21 | End: 2022-12-05

## 2022-10-21 NOTE — PROGRESS NOTES
[facility] - Outpatient Follow-up Note  Elma Moran 40 y o  female MRN: [id number]  Encounter: [id number]          ASSESSMENT AND PLAN:      1  Gastroparesis  Associated with 30 lbs weight loss unintentionally  Has significant postprandial bloating, epigastric pain, nausea with retching, fortunately no vomiting  Also has noticeable abdominal distention after eating  So far the only medications she has tried is as needed Bentyl which does provide help  Add reglan 5 TID and follow up in 1 month  D/w the pt regarding some potential side effects like qtc prolongation, tremors, involuntary movements  During fup if not better will discuss starting domperidone; discussed some side effects like sudden cardiac death, heart rate abnormalities  Discussed that options like G-POEM which can offer 60% help rate, discussed that there is no definite care for it at this time  And possibility that she might still require medications at this time  Also that she will have to be off marijuana for considering G POEM which we are hopeful [facility] will have in coming months  QTc reviewed and acceptable  Gastroparesis Cardinal Symptom Index score on scale of 0-5 on every parameter puts her at 40/45 which is very high  Also discussed low fodmap diet  2  Marijuana smoking  Has switched from buds to electric; which has been helping and she has been working to try to reduce use even further  Discussed that we would strongly recommend cessation  RTC in 1 month    ______________________________________________________________________    SUBJECTIVE:    Patient is a 43-year-old female who presents today for follow-up of gastroparesis  Patient has been dealing with significant postprandial epigastric pain, nausea, retching, postprandial fullness, bloating, abdominal distension which has significantly affected her daily living    This is also associated with nearly 30 lb of unintentional weight loss which is concerning  She has diagnosis of gastroparesis given evidence of delayed gastric emptying in 2022  She has been trying to comply with small frequent diet, avoiding deep fried food  For example yesterday she had air fried empanadas for lunch  [facility] rice for dinner  Avoids deep fried food  Uses air frier for everything  Takes dicyclomine PRN  REVIEW OF SYSTEMS IS OTHERWISE NEGATIVE  Historical Information   Past Medical History:   Diagnosis Date   • Anemia     in past   • Anxiety    • Depression    • Gallstones    • Migraine    • Nausea     occ   • Panic attack    • Rotator cuff disorder, right    • Vertigo      Past Surgical History:   Procedure Laterality Date   • ABDOMINAL SURGERY      scar tissue removed   •  SECTION      last assessed: Oct 23, 2014    • HYSTERECTOMY     • [place] LAP,CHOLECYSTECTOMY/GRAPH N/A 2018    Procedure: CHOLECYSTECTOMY LAPAROSCOPIC  WITH  ATTEMPTED IOC;   Surgeon: Lisandra Moise MD;  Location: [facility] OR;  Service: General   • TUBAL LIGATION       Social History   Social History     Substance and Sexual Activity   Alcohol Use No    Comment: none since 2021     Social History     Substance and Sexual Activity   Drug Use No     Social History     Tobacco Use   Smoking Status Former Smoker   • Quit date: 2013   • Years since quittin 6   Smokeless Tobacco Never Used     Family History   Problem Relation Age of Onset   • No Known Problems Family    • Hypertension Mother    • Diabetes type II Father         mellitus        Meds/Allergies       Current Outpatient Medications:   •  baclofen 10 mg tablet  •  cholecalciferol (VITAMIN D3) 1,000 units tablet  •  cyanocobalamin (VITAMIN B-12) 1000 MCG tablet  •  Cyanocobalamin 1000 MCG/ML KIT  •  Diclofenac Sodium (VOLTAREN) 1 %  •  dicyclomine (BENTYL) 20 mg tablet  •  docusate sodium (COLACE) 100 mg capsule  •  ergocalciferol (VITAMIN D2) 50,000 units  •  famotidine (PEPCID) 40 MG tablet  •  ferrous sulfate 324 (65 Fe) mg  •  metoclopramide (REGLAN) 5 mg tablet  •  mirtazapine (REMERON) 15 mg tablet  •  polyethylene glycol (GLYCOLAX) 17 GM/SCOOP powder  •  prochlorperazine (COMPAZINE) 5 mg tablet  •  SUMAtriptan (IMITREX) 50 mg tablet  •  trospium chloride (SANCTURA) 20 mg tablet  •  dicyclomine (BENTYL) 20 mg tablet  •  ondansetron (Zofran ODT) 4 mg disintegrating tablet    No Known Allergies        Objective     Blood pressure 100/60, pulse 92, temperature 98 2 °F (36 8 °C), temperature source Tympanic, height 4' 11" (1 499 m), weight 40 4 kg (89 lb), last menstrual period 07/24/2017, SpO2 99 %, not currently breastfeeding  Body mass index is 17 98 kg/m²  PHYSICAL EXAM:      General Appearance:   Alert, cooperative, no distress   HEENT:   Normocephalic    Respi:   No labored breathing, able to speak in full sentences   Heart[de-identified]   Normal rate on pulse palpation   Abdomen:   Non tender, good bowel sounds   Genitalia:   Deferred    Rectal:   Deferred    Extremities:  No cyanosis, clubbing or edema    Pulses:  2+ and symmetric    Skin:  No jaundice   Lymph nodes:  No palpable cervical lymphadenopathy        Lab Results:   No visits with results within 1 Day(s) from this visit  Latest known visit with results is:   Clinical Support on 10/05/2022   Component Date Value   • Ventricular Rate 10/05/2022 73    • Atrial Rate 10/05/2022 73    • CO Interval 10/05/2022 154    • QRSD Interval 10/05/2022 74    • QT Interval 10/05/2022 372    • QTC Interval 10/05/2022 409    • P Axis 10/05/2022 68    • QRS Axis 10/05/2022 82    • T Wave Axis 10/05/2022 42          Radiology Results:   No results found      Lisa Bruno MD  Fellow  Gastroenterology  [facility]    Answers for HPI/ROS submitted by the patient on 10/21/2022  Chronicity: recurrent  Onset: more than 1 year ago  Onset quality: sudden  Frequency: daily  Episode duration: 4 hours  Progression since onset: unchanged  Pain location: LUQ, epigastric region, left flank  Pain - numeric: 10/10  Pain quality: aching, cramping, a sensation of fullness, sharp  Radiates to: LLQ  anorexia: Yes  arthralgias: Yes  belching: Yes  constipation: Yes  diarrhea: Yes  dysuria: No  fever: No  flatus: Yes  frequency: No  headaches: No  hematochezia: No  hematuria: No  melena:  Yes  myalgias: Yes  nausea: Yes  weight loss: Yes  vomiting: No  Aggravated by: bowel movement, certain positions, eating  Relieved by: belching, bowel movements, certain positions, passing flatus, sitting up  Diagnostic workup: GI consult, lower endoscopy, upper endoscopy

## 2022-11-01 ENCOUNTER — OFFICE VISIT (OUTPATIENT)
Dept: FAMILY MEDICINE CLINIC | Facility: CLINIC | Age: 42
End: 2022-11-01

## 2022-11-01 VITALS
SYSTOLIC BLOOD PRESSURE: 114 MMHG | HEART RATE: 89 BPM | DIASTOLIC BLOOD PRESSURE: 66 MMHG | TEMPERATURE: 98.3 F | OXYGEN SATURATION: 98 % | RESPIRATION RATE: 18 BRPM | WEIGHT: 88.2 LBS | BODY MASS INDEX: 17.78 KG/M2 | HEIGHT: 59 IN

## 2022-11-01 DIAGNOSIS — G43.109 MIGRAINE WITH AURA AND WITHOUT STATUS MIGRAINOSUS, NOT INTRACTABLE: ICD-10-CM

## 2022-11-01 DIAGNOSIS — F41.9 ANXIETY: ICD-10-CM

## 2022-11-01 DIAGNOSIS — F32.A DEPRESSION, UNSPECIFIED DEPRESSION TYPE: ICD-10-CM

## 2022-11-01 DIAGNOSIS — R42 VERTIGO: ICD-10-CM

## 2022-11-01 DIAGNOSIS — E61.1 IRON DEFICIENCY: ICD-10-CM

## 2022-11-01 DIAGNOSIS — K31.84 GASTROPARESIS: ICD-10-CM

## 2022-11-01 DIAGNOSIS — M25.50 POLYARTHRALGIA: Primary | ICD-10-CM

## 2022-11-01 DIAGNOSIS — K52.9 COLITIS: ICD-10-CM

## 2022-11-01 DIAGNOSIS — R63.4 WEIGHT LOSS: ICD-10-CM

## 2022-11-01 DIAGNOSIS — R63.6 UNDERWEIGHT DUE TO INADEQUATE CALORIC INTAKE: ICD-10-CM

## 2022-11-01 DIAGNOSIS — R63.4 UNINTENDED WEIGHT LOSS: ICD-10-CM

## 2022-11-01 RX ORDER — METHYLPREDNISOLONE 4 MG/1
TABLET ORAL
Qty: 21 EACH | Refills: 0 | Status: SHIPPED | OUTPATIENT
Start: 2022-11-01

## 2022-11-01 RX ORDER — MECLIZINE HYDROCHLORIDE 25 MG/1
25 TABLET ORAL 3 TIMES DAILY PRN
Qty: 30 TABLET | Refills: 0 | Status: SHIPPED | OUTPATIENT
Start: 2022-11-01

## 2022-11-01 RX ORDER — LIDOCAINE 50 MG/G
1 PATCH TOPICAL DAILY
Qty: 30 PATCH | Refills: 1 | Status: SHIPPED | OUTPATIENT
Start: 2022-11-01

## 2022-11-01 RX ORDER — FERROUS SULFATE TAB EC 324 MG (65 MG FE EQUIVALENT) 324 (65 FE) MG
324 TABLET DELAYED RESPONSE ORAL EVERY OTHER DAY
Qty: 90 TABLET | Refills: 3 | Status: SHIPPED | OUTPATIENT
Start: 2022-11-01

## 2022-11-01 NOTE — ASSESSMENT & PLAN NOTE
Discussed that topical NSAID is not working  Will try lidocaine patch   Patient is completing exercises at home due to difficulty seeing PT

## 2022-11-01 NOTE — PATIENT INSTRUCTIONS
Migraine Headache   WHAT YOU NEED TO KNOW:   What is a migraine headache? A migraine is a severe headache  The pain can be so severe that it interferes with your daily activities  A migraine can last a few hours up to several days  The exact cause of migraines is not known  A family history of migraines increases your risk  Your risk is also higher if you are a woman or take medicines such as estrogen or a vasodilator  What are the warning signs that a migraine headache is about to start? Warning signs usually start 15 to 60 minutes before the headache:  Visual changes (auras), such as blurred vision, temporary blind or bright spots, lines, or hallucinations    Unusual tiredness or frequent yawning    Tingling in an arm or leg    What are the signs and symptoms of a migraine headache? A migraine headache usually begins as a dull ache around the eye or temple  The pain may get worse with movement  You may also have the following:  Pain in your head that may increase to the point that you cannot do everyday activities    Pain on one or both sides of your head    Throbbing, pulsing, or pounding pain in your head    Nausea and vomiting    Sensitivity to light, noise, or smells    What can trigger a migraine headache? Stress, eye strain, oversleeping, or not getting enough sleep    Hormone changes in women from birth control pills, pregnancy, menopause, or during a monthly period    Skipping meals, going too long without eating, or not drinking enough liquids    Certain foods or drinks such as chocolate, hard cheese, alcohol, or drinks that contain caffeine    Foods that contain gluten, nitrates, MSG, or artificial sweeteners    Sunlight, bright or flashing lights, loud noises, smoke, or strong smells    Heat, humidity, or changes in the weather    How is a migraine headache diagnosed? Your healthcare provider will ask about your headaches  Describe the pain and any other symptoms, such as nausea   Tell the provider if you think anything triggered the pain  The provider will also want to know what you ate and drank before the pain started  Tell the provider about any medical conditions you have or that run in your family  Include any recent stressors you have had  You may also need any of the following:  A neurologic exam  is used to check how your pupils react to light  Your healthcare provider may check your memory, hand grasp, and balance  CT or MRI pictures  may be taken of your brain  You may be given contrast liquid to help your brain show up better in the pictures  Tell the healthcare provider if you have ever had an allergic reaction to contrast liquid  Do not enter the MRI room with anything metal  Metal can cause serious injury  Tell the healthcare provider if you have any metal in or on your body  How is a migraine headache treated? Migraines cannot be cured  The goal of treatment is to reduce your symptoms  Medicines  may be given to help you manage migraines  Take medicine as soon as you feel a migraine begin, or as directed  Your healthcare provider may recommend any of the following:    Migraine medicines  are used to help prevent or stop a migraine  NSAIDs  help decrease swelling and pain or fever  This medicine is available with or without a doctor's order  NSAIDs can cause stomach bleeding or kidney problems in certain people  If you take blood thinner medicine, always ask your healthcare provider if NSAIDs are safe for you  Always read the medicine label and follow directions  Acetaminophen  decreases pain and fever  It is available without a doctor's order  Ask how much to take and how often to take it  Follow directions  Read the labels of all other medicines you are using to see if they also contain acetaminophen, or ask your doctor or pharmacist  Acetaminophen can cause liver damage if not taken correctly   Do not use more than 4 grams (4,000 milligrams) total of acetaminophen in one day      Prescription pain medicine  may be given  Ask your healthcare provider how to take this medicine safely  Some prescription pain medicines contain acetaminophen  Do not take other medicines that contain acetaminophen without talking to your healthcare provider  Too much acetaminophen may cause liver damage  Prescription pain medicine may cause constipation  Ask your healthcare provider how to prevent or treat constipation  Antinausea medicine  may be given to calm your stomach and to help prevent vomiting  This medicine can also help relieve pain  Cognitive behavior therapy (CBT)  can help you learn ways to manage and prevent migraines  A therapist can teach you to relax and to reduce stress  You may learn ways to create healthy nutrition, activity, and sleep habits to prevent migraines  The therapist can also help you manage conditions that can affect migraines, such as anxiety or depression  What can I do to manage my symptoms? Rest in a dark, quiet room  This will help decrease your pain  Sleep may also help relieve the pain  Apply ice to decrease pain  Use an ice pack, or put crushed ice in a plastic bag  Cover the ice pack with a towel and place it on your head  Apply ice for 15 to 20 minutes every hour  Apply heat to decrease pain and muscle spasms  Use a small towel dampened with warm water or a heating pad, or sit in a warm bath  Apply heat on the area for 20 to 30 minutes every 2 hours  You may alternate heat and ice  Keep a migraine record  Write down when your migraines start and stop  Include your symptoms and what you were doing when a migraine began  Record what you ate or drank for 24 hours before the migraine started  Keep track of what you did to treat your migraine and if it worked  Bring the migraine record with you to visits with your healthcare provider  What can I do to prevent another migraine headache? Prevent a medicine overuse headache    Take pain medicines only as long as directed  A medicine may be limited to a certain amount each month  Your healthcare provider can help you create a plan so you get a safe amount each month  Do not smoke  Nicotine and other chemicals in cigarettes and cigars can trigger a migraine or make it worse  Ask your healthcare provider for information if you currently smoke and need help to quit  E-cigarettes or smokeless tobacco still contain nicotine  Talk to your healthcare provider before you use these products  Do not drink alcohol  Alcohol can trigger a migraine  It can also keep medicines used to treat your migraines from working  Be physically active  Physical activity, such as exercise, may help prevent migraines  Talk to your healthcare provider about the best activity plan for you  Try to get at least 30 minutes of physical activity on most days  Manage stress  Stress may trigger a migraine  Learn new ways to relax, such as deep breathing  Create a sleep schedule  Go to bed and get up at the same times each day  Do not watch television before bed  Eat a variety of healthy foods  Include healthy foods such as include fruit, vegetables, whole-grain breads, low-fat dairy products, beans, lean meat, and fish  Do not have food or drinks that trigger your migraines  Drink more liquids to prevent dehydration  Your healthcare provider can tell you how much liquid to drink each day and which liquids are best for you  Call your local emergency number (911 in the [address] [facility]) or have someone call if:   You feel like you are going to faint, you become confused, or you have a seizure  When should I seek immediate care? You have a headache that seems different or much worse than your usual migraine headache  You have a severe headache with a fever or a stiff neck  You have new problems with speech, vision, balance, or movement  When should I call my doctor?    Your migraines interfere with your daily activities  Your medicines or treatments stop working  You have questions or concerns about your condition or care  CARE AGREEMENT:   You have the right to help plan your care  Learn about your health condition and how it may be treated  Discuss treatment options with your healthcare providers to decide what care you want to receive  You always have the right to refuse treatment  The above information is an  only  It is not intended as medical advice for individual conditions or treatments  Talk to your doctor, nurse or pharmacist before following any medical regimen to see if it is safe and effective for you  © Copyright [address] 2022 Information is for End User's use only and may not be sold, redistributed or otherwise used for commercial purposes   All illustrations and images included in CareNotes® are the copyrighted property of [employer]  or [address] [place], [place] [address]rom[place] delmy

## 2022-11-01 NOTE — ASSESSMENT & PLAN NOTE
Total of 30 lb weight loss, unintentional   Notes epigastric pain, nausea after eating  Some improvement since starting dicyclomine PRN, recently seen by GI and now starting Reglan   If no improvement GI considering a G POEM procedure

## 2022-11-01 NOTE — PROGRESS NOTES
Assessment/Plan:    Colitis  Following with GI    Migraine with aura and without status migrainosus, not intractable  Patient states that imitrex has been helping  2-3 times per month only  Anxiety  Starting with psych this week, continue with mirtazapine at this time     Polyarthralgia  Discussed that topical NSAID is not working  Will try lidocaine patch  Patient is completing exercises at home due to difficulty seeing PT    Vertigo  Vertigo occurs very infrequent, at this time she would like to continue meclizine PRN     Gastroparesis  Total of 30 lb weight loss, unintentional   Notes epigastric pain, nausea after eating  Some improvement since starting dicyclomine PRN, recently seen by GI and now starting Reglan   If no improvement GI considering a G POEM procedure     Maria De Jesus Jacobo was seen today for follow-up  Diagnoses and all orders for this visit:    Polyarthralgia  -     lidocaine (Lidoderm) 5 %; Apply 1 patch topically daily Remove & Discard patch within 12 hours or as directed by MD  -     methylPREDNISolone 4 MG tablet therapy pack; Use as directed on package    Colitis    Migraine with aura and without status migrainosus, not intractable    Anxiety    Depression, unspecified depression type    Vertigo  -     meclizine (ANTIVERT) 25 mg tablet; Take 1 tablet (25 mg total) by mouth 3 (three) times a day as needed for dizziness    Unintended weight loss  -     Ambulatory Referral to Nutrition Services; Future    Weight loss  -     Ambulatory Referral to Nutrition Services; Future    Underweight due to inadequate caloric intake  -     Ambulatory Referral to Nutrition Services; Future    Iron deficiency  -     ferrous sulfate 324 (65 Fe) mg; Take 1 tablet (324 mg total) by mouth every other day    Gastroparesis        Return in about 4 months (around 3/1/2023) for polyarthralgia   Patient Instructions     Migraine Headache   WHAT YOU NEED TO KNOW:   What is a migraine headache?   A migraine is a severe headache  The pain can be so severe that it interferes with your daily activities  A migraine can last a few hours up to several days  The exact cause of migraines is not known  A family history of migraines increases your risk  Your risk is also higher if you are a woman or take medicines such as estrogen or a vasodilator  What are the warning signs that a migraine headache is about to start? Warning signs usually start 15 to 60 minutes before the headache:  · Visual changes (auras), such as blurred vision, temporary blind or bright spots, lines, or hallucinations    · Unusual tiredness or frequent yawning    · Tingling in an arm or leg    What are the signs and symptoms of a migraine headache? A migraine headache usually begins as a dull ache around the eye or temple  The pain may get worse with movement  You may also have the following:  · Pain in your head that may increase to the point that you cannot do everyday activities    · Pain on one or both sides of your head    · Throbbing, pulsing, or pounding pain in your head    · Nausea and vomiting    · Sensitivity to light, noise, or smells    What can trigger a migraine headache? · Stress, eye strain, oversleeping, or not getting enough sleep    · Hormone changes in women from birth control pills, pregnancy, menopause, or during a monthly period    · Skipping meals, going too long without eating, or not drinking enough liquids    · Certain foods or drinks such as chocolate, hard cheese, alcohol, or drinks that contain caffeine    · Foods that contain gluten, nitrates, MSG, or artificial sweeteners    · Sunlight, bright or flashing lights, loud noises, smoke, or strong smells    · Heat, humidity, or changes in the weather    How is a migraine headache diagnosed? Your healthcare provider will ask about your headaches  Describe the pain and any other symptoms, such as nausea  Tell the provider if you think anything triggered the pain   The provider will also want to know what you ate and drank before the pain started  Tell the provider about any medical conditions you have or that run in your family  Include any recent stressors you have had  You may also need any of the following:  · A neurologic exam  is used to check how your pupils react to light  Your healthcare provider may check your memory, hand grasp, and balance  · CT or MRI pictures  may be taken of your brain  You may be given contrast liquid to help your brain show up better in the pictures  Tell the healthcare provider if you have ever had an allergic reaction to contrast liquid  Do not enter the MRI room with anything metal  Metal can cause serious injury  Tell the healthcare provider if you have any metal in or on your body  How is a migraine headache treated? Migraines cannot be cured  The goal of treatment is to reduce your symptoms  1  Medicines  may be given to help you manage migraines  Take medicine as soon as you feel a migraine begin, or as directed  Your healthcare provider may recommend any of the following:    ? Migraine medicines  are used to help prevent or stop a migraine  ? NSAIDs  help decrease swelling and pain or fever  This medicine is available with or without a doctor's order  NSAIDs can cause stomach bleeding or kidney problems in certain people  If you take blood thinner medicine, always ask your healthcare provider if NSAIDs are safe for you  Always read the medicine label and follow directions  ? Acetaminophen  decreases pain and fever  It is available without a doctor's order  Ask how much to take and how often to take it  Follow directions  Read the labels of all other medicines you are using to see if they also contain acetaminophen, or ask your doctor or pharmacist  Acetaminophen can cause liver damage if not taken correctly  Do not use more than 4 grams (4,000 milligrams) total of acetaminophen in one day  ? Prescription pain medicine  may be given   Ask your healthcare provider how to take this medicine safely  Some prescription pain medicines contain acetaminophen  Do not take other medicines that contain acetaminophen without talking to your healthcare provider  Too much acetaminophen may cause liver damage  Prescription pain medicine may cause constipation  Ask your healthcare provider how to prevent or treat constipation  ? Antinausea medicine  may be given to calm your stomach and to help prevent vomiting  This medicine can also help relieve pain  2  Cognitive behavior therapy (CBT)  can help you learn ways to manage and prevent migraines  A therapist can teach you to relax and to reduce stress  You may learn ways to create healthy nutrition, activity, and sleep habits to prevent migraines  The therapist can also help you manage conditions that can affect migraines, such as anxiety or depression  What can I do to manage my symptoms? · Rest in a dark, quiet room  This will help decrease your pain  Sleep may also help relieve the pain  · Apply ice to decrease pain  Use an ice pack, or put crushed ice in a plastic bag  Cover the ice pack with a towel and place it on your head  Apply ice for 15 to 20 minutes every hour  · Apply heat to decrease pain and muscle spasms  Use a small towel dampened with warm water or a heating pad, or sit in a warm bath  Apply heat on the area for 20 to 30 minutes every 2 hours  You may alternate heat and ice  · Keep a migraine record  Write down when your migraines start and stop  Include your symptoms and what you were doing when a migraine began  Record what you ate or drank for 24 hours before the migraine started  Keep track of what you did to treat your migraine and if it worked  Bring the migraine record with you to visits with your healthcare provider  What can I do to prevent another migraine headache? · Prevent a medicine overuse headache  Take pain medicines only as long as directed   A medicine may be limited to a certain amount each month  Your healthcare provider can help you create a plan so you get a safe amount each month  · Do not smoke  Nicotine and other chemicals in cigarettes and cigars can trigger a migraine or make it worse  Ask your healthcare provider for information if you currently smoke and need help to quit  E-cigarettes or smokeless tobacco still contain nicotine  Talk to your healthcare provider before you use these products  · Do not drink alcohol  Alcohol can trigger a migraine  It can also keep medicines used to treat your migraines from working  · Be physically active  Physical activity, such as exercise, may help prevent migraines  Talk to your healthcare provider about the best activity plan for you  Try to get at least 30 minutes of physical activity on most days  · Manage stress  Stress may trigger a migraine  Learn new ways to relax, such as deep breathing  · Create a sleep schedule  Go to bed and get up at the same times each day  Do not watch television before bed  · Eat a variety of healthy foods  Include healthy foods such as include fruit, vegetables, whole-grain breads, low-fat dairy products, beans, lean meat, and fish  Do not have food or drinks that trigger your migraines  · Drink more liquids to prevent dehydration  Your healthcare provider can tell you how much liquid to drink each day and which liquids are best for you  Call your local emergency number (911 in the [address] [facility]) or have someone call if:   · You feel like you are going to faint, you become confused, or you have a seizure  When should I seek immediate care? · You have a headache that seems different or much worse than your usual migraine headache  · You have a severe headache with a fever or a stiff neck  · You have new problems with speech, vision, balance, or movement  When should I call my doctor?    · Your migraines interfere with your daily activities  · Your medicines or treatments stop working  · You have questions or concerns about your condition or care  CARE AGREEMENT:   You have the right to help plan your care  Learn about your health condition and how it may be treated  Discuss treatment options with your healthcare providers to decide what care you want to receive  You always have the right to refuse treatment  The above information is an  only  It is not intended as medical advice for individual conditions or treatments  Talk to your doctor, nurse or pharmacist before following any medical regimen to see if it is safe and effective for you  © Copyright [employer] 2022 Information is for End User's use only and may not be sold, redistributed or otherwise used for commercial purposes  All illustrations and images included in CareNotes® are the copyrighted property of A D A M , [employer]  or [address] [place] [address] [facility]              Subjective: Preet Huddleston is a 40 y o  female who  has a past medical history of Anemia, Anxiety, Depression, Gallstones, Migraine, Nausea, Panic attack, Rotator cuff disorder, right, and Vertigo  who presented to the office today for follow up         The following portions of the patient's history were reviewed and updated as appropriate: allergies, current medications, past family history, past medical history, past social history, past surgical history and problem list     Current Outpatient Medications on File Prior to Visit   Medication Sig Dispense Refill   • baclofen 10 mg tablet TAKE 1 TABLET(10 MG) BY MOUTH THREE TIMES DAILY AS NEEDED FOR MUSCLE SPASMS 10 tablet 0   • cholecalciferol (VITAMIN D3) 1,000 units tablet Take 1 tablet (1,000 Units total) by mouth daily 90 tablet 3   • cyanocobalamin (VITAMIN B-12) 1000 MCG tablet Take 1 tablet (1,000 mcg total) by mouth daily 90 tablet 2   • Cyanocobalamin 1000 MCG/ML KIT      • Diclofenac Sodium (VOLTAREN) 1 % APPLY 2 GRAMS TOPICALLY TO THE AFFECTED AREA FOUR TIMES DAILY 100 g 2   • dicyclomine (BENTYL) 20 mg tablet Take 1 tablet (20 mg total) by mouth every 6 (six) hours As needed for abdominal pain 60 tablet 1   • dicyclomine (BENTYL) 20 mg tablet Take 1 tablet (20 mg total) by mouth 2 (two) times a day for 10 days 20 tablet 0   • docusate sodium (COLACE) 100 mg capsule TAKE 1 CAPSULE BY MOUTH EVERY MORNING 90 capsule 1   • ergocalciferol (VITAMIN D2) 50,000 units TAKE 1 CAPSULE BY MOUTH 1 TIME A WEEK 13 capsule 0   • famotidine (PEPCID) 40 MG tablet TAKE 1 TABLET(40 MG) BY MOUTH DAILY 60 tablet 2   • metoclopramide (REGLAN) 5 mg tablet Take 1 tablet (5 mg total) by mouth 3 (three) times a day 135 tablet 0   • mirtazapine (REMERON) 15 mg tablet TAKE 1 TABLET(15 MG) BY MOUTH DAILY AT BEDTIME 90 tablet 0   • ondansetron (Zofran ODT) 4 mg disintegrating tablet Take 1 tablet (4 mg total) by mouth every 6 (six) hours as needed for nausea or vomiting for up to 10 days 20 tablet 0   • polyethylene glycol (GLYCOLAX) 17 GM/SCOOP powder Take 17 g by mouth daily 255 g 0   • prochlorperazine (COMPAZINE) 5 mg tablet Take 1 tablet (5 mg total) by mouth every 6 (six) hours as needed for nausea or vomiting 30 tablet 0   • SUMAtriptan (IMITREX) 50 mg tablet TAKE 1 TABLET(50 MG) BY MOUTH 1 TIME FOR UP TO 1 DOSE AS NEEDED FOR MIGRAINE 9 tablet 0   • trospium chloride (SANCTURA) 20 mg tablet Take 20 mg by mouth 2 (two) times a day     • [DISCONTINUED] ferrous sulfate 324 (65 Fe) mg Take 1 tablet (324 mg total) by mouth daily before breakfast 90 tablet 3     No current facility-administered medications on file prior to visit  Review of Systems   Constitutional: Positive for fatigue and unexpected weight change  Negative for chills and fever  HENT: Negative for ear pain and sore throat  Eyes: Negative for pain and visual disturbance  Respiratory: Negative for cough and shortness of breath  Cardiovascular: Negative for chest pain and palpitations  Gastrointestinal: Positive for abdominal distention, abdominal pain, diarrhea and nausea  Negative for vomiting  Genitourinary: Negative for dysuria and hematuria  Musculoskeletal: Positive for arthralgias  Negative for back pain  Skin: Negative for color change and rash  Neurological: Negative for seizures and syncope  Psychiatric/Behavioral: Negative for dysphoric mood, self-injury and suicidal ideas  The patient is not nervous/anxious  All other systems reviewed and are negative  Objective:    /66 (BP Location: Right arm, Patient Position: Sitting, Cuff Size: Standard)   Pulse 89   Temp 98 3 °F (36 8 °C) (Temporal)   Resp 18   Ht 4' 11" (1 499 m)   Wt 40 kg (88 lb 3 2 oz)   LMP 07/24/2017   SpO2 98%   BMI 17 81 kg/m²     Physical Exam  Vitals and nursing note reviewed  Constitutional:       General: She is not in acute distress  Appearance: She is well-developed  She is not diaphoretic  HENT:      Head: Normocephalic and atraumatic  Right Ear: External ear normal       Left Ear: External ear normal    Eyes:      Conjunctiva/sclera: Conjunctivae normal       Pupils: Pupils are equal, round, and reactive to light  Cardiovascular:      Rate and Rhythm: Normal rate and regular rhythm  Pulmonary:      Effort: Pulmonary effort is normal  No respiratory distress  Breath sounds: Normal breath sounds  No wheezing  Musculoskeletal:         General: No deformity  Normal range of motion  Cervical back: Normal range of motion and neck supple  Lymphadenopathy:      Cervical: No cervical adenopathy  Skin:     General: Skin is warm and dry  Capillary Refill: Capillary refill takes less than 2 seconds  Findings: No rash  Neurological:      Mental Status: She is alert and oriented to person, place, and time     Psychiatric:         Behavior: Behavior normal          Humera Ordoñez  11/01/22  4:49 PM

## 2022-11-03 ENCOUNTER — OFFICE VISIT (OUTPATIENT)
Dept: PSYCHIATRY | Facility: CLINIC | Age: 42
End: 2022-11-03

## 2022-11-03 VITALS — WEIGHT: 86.9 LBS | BODY MASS INDEX: 17.55 KG/M2

## 2022-11-03 DIAGNOSIS — F41.9 ANXIETY: ICD-10-CM

## 2022-11-03 DIAGNOSIS — F33.1 MODERATE EPISODE OF RECURRENT MAJOR DEPRESSIVE DISORDER (HCC): ICD-10-CM

## 2022-11-03 RX ORDER — MIRTAZAPINE 30 MG/1
30 TABLET, FILM COATED ORAL
Qty: 30 TABLET | Refills: 2 | Status: SHIPPED | OUTPATIENT
Start: 2022-11-03

## 2022-11-03 NOTE — BH TREATMENT PLAN
TREATMENT PLAN (Medication Management Only)        [facility]    Name and Date of Birth: Ford Valdez 40 y o  1980  Date of Treatment Plan: November 3, 2022  Diagnosis/Diagnoses:    1  Anxiety    2  Moderate episode of recurrent major depressive disorder [facility])      Strengths/Personal Resources for Self-Care: motivation for treatment, taking medications as prescribed, ability to communicate well, ability to listen, ability to reason  Area/Areas of need (in own words): depressive symptoms, anxiety symptoms  1  Long Term Goal: improve anxiety and improve depression  Target Date: 6 months - 5/3/2023  Person/Persons responsible for completion of goal: Ford Courtney  2  Short Term Objective (s) - How will we reach this goal?:   A  Provider new recommended medication/dosage changes and/or continue medication(s): continue current medications as prescribed  B   Attend medication management appointments regularly  C   Attend psychotherapy regularly  Target Date: 6 months - 5/3/2023  Person/Persons Responsible for Completion of Goal: Ford Courtney  Progress Towards Goals: continuing treatment  Treatment Modality: medication management with psychotherapy every 3 months  Review due 90 to 120 days from date of this plan: 6 months - 5/3/2023  Expected length of service: maintenance unless revised  My Physician/PA/NP and I have developed this plan together and I agree to work on the goals and objectives  I understand the treatment goals that were developed for my treatment 
normal...

## 2022-11-03 NOTE — PSYCH
Visit Time    Visit Start Time: 1000  Visit Stop Time: 3436  Total Visit Duration: 30 minutes   This note was not shared with the patient due to reasonable likelihood of causing patient harm    Alecia Wahl    Name and Date of Birth: Alla Ocasio 40 y o  1980 MRN: [id number]    Date of Visit: November 3, 2022    Reason for visit (CC): "I have had anxiety for years    I have been coping with it myself"    No Known Allergies  HPI     Olegario Adam is a 40 y o  female with a history of Major Depressive Disorder, Generalized Anxiety Disorder and Panic attacks who presents for psychiatric evaluation due to worsening depression, anxiety symptoms and panic attacks  Symptoms first started gradually 1 years ago and progressively worsened over the last 1 years  Stressors preceding visit included financial problems, health issues, limited support and everyday stressors  Olegario Adam presents with primary complaint of long-standing anxiety and worsening depression over the past 1 year  Patient reports she is currently feeling down depressed every day and cries every morning  Sleep is adequate with good with mirtazapine but poor without  Endorses anhedonia, hopelessness, low energy, poor concentration and cognition, low appetite  Patient also endorses irritability  Denies suicidal ideation  She also endorses frequent anxiety starting in adolescents  States that anxiety is often triggered in social situations and also sometimes experiences anxiety at home  Experiences feelings of panic hyperventilation chest tightness hysterical crying 2-3 times per week  Denies history of symptoms of hanna psychosis PTSD OCD or eating disorder  Patient's primary stressor is poor health  Reports she was diagnosed with gastroparesis and much of her help difficulties center around poor appetite, stomach pain, weight loss and vitamin deficiency    Patient reports she is now seeing a gastroenterologist but has not yet been able to pinpoint cause of symptoms  Patient is also unable to maintain a job which has caused some financial hardship  Patient feels isolated at home and has limited support  Patient was previously seen at private psychiatrist and individual therapist   After employee turnover patient decided to seek care elsewhere  No previous inpatient hospitalizations, suicide attempts, or history of self-injury  Patient was previously trialed on Atarax which caused restlessness, Lexapro and buspirone which were ineffective  Patient reports she stopped cannabis use for past 1 month after recommendation by gastro  Patient has not had any alcohol in the past 1 year due to worsening some pain when drinking, states she also does not tolerate ibuprofen  No tobacco or other drug use  Medical history significant only for gastroparesis  Reports patient is a high school graduate  Has 4 children 3 are adults with family of own, youngest is 16years old and finishing high school  Patient has previously worked in [employer] and as   Lives with  and 59-year-old daughter  no legal issues  No weapons in the home    No history of  services    Current Outpatient Medications on File Prior to Visit   Medication Sig Dispense Refill   • baclofen 10 mg tablet TAKE 1 TABLET(10 MG) BY MOUTH THREE TIMES DAILY AS NEEDED FOR MUSCLE SPASMS 10 tablet 0   • cholecalciferol (VITAMIN D3) 1,000 units tablet Take 1 tablet (1,000 Units total) by mouth daily 90 tablet 3   • cyanocobalamin (VITAMIN B-12) 1000 MCG tablet Take 1 tablet (1,000 mcg total) by mouth daily 90 tablet 2   • Cyanocobalamin 1000 MCG/ML KIT      • Diclofenac Sodium (VOLTAREN) 1 % APPLY 2 GRAMS TOPICALLY TO THE AFFECTED AREA FOUR TIMES DAILY 100 g 2   • dicyclomine (BENTYL) 20 mg tablet Take 1 tablet (20 mg total) by mouth every 6 (six) hours As needed for abdominal pain 60 tablet 1   • dicyclomine (BENTYL) 20 mg tablet Take 1 tablet (20 mg total) by mouth 2 (two) times a day for 10 days 20 tablet 0   • docusate sodium (COLACE) 100 mg capsule TAKE 1 CAPSULE BY MOUTH EVERY MORNING 90 capsule 1   • ergocalciferol (VITAMIN D2) 50,000 units TAKE 1 CAPSULE BY MOUTH 1 TIME A WEEK 13 capsule 0   • famotidine (PEPCID) 40 MG tablet TAKE 1 TABLET(40 MG) BY MOUTH DAILY 60 tablet 2   • ferrous sulfate 324 (65 Fe) mg Take 1 tablet (324 mg total) by mouth every other day 90 tablet 3   • lidocaine (Lidoderm) 5 % Apply 1 patch topically daily Remove & Discard patch within 12 hours or as directed by MD 30 patch 1   • meclizine (ANTIVERT) 25 mg tablet Take 1 tablet (25 mg total) by mouth 3 (three) times a day as needed for dizziness 30 tablet 0   • methylPREDNISolone 4 MG tablet therapy pack Use as directed on package 21 each 0   • metoclopramide (REGLAN) 5 mg tablet Take 1 tablet (5 mg total) by mouth 3 (three) times a day 135 tablet 0   • ondansetron (Zofran ODT) 4 mg disintegrating tablet Take 1 tablet (4 mg total) by mouth every 6 (six) hours as needed for nausea or vomiting for up to 10 days 20 tablet 0   • polyethylene glycol (GLYCOLAX) 17 GM/SCOOP powder Take 17 g by mouth daily 255 g 0   • prochlorperazine (COMPAZINE) 5 mg tablet Take 1 tablet (5 mg total) by mouth every 6 (six) hours as needed for nausea or vomiting 30 tablet 0   • SUMAtriptan (IMITREX) 50 mg tablet TAKE 1 TABLET(50 MG) BY MOUTH 1 TIME FOR UP TO 1 DOSE AS NEEDED FOR MIGRAINE 9 tablet 0   • trospium chloride (SANCTURA) 20 mg tablet Take 20 mg by mouth 2 (two) times a day     • [DISCONTINUED] mirtazapine (REMERON) 15 mg tablet TAKE 1 TABLET(15 MG) BY MOUTH DAILY AT BEDTIME 90 tablet 0     No current facility-administered medications on file prior to visit         Psychiatric Review Of Systems:    Sleep changes: normal sleep  Appetite changes: decreased appetite, 30 lb weight loss in the past 1 year  Weight changes: recent weight loss (30 lbs)  Energy/anergy: low energy  Interest/pleasure/anhedonia: yes  Somatic symptoms:  Stomach upset stomach pain  Anxiety/panic: yes, panic attacks, worrying daily  Zoila: no  Guilty/hopeless: yes, hopelessness about the future  Self injurious behavior/risky behavior: Patient denies current risk or intent to self harm  Suicidal ideation: Denies suicidal ideation  Homicidal ideation: Patient denies homicidal ideations  Auditory hallucinations: no  Visual hallucinations: no  Other hallucinations: no  Delusional thinking: no  Eating disorder history: no  Obsessive/compulsive symptoms: no    Review Of Systems:    General emotional problems, decreased functioning, sleep disturbances and appetite disturbances   Personality no change in personality   Constitutional negative   ENT negative   Cardiovascular negative   Respiratory negative   Gastrointestinal abdominal pain and as noted in HPI   Genitourinary negative   Musculoskeletal negative   Integumentary negative   Neurological negative   Endocrine negative   Other Symptoms none, all other systems are negative       OBJECTIVE:    Vital signs in last 24 hours:    Vitals:    11/03/22 1024   Weight: 39 4 kg (86 lb 14 4 oz)       Mental Status Evaluation:      Appearance Adequate hygiene and grooming   Behavior calm and cooperative   Mood anxious, depressed and irritable  Depression Scale -  of 10 (0 = No depression)  Anxiety Scale -  of 10 (0 = No anxiety)   Speech Normal rate and volume   Affect mood-congruent and Tearful   Thought Processes Goal directed and coherent   Thought Content Does not verbalize delusional material   Associations Tightly connected   Perceptual Disturbances Denies hallucinations and does not appear to be responding to internal stimuli   Risk Potential Suicidal/Homicidal Ideation - No evidence of suicidal or homicidal ideation and patient does not verbalize suicidal or homicidal ideation  Risk of Violence - No evidence of risk for violence found on assessment  Risk of Self Mutilation - No evidence of risk for self mutilation found on assessment   Orientation oriented to person, place, time/date and situation   Memory recent and remote memory grossly intact   Consciousness alert and awake   Attention/Concentration attention span and concentration appear shorter than expected for age   Intellect appears to be of average intelligence   Insight fair   Judgement fair   Muscle Strength and Gait normal muscle strength and normal muscle tone, normal gait/station and normal balance   Motor Activity no abnormal movements   Language no difficulty naming common objects, no difficulty repeating a phrase, no difficulty writing a sentence   Fund of Knowledge adequate knowledge of current events  adequate fund of knowledge regarding past history  adequate fund of knowledge regarding vocabulary    Pain none   Pain Scale 0       Laboratory Results: I have personally reviewed all pertinent laboratory/tests results  Historical Information     History Review:     The following portions of the patient's history were reviewed and updated as appropriate: allergies, current medications, past family history, past medical history, past social history, past surgical history and problem list     Past Psychiatric History:     Past Inpatient Psychiatric Treatment:   No history of past inpatient psychiatric admissions  Past Outpatient Psychiatric Treatment:    Was in outpatient psychiatric treatment in the past with a psychiatrist  Was in outpatient psychiatric treatment in the past with a therapist  Past Suicide Attempts: No evidence of past suicide attempts  Past Violent Behavior: No evidence of past violent behavior and Patient denies history of violent behavior  Past Psychiatric Medication Trials: Lexapro, Remeron, Buspar and Atarax    Traumatic History:     Abuse: no history of physical or sexual abuse  Other Traumatic Events: N/a     Family Psychiatric History:     Family History Problem Relation Age of Onset   • No Known Problems Family    • Hypertension Mother    • Diabetes type II Father         mellitus      Substance Use History:    Social History     Substance and Sexual Activity   Alcohol Use No    Comment: none since 2021     Social History     Substance and Sexual Activity   Drug Use No     Social History:    Social History     Socioeconomic History   • Marital status: Single     Spouse name: Not on file   • Number of children: Not on file   • Years of education: Not on file   • Highest education level: Not on file   Occupational History   • Not on file   Tobacco Use   • Smoking status: Former Smoker     Quit date: 2013     Years since quittin 7   • Smokeless tobacco: Never Used   Vaping Use   • Vaping Use: Never used   Substance and Sexual Activity   • Alcohol use: No     Comment: none since 2021   • Drug use: No   • Sexual activity: Not on file   Other Topics Concern   • Not on file   Social History Narrative   • Not on file     Social Determinants of Health     Financial Resource Strain: Low Risk    • Difficulty of Paying Living Expenses: Not hard at all   Food Insecurity: No Food Insecurity   • Worried About Running Out of Food in the Last Year: Never true   • Ran Out of Food in the Last Year: Never true   Transportation Needs: No Transportation Needs   • Lack of Transportation (Medical): No   • Lack of Transportation (Non-Medical):  No   Physical Activity: Not on file   Stress: Not on file   Social Connections: Not on file   Intimate Partner Violence: Not on file   Housing Stability: Not on file     Past Medical History:    Past Medical History:   Diagnosis Date   • Anemia     in past   • Anxiety    • Depression    • Gallstones    • Migraine    • Nausea     occ   • Panic attack    • Rotator cuff disorder, right    • Vertigo         Past Surgical History:   Procedure Laterality Date   • ABDOMINAL SURGERY      scar tissue removed   •  SECTION      last assessed: Oct 23, 2014    • HYSTERECTOMY     • HI LAP,CHOLECYSTECTOMY/GRAPH N/A 2/21/2018    Procedure: CHOLECYSTECTOMY LAPAROSCOPIC  WITH  ATTEMPTED IOC; Surgeon: Kelvin Perez MD;  Location: [facility];  Service: General   • TUBAL LIGATION       Suicide/Homicide Risk Assessment:    Risk of Harm to Self:  The following ratings are based on assessment at the time of the interview  Demographic risk factors include: none  Historical Risk Factors include: chronic anxiety symptoms    Risk of Harm to Others: The following ratings are based on assessment at the time of the interview  Demographic Risk Factors include: none  Historical Risk Factors include: none  The following interventions are recommended: no intervention changes needed    Medications Risks/Benefits:      Risks, Benefits And Possible Side Effects Of Medications:    Discussed risks and benefits of treatment with patient including risk of suicidality, serotonin syndrome, increased QTc interval and SIADH related to treatment with antidepressants; Risk of induction of manic symptoms in certain patient populations     Controlled Medication Discussion:     Not applicable     Diagnoses and all orders for this visit:    Anxiety  -     Ambulatory referral to Psychiatry  -     mirtazapine (REMERON) 30 mg tablet; Take 1 tablet (30 mg total) by mouth daily at bedtime    Moderate episode of recurrent major depressive disorder (HCC)  -     mirtazapine (REMERON) 30 mg tablet; Take 1 tablet (30 mg total) by mouth daily at bedtime     Assessment/Plan:   Patient meets criteria for major depressive disorder and generalized anxiety disorder  Depression has worsened over the past year after diagnosis with gastroparesis  Previously failed trial of Lexapro patient felt a was not effective medication after 3 months  Is tolerating mirtazapine well  Will increase to 30 mg   Patient to monitor for any worsening of gastric symptoms constipation, stomach pain or nausea with increasing mirtazapine  Consider retrial of SSRI if mirtazapine ineffective or not well tolerated  Increase mirtazapine to 30 mg p o   Daily  Patient on wait list for individual therapy  Follow-up in 1 month  Aware of 24 hour and weekend coverage for urgent situations accessed by calling [facility] main practice number    Treatment Plan:    Completed and signed during the session: Yes - Treatment Plan done but not signed at time of office visit due to:  Plan reviewed in person and verbal consent given due to Jamilah social ANNAMARIE Juarez 11/03/22

## 2022-11-30 ENCOUNTER — TELEPHONE (OUTPATIENT)
Dept: GASTROENTEROLOGY | Facility: CLINIC | Age: 42
End: 2022-11-30

## 2022-11-30 ENCOUNTER — OFFICE VISIT (OUTPATIENT)
Dept: GASTROENTEROLOGY | Facility: CLINIC | Age: 42
End: 2022-11-30

## 2022-11-30 ENCOUNTER — HOSPITAL ENCOUNTER (EMERGENCY)
Facility: HOSPITAL | Age: 42
Discharge: HOME | End: 2022-11-30
Attending: EMERGENCY MEDICINE

## 2022-11-30 VITALS
DIASTOLIC BLOOD PRESSURE: 82 MMHG | RESPIRATION RATE: 16 BRPM | WEIGHT: 84.88 LBS | SYSTOLIC BLOOD PRESSURE: 115 MMHG | TEMPERATURE: 98.5 F | HEART RATE: 72 BPM | OXYGEN SATURATION: 99 % | BODY MASS INDEX: 17.14 KG/M2

## 2022-11-30 VITALS
DIASTOLIC BLOOD PRESSURE: 80 MMHG | TEMPERATURE: 98.2 F | SYSTOLIC BLOOD PRESSURE: 102 MMHG | WEIGHT: 87 LBS | HEIGHT: 59 IN | BODY MASS INDEX: 17.54 KG/M2

## 2022-11-30 DIAGNOSIS — R19.4 CHANGE IN BOWEL HABIT: ICD-10-CM

## 2022-11-30 DIAGNOSIS — K52.9 GASTROENTERITIS: Primary | ICD-10-CM

## 2022-11-30 DIAGNOSIS — K55.1 SUPERIOR MESENTERIC ARTERY SYNDROME (HCC): Primary | ICD-10-CM

## 2022-11-30 DIAGNOSIS — R10.33 PERIUMBILICAL ABDOMINAL PAIN: ICD-10-CM

## 2022-11-30 DIAGNOSIS — K31.84 GASTROPARESIS: Primary | ICD-10-CM

## 2022-11-30 DIAGNOSIS — R42 VERTIGO: ICD-10-CM

## 2022-11-30 LAB
ALBUMIN SERPL BCP-MCNC: 3.9 G/DL (ref 3.5–5)
ALP SERPL-CCNC: 76 U/L (ref 46–116)
ALT SERPL W P-5'-P-CCNC: 14 U/L (ref 12–78)
ANION GAP SERPL CALCULATED.3IONS-SCNC: 8 MMOL/L (ref 4–13)
AST SERPL W P-5'-P-CCNC: 14 U/L (ref 5–45)
BASOPHILS # BLD AUTO: 0.01 THOUSANDS/ÂΜL (ref 0–0.1)
BASOPHILS NFR BLD AUTO: 0 % (ref 0–1)
BILIRUB SERPL-MCNC: 0.38 MG/DL (ref 0.2–1)
BUN SERPL-MCNC: 8 MG/DL (ref 5–25)
CALCIUM SERPL-MCNC: 8.7 MG/DL (ref 8.3–10.1)
CHLORIDE SERPL-SCNC: 105 MMOL/L (ref 96–108)
CO2 SERPL-SCNC: 25 MMOL/L (ref 21–32)
CREAT SERPL-MCNC: 0.68 MG/DL (ref 0.6–1.3)
EOSINOPHIL # BLD AUTO: 0.02 THOUSAND/ÂΜL (ref 0–0.61)
EOSINOPHIL NFR BLD AUTO: 0 % (ref 0–6)
ERYTHROCYTE [DISTWIDTH] IN BLOOD BY AUTOMATED COUNT: 11.9 % (ref 11.6–15.1)
GFR SERPL CREATININE-BSD FRML MDRD: 108 ML/MIN/1.73SQ M
GLUCOSE SERPL-MCNC: 102 MG/DL (ref 65–140)
HCT VFR BLD AUTO: 39.4 % (ref 34.8–46.1)
HGB BLD-MCNC: 13.6 G/DL (ref 11.5–15.4)
IMM GRANULOCYTES # BLD AUTO: 0.02 THOUSAND/UL (ref 0–0.2)
IMM GRANULOCYTES NFR BLD AUTO: 0 % (ref 0–2)
LIPASE SERPL-CCNC: 182 U/L (ref 73–393)
LYMPHOCYTES # BLD AUTO: 1.5 THOUSANDS/ÂΜL (ref 0.6–4.47)
LYMPHOCYTES NFR BLD AUTO: 21 % (ref 14–44)
MCH RBC QN AUTO: 31.8 PG (ref 26.8–34.3)
MCHC RBC AUTO-ENTMCNC: 34.5 G/DL (ref 31.4–37.4)
MCV RBC AUTO: 92 FL (ref 82–98)
MONOCYTES # BLD AUTO: 0.73 THOUSAND/ÂΜL (ref 0.17–1.22)
MONOCYTES NFR BLD AUTO: 10 % (ref 4–12)
NEUTROPHILS # BLD AUTO: 4.86 THOUSANDS/ÂΜL (ref 1.85–7.62)
NEUTS SEG NFR BLD AUTO: 69 % (ref 43–75)
NRBC BLD AUTO-RTO: 0 /100 WBCS
PLATELET # BLD AUTO: 213 THOUSANDS/UL (ref 149–390)
PMV BLD AUTO: 10.6 FL (ref 8.9–12.7)
POTASSIUM SERPL-SCNC: 3.7 MMOL/L (ref 3.5–5.3)
PROT SERPL-MCNC: 7.7 G/DL (ref 6.4–8.4)
RBC # BLD AUTO: 4.28 MILLION/UL (ref 3.81–5.12)
SODIUM SERPL-SCNC: 138 MMOL/L (ref 135–147)
WBC # BLD AUTO: 7.14 THOUSAND/UL (ref 4.31–10.16)

## 2022-11-30 RX ORDER — ONDANSETRON 2 MG/ML
4 INJECTION INTRAMUSCULAR; INTRAVENOUS ONCE
Status: COMPLETED | OUTPATIENT
Start: 2022-11-30 | End: 2022-11-30

## 2022-11-30 RX ADMIN — ONDANSETRON 4 MG: 2 INJECTION INTRAMUSCULAR; INTRAVENOUS at 14:19

## 2022-11-30 RX ADMIN — SODIUM CHLORIDE 1000 ML: 0.9 INJECTION, SOLUTION INTRAVENOUS at 14:19

## 2022-11-30 NOTE — TELEPHONE ENCOUNTER
Lm for patient to call my direct line about schedule U/S w/ Doppler and being referred to Vascular surgery

## 2022-11-30 NOTE — PROGRESS NOTES
[facility]  Progress Note - Goldie Gallardo 40 y o  female MRN: [id number]    Unit/Bed#:  Encounter: [id number]    Assessment/Plan:  Presents here for 2nd opinion for gastroparesis    1  Gastroparesis  2  Change in bowel habit  - White Blood Cells, Stool by Gram Stain; Future  - Stool Enteric Bacterial Panel by PCR; Future  - H  pylori antigen, stool; Future  - Stool culture; Future  - Calprotectin,Fecal; Future    3  Periumbilical abdominal pain  4  Dehydration  - US abdomen complete with doppler; Future    She presents here for follow-up for history of gastroparesis but also discuss new onset of symptoms with worsening nausea and diarrhea for the last 3-4 days  Suspect she likely has a gastroenteritis  Of concern is she is unable to tolerate oral intake of food or liquids for the last several days  Dehydration may become a major issue and she may benefit from getting IV hydration  She will discuss this with her partner and plan for an ER visit but it is unclear when she is planning on going  She does have chronic nausea as well and has been abstinent from marijuana use for the last 2 weeks  It is possible this could be also component of cyclic vomiting syndrome  I have encouraged her to continue being abstinent from marijuana as well  Due to excessive weight loss of 40 lb I am also concern for SMA syndrome  I will place a referral for evaluation by vascular surgery  I have also ordered ultrasound with Dopplers  She does have history of gastroparesis and has not responded well to Reglan therapy  Domperidone is not an option due to financial constraints  She would like to consider and get further evaluation for G POEM  Will have her evaluated by our advanced endoscopist for further management  Subjective:     She presents here is very fatigued with complaints of diarrhea over the last 3 days    Diarrhea is described as nonbloody with greenish tinge with multiple bowel movements  She states symptoms of nausea started 2 weeks ago and now she has diarrhea which started 3 days ago  Denies any sick contact  Of concern is very limited oral intake of food and liquids  She states she gets with 3 sips of water or less the to ongoing symptoms  She is not able to keep anything down as well  We have prescribed her Reglan therapy for longstanding history gastroparesis  The symptoms have not helped as well over the last several days  She would not be a good candidate for domperidone due to financial constraints  She is interested in g POEM  Her BMI currently is 17-18  There is some concern of SMA syndrome on previous CT scan evaluation  EGD, colonoscopy have been within normal limits  She is debilitated by her symptoms and very emotional in the office today  She is going to discuss with her  going to ER for at least IV hydration due to dehydration  Answers for HPI/ROS submitted by the patient on 11/26/2022  Chronicity: chronic  Onset: more than 1 year ago  Onset quality: gradual  Frequency: 2 to 4 times per day  Episode duration: 2 Days  Progression since onset: waxing and waning  Pain location: LLQ, epigastric region  Pain - numeric: 9/10  Pain quality: aching, cramping, a sensation of fullness, sharp  Radiates to: LLQ, epigastric region, left flank  anorexia: Yes  arthralgias: Yes  belching: Yes  constipation: Yes  diarrhea: Yes  dysuria: No  fever: No  flatus: Yes  frequency: Yes  headaches: No  hematochezia: No  hematuria: No  melena:  Yes  myalgias: Yes  nausea: Yes  weight loss: Yes  vomiting: Yes  Aggravated by: bowel movement, certain positions, deep breathing, drinking alcohol, eating  Relieved by: belching, bowel movements, certain positions, passing flatus, recumbency, sitting up  Diagnostic workup: GI consult, lower endoscopy, upper endoscopy        Objective:     Vitals: Blood pressure 102/80, temperature 98 2 °F (36 8 °C), temperature source Tympanic, height 4' 11" (1 499 m), weight 39 5 kg (87 lb), last menstrual period 07/24/2017, not currently breastfeeding  ,Body mass index is 17 57 kg/m²  [unfilled]    Physical Exam:    GEN: wn/wd, NAD  HEENT: MMM, no cervical or supraclavicular LAD, anciteric  CV: RRR, no m/r/g  CHEST: CTA b/l, no w/r/r  ABD: +BS, soft, NT/ND, no hepatosplenomegaly  EXT: no c/c/e  SKIN: no rashes  NEURO: aaox3      Invasive Devices     None                         Lab, Imaging and other studies:     No visits with results within 1 Day(s) from this visit  Latest known visit with results is:   Clinical Support on 10/05/2022   Component Date Value   • Ventricular Rate 10/05/2022 73    • Atrial Rate 10/05/2022 73    • ND Interval 10/05/2022 154    • QRSD Interval 10/05/2022 74    • QT Interval 10/05/2022 372    • QTC Interval 10/05/2022 409    • P Axis 10/05/2022 68    • QRS Axis 10/05/2022 82    • T Wave Axis 10/05/2022 42          I have personally reviewed pertinent reports  and I have personally reviewed pertinent films in PACS    No current facility-administered medications for this visit

## 2022-11-30 NOTE — ED PROVIDER NOTES
History  Chief Complaint   Patient presents with   • Abdominal Pain     Pt reports n/v/d and loss of appetite for the past 3 days  Has a h/o of gastroparesis  Went to see GI who told patient to come in for evaluation  72-year-old female with history of gastroparesis of unknown etiology presents to the emergency department complaining of a several week history of nausea, abdominal pain and diarrhea  She states she has had multiple episodes of nonbloody, watery diarrhea over the last several weeks  She states the nausea is to a point where she can only take small sips of liquids  She has not been able to eat anything  No fevers or chills  She did see her gastroenterologist and she was given prescriptions to have stool studies done along with an ultrasound of her abdomen  She states she stopped taking her Reglan because it made her jittery  She denies fever  History provided by:  Patient   used: No    Abdominal Pain  Pain location:  Generalized  Pain quality: cramping    Pain radiates to:  Does not radiate  Onset quality:  Gradual  Duration:  2 weeks  Timing:  Intermittent  Progression:  Unchanged  Chronicity:  New  Context: previous surgery    Context: not alcohol use, not diet changes, not eating, not recent travel, not sick contacts and not suspicious food intake    Relieved by:  Nothing  Worsened by:  Nothing  Ineffective treatments: reglan    Associated symptoms: diarrhea and nausea    Associated symptoms: no anorexia, no belching, no chest pain, no chills, no constipation, no cough, no dysuria, no fatigue, no fever, no flatus, no hematemesis, no hematochezia, no hematuria, no melena, no shortness of breath, no sore throat, no vaginal bleeding, no vaginal discharge and no vomiting    Diarrhea:     Quality:  Watery    Number of occurrences:  2 to 3 episodes daily    Duration:  2 weeks    Timing:  Intermittent    Progression:  Unchanged  Risk factors: no alcohol abuse, has not had multiple surgeries, no NSAID use, not obese and not pregnant        Prior to Admission Medications   Prescriptions Last Dose Informant Patient Reported? Taking?    Cyanocobalamin 1000 MCG/ML KIT   Yes No   Diclofenac Sodium (VOLTAREN) 1 %   No No   Sig: APPLY 2 GRAMS TOPICALLY TO THE AFFECTED AREA FOUR TIMES DAILY   SUMAtriptan (IMITREX) 50 mg tablet   No No   Sig: TAKE 1 TABLET(50 MG) BY MOUTH 1 TIME FOR UP TO 1 DOSE AS NEEDED FOR MIGRAINE   baclofen 10 mg tablet   No No   Sig: TAKE 1 TABLET(10 MG) BY MOUTH THREE TIMES DAILY AS NEEDED FOR MUSCLE SPASMS   cholecalciferol (VITAMIN D3) 1,000 units tablet   No No   Sig: Take 1 tablet (1,000 Units total) by mouth daily   cyanocobalamin (VITAMIN B-12) 1000 MCG tablet   No No   Sig: Take 1 tablet (1,000 mcg total) by mouth daily   dicyclomine (BENTYL) 20 mg tablet   No No   Sig: Take 1 tablet (20 mg total) by mouth every 6 (six) hours As needed for abdominal pain   dicyclomine (BENTYL) 20 mg tablet   No No   Sig: Take 1 tablet (20 mg total) by mouth 2 (two) times a day for 10 days   docusate sodium (COLACE) 100 mg capsule   No No   Sig: TAKE 1 CAPSULE BY MOUTH EVERY MORNING   ergocalciferol (VITAMIN D2) 50,000 units   No No   Sig: TAKE 1 CAPSULE BY MOUTH 1 TIME A WEEK   famotidine (PEPCID) 40 MG tablet   No No   Sig: TAKE 1 TABLET(40 MG) BY MOUTH DAILY   ferrous sulfate 324 (65 Fe) mg   No No   Sig: Take 1 tablet (324 mg total) by mouth every other day   lidocaine (Lidoderm) 5 %   No No   Sig: Apply 1 patch topically daily Remove & Discard patch within 12 hours or as directed by MD   meclizine (ANTIVERT) 25 mg tablet   No No   Sig: Take 1 tablet (25 mg total) by mouth 3 (three) times a day as needed for dizziness   methylPREDNISolone 4 MG tablet therapy pack   No No   Sig: Use as directed on package   metoclopramide (REGLAN) 5 mg tablet   No No   Sig: Take 1 tablet (5 mg total) by mouth 3 (three) times a day   mirtazapine (REMERON) 30 mg tablet   No No   Sig: Take 1 tablet (30 mg total) by mouth daily at bedtime   ondansetron (Zofran ODT) 4 mg disintegrating tablet   No No   Sig: Take 1 tablet (4 mg total) by mouth every 6 (six) hours as needed for nausea or vomiting for up to 10 days   polyethylene glycol (GLYCOLAX) 17 GM/SCOOP powder   No No   Sig: Take 17 g by mouth daily   prochlorperazine (COMPAZINE) 5 mg tablet   No No   Sig: Take 1 tablet (5 mg total) by mouth every 6 (six) hours as needed for nausea or vomiting   trospium chloride (SANCTURA) 20 mg tablet   Yes No   Sig: Take 20 mg by mouth 2 (two) times a day      Facility-Administered Medications: None       Past Medical History:   Diagnosis Date   • Anemia     in past   • Anxiety    • Depression    • Gallstones    • Migraine    • Nausea     occ   • Panic attack    • Rotator cuff disorder, right    • Vertigo        Past Surgical History:   Procedure Laterality Date   • ABDOMINAL SURGERY      scar tissue removed   •  SECTION      last assessed: Oct 23, 2014    • HYSTERECTOMY     • NE LAP,CHOLECYSTECTOMY/GRAPH N/A 2018    Procedure: CHOLECYSTECTOMY LAPAROSCOPIC  WITH  ATTEMPTED IOC; Surgeon: Denisa Oneal MD;  Location: [facility] OR;  Service: General   • TUBAL LIGATION         Family History   Problem Relation Age of Onset   • No Known Problems Family    • Hypertension Mother    • Diabetes type II Father         mellitus      I have reviewed and agree with the history as documented  E-Cigarette/Vaping   • E-Cigarette Use Never User      E-Cigarette/Vaping Substances     Social History     Tobacco Use   • Smoking status: Former     Types: Cigarettes     Quit date: 2013     Years since quittin 7   • Smokeless tobacco: Never   Vaping Use   • Vaping Use: Never used   Substance Use Topics   • Alcohol use: No     Comment: none since 2021   • Drug use: No       Review of Systems   Constitutional: Positive for appetite change   Negative for activity change, chills, diaphoresis, fatigue, fever and unexpected weight change  HENT: Negative  Negative for congestion, rhinorrhea and sore throat  Eyes: Negative  Negative for discharge, redness and itching  Respiratory: Negative  Negative for apnea, cough, chest tightness, shortness of breath and wheezing  Cardiovascular: Negative for chest pain, palpitations and leg swelling  Gastrointestinal: Positive for abdominal pain, diarrhea and nausea  Negative for abdominal distention, anorexia, constipation, flatus, hematemesis, hematochezia, melena and vomiting  Endocrine: Negative  Genitourinary: Negative  Negative for dysuria, flank pain, frequency, hematuria, urgency, vaginal bleeding and vaginal discharge  Musculoskeletal: Negative  Negative for back pain  Skin: Negative  Allergic/Immunologic: Negative  Neurological: Negative  Negative for dizziness, syncope, weakness, light-headedness, numbness and headaches  Hematological: Negative  All other systems reviewed and are negative  Physical Exam  Physical Exam  Vitals and nursing note reviewed  Constitutional:       General: She is awake  She is not in acute distress  Appearance: Normal appearance  She is well-developed, normal weight and well-nourished  She is not ill-appearing, toxic-appearing or diaphoretic  HENT:      Head: Normocephalic and atraumatic  Right Ear: External ear normal       Left Ear: External ear normal       Nose: Nose normal       Mouth/Throat:      Mouth: Oropharynx is clear and moist  Mucous membranes are moist       Pharynx: No oropharyngeal exudate  Eyes:      General: No scleral icterus  Right eye: No discharge  Left eye: No discharge  Extraocular Movements: EOM normal       Conjunctiva/sclera: Conjunctivae normal    Neck:      Thyroid: No thyromegaly  Vascular: No JVD  Trachea: No tracheal deviation  Cardiovascular:      Rate and Rhythm: Normal rate and regular rhythm  Heart sounds: Normal heart sounds   No murmur heard  No friction rub  No gallop  Pulmonary:      Effort: Pulmonary effort is normal  No respiratory distress  Breath sounds: Normal breath sounds  No stridor  No wheezing, rhonchi or rales  Chest:      Chest wall: No tenderness  Abdominal:      General: Bowel sounds are normal  There is no distension  Palpations: Abdomen is soft  There is no mass  Tenderness: There is generalized abdominal tenderness  There is no guarding or rebound  Hernia: No hernia is present  Musculoskeletal:         General: No edema  Skin:     General: Skin is warm and dry  Coloration: Skin is not jaundiced or pale  Findings: No bruising, erythema, lesion or rash  Neurological:      General: No focal deficit present  Mental Status: She is alert and oriented to person, place, and time  Motor: No weakness or abnormal muscle tone  Deep Tendon Reflexes: Reflexes are normal and symmetric  Psychiatric:         Mood and Affect: Mood and affect and mood normal          Behavior: Behavior is cooperative           Vital Signs  ED Triage Vitals [11/30/22 1330]   Temperature Pulse Respirations Blood Pressure SpO2   98 5 °F (36 9 °C) 89 18 119/84 98 %      Temp Source Heart Rate Source Patient Position - Orthostatic VS BP Location FiO2 (%)   Oral Monitor Sitting Right arm --      Pain Score       8           Vitals:    11/30/22 1330   BP: 119/84   Pulse: 89   Patient Position - Orthostatic VS: Sitting         Visual Acuity      ED Medications  Medications   sodium chloride 0 9 % bolus 1,000 mL (1,000 mL Intravenous New Bag 11/30/22 1419)   ondansetron (ZOFRAN) injection 4 mg (4 mg Intravenous Given 11/30/22 1419)       Diagnostic Studies  Results Reviewed     Procedure Component Value Units Date/Time    H  pylori antigen, stool [[id number]]     Lab Status: No result Specimen: Stool     Stool Enteric Bacterial Panel by PCR [[id number]]     Lab Status: No result Specimen: Stool Clostridium difficile toxin by PCR with EIA [[id number]]     Lab Status: No result Specimen: Stool     Comprehensive metabolic panel [[id number]] Collected: 11/30/22 1335    Lab Status: Final result Specimen: Blood from Arm, Right Updated: 11/30/22 1401     Sodium 138 mmol/L      Potassium 3 7 mmol/L      Chloride 105 mmol/L      CO2 25 mmol/L      ANION GAP 8 mmol/L      BUN 8 mg/dL      Creatinine 0 68 mg/dL      Glucose 102 mg/dL      Calcium 8 7 mg/dL      AST 14 U/L      ALT 14 U/L      Alkaline Phosphatase 76 U/L      Total Protein 7 7 g/dL      Albumin 3 9 g/dL      Total Bilirubin 0 38 mg/dL      eGFR 108 ml/min/1 73sq m     Narrative:      National Kid[employer] Disease Foundation guidelines for Chronic Kidney Disease (CKD):   •  Stage 1 with normal or high GFR (GFR > 90 mL/min/1 73 square meters)  •  Stage 2 Mild CKD (GFR = 60-89 mL/min/1 73 square meters)  •  Stage 3A Moderate CKD (GFR = 45-59 mL/min/1 73 square meters)  •  Stage 3B Moderate CKD (GFR = 30-44 mL/min/1 73 square meters)  •  Stage 4 Severe CKD (GFR = 15-29 mL/min/1 73 square meters)  •  Stage 5 End Stage CKD (GFR <15 mL/min/1 73 square meters)  Note: GFR calculation is accurate only with a steady state creatinine    Lipase [[id number]]  (Normal) Collected: 11/30/22 1335    Lab Status: Final result Specimen: Blood from Arm, Right Updated: 11/30/22 1401     Lipase 182 u/L     CBC and differential [[id number]] Collected: 11/30/22 1335    Lab Status: Final result Specimen: Blood from Arm, Right Updated: 11/30/22 1342     WBC 7 14 Thousand/uL      RBC 4 28 Million/uL      Hemoglobin 13 6 g/dL      Hematocrit 39 4 %      MCV 92 fL      MCH 31 8 pg      MCHC 34 5 g/dL      RDW 11 9 %      MPV 10 6 fL      Platelets 080 Thousands/uL      nRBC 0 /100 WBCs      Neutrophils Relative 69 %      Immat GRANS % 0 %      Lymphocytes Relative 21 %      Monocytes Relative 10 %      Eosinophils Relative 0 %      Basophils Relative 0 %      Neutrophils Absolute 4 86 Thousands/µL      Immature Grans Absolute 0 02 Thousand/uL      Lymphocytes Absolute 1 50 Thousands/µL      Monocytes Absolute 0 73 Thousand/µL      Eosinophils Absolute 0 02 Thousand/µL      Basophils Absolute 0 01 Thousands/µL                  No orders to display              Procedures  Procedures         ED Course                               SBIRT 22yo+    Flowsheet Row Most Recent Value   SBIRT (23 yo +)    In order to provide better care to our patients, we are screening all of our patients for alcohol and drug use  Would it be okay to ask you these screening questions? No Filed at: 11/30/2022 1402                    MDM  Number of Diagnoses or Management Options  Diagnosis management comments: 50-year-old female presents to the ED complaining of a 2 week history of several episodes of watery diarrhea, abdominal cramping and nausea to the point where she is unable to eat anything and can only take small sips a liquids  No fever  She has a history of gastroparesis with unknown etiology and had just seen her gastroenterologist today and was referred here for IV hydration  She was given prescriptions for stool cultures and an ultrasound of her abdomen  On exam she is alert no acute distress sitting up  She does have diffuse abdominal tenderness on exam without peritoneal signs  Labs drawn in triage are normal   Will give IV fluids, Zofran and dry p o  Challenge  Will send stool cultures if patient is able to provide a sample here in the ED  Amount and/or Complexity of Data Reviewed  Clinical lab tests: ordered and reviewed  Review and summarize past medical records: yes        Disposition  Final diagnoses:   None     ED Disposition     None      Follow-up Information    None         Patient's Medications   Discharge Prescriptions    No medications on file       No discharge procedures on file      PDMP Review     None          ED Provider  Electronically Signed by           Lauren Caruso DO  11/30/22 1553

## 2022-11-30 NOTE — TELEPHONE ENCOUNTER
----- Message from Mayank Andrew MD sent at 11/30/2022 12:49 PM EST -----  Can we order ultrasound with Doppler of the abdomen and also have the patient see vascular surgery    Thank

## 2022-11-30 NOTE — TELEPHONE ENCOUNTER
Patient called back  Pt presently in ER and asked that paperwork be mailed to her  She will ask if U/S can be done in ER, if not she will call to schedule  Paperwork mailed

## 2022-12-01 RX ORDER — MECLIZINE HYDROCHLORIDE 25 MG/1
TABLET ORAL
Qty: 30 TABLET | Refills: 0 | Status: SHIPPED | OUTPATIENT
Start: 2022-12-01

## 2022-12-10 ENCOUNTER — HOSPITAL ENCOUNTER (OUTPATIENT)
Dept: ULTRASOUND IMAGING | Facility: HOSPITAL | Age: 42
Discharge: HOME | End: 2022-12-10
Attending: INTERNAL MEDICINE

## 2022-12-10 DIAGNOSIS — R10.33 PERIUMBILICAL ABDOMINAL PAIN: ICD-10-CM

## 2023-01-04 ENCOUNTER — CONSULT (OUTPATIENT)
Dept: VASCULAR SURGERY | Facility: CLINIC | Age: 43
End: 2023-01-04

## 2023-01-04 VITALS
HEIGHT: 59 IN | HEART RATE: 111 BPM | SYSTOLIC BLOOD PRESSURE: 94 MMHG | BODY MASS INDEX: 16.81 KG/M2 | OXYGEN SATURATION: 98 % | WEIGHT: 83.4 LBS | DIASTOLIC BLOOD PRESSURE: 74 MMHG

## 2023-01-04 DIAGNOSIS — K31.84 GASTROPARESIS: Primary | ICD-10-CM

## 2023-01-04 DIAGNOSIS — K55.1 SUPERIOR MESENTERIC ARTERY SYNDROME (HCC): ICD-10-CM

## 2023-01-04 DIAGNOSIS — K55.1 SMAS (SUPERIOR MESENTERIC ARTERY SYNDROME) (HCC): ICD-10-CM

## 2023-01-04 DIAGNOSIS — R63.4 WEIGHT LOSS: ICD-10-CM

## 2023-01-04 NOTE — PROGRESS NOTES
Assessment/Plan:    SMAS (superior mesenteric artery syndrome) ([facility] [address] )  Patient is a pleasant 50-year-old woman with longstanding history (November 2021) of abdominal pain, distention, nausea, vomiting and watery diarrhea  She has had a very extensive work-up  She has been diagnosed with delayed gastric emptying consistent with idiopathic gastroparesis  Her EGD/colonoscopy biopsy she reports has been unrevealing  She reports an approximately 40 pound weight loss  Her CT imaging does demonstrate radiographic finding of SMA syndrome which is contributed to loss of the retroperitoneal fat pad between the aorta and SMA and not an inherent defect with the SMA itself  Having said that I do believe this is a result of significant weight loss and initial culprit of her constellation of symptoms  Conservative measures include increased weight gain to restore the "normal" aortomesenteric angle  She has a follow-up appointment with a gastroenterologist tomorrow  Discussed that it may be worthwhile to mention possible trial with erythromycin to see if it alleviates her gastroparesis  She will inquire with her gastroenterologist tomorrow  No acute vascular intervention/further work-up warranted/indicated at this time  Ultimately if her symptoms are indeed felt to be related to SMA syndrome she would need referral to general surgery to discuss possible surgical intervention  Diagnoses and all orders for this visit:    Gastroparesis    Superior mesenteric artery syndrome [facility])  -     Ambulatory Referral to Vascular Surgery    Weight loss    SMAS (superior mesenteric artery syndrome) ([facility])          Subjective:      Patient ID: Jerome Strickland is a 40 y o  female  Patient is new to our office  She was referred here by Dr Kodak Jaffe MD, gastroenterology  Patient had a CT ABD/Pelvis done 11/18/2021  Patient has been having blood in her stools as well as unintentional wt loss that started November 2021   She has abd pain after eating  She is on a specialized diet of soft/pureed foods  She either has constipation of bouts of diarrhea  Patient is a former smoker  Ale Gomez is a 61-year-old woman who was referred to the office for "SMA syndrome"  She reports all she has had today to eat is coffee and "baby food"  She reports early satiety, nausea, vomiting, and diarrhea fluctuating with constipation  The following portions of the patient's history were reviewed and updated as appropriate: allergies, current medications, past family history, past medical history, past social history, past surgical history and problem list     Review of Systems   Constitutional: Positive for fatigue and unexpected weight change (wt loss 25lbs +)  HENT: Negative  Respiratory: Positive for shortness of breath (SOB with activity)  Gastrointestinal: Positive for abdominal pain, constipation and diarrhea  Endocrine: Negative  Genitourinary: Positive for difficulty urinating  Musculoskeletal: Positive for myalgias  Skin: Negative  Allergic/Immunologic: Negative  Neurological: Positive for headaches  Hematological: Bruises/bleeds easily  Psychiatric/Behavioral: Negative  I have personally reviewed the ROS entered by MA and agree as documented  Objective:      BP 94/74 (BP Location: Left arm, Patient Position: Sitting, Cuff Size: Child)   Pulse (!) 111   Ht 4' 11" (1 499 m)   Wt 37 8 kg (83 lb 6 4 oz)   LMP 07/24/2017   SpO2 98%   BMI 16 84 kg/m²          Physical Exam  Constitutional:       General: She is not in acute distress  Appearance: She is well-developed  HENT:      Head: Normocephalic and atraumatic  Eyes:      General: No scleral icterus  Conjunctiva/sclera: Conjunctivae normal    Neck:      Trachea: No tracheal deviation  Cardiovascular:      Rate and Rhythm: Normal rate  Pulmonary:      Effort: Pulmonary effort is normal       Breath sounds: Normal breath sounds     Abdominal: General: There is no distension  Palpations: Abdomen is soft  There is no mass (no appreciable aortic pulsation/aneurysm)  Tenderness: There is abdominal tenderness  There is no guarding or rebound  Hernia: No hernia is present  Musculoskeletal:         General: Normal range of motion  Cervical back: Normal range of motion and neck supple  Skin:     General: Skin is warm and dry  Neurological:      Mental Status: She is alert and oriented to person, place, and time  Psychiatric:         Mood and Affect: Mood normal          Behavior: Behavior normal          Thought Content:  Thought content normal          Judgment: Judgment normal

## 2023-01-04 NOTE — ASSESSMENT & PLAN NOTE
Patient is a pleasant 72-year-old woman with longstanding history (November 2021) of abdominal pain, distention, nausea, vomiting and watery diarrhea  She has had a very extensive work-up  She has been diagnosed with delayed gastric emptying consistent with idiopathic gastroparesis  Her EGD/colonoscopy biopsy she reports has been unrevealing  She reports an approximately 40 pound weight loss  Her CT imaging does demonstrate radiographic finding of SMA syndrome which is contributed to loss of the retroperitoneal fat pad between the aorta and SMA and not an inherent defect with the SMA itself  Having said that I do believe this is a result of significant weight loss and initial culprit of her constellation of symptoms  Conservative measures include increased weight gain to restore the "normal" aortomesenteric angle  She has a follow-up appointment with a gastroenterologist tomorrow  Discussed that it may be worthwhile to mention possible trial with erythromycin to see if it alleviates her gastroparesis  She will inquire with her gastroenterologist tomorrow  No acute vascular intervention/further work-up warranted/indicated at this time  Ultimately if her symptoms are indeed felt to be related to SMA syndrome she would need referral to general surgery to discuss possible surgical intervention

## 2023-01-05 ENCOUNTER — OFFICE VISIT (OUTPATIENT)
Dept: GASTROENTEROLOGY | Facility: CLINIC | Age: 43
End: 2023-01-05

## 2023-01-05 VITALS
HEART RATE: 77 BPM | DIASTOLIC BLOOD PRESSURE: 77 MMHG | SYSTOLIC BLOOD PRESSURE: 98 MMHG | BODY MASS INDEX: 16.73 KG/M2 | TEMPERATURE: 98.9 F | WEIGHT: 83 LBS | HEIGHT: 59 IN

## 2023-01-05 DIAGNOSIS — E46 MALNUTRITION, UNSPECIFIED TYPE (HCC): Primary | ICD-10-CM

## 2023-01-05 DIAGNOSIS — K31.84 GASTROPARESIS: ICD-10-CM

## 2023-01-05 NOTE — PROGRESS NOTES
[facility] - Outpatient Follow-up Note  Jerome Valverdeist 40 y o  female MRN: [id number]  Encounter: [id number]          ASSESSMENT AND PLAN:      1  Gastroparesis   Patient is complaining about multiple GI symptoms including postprandial bloating, epigastric pain, nausea with occasional vomiting, her symptoms have been ongoing for about a year  States she was using marijuana to help with the nausea in the past, she quit marijuana use about 2 months ago  She underwent gastric emptying study that showed gastroparesis with half emptying time of 188 minutes, gastric emptying at 4 hours was 64%  GCSI 40/45  We had an extensive discussion about options for endoscopic treatment including Botox injection and gastric per oral endoscopic myotomy, patient was also made aware that surgery is a possible option for treatment  We discussed that Botox effects are usually short lived and can make further intervention such as G-POEM more difficult  Patient decided for G-POEM, patient was made aware this is a novel endoscopic procedure that provide relief of some of the symptoms of gastroparesis but does not constitute a cure, patient is aware that hospital admission is required after the procedure, usually for 1 night  The patient is in agreement with the plan, the risks and benefits of the procedure were discussed with the patient including but not limited to bleeding, infection, perforation, CO2 related complications such as capnothorax, capnomediastinum, capnoperitoneum, subcutaneous emphysema, as well as the need for surgery in case of emergency  Currently her BMI is 16 7, we discussed her BMI needs to improve prior to any procedure  She will be referred to nutritionist, will see in 3 months      2   Malnutrition, unspecified type ([address] )  Current BMI 16 7  She has been sent for nutrition evaluation in the past has not been able to be seen  We will facilitate nutrition consult  Patient was explained she needs to improve her BMI prior to any  procedure for gastroparesis  ______________________________________________________________________    SUBJECTIVE:  Patient seen and examined, she is a 20-year-old female patient referred by Dr Nimisha Conway, she is complaining about postprandial bloating, epigastric pain, nausea, this has been ongoing for about a year, she was smoking marijuana to help with the nausea, she quit marijuana about 2 months ago, otherwise she denies any recent events, currently is tolerating PO route on a modified diet, denies recent vomiting, is passing flatus and having daily bowel movements of normal consistency, denies abdominal pain, continues losing weight      REVIEW OF SYSTEMS IS OTHERWISE NEGATIVE  Historical Information   Past Medical History:   Diagnosis Date   • Anemia     in past   • Anxiety    • Depression    • Gallstones    • Migraine    • Nausea     occ   • Panic attack    • Rotator cuff disorder, right    • Vertigo      Past Surgical History:   Procedure Laterality Date   • ABDOMINAL SURGERY      scar tissue removed   •  SECTION      last assessed: Oct 23, 2014    • HYSTERECTOMY     • IL LAPS SURG CHOLECYSTECTOMY W/CHOLANGIOGRAPHY N/A 2018    Procedure: CHOLECYSTECTOMY LAPAROSCOPIC  WITH  ATTEMPTED IOC;   Surgeon: Emily Ellis MD;  Location: [facility];  Service: General   • TUBAL LIGATION       Social History   Social History     Substance and Sexual Activity   Alcohol Use No    Comment: none since 2021     Social History     Substance and Sexual Activity   Drug Use No     Social History     Tobacco Use   Smoking Status Former   • Types: Cigarettes   • Quit date: 2013   • Years since quittin 8   Smokeless Tobacco Never     Family History   Problem Relation Age of Onset   • No Known Problems Family    • Hypertension Mother    • Diabetes type II Father         mellitus        Meds/Allergies       Current Outpatient Medications:   •  cyanocobalamin (VITAMIN B-12) 1000 MCG tablet  •  dicyclomine (BENTYL) 20 mg tablet  •  docusate sodium (COLACE) 100 mg capsule  •  famotidine (PEPCID) 40 MG tablet  •  ferrous sulfate 324 (65 Fe) mg  •  lidocaine (Lidoderm) 5 %  •  meclizine (ANTIVERT) 25 mg tablet  •  mirtazapine (REMERON) 30 mg tablet  •  prochlorperazine (COMPAZINE) 5 mg tablet  •  SUMAtriptan (IMITREX) 50 mg tablet  •  trospium chloride (SANCTURA) 20 mg tablet  •  baclofen 10 mg tablet  •  cholecalciferol (VITAMIN D3) 1,000 units tablet  •  Cyanocobalamin 1000 MCG/ML KIT  •  Diclofenac Sodium (VOLTAREN) 1 %  •  dicyclomine (BENTYL) 20 mg tablet  •  ergocalciferol (VITAMIN D2) 50,000 units  •  methylPREDNISolone 4 MG tablet therapy pack  •  ondansetron (Zofran ODT) 4 mg disintegrating tablet  •  polyethylene glycol (GLYCOLAX) 17 GM/SCOOP powder    No Known Allergies        Objective     Blood pressure 98/77, pulse 77, temperature 98 9 °F (37 2 °C), temperature source Tympanic, height 4' 11" (1 499 m), weight 37 6 kg (83 lb), last menstrual period 07/24/2017, not currently breastfeeding  Body mass index is 16 76 kg/m²  PHYSICAL EXAM:      General Appearance:   Alert, cooperative, no distress   HEENT:   Normocephalic, atraumatic, anicteric      Neck:  Supple, symmetrical, trachea midline   Lungs:   Clear to auscultation bilaterally; no rales, rhonchi or wheezing; respirations unlabored    Heart[de-identified]   Regular rate and rhythm; no murmur, rub, or gallop  Abdomen:   Soft, non-tender, non-distended; normal bowel sounds; no masses, no organomegaly    Genitalia:   Deferred    Rectal:   Deferred    Extremities:  No cyanosis, clubbing or edema    Pulses:  2+ and symmetric    Skin:  No jaundice, rashes, or lesions    Lymph nodes:  No palpable cervical lymphadenopathy        Lab Results:   No visits with results within 1 Day(s) from this visit     Latest known visit with results is:   Admission on 11/30/2022, Discharged on 11/30/2022   Component Date Value   • WBC 11/30/2022 7 14    • RBC 11/30/2022 4 28    • Hemoglobin 11/30/2022 13 6    • Hematocrit 11/30/2022 39 4    • MCV 11/30/2022 92    • MCH 11/30/2022 31 8    • MCHC 11/30/2022 34 5    • RDW 11/30/2022 11 9    • MPV 11/30/2022 10 6    • Platelets 68/77/3863 213    • nRBC 11/30/2022 0    • Neutrophils Relative 11/30/2022 69    • Immat GRANS % 11/30/2022 0    • Lymphocytes Relative 11/30/2022 21    • Monocytes Relative 11/30/2022 10    • Eosinophils Relative 11/30/2022 0    • Basophils Relative 11/30/2022 0    • Neutrophils Absolute 11/30/2022 4 86    • Immature Grans Absolute 11/30/2022 0 02    • Lymphocytes Absolute 11/30/2022 1 50    • Monocytes Absolute 11/30/2022 0 73    • Eosinophils Absolute 11/30/2022 0 02    • Basophils Absolute 11/30/2022 0 01    • Sodium 11/30/2022 138    • Potassium 11/30/2022 3 7    • Chloride 11/30/2022 105    • CO2 11/30/2022 25    • ANION GAP 11/30/2022 8    • BUN 11/30/2022 8    • Creatinine 11/30/2022 0 68    • Glucose 11/30/2022 102    • Calcium 11/30/2022 8 7    • AST 11/30/2022 14    • ALT 11/30/2022 14    • Alkaline Phosphatase 11/30/2022 76    • Total Protein 11/30/2022 7 7    • Albumin 11/30/2022 3 9    • Total Bilirubin 11/30/2022 0 38    • eGFR 11/30/2022 108    • Lipase 11/30/2022 182          Radiology Results:   US abdomen complete with doppler    Result Date: 12/15/2022  Narrative: ABDOMEN ULTRASOUND, COMPLETE WITH DOPPLER INDICATION:    R61 26: Periumbilical pain   COMPARISON:  Abdominal ultrasound 2017, CT abdomen and pelvis November 2021 TECHNIQUE:   Real-time ultrasound of the abdomen was performed with a curvilinear transducer with both volumetric sweeps and still imaging techniques  FINDINGS: PANCREAS:  Visualized portions of the pancreas are within normal limits  AORTA AND IVC:  Visualized portions are normal for patient age  LIVER: Size:  Within normal range  The liver measures 12 0 cm in the midclavicular line  Contour:  Surface contour is smooth   Parenchyma:  Echogenicity and echotexture are within normal limits  No liver mass identified  LIVER DOPPLER: The main portal vein and primary branch segments are patent and hepatopetal with normal spectral waveform  Hepatic veins are patent  Spectral waveforms within normal limits  Main hepatic artery appears normal size, patent with normal spectral waveform  BILIARY: Patient has undergone cholecystectomy  No intrahepatic biliary dilatation  CBD measures 4 0 mm  No choledocholithiasis  KIDNEY: Right kidney measures 9 0 x 3 5 x 4 5 cm  Volume 73 8 mL Left kidney measures 10 1 x 4 6 x 5 1 cm  Volume 124 3 mL SPLEEN: Measures 7 9 x 8 3 x 4 3 cm  Volume 146 7 mL Within normal limits  ASCITES:  None  Impression: Postcholecystectomy  Workstation performed: [id number]     Answers for HPI/ROS submitted by the patient on 1/5/2023  Chronicity: chronic  Onset: more than 1 year ago  Onset quality: sudden  Frequency: daily  Episode duration: 2 Days  Progression since onset: waxing and waning  Pain location: LLQ, epigastric region, periumbilical region, left flank  Pain - numeric: 8/10  Pain quality: aching, cramping, a sensation of fullness, sharp  Radiates to: LUQ, periumbilical region, left flank, pelvis  anorexia: Yes  arthralgias: Yes  belching: Yes  constipation: Yes  diarrhea: Yes  dysuria: No  fever: No  flatus: Yes  frequency: Yes  headaches: Yes  hematochezia: No  hematuria: No  melena:  Yes  myalgias: Yes  nausea: Yes  weight loss: Yes  vomiting: No  Aggravated by: bowel movement, certain positions, deep breathing, drinking alcohol, eating  Relieved by: bowel movements, passing flatus  Diagnostic workup: CT scan, GI consult, lower endoscopy, ultrasound, upper endoscopy

## 2023-01-09 DIAGNOSIS — M25.50 POLYARTHRALGIA: ICD-10-CM

## 2023-01-12 RX ORDER — LIDOCAINE 50 MG/G
PATCH TOPICAL
Qty: 30 PATCH | Refills: 1 | Status: SHIPPED | OUTPATIENT
Start: 2023-01-12

## 2023-01-17 DIAGNOSIS — R42 VERTIGO: ICD-10-CM

## 2023-01-17 DIAGNOSIS — K59.00 CONSTIPATION, UNSPECIFIED CONSTIPATION TYPE: ICD-10-CM

## 2023-01-17 DIAGNOSIS — R11.0 NAUSEA: ICD-10-CM

## 2023-01-17 RX ORDER — POLYETHYLENE GLYCOL 3350 17 G/17G
POWDER, FOR SOLUTION ORAL
Qty: 238 G | Refills: 0 | Status: SHIPPED | OUTPATIENT
Start: 2023-01-17

## 2023-01-17 RX ORDER — MECLIZINE HYDROCHLORIDE 25 MG/1
TABLET ORAL
Qty: 30 TABLET | Refills: 0 | Status: SHIPPED | OUTPATIENT
Start: 2023-01-17

## 2023-01-17 RX ORDER — PROCHLORPERAZINE MALEATE 5 MG/1
TABLET ORAL
Qty: 30 TABLET | Refills: 0 | Status: SHIPPED | OUTPATIENT
Start: 2023-01-17

## 2023-01-23 NOTE — PSYCH
[address]    Name and Date of Birth: Natalia Blanco 40 y o  1980 MRN: [id number]    Date of Visit: January 25, 2023    Reason for visit: Full psychiatric intake assessment for medication management        Chief Complaint   Patient presents with   • Establish Care       HPI:     Natalia Blanco is a 40 y o  female, domiciled with , son (21), and daughter (16), currently unemployed due to medical issues, w/ PMH of gastroparesis, SMA syndrome, colitis, migraines, vertigo, unintentional weight loss, iron deficiency and b12 deficiency and PPH of depression and anxiety, no prior psychiatric admissions, no prior SA, no h/o self-injurious behavior, who presented to the mental health clinic for the initial intake and psychiatric evaluation on January 25, 2023  Judy Duong was a former patient of [address] [place] [address] (last visit on 11/3/2022) and is currently prescribed Remeron  Tolerating medication well with no medication side effects observed or reported  Not currently involved in individual psychotherapy; is on wait list to begin treatment  Natalia Blanco was visited in the clinic; chart reviewed  Met with patient individually  Reports symptoms of depression and anxiety began to worsen approximately 1 year ago due to worsening medical issues  Was in therapy many years ago due to depression and anxiety symptoms but was then doing well for a number of years  On evaluation today, Judy Duong endorses history of depression and anxiety symptoms beginning approximately one year ago with fluctuating course since that time  Precipitating stressors include medical issues, family issues, and financial stressors   She has experienced multiple depressive episodes over the past year, with symptoms including depressed mood, sleep disruption with frequent overnight awakening, anhedonia, decreased appetite, decreased concentration, low energy, and poor motivation  At times, experiences passive death wishes  Denies suicidal ideation, plan, or intent  Depressive symptoms have worsened over the past month  She has been feeling more emotional with frequent crying episodes  Has been arguing with her children for no reason  She was recently diagnosed with gastroparesis and has been unable to work over the past several years due to GI symptoms, pain, and unintentional weight loss  She will need surgery in the future but said she needs to see dietician and gain weight prior to surgery  Her appetite remains poor  Remeron has been beneficial with overall improvement in depressive symptoms until about 1 month ago when she began to experience worsening symptoms  She has been awakening frequently overnight  Magda Reyna endorses acute and chronic anxiety, pathologic in nature, and suggestive of HUEY (generalized anxiety disorder)  Reports excessive nervousness, irrational worry, and overt anxiousness  Pervasively restless, tense, keyed-up, and chronically on-edge  Experiences disruption in energy and concentration secondary to anxiety  Experiences irritability, inability to relax, and disruption in sleep secondary to pathologic anxiety  At times, overwhelmed/consumed by irrational fear  Magda Reyna reports severe anxiety symptoms and frequent panic episodes prior to starting medication  She has ongoing frequent worry but is better able to control her worry  She often utilizes deep breathing  She has some ongoing panic episodes approximately 1-2 days per week  She reports awakening with chest pain and palpitations, shakiness  Episodes last up to several hours  She mostly worries about her health and her children  Her 63-year-old son recently moved back home due to financial issues  He has 2 children and another child on the way  She has been worrying about his future  She has been irritable most days and arguing with her kids often   Her relationship with her  has been good, describing him as supportive and helpful to her  She often feels isolated at home due to being unemployed  No suicidal ideation, plan, or intent upon direct inquiry  SIB: denies  HI/hx violence: no HI or concerns for violence    No reported or documented trauma history  No intrusive, avoidance, negative alterations, or hyperarousal symptoms of PTSD noted  No disordered eating patterns, including restricting intake, binging, or purging  No symptoms of OCD including obsessive, ritualistic acts, intrusive thoughts or images  No present or past manic symptoms  No perceptual disturbances  No paranoid ideations or fixed delusions were elicited  Does not appear internally preoccupied at time of encounter  No history of substance use, including vaping, cigarettes, etoh, marijuana, or other illicit drug use  Review Of Systems:    Constitutional low energy and as noted in HPI   ENT negative   Cardiovascular negative   Respiratory negative   Gastrointestinal as noted in HPI   Genitourinary negative   Musculoskeletal negative   Integumentary negative   Neurological negative   Endocrine negative   Other Symptoms none, all other systems are negative         PHQ-2/9 Depression Screening    Little interest or pleasure in doing things: 2 - more than half the days  Feeling down, depressed, or hopeless: 3 - nearly every day  Trouble falling or staying asleep, or sleeping too much: 2 - more than half the days  Feeling tired or having little energy: 3 - nearly every day  Poor appetite or overeating: 3 - nearly every day  Feeling bad about yourself - or that you are a failure or have let yourself or your family down: 2 - more than half the days  Trouble concentrating on things, such as reading the newspaper or watching television: 2 - more than half the days  Moving or speaking so slowly that other people could have noticed   Or the opposite - being so fidgety or restless that you have been moving around a lot more than usual: 2 - more than half the days  Thoughts that you would be better off dead, or of hurting yourself in some way: 1 - several days  PHQ-9 Score: 20   PHQ-9 Interpretation: Severe depression          HUEY-7 Flowsheet Screening    Flowsheet Row Most Recent Value   Over the last 2 weeks, how often have you been bothered by any of the following problems? Feeling nervous, anxious, or on edge 2   Not being able to stop or control worrying 0   Worrying too much about different things 3   Trouble relaxing 0   Being so restless that it is hard to sit still 1   Becoming easily annoyed or irritable 3   Feeling afraid as if something awful might happen 0   HUEY-7 Total Score 9        Past Psychiatric History:    Past Inpatient Psychiatric Treatment:   No history of past inpatient psychiatric admissions  Past Outpatient Psychiatric Treatment:    Was in outpatient psychiatric treatment in the past with a psychiatrist  Past Suicide Attempts: no  Past Violent Behavior: no  Past Psychiatric Medication Trials: Lexapro, Buspar and Atarax   Lexapro-ineffective, Buspar-ineffective, Atarax-restlessness    Traumatic History:    Abuse: no history of physical or sexual abuse, no history of emotional abuse  Other Traumatic Events: none     Family Psychiatric History:   Paternal aunts-anxiety, depression  Maternal aunt-anxiety    FH of suicide-denies    Substance Abuse History:   Tobacco/alcohol/caffeine: Denies alcohol use, Denies tobacco use, Caffeine intake: 1 cups of caffeinated coffee per day(s)  Illicit drugs: Denies history of illicit drug use    Social History:    Developmental: Denies a history of milestone/developmental delay  Denies a history of in-utero exposure to toxins/illicit substances  There is no documented history of IEP or need for special education     Education: high school diploma/GED  Living arrangement, social support:  and children daughter (16), son (21)  2 other adult children live on their own  Unemployed due to medical issues   Access to firearms: Denies direct access to weapons/firearms   hx-denies  Legal hx-denies    Past Medical History:    Past Medical History:   Diagnosis Date   • Anemia     in past   • Anxiety    • Depression    • Gallstones    • Migraine    • Nausea     occ   • Panic attack    • Rotator cuff disorder, right    • Vertigo         Past Surgical History:   Procedure Laterality Date   • ABDOMINAL SURGERY      scar tissue removed   •  SECTION      last assessed: Oct 23, 2014    • HYSTERECTOMY     • IL LAPS SURG CHOLECYSTECTOMY W/CHOLANGIOGRAPHY N/A 2018    Procedure: CHOLECYSTECTOMY LAPAROSCOPIC  WITH  ATTEMPTED IOC; Surgeon: Lien Haji MD;  Location: [facility];  Service: General   • TUBAL LIGATION       No Known Allergies    History Review: The following portions of the patient's history were reviewed and updated as appropriate: allergies, current medications, past family history, past medical history, past social history, past surgical history and problem list     OBJECTIVE:    Vital signs in last 24 hours: There were no vitals filed for this visit      Mental Status Evaluation:  Appearance and attitude: appeared as stated age, cooperative and attentive, casually dressed, underweight, with good hygiene  Eye contact: good  Motor Function: within normal limits, intact gait, No PMA/PMR  Gait/station: normal gait/station and normal balance  Speech: normal for rate, rhythm, volume, latency, amount  Language: No overt abnormality  Mood/affect: depressed / Affect was constricted but reactive, mood congruent  Thought Processes: sequential and goal-directed  Thought content: denies suicidal ideation or homicidal ideation; no delusions or first rank symptoms  Associations: intact associations  Perceptual disturbances: denies Auditory/Visual/Tactile Hallucinations  Orientation: oriented to time, person, place and to the situational context  Cognitive Function: intact  Memory: recent and remote memory grossly intact  Intellect: average  Fund of knowledge: aware of current events, aware of past history and vocabulary average  Impulse control: good  Insight/judgment: good/good    Pain: denied    Lab Results: I have personally reviewed all pertinent laboratory/tests results  Recent Labs (last 2 months):    Admission on 11/30/2022, Discharged on 11/30/2022   Component Date Value   • WBC 11/30/2022 7 14    • RBC 11/30/2022 4 28    • Hemoglobin 11/30/2022 13 6    • Hematocrit 11/30/2022 39 4    • MCV 11/30/2022 92    • MCH 11/30/2022 31 8    • MCHC 11/30/2022 34 5    • RDW 11/30/2022 11 9    • MPV 11/30/2022 10 6    • Platelets 46/88/9325 213    • nRBC 11/30/2022 0    • Neutrophils Relative 11/30/2022 69    • Immat GRANS % 11/30/2022 0    • Lymphocytes Relative 11/30/2022 21    • Monocytes Relative 11/30/2022 10    • Eosinophils Relative 11/30/2022 0    • Basophils Relative 11/30/2022 0    • Neutrophils Absolute 11/30/2022 4 86    • Immature Grans Absolute 11/30/2022 0 02    • Lymphocytes Absolute 11/30/2022 1 50    • Monocytes Absolute 11/30/2022 0 73    • Eosinophils Absolute 11/30/2022 0 02    • Basophils Absolute 11/30/2022 0 01    • Sodium 11/30/2022 138    • Potassium 11/30/2022 3 7    • Chloride 11/30/2022 105    • CO2 11/30/2022 25    • ANION GAP 11/30/2022 8    • BUN 11/30/2022 8    • Creatinine 11/30/2022 0 68    • Glucose 11/30/2022 102    • Calcium 11/30/2022 8 7    • AST 11/30/2022 14    • ALT 11/30/2022 14    • Alkaline Phosphatase 11/30/2022 76    • Total Protein 11/30/2022 7 7    • Albumin 11/30/2022 3 9    • Total Bilirubin 11/30/2022 0 38    • eGFR 11/30/2022 108    • Lipase 11/30/2022 182          Suicide/Homicide Risk Assessment:    Risk of Harm to Self:  The following ratings are based on assessment at the time of the interview  Demographic risk factors include: none  Historical Risk Factors include: chronic depressive symptoms, chronic anxiety symptoms  Recent Specific Risk Factors include: diagnosis of depression, mental illness diagnosis, current depressive symptoms, current anxiety symptoms  Protective Factors: no current suicidal ideation, being a parent, being , compliant with medications, having a desire to be alive, no substance use problems, supportive family  Weapons: none  The following steps have been taken to ensure weapons are properly secured: not applicable  Based on today's assessment, Arlene Nathan presents the following risk of harm to self: minimal    Risk of Harm to Others: The following ratings are based on assessment at the time of the interview  Demographic Risk Factors include: none  Historical Risk Factors include: none  Recent Specific Risk Factors include: none  Protective Factors: no current homicidal ideation  Weapons: none  The following steps have been taken to ensure weapons are properly secured: not applicable  Based on today's assessment, Arlene Nathan presents the following risk of harm to others: none    The following interventions are recommended: no intervention changes needed  Although patient's acute lethality risk is LOW, long-term/chronic lethality risk is mildly elevated given chronic mental health symptoms  Arlene Bare is future-oriented, forward-thinking, and demonstrates ability to act in a self-preserving manner as evidenced by volitionally presenting to the clinic today, seeking treatment  At this time, inpatient hospitalization is not currently warranted  To mitigate future risk, patient should adhere to treatment recommendations, avoid alcohol/illicit substance use, utilize community-based resources and familiar support, and prioritize mental health treatment  Based on today's assessment and clinical criteria, [facility] contracts for safety and is not an imminent risk of harm to self or others  Outpatient level of care is deemed appropriate at this present time   Arlene Nathan understands that if they are no longer able to contract for safety, they need to call/contact the outpatient office including this writer, call/contact crisis and/or attend to the nearest Emergency Department for immediate evaluation  Assessment/Plan:   Diagnosis: 1  MDD 2  HUEY    In summary,   Chiqui Valencia is a 40 y o  female, domiciled with , son (21), and daughter (16), currently unemployed due to medical issues, w/ PMH of gastroparesis, SMA syndrome, colitis, migraines, vertigo, unintentional weight loss, iron deficiency and b12 deficiency and PPH of depression and anxiety, no prior psychiatric admissions, no prior SA, no h/o self-injurious behavior, who presented to the mental health clinic for the initial intake and psychiatric evaluation on January 25, 2023  Nelly Bey was a former patient of [address] [place] [address] (last visit on 11/3/2022) and is currently prescribed Remeron  Tolerating medication well with no medication side effects observed or reported  Not currently involved in individual psychotherapy; is on wait list to begin treatment  On assessment today, Nelly Bey endorses worsening depression symptoms over the past month with frequent tearfulness, low energy, middle insomnia, and passive death wishes, frequent worry, irritability, and intermittent panic episodes, in the context of psychosocial stressors, medical issues, and chronic mental health symptoms  Her current presentation meets criteria for MDD and HUEY  Currently she is not at risk for suicide, homicide, self-injury, aggressive behaviors, self-neglect, or neglect of dependents or children  Given this presentation, the patient will benefit from further outpatient follow up for management of her symptoms  At conclusion of evaluation, Chiqui Valencia is amenable and gave informed consent to the recommendations of this writer  Psycho-education regarding SSRI medication class, and the importance of compliance with psychiatric treatment reiterated   PARQ completed including serotonin syndrome, SIADH, worsening depression, suicidality, induction of hanna, GI upset, headaches, activation, sexual side effects, sedation, potential drug interactions, and others  Educated on the [facility] and Environmental Approach to mental health  Patient was receptive to education  Plan:  1  Admit to [address] [place] [address] outpatient services for ongoing treatment of depression and anxiety  2  Continue Remeron 30 mg at bedtime for depression  3  Initiate Zoloft 25 mg at bedtime for one week, then increase to 50 mg at bedtime for depression and anxiety symptoms  4  Currently on wait list for individual psychotherapy  5  Follow up with primary care provider for ongoing medical care  6  Follow up with this provider in 3 months         Diagnoses and all orders for this visit:    Moderate episode of recurrent major depressive disorder (HCC)  -     sertraline (Zoloft) 50 mg tablet; Take 1/2 tablet (25 mg) by mouth at bedtime for 1 week, then increase to 1 tablet at bedtime  -     mirtazapine (REMERON) 30 mg tablet; Take 1 tablet (30 mg total) by mouth daily at bedtime    HUEY (generalized anxiety disorder)  -     mirtazapine (REMERON) 30 mg tablet; Take 1 tablet (30 mg total) by mouth daily at bedtime          - Psychoeducation provided regarding the importance of exercise and health dietary choices and their impact on mood, energy, and motivation   - Counseled to avoid ETOH, illicit substances, and nicotine secondary to the detrimental effects of these substances on mental and physical health  - Encouraged to engage in non-verbal forms of therapy such as art therapy, music therapy, and mindfulness  - Psychoeducation regarding medication benefits and risks, side effects, indications and alternatives provided to the patient and the importance of compliance with psychiatric medication reiterated   The Fina Louis verbalized understanding and agreed with the plan  - The patient was educated about 24 hour and weekend coverage for urgent situations accessed by calling [address] main practice number  - Patient was educated to call [address] [facility] ([phone] [[phone]]) for behavioral crisis at any time, 911 for any safety concerns, or go to nearest ER if her symptoms become overwhelming or unmanageable  Medications Risks/Benefits:      Risks, Benefits And Possible Side Effects Of Medications:    Risks, benefits, and possible side effects of medications explained to Anupam and she verbalizes understanding and agreement for treatment      Controlled Medication Discussion:     No records found for controlled prescriptions according to [place] Prescription Drug Monitoring Program    Treatment Plan:    Completed and signed during the session: Yes - Treatment Plan done but not signed at time of office visit due to:  Plan reviewed in person and verbal consent given due to [address] distancing    This note was not shared with the patient due to reasonable likelihood of causing patient harm    Visit Time    Visit Start Time: 10:43 AM  Visit Stop Time: 11:23 AM  Total Visit Duration: 40 minutes      ANNAMARIE rCow 01/25/23

## 2023-01-25 ENCOUNTER — OFFICE VISIT (OUTPATIENT)
Dept: PSYCHIATRY | Facility: CLINIC | Age: 43
End: 2023-01-25

## 2023-01-25 DIAGNOSIS — F33.1 MODERATE EPISODE OF RECURRENT MAJOR DEPRESSIVE DISORDER (HCC): Primary | ICD-10-CM

## 2023-01-25 DIAGNOSIS — F41.1 GAD (GENERALIZED ANXIETY DISORDER): ICD-10-CM

## 2023-01-25 RX ORDER — MIRTAZAPINE 30 MG/1
30 TABLET, FILM COATED ORAL
Qty: 90 TABLET | Refills: 1 | Status: SHIPPED | OUTPATIENT
Start: 2023-01-25

## 2023-01-25 NOTE — BH TREATMENT PLAN
TREATMENT PLAN (Medication Management Only)        [facility]    Name and Date of Birth: David Mak 40 y o  1980  Date of Treatment Plan: January 25, 2023  Diagnosis/Diagnoses:    1  Moderate episode of recurrent major depressive disorder ([address] )    2  HUEY (generalized anxiety disorder)      Strengths/Personal Resources for Self-Care: supportive family, taking medications as prescribed, ability to communicate well, ability to understand psychiatric illness, average or above intelligence, willingness to work on problems  Area/Areas of need (in own words): anxiety symptoms, depressive symptoms  1  Long Term Goal: continue improvement in depression  Target Date:3 months - 4/25/2023  Person/Persons responsible for completion of goal: Erika Platt  2  Short Term Objective (s) - How will we reach this goal?:   A  Provider new recommended medication/dosage changes and/or continue medication(s): continue all other medications, Medication changes: Start Zoloft  Continue Remeron  B  N/A   C  N/A  Target Date:3 months - 4/25/2023  Person/Persons Responsible for Completion of Goal: Erika Platt  Progress Towards Goals: continuing treatment  Treatment Modality: medication management every 3 months  Review due 180 days from date of this plan: 6 months - 7/25/2023  Expected length of service: ongoing treatment  My Physician/PA/NP and I have developed this plan together and I agree to work on the goals and objectives  I understand the treatment goals that were developed for my treatment

## 2023-01-31 DIAGNOSIS — K31.84 GASTROPARESIS: ICD-10-CM

## 2023-01-31 RX ORDER — METOCLOPRAMIDE 5 MG/1
TABLET ORAL
Qty: 135 TABLET | Refills: 0 | Status: SHIPPED | OUTPATIENT
Start: 2023-01-31

## 2023-02-15 ENCOUNTER — TELEPHONE (OUTPATIENT)
Dept: PSYCHIATRY | Facility: CLINIC | Age: 43
End: 2023-02-15

## 2023-02-20 ENCOUNTER — TELEPHONE (OUTPATIENT)
Dept: PSYCHIATRY | Facility: CLINIC | Age: 43
End: 2023-02-20

## 2023-02-27 DIAGNOSIS — K52.9 COLITIS: ICD-10-CM

## 2023-02-27 DIAGNOSIS — G43.109 MIGRAINE WITH AURA AND WITHOUT STATUS MIGRAINOSUS, NOT INTRACTABLE: ICD-10-CM

## 2023-02-28 RX ORDER — FAMOTIDINE 40 MG/1
TABLET, FILM COATED ORAL
Qty: 60 TABLET | Refills: 2 | Status: SHIPPED | OUTPATIENT
Start: 2023-02-28

## 2023-02-28 RX ORDER — SUMATRIPTAN 50 MG/1
TABLET, FILM COATED ORAL
Qty: 9 TABLET | Refills: 0 | Status: SHIPPED | OUTPATIENT
Start: 2023-02-28

## 2023-03-11 DIAGNOSIS — M25.50 POLYARTHRALGIA: ICD-10-CM

## 2023-03-13 RX ORDER — LIDOCAINE 50 MG/G
PATCH TOPICAL
Qty: 90 PATCH | Refills: 1 | Status: SHIPPED | OUTPATIENT
Start: 2023-03-13

## 2023-03-16 ENCOUNTER — OFFICE VISIT (OUTPATIENT)
Dept: FAMILY MEDICINE CLINIC | Facility: CLINIC | Age: 43
End: 2023-03-16

## 2023-03-16 VITALS
DIASTOLIC BLOOD PRESSURE: 70 MMHG | WEIGHT: 92 LBS | BODY MASS INDEX: 18.55 KG/M2 | HEART RATE: 92 BPM | TEMPERATURE: 97.9 F | RESPIRATION RATE: 14 BRPM | OXYGEN SATURATION: 98 % | HEIGHT: 59 IN | SYSTOLIC BLOOD PRESSURE: 102 MMHG

## 2023-03-16 DIAGNOSIS — J02.9 PHARYNGITIS, UNSPECIFIED ETIOLOGY: ICD-10-CM

## 2023-03-16 DIAGNOSIS — M79.604 RIGHT LEG PAIN: Primary | ICD-10-CM

## 2023-03-16 RX ORDER — FLUTICASONE PROPIONATE 50 MCG
1 SPRAY, SUSPENSION (ML) NASAL DAILY
Qty: 16 G | Refills: 0 | Status: SHIPPED | OUTPATIENT
Start: 2023-03-16

## 2023-03-16 RX ORDER — FEXOFENADINE HCL 180 MG/1
180 TABLET ORAL DAILY
Qty: 90 TABLET | Refills: 1 | Status: SHIPPED | OUTPATIENT
Start: 2023-03-16

## 2023-03-16 NOTE — PROGRESS NOTES
Name: Yasmeen Ashton      : 1980      MRN: [id number]  Encounter Provider: ANNAMARIE Dodge  Encounter Date: 3/16/2023   Encounter department: [place] [address]    Assessment & Plan     1  Right leg pain  -     VAS lower limb venous duplex study, unilateral/limited; Future; Expected date: 2023    2  Pharyngitis, unspecified etiology  -     Throat culture  -     fexofenadine (ALLEGRA) 180 MG tablet; Take 1 tablet (180 mg total) by mouth daily  -     fluticasone (FLONASE) 50 mcg/act nasal spray; 1 spray into each nostril daily  -     Dyclonine-Glycerin (Cepacol Sore Throat Spray) 0 1-33 % LIQD; Apply 1 spray to the mouth or throat every 6 (six) hours           Subjective     Yasmeen Ashton is a 40 y o  female who  has a past medical history of Anemia, Anxiety, Depression, Gallstones, Migraine, Nausea, Panic attack, Rotator cuff disorder, right, and Vertigo  who presented to the office today for follow up       She reports that she started with a sore throat 2 days ago  Also has chills, hoarse voice, and congestion  She is also having posterior right leg pain right below a varicose vein, pain has been present for quite awhile but has been worsening over the past week       The following portions of the patient's history were reviewed and updated as appropriate: allergies, current medications, past family history, past medical history, past social history, past surgical history and problem list       Review of Systems   Constitutional: Positive for chills  Negative for fever  HENT: Positive for congestion and sore throat  Negative for ear pain  Eyes: Negative for pain and visual disturbance  Respiratory: Negative for cough and shortness of breath  Cardiovascular: Negative for chest pain and palpitations  Gastrointestinal: Negative for abdominal pain and vomiting  Genitourinary: Negative for dysuria and hematuria     Musculoskeletal: Positive for arthralgias and myalgias  Negative for back pain  Skin: Negative for color change and rash  Neurological: Negative for seizures and syncope  All other systems reviewed and are negative  Past Medical History:   Diagnosis Date   • Anemia     in past   • Anxiety    • Depression    • Gallstones    • Migraine    • Nausea     occ   • Panic attack    • Rotator cuff disorder, right    • Vertigo      Past Surgical History:   Procedure Laterality Date   • ABDOMINAL SURGERY      scar tissue removed   •  SECTION      last assessed: Oct 23, 2014    • HYSTERECTOMY     • IN LAPS SURG CHOLECYSTECTOMY W/CHOLANGIOGRAPHY N/A 2018    Procedure: CHOLECYSTECTOMY LAPAROSCOPIC  WITH  ATTEMPTED IOC; Surgeon: Eli Thomas MD;  Location: [facility];  Service: General   • TUBAL LIGATION       Family History   Problem Relation Age of Onset   • No Known Problems Family    • Hypertension Mother    • Diabetes type II Father         mellitus      Social History     Socioeconomic History   • Marital status: Single     Spouse name: None   • Number of children: None   • Years of education: None   • Highest education level: None   Occupational History   • None   Tobacco Use   • Smoking status: Former     Types: Cigarettes     Quit date: 2013     Years since quitting: 10 0   • Smokeless tobacco: Never   Vaping Use   • Vaping Use: Never used   Substance and Sexual Activity   • Alcohol use: No     Comment: none since 2021   • Drug use: No   • Sexual activity: None   Other Topics Concern   • None   Social History Narrative   • None     Social Determinants of Health     Financial Resource Strain: Medium Risk   • Difficulty of Paying Living Expenses: Somewhat hard   Food Insecurity: No Food Insecurity   • Worried About Running Out of Food in the Last Year: Never true   • Ran Out of Food in the Last Year: Never true   Transportation Needs: Unmet Transportation Needs   • Lack of Transportation (Medical):  Yes   • Lack of Transportation (Non-Medical): Yes   Physical Activity: Not on file   Stress: Not on file   Social Connections: Not on file   Intimate Partner Violence: Not on file   Housing Stability: Not on file     Current Outpatient Medications on File Prior to Visit   Medication Sig   • baclofen 10 mg tablet TAKE 1 TABLET(10 MG) BY MOUTH THREE TIMES DAILY AS NEEDED FOR MUSCLE SPASMS (Patient not taking: Reported on 1/4/2023)   • cholecalciferol (VITAMIN D3) 1,000 units tablet TAKE 1 TABLET BY MOUTH DAILY (Patient not taking: Reported on 1/4/2023)   • cyanocobalamin (VITAMIN B-12) 1000 MCG tablet Take 1 tablet (1,000 mcg total) by mouth daily   • Cyanocobalamin 1000 MCG/ML KIT  (Patient not taking: Reported on 1/4/2023)   • Diclofenac Sodium (VOLTAREN) 1 % APPLY 2 GRAMS TOPICALLY TO THE AFFECTED AREA FOUR TIMES DAILY (Patient not taking: Reported on 1/4/2023)   • dicyclomine (BENTYL) 20 mg tablet Take 1 tablet (20 mg total) by mouth every 6 (six) hours As needed for abdominal pain   • dicyclomine (BENTYL) 20 mg tablet Take 1 tablet (20 mg total) by mouth 2 (two) times a day for 10 days   • docusate sodium (COLACE) 100 mg capsule TAKE 1 CAPSULE BY MOUTH EVERY MORNING   • ergocalciferol (VITAMIN D2) 50,000 units TAKE 1 CAPSULE BY MOUTH 1 TIME A WEEK   • famotidine (PEPCID) 40 MG tablet TAKE 1 TABLET(40 MG) BY MOUTH DAILY   • ferrous sulfate 324 (65 Fe) mg Take 1 tablet (324 mg total) by mouth every other day   • lidocaine (LIDODERM) 5 % APPLY 1 PATCH TO AFFECTED AREA EVERY DAY(REMOVE AND DISCARD PATCH WITHIN 12 HOURS OR AS DIRECTED BY MD)   • meclizine (ANTIVERT) 25 mg tablet TAKE 1 TABLET(25 MG) BY MOUTH THREE TIMES DAILY AS NEEDED FOR DIZZINESS   • methylPREDNISolone 4 MG tablet therapy pack Use as directed on package (Patient not taking: Reported on 1/4/2023)   • metoclopramide (REGLAN) 5 mg tablet TAKE 1 TABLET(5 MG) BY MOUTH THREE TIMES DAILY   • mirtazapine (REMERON) 30 mg tablet Take 1 tablet (30 mg total) by mouth daily at bedtime   • ondansetron (Zofran ODT) 4 mg disintegrating tablet Take 1 tablet (4 mg total) by mouth every 6 (six) hours as needed for nausea or vomiting for up to 10 days   • polyethylene glycol (GLYCOLAX) 17 GM/SCOOP powder MIX AND DRINK 17 GRAMS BY MOUTH EVERY DAY   • prochlorperazine (COMPAZINE) 5 mg tablet TAKE 1 TABLET(5 MG) BY MOUTH EVERY 6 HOURS AS NEEDED FOR NAUSEA OR VOMITING   • sertraline (Zoloft) 50 mg tablet Take 1/2 tablet (25 mg) by mouth at bedtime for 1 week, then increase to 1 tablet at bedtime   • SUMAtriptan (IMITREX) 50 mg tablet TAKE 1 TABLET(50 MG) BY MOUTH 1 TIME FOR UP TO 1 DOSE AS NEEDED FOR MIGRAINE   • trospium chloride (SANCTURA) 20 mg tablet Take 20 mg by mouth 2 (two) times a day     No Known Allergies  Immunization History   Administered Date(s) Administered   • Influenza Quadrivalent Preservative Free 3 years and older IM 10/23/2014       Objective     /70 (BP Location: Left arm, Patient Position: Sitting, Cuff Size: Child)   Pulse 92   Temp 97 9 °F (36 6 °C) (Temporal)   Resp 14   Ht 4' 11" (1 499 m)   Wt 41 7 kg (92 lb)   LMP 07/24/2017   SpO2 98%   BMI 18 58 kg/m²     Physical Exam  Vitals and nursing note reviewed  Constitutional:       General: She is not in acute distress  Appearance: She is well-developed  She is not diaphoretic  HENT:      Head: Normocephalic and atraumatic  Right Ear: External ear normal       Left Ear: External ear normal       Nose: Congestion present  Mouth/Throat:      Pharynx: Posterior oropharyngeal erythema present  Eyes:      Conjunctiva/sclera: Conjunctivae normal       Pupils: Pupils are equal, round, and reactive to light  Cardiovascular:      Rate and Rhythm: Normal rate and regular rhythm  Pulmonary:      Effort: Pulmonary effort is normal  No respiratory distress  Breath sounds: Normal breath sounds  No wheezing  Musculoskeletal:         General: No deformity  Normal range of motion        Cervical back: Normal range of motion and neck supple  Comments: Right posterior calf- varicose vein +TTP    Lymphadenopathy:      Cervical: No cervical adenopathy  Skin:     General: Skin is warm and dry  Capillary Refill: Capillary refill takes less than 2 seconds  Findings: No rash  Neurological:      Mental Status: She is alert and oriented to person, place, and time     Psychiatric:         Behavior: Behavior normal        Gabby Vazquez

## 2023-03-16 NOTE — PATIENT INSTRUCTIONS
Pharyngitis   WHAT YOU NEED TO KNOW:   Pharyngitis, or sore throat, is inflammation of the tissues and structures in your pharynx (throat)  Pharyngitis is most often caused by bacteria or a virus  Other causes include smoking, allergies, or acid reflux  DISCHARGE INSTRUCTIONS:   Call your local emergency number (911 in the [address] [place] [address],[address]) if:   You have trouble breathing or swallowing because your throat is swollen  Return to the emergency department if:   You are drooling because it hurts too much to swallow  Your fever is higher than 102? F (39?C) or lasts longer than 3 days  You are confused  You taste blood  Call your doctor if:   Your throat pain gets worse  You have a painful lump in your throat that does not go away after 5 days  Your symptoms do not improve after 5 days  You have questions or concerns about your condition or care  Medicines:  Viral pharyngitis will go away on its own without treatment  Your sore throat should start to feel better in 3 to 5 days  You may need any of the following:  Antibiotics  treat a bacterial infection  NSAIDs  help decrease swelling and pain or fever  This medicine is available with or without a doctor's order  NSAIDs can cause stomach bleeding or kidney problems in certain people  If you take blood thinner medicine, always ask your healthcare provider if NSAIDs are safe for you  Always read the medicine label and follow directions  Acetaminophen  decreases pain and fever  It is available without a doctor's order  Ask how much to take and how often to take it  Follow directions  Read the labels of all other medicines you are using to see if they also contain acetaminophen, or ask your doctor or pharmacist  Acetaminophen can cause liver damage if not taken correctly  Take your medicine as directed  Contact your healthcare provider if you think your medicine is not helping or if you have side effects   Tell your provider if you are allergic to any medicine  Keep a list of the medicines, vitamins, and herbs you take  Include the amounts, and when and why you take them  Bring the list or the pill bottles to follow-up visits  Carry your medicine list with you in case of an emergency  Manage your symptoms:   Gargle salt water  Mix ¼ teaspoon salt in an 8 ounce glass of warm water and gargle  Do not swallow  Salt water may help decrease swelling in your throat  Drink liquids as directed  You may need to drink more liquids than usual  Liquids may help soothe your throat and prevent dehydration  Ask how much liquid to drink each day and which liquids are best for you  Use a cool mist humidifier  This will add moisture to the air and help decrease your cough  Soothe your throat  Cough drops, ice, soft foods, or popsicles may help decrease throat pain  Prevent the spread of pharyngitis:  Cover your mouth and nose when you cough or sneeze  Do not share food or drinks  Wash your hands often  Use soap and water  If soap and water are unavailable, use an alcohol-based hand   Follow up with your doctor as directed:  Write down your questions so you remember to ask them during your visits  © Copyright [employer] 2022 Information is for End User's use only and may not be sold, redistributed or otherwise used for commercial purposes  The above information is an  only  It is not intended as medical advice for individual conditions or treatments  Talk to your doctor, nurse or pharmacist before following any medical regimen to see if it is safe and effective for you

## 2023-03-18 LAB — BACTERIA THROAT CULT: NORMAL

## 2023-03-20 ENCOUNTER — HOSPITAL ENCOUNTER (OUTPATIENT)
Dept: NON INVASIVE DIAGNOSTICS | Facility: HOSPITAL | Age: 43
Discharge: HOME | End: 2023-03-20

## 2023-03-20 DIAGNOSIS — M79.604 RIGHT LEG PAIN: ICD-10-CM

## 2023-03-27 DIAGNOSIS — K52.9 COLITIS: ICD-10-CM

## 2023-03-27 DIAGNOSIS — G43.109 MIGRAINE WITH AURA AND WITHOUT STATUS MIGRAINOSUS, NOT INTRACTABLE: ICD-10-CM

## 2023-03-27 RX ORDER — FAMOTIDINE 40 MG/1
TABLET, FILM COATED ORAL
Qty: 90 TABLET | Refills: 0 | Status: SHIPPED | OUTPATIENT
Start: 2023-03-27

## 2023-03-27 RX ORDER — SUMATRIPTAN 50 MG/1
TABLET, FILM COATED ORAL
Qty: 9 TABLET | Refills: 0 | Status: SHIPPED | OUTPATIENT
Start: 2023-03-27

## 2023-04-03 ENCOUNTER — OFFICE VISIT (OUTPATIENT)
Dept: GASTROENTEROLOGY | Facility: CLINIC | Age: 43
End: 2023-04-03

## 2023-04-03 VITALS
WEIGHT: 89 LBS | DIASTOLIC BLOOD PRESSURE: 64 MMHG | HEIGHT: 59 IN | TEMPERATURE: 99.2 F | SYSTOLIC BLOOD PRESSURE: 100 MMHG | BODY MASS INDEX: 17.94 KG/M2

## 2023-04-03 DIAGNOSIS — K31.84 GASTROPARESIS: Primary | ICD-10-CM

## 2023-04-03 NOTE — PATIENT INSTRUCTIONS
Scheduled date of EGD with POEM (as of today): 5/19/23  Physician performing EGD: Dr Yuniel Armstrong  Location of EGD: [facility]  Instructions reviewed with patient by: Helena Ren in the office  Clearances:   n/a

## 2023-04-03 NOTE — PROGRESS NOTES
[facility] - Outpatient Follow-up Note  Jatinder Dutton 40 y o  female MRN: [id number]  Encounter: [id number]          ASSESSMENT AND PLAN:      1  Gastroparesis  Patient presenting currently with symptoms of gastroparesis  Her main symptoms are pain and bloating with early satiety, less nausea or vomiting  Etiology of gastroparesis is probably idiopathic   She is currently on a modified diet and her weight has been increasing after our last visit  GCSI 24  Gastric emptying study has been performed and showed half emptying time was 188, gastric emptying at 4 hours was 64%  We had an extensive discussion about options for endoscopic treatment including Botox injection, per oral endoscopic myotomy, patient was also made aware that surgery and gastric pacemaker are possible option for treatment  We discussed that Botox effects are usually short lived and can make further intervention such as G-POEM more difficult  Patient decided for G-POEM, patient was made aware this is a novel endoscopic procedure that provide some relief of her gastroparesis symptoms similar to surgical pyloroplasty, patient was made aware G-POEM does not provide a cure for her gastroparesis and symptoms do not improve at all in some patients, insurance may also not cover the procedure  Patient is aware that hospital admission is required after the procedure, usually for 1 night  The patient is in agreement with the plan, the risks and benefits of the procedure were discussed with the patient including but not limited to bleeding, infection, perforation, CO2 related complications such as capnothorax, capnomediastinum, capnoperitoneum, subcutaneous emphysema, as well as the need for surgery in case of emergency      ______________________________________________________________________    SUBJECTIVE:  Patient seen and examined, she is a 43-year-old female patient with history of chronic gastroparesis, she was seen in January, at that time we had a discussion about the procedure but given her significant weight loss we decided to add another discussion in 3 months after her weight improves, overall she has gained some weight, denies any recent events, currently is tolerating PO route, denies current nausea or vomiting, is passing flatus and having bowel movements, she is complaining about abdominal pain, persistent bloating and distended belly, no recent weight loss      REVIEW OF SYSTEMS IS OTHERWISE NEGATIVE  Historical Information   Past Medical History:   Diagnosis Date   • Anemia     in past   • Anxiety    • Depression    • Gallstones    • Migraine    • Nausea     occ   • Panic attack    • Rotator cuff disorder, right    • Vertigo      Past Surgical History:   Procedure Laterality Date   • ABDOMINAL SURGERY      scar tissue removed   •  SECTION      last assessed: Oct 23, 2014    • HYSTERECTOMY     • OR LAPS SURG CHOLECYSTECTOMY W/CHOLANGIOGRAPHY N/A 2018    Procedure: CHOLECYSTECTOMY LAPAROSCOPIC  WITH  ATTEMPTED IOC;   Surgeon: Lee Dyson MD;  Location: [facility];  Service: General   • TUBAL LIGATION       Social History   Social History     Substance and Sexual Activity   Alcohol Use No    Comment: none since 2021     Social History     Substance and Sexual Activity   Drug Use No     Social History     Tobacco Use   Smoking Status Former   • Types: Cigarettes   • Quit date: 2013   • Years since quitting: 10 1   Smokeless Tobacco Never     Family History   Problem Relation Age of Onset   • No Known Problems Family    • Hypertension Mother    • Diabetes type II Father         mellitus        Meds/Allergies       Current Outpatient Medications:   •  baclofen 10 mg tablet  •  cyanocobalamin (VITAMIN B-12) 1000 MCG tablet  •  dicyclomine (BENTYL) 20 mg tablet  •  docusate sodium (COLACE) 100 mg capsule  •  Dyclonine-Glycerin (Cepacol Sore Throat Spray) 0 1-33 % LIQD  •  famotidine (PEPCID) 40 MG "tablet  •  ferrous sulfate 324 (65 Fe) mg  •  fexofenadine (ALLEGRA) 180 MG tablet  •  fluticasone (FLONASE) 50 mcg/act nasal spray  •  lidocaine (LIDODERM) 5 %  •  meclizine (ANTIVERT) 25 mg tablet  •  metoclopramide (REGLAN) 5 mg tablet  •  mirtazapine (REMERON) 30 mg tablet  •  polyethylene glycol (GLYCOLAX) 17 GM/SCOOP powder  •  prochlorperazine (COMPAZINE) 5 mg tablet  •  sertraline (Zoloft) 50 mg tablet  •  SUMAtriptan (IMITREX) 50 mg tablet  •  trospium chloride (SANCTURA) 20 mg tablet  •  cholecalciferol (VITAMIN D3) 1,000 units tablet  •  Cyanocobalamin 1000 MCG/ML KIT  •  Diclofenac Sodium (VOLTAREN) 1 %  •  dicyclomine (BENTYL) 20 mg tablet  •  ergocalciferol (VITAMIN D2) 50,000 units  •  methylPREDNISolone 4 MG tablet therapy pack  •  ondansetron (Zofran ODT) 4 mg disintegrating tablet    No Known Allergies        Objective     Blood pressure 100/64, temperature 99 2 °F (37 3 °C), temperature source Tympanic, height 4' 11\" (1 499 m), weight 40 4 kg (89 lb), last menstrual period 07/24/2017, not currently breastfeeding  Body mass index is 17 98 kg/m²  PHYSICAL EXAM:      General Appearance:   Alert, cooperative, no distress   HEENT:   Normocephalic, atraumatic, anicteric  Neck:  Supple, symmetrical, trachea midline   Lungs:   Clear to auscultation bilaterally; no rales, rhonchi or wheezing; respirations unlabored    Heart[de-identified]   Regular rate and rhythm; no murmur, rub, or gallop  Abdomen:   Soft, non-tender, non-distended; normal bowel sounds; no masses, no organomegaly    Genitalia:   Deferred    Rectal:   Deferred    Extremities:  No edema    Lymph nodes:  No palpable cervical lymphadenopathy    Skin:  No jaundice, rashes, or lesions          Lab Results:   No visits with results within 1 Day(s) from this visit     Latest known visit with results is:   Office Visit on 03/16/2023   Component Date Value   • Throat Culture 03/16/2023 Negative for beta-hemolytic Streptococcus          Radiology " Results:   VAS lower limb venous duplex study, unilateral/limited    Result Date: 3/20/2023  Narrative:  [facility] REPORT CLINICAL: Indications: Patient presents with left posterior knee pain x 1 month, worsening over the past week  Patient has no history of DVT and is not on a blood thinner  Operative History: Patient denies previous cardiovascular surgery Risk Factors The patient has history of previous smoking (quit 5-10yrs ago)  CONCLUSION:  Impression: RIGHT LOWER LIMB LIMITED: Evaluation shows no evidence of thrombus in the common femoral vein  Doppler evaluation shows a normal response to augmentation maneuvers  LEFT LOWER LIMB: No evidence of acute or chronic deep vein thrombosis No evidence of superficial thrombophlebitis noted  No evidence of valvular incompetence noted in the deep veins  Popliteal, posterior tibial and anterior tibial arterial Doppler waveforms are triphasic  Technical findings were posted to [facility] 3/20/2023 @1233    SIGNATURE: Electronically Signed by: Inocente Peña MD on 2023-03-20 03:01:16 PM    Answers for HPI/ROS submitted by the patient on 4/2/2023  Chronicity: recurrent  Onset: more than 1 year ago  Onset quality: sudden  Frequency: every several days  Episode duration: 2 Days  Progression since onset: unchanged  Pain location: LUQ  Pain - numeric: 8/10  Pain quality: aching, cramping, a sensation of fullness, sharp  Radiates to: LLQ, LUQ  anorexia: No  arthralgias: Yes  belching: Yes  constipation: Yes  diarrhea: Yes  dysuria: No  fever: No  flatus: Yes  frequency: No  headaches: No  hematochezia: No  hematuria: No  melena: No  myalgias: Yes  nausea: Yes  weight loss: Yes  vomiting: No  Aggravated by: drinking alcohol, eating  Relieved by: belching, bowel movements, liquids, passing flatus, recumbency  Diagnostic workup: GI consult, lower endoscopy, upper endoscopy

## 2023-04-26 DIAGNOSIS — G43.109 MIGRAINE WITH AURA AND WITHOUT STATUS MIGRAINOSUS, NOT INTRACTABLE: ICD-10-CM

## 2023-04-26 DIAGNOSIS — K59.00 CONSTIPATION, UNSPECIFIED CONSTIPATION TYPE: ICD-10-CM

## 2023-04-26 RX ORDER — DOCUSATE SODIUM 100 MG/1
CAPSULE, LIQUID FILLED ORAL
Qty: 90 CAPSULE | Refills: 1 | Status: SHIPPED | OUTPATIENT
Start: 2023-04-26

## 2023-04-28 RX ORDER — SUMATRIPTAN 50 MG/1
TABLET, FILM COATED ORAL
Qty: 9 TABLET | Refills: 0 | Status: SHIPPED | OUTPATIENT
Start: 2023-04-28

## 2023-05-15 ENCOUNTER — TRANSCRIBE ORDERS (OUTPATIENT)
Dept: GASTROENTEROLOGY | Facility: CLINIC | Age: 43
End: 2023-05-15

## 2023-05-16 ENCOUNTER — TELEPHONE (OUTPATIENT)
Dept: OTHER | Facility: HOSPITAL | Age: 43
End: 2023-05-16

## 2023-05-16 NOTE — TELEPHONE ENCOUNTER
"I spoke to   [facility] for peer to peer for NATHAN SMALL  She is not a gastroenterologist, so she is sending the case for a \"specialty match\" where a gastroenterologist will review the case  She said we should hear back in about 1 week  If not, our nurse can call Dr Glenn De Leon direct line [phone]    "

## 2023-05-18 ENCOUNTER — TELEPHONE (OUTPATIENT)
Dept: GASTROENTEROLOGY | Facility: CLINIC | Age: 43
End: 2023-05-18

## 2023-05-18 NOTE — TELEPHONE ENCOUNTER
Pt returning call about her insurance not covering her procedure  She states Marjorie Izabella left her a message   She can be reached at [phone]

## 2023-05-22 DIAGNOSIS — G43.109 MIGRAINE WITH AURA AND WITHOUT STATUS MIGRAINOSUS, NOT INTRACTABLE: ICD-10-CM

## 2023-05-22 RX ORDER — SUMATRIPTAN 50 MG/1
TABLET, FILM COATED ORAL
Qty: 9 TABLET | Refills: 0 | Status: SHIPPED | OUTPATIENT
Start: 2023-05-22

## 2023-05-23 ENCOUNTER — TELEPHONE (OUTPATIENT)
Dept: GASTROENTEROLOGY | Facility: CLINIC | Age: 43
End: 2023-05-23

## 2023-05-23 DIAGNOSIS — K31.84 GASTROPARESIS: Primary | ICD-10-CM

## 2023-05-23 NOTE — TELEPHONE ENCOUNTER
Spoke with patient  Informed patient that the appeal has been denied  Dr Donavon Mcardle recommends patient to speak with Dr Ivy Currie or Dr Lola Mason to discuss domperidone  Patient states that she has discussed domperidone with Dr Lola Mason and she is not interested in domperidone at this time  Dr Donavon Mcardle also recommends referral to Dr Raman Richards at [facility]  Patient is agreeable to seeing Dr Raman Richards at [facility]  Referral placed  Advised patient that staff at Dr Margarita Power office will reach out to schedule  Clerical -please initiate the referral process (send clinical information, insurance, etc to fax # [phone])     In 1 week I will call the representative at Dr Jerry Carrasco office, her name is Alison Castaneda ([phone]) and check to make sure everything was received and that they have contacted pt to set up appt

## 2023-05-23 NOTE — TELEPHONE ENCOUNTER
Tried calling patient ([phone])  Unable to contact patient because phone is not in service  Sent patient my-chart message informing her that G-POEM was denied by insurance

## 2023-05-23 NOTE — TELEPHONE ENCOUNTER
----- Message from Tiffany Dial sent at 5/23/2023  9:47 AM EDT -----  Regarding: FW: BARNEY  Contact: [phone]    ----- Message -----  From: Maria Del Rosario Styles  Sent: 5/23/2023   9:39 AM EDT  To: , #  Subject: BARNEY                                           Good Morning   They denied the appeal? What would be the next step? You can give me a call at [phone]

## 2023-05-23 NOTE — TELEPHONE ENCOUNTER
May 19, 2023  Frank Alonso PA-C      4:39 PM  Note   Dr Elaine Perry, physician reviewer, called me back and explained that G-POEM was unfortunately denied after a gastroenterologist reviewed the case  Again, the justification is that G-POEM is investigational and experimental  We should be receiving paperwork from the insurance company, if we haven't already

## 2023-06-01 NOTE — TELEPHONE ENCOUNTER
Have not heard back from Dinah arreola from Dr Luke Lentz office  Spoke with patient  Patient states that no one has reached out from Dr Luke Lentz office to schedule a consultation  Provided patient with phone number to Dr Luke Lentz office to schedule consultation  Advised patient to call if she has trouble reaching Dr Luke cruz

## 2023-06-16 DIAGNOSIS — K52.9 COLITIS: ICD-10-CM

## 2023-06-16 RX ORDER — FAMOTIDINE 40 MG/1
TABLET, FILM COATED ORAL
Qty: 90 TABLET | Refills: 0 | Status: SHIPPED | OUTPATIENT
Start: 2023-06-16

## 2023-06-19 ENCOUNTER — TELEPHONE (OUTPATIENT)
Dept: FAMILY MEDICINE CLINIC | Facility: CLINIC | Age: 43
End: 2023-06-19

## 2023-06-19 NOTE — TELEPHONE ENCOUNTER
Hi, good morning  My name is Meenu Diaz, Date of birth [birth year] in [birth year]  The reason for my call is because I have an appointment today at 10:40  I would like to reschedule  I'm not feeling so good today  You could give me a call back at four, [phone] again, the number is [phone] again [phone]  It's just reschedule an appointment  Have to do a 1040  I'm not feeling good today  You could give me a call back, but I'll be calling back to reschedule  Thank you  Have a great day      Called patient back to reschedule her appointment patient is not feeling well

## 2023-06-26 ENCOUNTER — OFFICE VISIT (OUTPATIENT)
Dept: FAMILY MEDICINE CLINIC | Facility: CLINIC | Age: 43
End: 2023-06-26

## 2023-06-26 ENCOUNTER — APPOINTMENT (OUTPATIENT)
Dept: LAB | Facility: CLINIC | Age: 43
End: 2023-06-26
Payer: COMMERCIAL

## 2023-06-26 VITALS
WEIGHT: 92.6 LBS | BODY MASS INDEX: 18.67 KG/M2 | SYSTOLIC BLOOD PRESSURE: 118 MMHG | HEART RATE: 102 BPM | TEMPERATURE: 97.6 F | RESPIRATION RATE: 18 BRPM | OXYGEN SATURATION: 98 % | DIASTOLIC BLOOD PRESSURE: 64 MMHG | HEIGHT: 59 IN

## 2023-06-26 DIAGNOSIS — E61.1 IRON DEFICIENCY: ICD-10-CM

## 2023-06-26 DIAGNOSIS — E55.9 VITAMIN D DEFICIENCY: ICD-10-CM

## 2023-06-26 DIAGNOSIS — R63.4 UNINTENDED WEIGHT LOSS: ICD-10-CM

## 2023-06-26 DIAGNOSIS — R63.6 UNDERWEIGHT DUE TO INADEQUATE CALORIC INTAKE: ICD-10-CM

## 2023-06-26 DIAGNOSIS — E53.8 B12 DEFICIENCY: ICD-10-CM

## 2023-06-26 DIAGNOSIS — R53.83 FATIGUE, UNSPECIFIED TYPE: ICD-10-CM

## 2023-06-26 DIAGNOSIS — R63.6 UNDERWEIGHT DUE TO INADEQUATE CALORIC INTAKE: Primary | ICD-10-CM

## 2023-06-26 LAB
25(OH)D3 SERPL-MCNC: 29.6 NG/ML (ref 30–100)
ALBUMIN SERPL BCP-MCNC: 4.1 G/DL (ref 3.5–5)
ALP SERPL-CCNC: 72 U/L (ref 46–116)
ALT SERPL W P-5'-P-CCNC: 17 U/L (ref 12–78)
ANION GAP SERPL CALCULATED.3IONS-SCNC: 1 MMOL/L
AST SERPL W P-5'-P-CCNC: 13 U/L (ref 5–45)
BASOPHILS # BLD AUTO: 0.03 THOUSANDS/ÂΜL (ref 0–0.1)
BASOPHILS NFR BLD AUTO: 1 % (ref 0–1)
BILIRUB SERPL-MCNC: 0.42 MG/DL (ref 0.2–1)
BUN SERPL-MCNC: 12 MG/DL (ref 5–25)
CALCIUM SERPL-MCNC: 9.3 MG/DL (ref 8.3–10.1)
CHLORIDE SERPL-SCNC: 109 MMOL/L (ref 96–108)
CHOLEST SERPL-MCNC: 130 MG/DL
CO2 SERPL-SCNC: 28 MMOL/L (ref 21–32)
CREAT SERPL-MCNC: 0.76 MG/DL (ref 0.6–1.3)
EOSINOPHIL # BLD AUTO: 0.09 THOUSAND/ÂΜL (ref 0–0.61)
EOSINOPHIL NFR BLD AUTO: 1 % (ref 0–6)
ERYTHROCYTE [DISTWIDTH] IN BLOOD BY AUTOMATED COUNT: 12.1 % (ref 11.6–15.1)
EST. AVERAGE GLUCOSE BLD GHB EST-MCNC: 103 MG/DL
FERRITIN SERPL-MCNC: 56 NG/ML (ref 11–307)
GFR SERPL CREATININE-BSD FRML MDRD: 97 ML/MIN/1.73SQ M
GLUCOSE P FAST SERPL-MCNC: 96 MG/DL (ref 65–99)
HBA1C MFR BLD: 5.2 %
HCT VFR BLD AUTO: 41.7 % (ref 34.8–46.1)
HDLC SERPL-MCNC: 72 MG/DL
HGB BLD-MCNC: 13.6 G/DL (ref 11.5–15.4)
IMM GRANULOCYTES # BLD AUTO: 0.01 THOUSAND/UL (ref 0–0.2)
IMM GRANULOCYTES NFR BLD AUTO: 0 % (ref 0–2)
IRON SATN MFR SERPL: 43 % (ref 15–50)
IRON SERPL-MCNC: 131 UG/DL (ref 50–170)
LDLC SERPL CALC-MCNC: 49 MG/DL (ref 0–100)
LYMPHOCYTES # BLD AUTO: 1.87 THOUSANDS/ÂΜL (ref 0.6–4.47)
LYMPHOCYTES NFR BLD AUTO: 29 % (ref 14–44)
MCH RBC QN AUTO: 31.1 PG (ref 26.8–34.3)
MCHC RBC AUTO-ENTMCNC: 32.6 G/DL (ref 31.4–37.4)
MCV RBC AUTO: 95 FL (ref 82–98)
MONOCYTES # BLD AUTO: 0.7 THOUSAND/ÂΜL (ref 0.17–1.22)
MONOCYTES NFR BLD AUTO: 11 % (ref 4–12)
NEUTROPHILS # BLD AUTO: 3.74 THOUSANDS/ÂΜL (ref 1.85–7.62)
NEUTS SEG NFR BLD AUTO: 58 % (ref 43–75)
NONHDLC SERPL-MCNC: 58 MG/DL
NRBC BLD AUTO-RTO: 0 /100 WBCS
PLATELET # BLD AUTO: 243 THOUSANDS/UL (ref 149–390)
PMV BLD AUTO: 11.9 FL (ref 8.9–12.7)
POTASSIUM SERPL-SCNC: 4.3 MMOL/L (ref 3.5–5.3)
PROT SERPL-MCNC: 8.1 G/DL (ref 6.4–8.4)
RBC # BLD AUTO: 4.37 MILLION/UL (ref 3.81–5.12)
SODIUM SERPL-SCNC: 138 MMOL/L (ref 135–147)
TIBC SERPL-MCNC: 306 UG/DL (ref 250–450)
TRIGL SERPL-MCNC: 45 MG/DL
TSH SERPL DL<=0.05 MIU/L-ACNC: 0.88 UIU/ML (ref 0.45–4.5)
VIT B12 SERPL-MCNC: 1158 PG/ML (ref 180–914)
WBC # BLD AUTO: 6.44 THOUSAND/UL (ref 4.31–10.16)

## 2023-06-26 PROCEDURE — 80053 COMPREHEN METABOLIC PANEL: CPT

## 2023-06-26 PROCEDURE — 82728 ASSAY OF FERRITIN: CPT

## 2023-06-26 PROCEDURE — 84443 ASSAY THYROID STIM HORMONE: CPT

## 2023-06-26 PROCEDURE — 85025 COMPLETE CBC W/AUTO DIFF WBC: CPT

## 2023-06-26 PROCEDURE — 83550 IRON BINDING TEST: CPT

## 2023-06-26 PROCEDURE — 36415 COLL VENOUS BLD VENIPUNCTURE: CPT

## 2023-06-26 PROCEDURE — 83540 ASSAY OF IRON: CPT

## 2023-06-26 PROCEDURE — 83036 HEMOGLOBIN GLYCOSYLATED A1C: CPT

## 2023-06-26 PROCEDURE — 82306 VITAMIN D 25 HYDROXY: CPT

## 2023-06-26 PROCEDURE — 99214 OFFICE O/P EST MOD 30 MIN: CPT | Performed by: NURSE PRACTITIONER

## 2023-06-26 PROCEDURE — 80061 LIPID PANEL: CPT

## 2023-06-26 PROCEDURE — 82607 VITAMIN B-12: CPT

## 2023-06-26 NOTE — PROGRESS NOTES
Name: Susan Gould      : 1980      MRN: [id number]  Encounter Provider: ANNAMARIE Sanchez  Encounter Date: 2023   Encounter department: [facility]     Assessment & Plan     1  Underweight due to inadequate caloric intake  -     Ambulatory Referral to Nutrition Services; Future  -     CBC and differential; Future  -     Comprehensive metabolic panel; Future  -     Hemoglobin A1C; Future  -     Lipid panel; Future  -     TSH, 3rd generation with Free T4 reflex; Future  -     Vitamin B12; Future  -     Iron Panel (Includes Ferritin, Iron Sat%, Iron, and TIBC); Future    2  Unintended weight loss  -     Ambulatory Referral to Nutrition Services; Future  -     CBC and differential; Future  -     Comprehensive metabolic panel; Future  -     Hemoglobin A1C; Future  -     Lipid panel; Future  -     TSH, 3rd generation with Free T4 reflex; Future  -     Vitamin B12; Future  -     Iron Panel (Includes Ferritin, Iron Sat%, Iron, and TIBC); Future    3  Vitamin D deficiency  -     Vitamin D 25 hydroxy; Future    4  B12 deficiency  -     Vitamin B12; Future    5  Iron deficiency  -     CBC and differential; Future  -     Iron Panel (Includes Ferritin, Iron Sat%, Iron, and TIBC); Future    6  Fatigue, unspecified type  -     CBC and differential; Future  -     Comprehensive metabolic panel; Future  -     Hemoglobin A1C; Future  -     Lipid panel; Future  -     TSH, 3rd generation with Free T4 reflex; Future  -     Vitamin B12; Future  -     Iron Panel (Includes Ferritin, Iron Sat%, Iron, and TIBC); Future  -     Vitamin D 25 hydroxy; Future           Subjective      Susan Gould is a 42yo female here today for follow up of R leg pain  She states that the pain is improving and that she uses an ace wrap daily for the pain  Pt received a LE duplex on 3/20/23 which was normal  Denies any known injury to the leg, numbness/tingling, or falls     Today she complains of generalized weakness and fatigue  She has gained weight since her last appointment but wishes to see a nutritionist to further discuss her diet  Denies any dizziness, lightheadedness, headaches, or syncope  Review of Systems   Constitutional: Positive for fatigue  Negative for diaphoresis  Eyes: Negative for visual disturbance  Respiratory: Negative for chest tightness and shortness of breath  Cardiovascular: Negative for chest pain, palpitations and leg swelling  Gastrointestinal: Negative for constipation and diarrhea  Musculoskeletal: Positive for gait problem  Neurological: Positive for weakness  Negative for numbness         Current Outpatient Medications on File Prior to Visit   Medication Sig   • baclofen 10 mg tablet TAKE 1 TABLET(10 MG) BY MOUTH THREE TIMES DAILY AS NEEDED FOR MUSCLE SPASMS   • cholecalciferol (VITAMIN D3) 1,000 units tablet TAKE 1 TABLET BY MOUTH DAILY (Patient not taking: Reported on 1/4/2023)   • Cyanocobalamin 1000 MCG/ML KIT  (Patient not taking: Reported on 1/4/2023)   • Diclofenac Sodium (VOLTAREN) 1 % APPLY 2 GRAMS TOPICALLY TO THE AFFECTED AREA FOUR TIMES DAILY (Patient not taking: Reported on 1/4/2023)   • dicyclomine (BENTYL) 20 mg tablet Take 1 tablet (20 mg total) by mouth every 6 (six) hours As needed for abdominal pain   • dicyclomine (BENTYL) 20 mg tablet Take 1 tablet (20 mg total) by mouth 2 (two) times a day for 10 days   • docusate sodium (COLACE) 100 mg capsule TAKE 1 CAPSULE BY MOUTH EVERY MORNING   • Dyclonine-Glycerin (Cepacol Sore Throat Spray) 0 1-33 % LIQD Apply 1 spray to the mouth or throat every 6 (six) hours   • ergocalciferol (VITAMIN D2) 50,000 units TAKE 1 CAPSULE BY MOUTH 1 TIME A WEEK   • famotidine (PEPCID) 40 MG tablet TAKE 1 TABLET(40 MG) BY MOUTH DAILY   • ferrous sulfate 324 (65 Fe) mg Take 1 tablet (324 mg total) by mouth every other day   • fexofenadine (ALLEGRA) 180 MG tablet Take 1 tablet (180 mg total) by mouth daily   • "fluticasone (FLONASE) 50 mcg/act nasal spray 1 spray into each nostril daily   • lidocaine (LIDODERM) 5 % APPLY 1 PATCH TO AFFECTED AREA EVERY DAY(REMOVE AND DISCARD PATCH WITHIN 12 HOURS OR AS DIRECTED BY MD)   • meclizine (ANTIVERT) 25 mg tablet TAKE 1 TABLET(25 MG) BY MOUTH THREE TIMES DAILY AS NEEDED FOR DIZZINESS   • methylPREDNISolone 4 MG tablet therapy pack Use as directed on package (Patient not taking: Reported on 1/4/2023)   • metoclopramide (REGLAN) 5 mg tablet TAKE 1 TABLET(5 MG) BY MOUTH THREE TIMES DAILY   • mirtazapine (REMERON) 30 mg tablet Take 1 tablet (30 mg total) by mouth daily at bedtime   • ondansetron (Zofran ODT) 4 mg disintegrating tablet Take 1 tablet (4 mg total) by mouth every 6 (six) hours as needed for nausea or vomiting for up to 10 days   • polyethylene glycol (GLYCOLAX) 17 GM/SCOOP powder MIX AND DRINK 17 GRAMS BY MOUTH EVERY DAY   • prochlorperazine (COMPAZINE) 5 mg tablet TAKE 1 TABLET(5 MG) BY MOUTH EVERY 6 HOURS AS NEEDED FOR NAUSEA OR VOMITING   • sertraline (Zoloft) 50 mg tablet Take 1/2 tablet (25 mg) by mouth at bedtime for 1 week, then increase to 1 tablet at bedtime   • SUMAtriptan (IMITREX) 50 mg tablet TAKE 1 TABLET(50 MG) BY MOUTH 1 TIME FOR UP TO 1 DOSE AS NEEDED FOR MIGRAINE   • trospium chloride (SANCTURA) 20 mg tablet Take 20 mg by mouth 2 (two) times a day   • vitamin B-12 (VITAMIN B-12) 1,000 mcg tablet TAKE 1 TABLET BY MOUTH EVERY DAY       Objective     /64 (BP Location: Left arm, Patient Position: Sitting, Cuff Size: Standard)   Pulse 102   Temp 97 6 °F (36 4 °C) (Temporal)   Resp 18   Ht 4' 11\" (1 499 m)   Wt 42 kg (92 lb 9 6 oz)   LMP 07/24/2017   SpO2 98%   BMI 18 70 kg/m²     Physical Exam  Vitals and nursing note reviewed  Constitutional:       General: She is not in acute distress  HENT:      Head: Normocephalic and atraumatic     Eyes:      Conjunctiva/sclera: Conjunctivae normal    Cardiovascular:      Rate and Rhythm: Normal rate and " regular rhythm  Pulses: Normal pulses  Pulmonary:      Effort: Pulmonary effort is normal       Breath sounds: Normal breath sounds  Musculoskeletal:      Right lower leg: No edema  Left lower leg: No edema  Skin:     General: Skin is warm and dry  Neurological:      Mental Status: She is alert         DAMION Carter CRNP

## 2023-06-29 DIAGNOSIS — E55.9 VITAMIN D DEFICIENCY: ICD-10-CM

## 2023-06-29 RX ORDER — MELATONIN
1000 DAILY
Qty: 90 TABLET | Refills: 1 | Status: SHIPPED | OUTPATIENT
Start: 2023-06-29

## 2023-07-05 DIAGNOSIS — R11.0 NAUSEA: ICD-10-CM

## 2023-07-09 DIAGNOSIS — R42 VERTIGO: ICD-10-CM

## 2023-07-10 DIAGNOSIS — G43.109 MIGRAINE WITH AURA AND WITHOUT STATUS MIGRAINOSUS, NOT INTRACTABLE: ICD-10-CM

## 2023-07-10 DIAGNOSIS — F41.1 GAD (GENERALIZED ANXIETY DISORDER): ICD-10-CM

## 2023-07-10 DIAGNOSIS — F33.1 MODERATE EPISODE OF RECURRENT MAJOR DEPRESSIVE DISORDER (HCC): ICD-10-CM

## 2023-07-10 RX ORDER — PROCHLORPERAZINE MALEATE 5 MG/1
TABLET ORAL
Qty: 30 TABLET | Refills: 0 | Status: SHIPPED | OUTPATIENT
Start: 2023-07-10

## 2023-07-10 RX ORDER — MECLIZINE HYDROCHLORIDE 25 MG/1
TABLET ORAL
Qty: 30 TABLET | Refills: 0 | Status: SHIPPED | OUTPATIENT
Start: 2023-07-10

## 2023-07-11 RX ORDER — MIRTAZAPINE 30 MG/1
TABLET, FILM COATED ORAL
Qty: 90 TABLET | Refills: 1 | Status: SHIPPED | OUTPATIENT
Start: 2023-07-11

## 2023-08-04 DIAGNOSIS — G43.109 MIGRAINE WITH AURA AND WITHOUT STATUS MIGRAINOSUS, NOT INTRACTABLE: ICD-10-CM

## 2023-08-04 RX ORDER — SUMATRIPTAN 50 MG/1
TABLET, FILM COATED ORAL
Qty: 9 TABLET | Refills: 0 | Status: SHIPPED | OUTPATIENT
Start: 2023-08-04

## 2023-08-15 DIAGNOSIS — R42 VERTIGO: ICD-10-CM

## 2023-08-18 RX ORDER — MECLIZINE HYDROCHLORIDE 25 MG/1
TABLET ORAL
Qty: 30 TABLET | Refills: 0 | Status: SHIPPED | OUTPATIENT
Start: 2023-08-18

## 2023-09-12 DIAGNOSIS — K59.00 CONSTIPATION, UNSPECIFIED CONSTIPATION TYPE: ICD-10-CM

## 2023-09-12 RX ORDER — POLYETHYLENE GLYCOL 3350 17 G/17G
POWDER, FOR SOLUTION ORAL
Qty: 238 G | Refills: 0 | Status: SHIPPED | OUTPATIENT
Start: 2023-09-12

## 2023-09-14 ENCOUNTER — TELEPHONE (OUTPATIENT)
Dept: PSYCHIATRY | Facility: CLINIC | Age: 43
End: 2023-09-14

## 2023-09-28 ENCOUNTER — TELEPHONE (OUTPATIENT)
Dept: PSYCHIATRY | Facility: CLINIC | Age: 43
End: 2023-09-28

## 2023-09-28 NOTE — TELEPHONE ENCOUNTER
Ingrid Miller called regarding appt with Sunny Sawyer patient was told that someone will contact her to schedule appt. Patient understood.

## 2023-09-30 DIAGNOSIS — R11.0 NAUSEA: ICD-10-CM

## 2023-09-30 DIAGNOSIS — R42 VERTIGO: ICD-10-CM

## 2023-10-02 RX ORDER — PROCHLORPERAZINE MALEATE 5 MG/1
TABLET ORAL
Qty: 30 TABLET | Refills: 0 | Status: SHIPPED | OUTPATIENT
Start: 2023-10-02

## 2023-10-02 RX ORDER — MECLIZINE HYDROCHLORIDE 25 MG/1
TABLET ORAL
Qty: 30 TABLET | Refills: 0 | Status: SHIPPED | OUTPATIENT
Start: 2023-10-02

## 2023-10-03 ENCOUNTER — OFFICE VISIT (OUTPATIENT)
Dept: PSYCHIATRY | Facility: CLINIC | Age: 43
End: 2023-10-03
Payer: COMMERCIAL

## 2023-10-03 DIAGNOSIS — F41.1 GAD (GENERALIZED ANXIETY DISORDER): ICD-10-CM

## 2023-10-03 DIAGNOSIS — F41.1 GENERALIZED ANXIETY DISORDER: ICD-10-CM

## 2023-10-03 DIAGNOSIS — F33.1 MODERATE EPISODE OF RECURRENT MAJOR DEPRESSIVE DISORDER (HCC): ICD-10-CM

## 2023-10-03 DIAGNOSIS — F32.1 CURRENT MODERATE EPISODE OF MAJOR DEPRESSIVE DISORDER WITHOUT PRIOR EPISODE (HCC): Primary | ICD-10-CM

## 2023-10-03 PROBLEM — G43.019 COMMON MIGRAINE WITH INTRACTABLE MIGRAINE: Status: ACTIVE | Noted: 2017-04-27

## 2023-10-03 PROBLEM — R10.13 EPIGASTRIC PAIN: Status: ACTIVE | Noted: 2017-12-05

## 2023-10-03 PROBLEM — K80.20 CHOLELITHIASIS: Status: ACTIVE | Noted: 2017-12-14

## 2023-10-03 PROCEDURE — 90792 PSYCH DIAG EVAL W/MED SRVCS: CPT | Performed by: NURSE PRACTITIONER

## 2023-10-03 RX ORDER — DULOXETIN HYDROCHLORIDE 20 MG/1
20 CAPSULE, DELAYED RELEASE ORAL DAILY
Qty: 20 CAPSULE | Refills: 1 | Status: SHIPPED | OUTPATIENT
Start: 2023-10-03

## 2023-10-03 RX ORDER — MIRTAZAPINE 30 MG/1
30 TABLET, FILM COATED ORAL
Qty: 90 TABLET | Refills: 1 | Status: SHIPPED | OUTPATIENT
Start: 2023-10-03

## 2023-10-03 NOTE — BH TREATMENT PLAN
TREATMENT PLAN (Medication Management Only)        [facility]    Name and Date of Birth: Sujey Ramsay 44 y.o. 1980  Date of Treatment Plan: October 3, 2023  Diagnosis/Diagnoses:    1. Current moderate episode of major depressive disorder without prior episode ([address])    2. Generalized anxiety disorder      Strengths/Personal Resources for Self-Care: supportive family, taking medications as prescribed, ability to communicate well, ability to understand psychiatric illness, average or above intelligence, willingness to work on problems. Area/Areas of need (in own words): anxiety symptoms, depressive symptoms  1. Long Term Goal: continue improvement in depression. Target Date:6 months - 4/3/2024  Person/Persons responsible for completion of goal: Maxx Howard  2. Short Term Objective (s) - How will we reach this goal?:   A. Provider new recommended medication/dosage changes and/or continue medication(s): Medication changes: . B. Attend medication management appointments regularly. C. Call supportive people when needed . Target Date:3 months - 1/3/2024  Person/Persons Responsible for Completion of Goal: Maxx Howard  Progress Towards Goals: initiating treatment  Treatment Modality: medication management every 3 months  Review due 180 days from date of this plan: 6 months - 4/3/2024  Expected length of service: ongoing treatment  My Physician/PA/NP and I have developed this plan together and I agree to work on the goals and objectives. I understand the treatment goals that were developed for my treatment.

## 2023-10-03 NOTE — PSYCH
[address] [facility]    Name and Date of Birth: Tay Beth 44 y.o. 1980 MRN: [id number]    Date of Visit: October 3, 2023    TIME STARTED: I have spent 30 minutes with Patient  today, starting time 1350, ending time 46    Reason for visit: Initial psychiatric intake assessment    Chief complaint: "my depression and anxiety". History of Present Illness (HPI): Tay Beth is a 44 y.o., female, possessing a previous psychiatric history significant for Major Depression, medically complicated by Gastroparesis of unknown etiology and subsequent vitamin deficiency, nausea, weight loss, presenting to the [address] [place] [address] outpatient clinic for intake assessment. Graciela Johnson states "I get so sad and lonely ". Was last seen in this office by Bhargavi Esqueda in January of this year. At that time she was continued on her Remeron 30 mg for both depression and appetite. She was started on Zoloft 25 mg with instructions to increase to 50 mg after 2 weeks. She reports she stopped taking the Zoloft due to side effect of akathisia. Since that time she has continued on her Remeron. She was scheduled with this therapist and also follow up with Wendy Duarte, but was a no-show to both of those appointments. She reports this is due to change in her telephone number. She did not know she had been assigned a therapist, thought she was still on the wait list.  Since last seen by Wendy Duarte she reports her depression and anxiety have largely stayed the same for "maybe a little worse ". She reports some physical exam anxiety symptoms including shakiness and heart palpitations. She states this will occur several times a week. Symptoms of depression include sadness, loneliness, increases in anxiety, anhedonia, sometimes intentionally isolative, ongoing decrease in appetite. She reports she is sleeping well currently on Remeron 30 mg. She denies any suicidal thoughts or passive death wish. She reports throughout her course of depression she has never had suicidal thoughts or passive death wish. Previous diagnoses of major depression and generalized anxiety disorder. Denies any symptoms of zoila. Denies any history of psychosis. Denies any symptoms of obsessive-compulsive disorder. Presently, patient denies suicidal/homicidal ideation in addition to thoughts of self-injury; contracts for safety, see below for risk assessment. At conclusion of evaluation, patient is amenable to the recommendations of this writer including: The addition of Cymbalta 20 mg for depression and anxiety. She is on the waiting list for an outpatient therapist..  Also, patient is amenable to calling/contacting the outpatient office including this writer if any acute adverse effects of their medication regimen arise in addition to any comments or concerns pertaining to their psychiatric management. Patient is amenable to calling/contacting crisis and/or attending to the nearest emergency department if their clinical condition deteriorates to assure their safety and stability, stating that they are able to appropriately confide in their significant other regarding their psychiatric state. Current Rating Scores:     None completed today.     Psychiatric Review Of Systems:    Appetite: decreased, weight loss   Adverse eating: currently symptoms of anorexia  Weight changes: decreased  Insomnia/sleeplessness: no  Fatigue/anergy: yes  Anhedonia/lack of interest: yes  Attention/concentration: yes  Psychomotor agitation/retardation: yes  Somatic symptoms: yes  Anxiety/panic attack: yes  Zoila/hypomania: no  Hopelessness/helplessness/worthlessness: no  Self-injurious behavior/high-risk behavior: no  Suicidal ideation: no  Homicidal ideation: no  Auditory hallucinations: no  Visual hallucinations: no  Other perceptual disturbances: no  Delusional thinking: no  Obsessive/compulsive symptoms: no    Review Of Systems:    Constitutional feeling poorly and feeling tired   ENT negative   Cardiovascular negative   Respiratory negative   Gastrointestinal abdominal discomfort   Genitourinary negative   Musculoskeletal leg pain   Integumentary negative   Neurological negative   Endocrine negative       Pain Level       Other Symptoms none, all other systems are negative       Family Psychiatric History:     Family History   Problem Relation Age of Onset   • No Known Problems Family    • Hypertension Mother    • Diabetes type II Father         mellitus                Past Psychiatric History:     Previous inpatient psychiatric admissions: None. Previous inpatient/outpatient substance abuse rehabilitation: None. Present/previous outpatient psychiatric treatment: Here since November 2022. Present/previous psychotherapy: None. History of suicidal attempts/gestures: None. History of violence/aggressive behaviors: None. Present/previous psychotropic medication use: Currently on mirtazapine. Previously trialed Zoloft (caused akathisia), Lexapro, BuSpar, gabapentin. Substance Abuse History:    Patient denies history of alcohol, illict substance, or tobacco abuse. Rachel Gill does not apear under the influence or withdrawal of any psychoactive substance throughout today's examination.      Social History:    Educational History:  Academic history: high school diploma/GED  Marital history:   Social support system:  and children  Residential history: Resides in a home; lives with  and daughter  Vocational History: on permanent disability  Access to guns/weapons: none  Legal History: None    Traumatic History:     Abuse:none is reported  Other Traumatic Events: None    Past Medical History:    Past Medical History:   Diagnosis Date   • Anemia     in past   • Anxiety    • Depression    • Gallstones    • Migraine    • Nausea     occ   • Panic attack    • Rotator cuff disorder, right    • Vertigo         Past Surgical History:   Procedure Laterality Date   • ABDOMINAL SURGERY      scar tissue removed   •  SECTION      last assessed: Oct 23, 2014    • HYSTERECTOMY     • [place] LAPS SURG CHOLECYSTECTOMY W/CHOLANGIOGRAPHY N/A 2018    Procedure: CHOLECYSTECTOMY LAPAROSCOPIC  WITH  ATTEMPTED IOC; Surgeon: Laurel Mcrae MD;  Location: [facility];  Service: General   • TUBAL LIGATION       No Known Allergies      OBJECTIVE:    Vital signs in last 24 hours: There were no vitals filed for this visit.     Mental Status Evaluation:    Appearance age appropriate, casually dressed   Behavior cooperative, calm   Speech normal rate, normal volume, normal pitch   Mood depressed   Affect tearful   Thought Processes organized, goal directed   Associations intact associations   Thought Content no overt delusions   Perceptual Disturbances: no auditory hallucinations, no visual hallucinations   Abnormal Thoughts  Risk Potential Suicidal ideation - None  Homicidal ideation - None  Potential for aggression - No   Orientation oriented to person, place, time/date and situation   Memory recent and remote memory grossly intact   Consciousness alert and awake   Attention Span Concentration Span attention span and concentration are age appropriate   Intellect appears to be of average intelligence   Insight intact   Judgement intact   Muscle Strength and  Gait normal muscle strength and normal muscle tone, normal gait and normal balance   Motor Activity no abnormal movements   Language no difficulty naming common objects, no difficulty repeating a phrase, no difficulty writing a sentence   Fund of Knowledge adequate knowledge of current events  adequate fund of knowledge regarding past history  adequate fund of knowledge regarding vocabulary        Laboratory Results: I have personally reviewed all pertinent laboratory/tests results    Recent Labs (last 2 months):   No visits with results within 2 Month(s) from this visit.    Latest known visit with results is:   Appointment on 06/26/2023   Component Date Value   • WBC 06/26/2023 6.44    • RBC 06/26/2023 4.37    • Hemoglobin 06/26/2023 13.6    • Hematocrit 06/26/2023 41.7    • MCV 06/26/2023 95    • MCH 06/26/2023 31.1    • MCHC 06/26/2023 32.6    • RDW 06/26/2023 12.1    • MPV 06/26/2023 11.9    • Platelets 31/79/5859 243    • nRBC 06/26/2023 0    • Neutrophils Relative 06/26/2023 58    • Immat GRANS % 06/26/2023 0    • Lymphocytes Relative 06/26/2023 29    • Monocytes Relative 06/26/2023 11    • Eosinophils Relative 06/26/2023 1    • Basophils Relative 06/26/2023 1    • Neutrophils Absolute 06/26/2023 3.74    • Immature Grans Absolute 06/26/2023 0.01    • Lymphocytes Absolute 06/26/2023 1.87    • Monocytes Absolute 06/26/2023 0.70    • Eosinophils Absolute 06/26/2023 0.09    • Basophils Absolute 06/26/2023 0.03    • Sodium 06/26/2023 138    • Potassium 06/26/2023 4.3    • Chloride 06/26/2023 109 (H)    • CO2 06/26/2023 28    • ANION GAP 06/26/2023 1    • BUN 06/26/2023 12    • Creatinine 06/26/2023 0.76    • Glucose, Fasting 06/26/2023 96    • Calcium 06/26/2023 9.3    • AST 06/26/2023 13    • ALT 06/26/2023 17    • Alkaline Phosphatase 06/26/2023 72    • Total Protein 06/26/2023 8.1    • Albumin 06/26/2023 4.1    • Total Bilirubin 06/26/2023 0.42    • eGFR 06/26/2023 97    • Hemoglobin A1C 06/26/2023 5.2    • EAG 06/26/2023 103    • Cholesterol 06/26/2023 130    • Triglycerides 06/26/2023 45    • HDL, Direct 06/26/2023 72    • LDL Calculated 06/26/2023 49    • Non-HDL-Chol (CHOL-HDL) 06/26/2023 58    • TSH 3RD GENERATON 06/26/2023 0.877    • Vitamin B-12 06/26/2023 1,158 (H)    • Vit D, 25-Hydroxy 06/26/2023 29.6 (L)    • Iron Saturation 06/26/2023 43    • TIBC 06/26/2023 306    • Iron 06/26/2023 131    • Ferritin 06/26/2023 56          Suicide/Homicide Risk Assessment:    Risk of Harm to Self:  Demographic risk factors include: none  Historical Risk Factors include: chronic depression, history of anxiety  Protective Factors: no current suicidal ideation, access to mental health treatment, being , compliant with medications  Based on today's assessment, Maxx Howard presents the following risk of harm to self: none    Risk of Harm to Others:  Demographic Risk Factors include: none. Historical Risk Factors include: none. Recent Specific Risk Factors include: none. Protective Factors: no current homicidal ideation  Based on today's assessment, Maxx Howard presents the following risk of harm to others: none    The following interventions are recommended: no intervention changes needed. Although patient's acute lethality risk is low, long-term/chronic lethality risk is mildly elevated in the presence of ongoing depression. At the current moment, Maxx Howard is future-oriented, forward-thinking, and demonstrates ability to act in a self-preserving manner as evidenced by volitionally presenting to the clinic today, seeking treatment. To mitigate future risk, patient should adhere to the recommendations of this writer, avoid alcohol/illicit substance use, utilize community-based resources and familiar support and prioritize mental health treatment. Presently, patient denies suicidal/homicidal ideation in addition to thoughts of self-injury; contracts for safety, see below for risk assessment. At conclusion of evaluation, patient is amenable to the recommendations of this writer including: Medication adjustments and individual therapy. Also, patient is amenable to calling/contacting the outpatient office including this writer if any acute adverse effects of their medication regimen arise in addition to any comments or concerns pertaining to their psychiatric management.   Patient is amenable to calling/contacting crisis and/or attending to the nearest emergency department if their clinical condition deteriorates to assure their safety and stability, stating that they are able to appropriately confide in their  or daughter regarding their psychiatric state. At this juncture, inpatient hospitalization is not currently warranted. The following interventions are recommended:   no intervention changes  To mitigate future risk, patient should adhere to the recommendations of this writer, avoid alcohol/illicit substance use, utilize community-based resources and familiar support and prioritize mental health treatment. Assessment/Plan:   Diagnoses and all orders for this visit:    Current moderate episode of major depressive disorder without prior episode ([address])    Generalized anxiety disorder      Treatment Recommendations/Precautions:    • Add Cymbalta 20 mg daily  • Continue Remeron 30 mg at bedtime  • Medication management every 1-2 months  • Aware of 24 hour and weekend coverage for urgent situations accessed by calling [facility] main practice number    Medications Risks/Benefits:      Risks, Benefits And Possible Side Effects Of Medications:    Risks, benefits, and possible side effects of medications explained to Miracle and she verbalizes understanding and agreement for treatment. including: . Controlled Medication Discussion: Miracle has been filling controlled prescriptions on time as prescribed according to [place] Prescription Drug Monitoring Program    Treatment Plan:    Completed and signed during the session: Yes - with Miracle, not signed by patient due to technical difficulties. Will have her sign at next visit.   Verbal consent obtained    This note was not shared with the patient due to reasonable likelihood of causing patient harm    ANNAMARIE Mosqueda 10/03/23

## 2023-10-12 DIAGNOSIS — K59.00 CONSTIPATION, UNSPECIFIED CONSTIPATION TYPE: ICD-10-CM

## 2023-10-12 RX ORDER — POLYETHYLENE GLYCOL 3350 17 G/17G
POWDER, FOR SOLUTION ORAL
Qty: 238 G | Refills: 0 | Status: SHIPPED | OUTPATIENT
Start: 2023-10-12

## 2023-10-31 ENCOUNTER — OFFICE VISIT (OUTPATIENT)
Dept: PSYCHIATRY | Facility: CLINIC | Age: 43
End: 2023-10-31
Payer: COMMERCIAL

## 2023-10-31 VITALS — WEIGHT: 95.6 LBS | BODY MASS INDEX: 19.31 KG/M2

## 2023-10-31 DIAGNOSIS — F41.0 PANIC ATTACK: ICD-10-CM

## 2023-10-31 DIAGNOSIS — R63.6 UNDERWEIGHT DUE TO INADEQUATE CALORIC INTAKE: ICD-10-CM

## 2023-10-31 DIAGNOSIS — G47.09 OTHER INSOMNIA: ICD-10-CM

## 2023-10-31 DIAGNOSIS — F41.1 GENERALIZED ANXIETY DISORDER: ICD-10-CM

## 2023-10-31 DIAGNOSIS — F32.1 CURRENT MODERATE EPISODE OF MAJOR DEPRESSIVE DISORDER WITHOUT PRIOR EPISODE (HCC): Primary | ICD-10-CM

## 2023-10-31 PROCEDURE — 99214 OFFICE O/P EST MOD 30 MIN: CPT | Performed by: NURSE PRACTITIONER

## 2023-10-31 RX ORDER — DULOXETIN HYDROCHLORIDE 30 MG/1
30 CAPSULE, DELAYED RELEASE ORAL DAILY
Qty: 90 CAPSULE | Refills: 1 | Status: SHIPPED | OUTPATIENT
Start: 2023-10-31

## 2023-10-31 NOTE — PSYCH
MEDICATION MANAGEMENT NOTE        [facility]    Name and Date of Birth: Jovany Lindsay 43 y.o. 1980 MRN: [id number]    Date of Visit: October 31, 2023    Visit Time    Visit Start Time: 3623   Visit Stop Time: 9723  Total Visit Duration:  25 minutes     The total visit duration detailed above includes: patient engagement, medication management, psychotherapy/counseling, discussion regarding treatment goals, and coordination of care. Note Share Disclaimer: This note was not shared with the patient due to reasonable likelihood of causing patient harm    No Known Allergies  SUBJECTIVE:    Chino Hernandez is seen today for a follow up for Major Depressive Disorder, Generalized Anxiety Disorder, Panic Disorder, and insomnia. She continues to improve gradually since the last visit. At last visit we discontinued her Zoloft and started on Cymbalta 20. She has been tolerating Cymbalta 20 with no noted adverse effects. She notes some improvement in mood and energy and motivation. She notes some decrease in her anxiety overall. States her  has noted improvements as well. Looks brighter. States appetite has been about the same. We did weigh her today as she has been underweight due to gastroparesis. Was likely complicated by her anxiety. Weight today was 95 pounds which is a little bit of an improvement. She does states she still has episodes of increased anxiety and panic. We discussed option for med increase and she would like to try Cymbalta at 20 mg. She continues Remeron at 30 mg. States she would not be able to sleep were it not for this medication. We will continue the same. She denies any side effects from current psychiatric medications. HPI ROS Appetite Changes and Sleep:     She reports fluctuating sleep pattern,  appetite continues low , fluctuating energy levels.  Denies homicidal ideation, denies suicidal ideation    Review Of Systems:   no complaints, all other systems are negative         Mental Status Evaluation:    Appearance:  age appropriate   Behavior:  pleasant, cooperative   Speech:  normal rate, normal volume   Mood:  improved   Affect:  normal range and intensity   Thought Process:  goal directed   Associations: intact associations   Thought Content:  no overt delusions   Perceptual Disturbances: none   Risk Potential: Suicidal ideation - None  Homicidal ideation - None  Potential for aggression - No   Sensorium:  oriented to person, place, time/date, and situation   Memory:  recent and remote memory grossly intact   Consciousness:  alert and awake   Attention: attention span and concentration appear shorter than expected for age   Insight:  improved   Judgment: fair   Gait/Station: normal gait/station, normal balance   Motor Activity: no abnormal movements       History Review: The following portions of the patient's history were reviewed and updated as appropriate: psychiatric history, trauma history allergies, current medications, past family history, past medical history, past social history, past surgical history, and problem list   OBJECTIVE:     Vital signs in last 24 hours: There were no vitals filed for this visit. Laboratory Results: I have personally reviewed all pertinent laboratory/tests results. Suicide/Homicide Risk Assessment:    The following interventions are recommended: no intervention changes needed. Although patient's acute lethality risk is low, long-term/chronic lethality risk is mildly elevated in the presence of ongoing depression and anxiety. At the current moment, Armando Moseley is future-oriented, forward-thinking, and demonstrates ability to act in a self-preserving manner as evidenced by volitionally presenting to the clinic today, seeking treatment.  To mitigate future risk, patient should adhere to the recommendations below, avoid alcohol/illicit substance use, utilize community-based resources and familiar support and prioritize mental health treatment. Presently, patient denies active suicidal/homicidal ideation in addition to thoughts of self-injury; contracts for safety. At conclusion of evaluation, patient is amenable to the recommendations below. Patient is amenable to calling/contacting the outpatient office including this writer if any acute adverse effects of their medication regimen arise in addition to any comments or concerns pertaining to their psychiatric management. Patient is amenable to calling/contacting crisis and/or attending to the nearest emergency department if their clinical condition deteriorates to assure their safety and stability, stating that they are able to appropriately confide in their  regarding their psychiatric state. Assessment/Plan:     PLAN:  Diagnoses and all orders for this visit:    Current moderate episode of major depressive disorder without prior episode (HCC)  -     DULoxetine (CYMBALTA) 30 mg delayed release capsule; Take 1 capsule (30 mg total) by mouth daily    Underweight due to inadequate caloric intake    Panic attack    Generalized anxiety disorder    Other insomnia         Treatment Recommendations/Precautions:    Increase Cymbalta to 30 mg  Continue Remeron 30 mg at bedtime  Will rerequest individual therapy, I verified the correct phone number. Medication management with psychotherapy every 6-8 weeks  Aware of 24 hour and weekend coverage for urgent situations accessed by calling [facility] practice number  Patient advised to call 911 if feeling suicidal or homicidal before acting out on their thoughts and they expressed understanding.     Medications Risks/Benefits      Risks, Benefits And Possible Side Effects Of Medications:    Discussed risks and benefits of treatment with patient including risk of suicidality, serotonin syndrome, increased QTc interval and SIADH related to treatment with antidepressants; Risk of induction of manic symptoms in certain patient populations         Controlled Medication Discussion:     Not applicable - controlled prescriptions are not prescribed by this practice    Psychotherapy Provided:     Individual psychotherapy provided: Yes  Medication changes discussed with Maxx Howard. Recent stressor including health issues and extended family  discussed with Maxx Howard. Reassurance and supportive therapy provided.       Treatment Plan:    Completed and signed during the session: Not applicable - Treatment Plan not due at this session      ANNAMARIE Garcia 10/31/23

## 2023-11-28 DIAGNOSIS — E61.1 IRON DEFICIENCY: ICD-10-CM

## 2023-11-29 RX ORDER — FERROUS SULFATE 324(65)MG
324 TABLET, DELAYED RELEASE (ENTERIC COATED) ORAL EVERY OTHER DAY
Qty: 90 TABLET | Refills: 3 | Status: SHIPPED | OUTPATIENT
Start: 2023-11-29

## 2023-12-04 ENCOUNTER — OFFICE VISIT (OUTPATIENT)
Dept: GASTROENTEROLOGY | Facility: CLINIC | Age: 43
End: 2023-12-04
Payer: MEDICARE

## 2023-12-04 VITALS
WEIGHT: 95 LBS | TEMPERATURE: 98.9 F | BODY MASS INDEX: 19.15 KG/M2 | HEIGHT: 59 IN | DIASTOLIC BLOOD PRESSURE: 68 MMHG | SYSTOLIC BLOOD PRESSURE: 110 MMHG

## 2023-12-04 DIAGNOSIS — R10.13 EPIGASTRIC PAIN: Primary | ICD-10-CM

## 2023-12-04 PROCEDURE — 99214 OFFICE O/P EST MOD 30 MIN: CPT | Performed by: INTERNAL MEDICINE

## 2023-12-04 NOTE — PROGRESS NOTES
[facility] - Outpatient Follow-up Note  Luis Sena 44 y.o. female MRN: [id number]  Encounter: [id number]          ASSESSMENT AND PLAN:      1. Epigastric pain  CT abdomen pelvis w contrast          Will perform ct scan to evaluate pain in the mid epigastric area. Patient still has gastroparesis like symptoms. Her to continue small, frequent meals. Will continue her Zofran for antinausea coverage. Will likely have her schedule for outpatient visit in 3 to 4 months with Dr. Dustin Sunshine who may be able to do POEM at that time and see if her insurance will consider resubmission. Otherwise may need to seek procedure at outside institution. ______________________________________________________________    SUBJECTIVE: Patient here for follow-up visit. She has a history of gastroparesis confirmed on gastric emptying study and was previously seen in January of this year. Plan was to pursue G POEM however patient was unable to get insurance to cover this procedure. She continues to have postprandial bloating, epigastric pain, nausea and vomiting. Symptoms have persisted over the last 2 years. She has used marijuana to help tame the symptoms. She has been using less marijuana more recently. Of note she does have abdominal pain in the stomach. Has a small palpable area that is very tender. REVIEW OF SYSTEMS IS OTHERWISE NEGATIVE. Historical Information   Past Medical History:   Diagnosis Date    Anemia     in past    Anxiety     Depression     Gallstones     Migraine     Nausea     occ    Panic attack     Rotator cuff disorder, right     Vertigo      Past Surgical History:   Procedure Laterality Date    ABDOMINAL SURGERY      scar tissue removed     SECTION      last assessed: Oct 23, 2014     HYSTERECTOMY      OK LAPS SURG CHOLECYSTECTOMY Anupam Roverto N/A 2018    Procedure: CHOLECYSTECTOMY LAPAROSCOPIC  WITH  ATTEMPTED IOC;   Surgeon: Bard Luca MD; Location: [facility] OR;  Service: General    TUBAL LIGATION       Social History   Social History     Substance and Sexual Activity   Alcohol Use No    Comment: none since 4/2021     Social History     Substance and Sexual Activity   Drug Use No     Social History     Tobacco Use   Smoking Status Former    Types: Cigarettes    Quit date: 2/19/2013    Years since quitting: 10.7   Smokeless Tobacco Never     Family History   Problem Relation Age of Onset    No Known Problems Family     Hypertension Mother     Diabetes type II Father         mellitus        Meds/Allergies       Current Outpatient Medications:     baclofen 10 mg tablet    cholecalciferol (VITAMIN D3) 1,000 units tablet    dicyclomine (BENTYL) 20 mg tablet    DULoxetine (CYMBALTA) 30 mg delayed release capsule    Dyclonine-Glycerin (Cepacol Sore Throat Spray) 0.1-33 % LIQD    ferrous sulfate 324 (65 Fe) mg    fexofenadine (ALLEGRA) 180 MG tablet    fluticasone (FLONASE) 50 mcg/act nasal spray    lidocaine (LIDODERM) 5 %    meclizine (ANTIVERT) 25 mg tablet    mirtazapine (REMERON) 30 mg tablet    polyethylene glycol (GLYCOLAX) 17 GM/SCOOP powder    prochlorperazine (COMPAZINE) 5 mg tablet    SUMAtriptan (IMITREX) 50 mg tablet    trospium chloride (SANCTURA) 20 mg tablet    vitamin B-12 (VITAMIN B-12) 1,000 mcg tablet    docusate sodium (COLACE) 100 mg capsule    famotidine (PEPCID) 40 MG tablet    ondansetron (Zofran ODT) 4 mg disintegrating tablet    No Known Allergies        Objective     Blood pressure 110/68, temperature 98.9 °F (37.2 °C), temperature source Tympanic, height 4' 11" (1.499 m), weight 43.1 kg (95 lb), last menstrual period 07/24/2017, not currently breastfeeding. Body mass index is 19.19 kg/m². PHYSICAL EXAM:      General Appearance:   Alert, cooperative, no distress   HEENT:   Normocephalic, atraumatic, anicteric.          Lungs:   Equal chest rise and unlabored breathing, normal cough   Heart:   No visualized JVD   Abdomen:   Soft, non-tender, non-distended; no masses, no organomegaly . Small palpable area that is very tender. Could be abdominal wall hematoma   Genitalia:   Deferred    Rectal:   Deferred    Extremities:  No cyanosis, clubbing or edema    Pulses:  Musculoskeletal:  2+ and symmetric  Normal range of motion visualized    Skin:  Neuro:  No jaundice, rashes, or lesions   Alert and appropriate           Lab Results:   No visits with results within 1 Day(s) from this visit.    Latest known visit with results is:   Appointment on 06/26/2023   Component Date Value    WBC 06/26/2023 6.44     RBC 06/26/2023 4.37     Hemoglobin 06/26/2023 13.6     Hematocrit 06/26/2023 41.7     MCV 06/26/2023 95     MCH 06/26/2023 31.1     MCHC 06/26/2023 32.6     RDW 06/26/2023 12.1     MPV 06/26/2023 11.9     Platelets 32/49/8059 243     nRBC 06/26/2023 0     Neutrophils Relative 06/26/2023 58     Immat GRANS % 06/26/2023 0     Lymphocytes Relative 06/26/2023 29     Monocytes Relative 06/26/2023 11     Eosinophils Relative 06/26/2023 1     Basophils Relative 06/26/2023 1     Neutrophils Absolute 06/26/2023 3.74     Immature Grans Absolute 06/26/2023 0.01     Lymphocytes Absolute 06/26/2023 1.87     Monocytes Absolute 06/26/2023 0.70     Eosinophils Absolute 06/26/2023 0.09     Basophils Absolute 06/26/2023 0.03     Sodium 06/26/2023 138     Potassium 06/26/2023 4.3     Chloride 06/26/2023 109 (H)     CO2 06/26/2023 28     ANION GAP 06/26/2023 1     BUN 06/26/2023 12     Creatinine 06/26/2023 0.76     Glucose, Fasting 06/26/2023 96     Calcium 06/26/2023 9.3     AST 06/26/2023 13     ALT 06/26/2023 17     Alkaline Phosphatase 06/26/2023 72     Total Protein 06/26/2023 8.1     Albumin 06/26/2023 4.1     Total Bilirubin 06/26/2023 0.42     eGFR 06/26/2023 97     Hemoglobin A1C 06/26/2023 5.2     EAG 06/26/2023 103     Cholesterol 06/26/2023 130     Triglycerides 06/26/2023 45     HDL, Direct 06/26/2023 72     LDL Calculated 06/26/2023 49     Non-HDL-Chol (CHOL-HDL) 06/26/2023 58     TSH 3RD GENERATON 06/26/2023 0.877     Vitamin B-12 06/26/2023 1,158 (H)     Vit D, 25-Hydroxy 06/26/2023 29.6 (L)     Iron Saturation 06/26/2023 43     TIBC 06/26/2023 306     Iron 06/26/2023 131     Ferritin 06/26/2023 56          Radiology Results:   No results found.   Answers submitted by the patient for this visit:  Abdominal Pain Questionnaire (Submitted on 12/1/2023)  Chief Complaint: Abdominal pain  Chronicity: recurrent  Onset: more than 1 year ago  Onset quality: sudden  Frequency: 2 to 4 times per day  Episode duration: 2 Days  Progression since onset: gradually worsening  Pain location: LUQ, epigastric region, periumbilical region  Pain - numeric: 9/10  Pain quality: aching, cramping, dull, a sensation of fullness, sharp  Radiates to: periumbilical region, left flank  anorexia: No  arthralgias: Yes  belching: No  constipation: Yes  diarrhea: No  dysuria: No  fever: No  flatus: Yes  frequency: No  headaches: Yes  hematochezia: No  hematuria: No  melena: No  myalgias: Yes  nausea: Yes  weight loss: Yes  vomiting: No  Aggravated by: bowel movement, certain positions, drinking alcohol, eating, movement  Relieved by: bowel movements, passing flatus, sitting up

## 2023-12-04 NOTE — PATIENT INSTRUCTIONS
Patient was given script for CT scan   Scheduled for 12/11/2023 at [facility]  at 1:30    Patient Needs to see Dr. Maryan Beverly for Poem Consult  in 3 months. Scheduled for 04/19/2024 but would like to have a sooner appointment if at all possible. Last insurance did not approve it , patient has new insurance .

## 2023-12-11 ENCOUNTER — HOSPITAL ENCOUNTER (OUTPATIENT)
Dept: CT IMAGING | Facility: HOSPITAL | Age: 43
Discharge: HOME | End: 2023-12-11
Attending: INTERNAL MEDICINE
Payer: MEDICARE

## 2023-12-11 DIAGNOSIS — R10.13 EPIGASTRIC PAIN: ICD-10-CM

## 2023-12-11 PROCEDURE — 74177 CT ABD & PELVIS W/CONTRAST: CPT

## 2023-12-11 PROCEDURE — G1004 CDSM NDSC: HCPCS

## 2023-12-11 RX ADMIN — IOHEXOL 85 ML: 350 INJECTION, SOLUTION INTRAVENOUS at 13:47

## 2023-12-13 ENCOUNTER — TELEPHONE (OUTPATIENT)
Dept: PSYCHIATRY | Facility: CLINIC | Age: 43
End: 2023-12-13

## 2023-12-28 DIAGNOSIS — K59.00 CONSTIPATION, UNSPECIFIED CONSTIPATION TYPE: ICD-10-CM

## 2023-12-28 RX ORDER — DOCUSATE SODIUM 100 MG/1
CAPSULE, LIQUID FILLED ORAL
Qty: 90 CAPSULE | Refills: 1 | Status: SHIPPED | OUTPATIENT
Start: 2023-12-28

## 2023-12-29 ENCOUNTER — APPOINTMENT (OUTPATIENT)
Dept: LAB | Facility: HOSPITAL | Age: 43
End: 2023-12-29
Payer: MEDICARE

## 2023-12-29 ENCOUNTER — OFFICE VISIT (OUTPATIENT)
Dept: FAMILY MEDICINE CLINIC | Facility: CLINIC | Age: 43
End: 2023-12-29

## 2023-12-29 VITALS
TEMPERATURE: 97.8 F | SYSTOLIC BLOOD PRESSURE: 110 MMHG | OXYGEN SATURATION: 98 % | BODY MASS INDEX: 18.46 KG/M2 | HEART RATE: 96 BPM | DIASTOLIC BLOOD PRESSURE: 60 MMHG | WEIGHT: 91.4 LBS

## 2023-12-29 DIAGNOSIS — E53.8 B12 DEFICIENCY: ICD-10-CM

## 2023-12-29 DIAGNOSIS — R53.83 FATIGUE, UNSPECIFIED TYPE: ICD-10-CM

## 2023-12-29 DIAGNOSIS — E55.9 VITAMIN D DEFICIENCY: ICD-10-CM

## 2023-12-29 DIAGNOSIS — E61.1 IRON DEFICIENCY: Primary | ICD-10-CM

## 2023-12-29 DIAGNOSIS — M16.11 OSTEOARTHRITIS OF RIGHT HIP, UNSPECIFIED OSTEOARTHRITIS TYPE: ICD-10-CM

## 2023-12-29 DIAGNOSIS — R10.31 PAIN, ABDOMINAL, RLQ: ICD-10-CM

## 2023-12-29 DIAGNOSIS — R63.4 UNINTENDED WEIGHT LOSS: ICD-10-CM

## 2023-12-29 DIAGNOSIS — R63.6 UNDERWEIGHT DUE TO INADEQUATE CALORIC INTAKE: ICD-10-CM

## 2023-12-29 DIAGNOSIS — E61.1 IRON DEFICIENCY: ICD-10-CM

## 2023-12-29 PROBLEM — G47.09 OTHER INSOMNIA: Status: RESOLVED | Noted: 2023-10-31 | Resolved: 2023-12-29

## 2023-12-29 PROBLEM — Z20.822 SUSPECTED COVID-19 VIRUS INFECTION: Status: RESOLVED | Noted: 2020-11-20 | Resolved: 2023-12-29

## 2023-12-29 PROBLEM — K80.20 CHOLELITHIASIS: Status: RESOLVED | Noted: 2017-12-14 | Resolved: 2023-12-29

## 2023-12-29 LAB
25(OH)D3 SERPL-MCNC: 39.6 NG/ML (ref 30–100)
ALBUMIN SERPL BCP-MCNC: 4.6 G/DL (ref 3.5–5)
ALP SERPL-CCNC: 85 U/L (ref 34–104)
ALT SERPL W P-5'-P-CCNC: 12 U/L (ref 7–52)
ANION GAP SERPL CALCULATED.3IONS-SCNC: 8 MMOL/L
AST SERPL W P-5'-P-CCNC: 15 U/L (ref 13–39)
BASOPHILS # BLD AUTO: 0.02 THOUSANDS/ÂΜL (ref 0–0.1)
BASOPHILS NFR BLD AUTO: 0 % (ref 0–1)
BILIRUB SERPL-MCNC: 0.38 MG/DL (ref 0.2–1)
BUN SERPL-MCNC: 11 MG/DL (ref 5–25)
CALCIUM SERPL-MCNC: 9.3 MG/DL (ref 8.4–10.2)
CHLORIDE SERPL-SCNC: 103 MMOL/L (ref 96–108)
CHOLEST SERPL-MCNC: 119 MG/DL
CO2 SERPL-SCNC: 28 MMOL/L (ref 21–32)
CREAT SERPL-MCNC: 0.78 MG/DL (ref 0.6–1.3)
EOSINOPHIL # BLD AUTO: 0.04 THOUSAND/ÂΜL (ref 0–0.61)
EOSINOPHIL NFR BLD AUTO: 1 % (ref 0–6)
ERYTHROCYTE [DISTWIDTH] IN BLOOD BY AUTOMATED COUNT: 11.9 % (ref 11.6–15.1)
EST. AVERAGE GLUCOSE BLD GHB EST-MCNC: 114 MG/DL
FERRITIN SERPL-MCNC: 80 NG/ML (ref 11–307)
FOLATE SERPL-MCNC: 11.4 NG/ML
GFR SERPL CREATININE-BSD FRML MDRD: 94 ML/MIN/1.73SQ M
GLUCOSE P FAST SERPL-MCNC: 100 MG/DL (ref 65–99)
HBA1C MFR BLD: 5.6 %
HCT VFR BLD AUTO: 41.4 % (ref 34.8–46.1)
HDLC SERPL-MCNC: 57 MG/DL
HGB BLD-MCNC: 14.1 G/DL (ref 11.5–15.4)
IMM GRANULOCYTES # BLD AUTO: 0.02 THOUSAND/UL (ref 0–0.2)
IMM GRANULOCYTES NFR BLD AUTO: 0 % (ref 0–2)
IRON SATN MFR SERPL: 23 % (ref 15–50)
IRON SERPL-MCNC: 67 UG/DL (ref 50–212)
LDLC SERPL CALC-MCNC: 44 MG/DL (ref 0–100)
LYMPHOCYTES # BLD AUTO: 1.65 THOUSANDS/ÂΜL (ref 0.6–4.47)
LYMPHOCYTES NFR BLD AUTO: 26 % (ref 14–44)
MCH RBC QN AUTO: 31.9 PG (ref 26.8–34.3)
MCHC RBC AUTO-ENTMCNC: 34.1 G/DL (ref 31.4–37.4)
MCV RBC AUTO: 94 FL (ref 82–98)
MONOCYTES # BLD AUTO: 0.73 THOUSAND/ÂΜL (ref 0.17–1.22)
MONOCYTES NFR BLD AUTO: 11 % (ref 4–12)
NEUTROPHILS # BLD AUTO: 3.99 THOUSANDS/ÂΜL (ref 1.85–7.62)
NEUTS SEG NFR BLD AUTO: 62 % (ref 43–75)
NONHDLC SERPL-MCNC: 62 MG/DL
NRBC BLD AUTO-RTO: 0 /100 WBCS
PLATELET # BLD AUTO: 252 THOUSANDS/UL (ref 149–390)
PMV BLD AUTO: 11 FL (ref 8.9–12.7)
POTASSIUM SERPL-SCNC: 3.9 MMOL/L (ref 3.5–5.3)
PROT SERPL-MCNC: 8.1 G/DL (ref 6.4–8.4)
RBC # BLD AUTO: 4.42 MILLION/UL (ref 3.81–5.12)
SODIUM SERPL-SCNC: 139 MMOL/L (ref 135–147)
TIBC SERPL-MCNC: 294 UG/DL (ref 250–450)
TRIGL SERPL-MCNC: 91 MG/DL
TSH SERPL DL<=0.05 MIU/L-ACNC: 2.41 UIU/ML (ref 0.45–4.5)
UIBC SERPL-MCNC: 227 UG/DL (ref 155–355)
VIT B12 SERPL-MCNC: 1019 PG/ML (ref 180–914)
WBC # BLD AUTO: 6.45 THOUSAND/UL (ref 4.31–10.16)

## 2023-12-29 PROCEDURE — 83550 IRON BINDING TEST: CPT

## 2023-12-29 PROCEDURE — 80061 LIPID PANEL: CPT

## 2023-12-29 PROCEDURE — 99214 OFFICE O/P EST MOD 30 MIN: CPT | Performed by: NURSE PRACTITIONER

## 2023-12-29 PROCEDURE — 82746 ASSAY OF FOLIC ACID SERUM: CPT

## 2023-12-29 PROCEDURE — 36415 COLL VENOUS BLD VENIPUNCTURE: CPT

## 2023-12-29 PROCEDURE — 82728 ASSAY OF FERRITIN: CPT

## 2023-12-29 PROCEDURE — 84443 ASSAY THYROID STIM HORMONE: CPT

## 2023-12-29 PROCEDURE — 82306 VITAMIN D 25 HYDROXY: CPT

## 2023-12-29 PROCEDURE — 82607 VITAMIN B-12: CPT

## 2023-12-29 PROCEDURE — 80053 COMPREHEN METABOLIC PANEL: CPT

## 2023-12-29 PROCEDURE — 85025 COMPLETE CBC W/AUTO DIFF WBC: CPT

## 2023-12-29 PROCEDURE — 83540 ASSAY OF IRON: CPT

## 2023-12-29 PROCEDURE — 83036 HEMOGLOBIN GLYCOSYLATED A1C: CPT

## 2023-12-29 NOTE — ASSESSMENT & PLAN NOTE
Mild right hip OA noted on recent CT abdomen, patient reports difficulty with ambulating-- especially steps   Recommend PT, sports medicine   Continue with lidocaine patches

## 2023-12-29 NOTE — ASSESSMENT & PLAN NOTE
Lab Results   Component Value Date    WBC 6.45 12/29/2023    HGB 14.1 12/29/2023    HCT 41.4 12/29/2023    MCV 94 12/29/2023     12/29/2023     Lab Results   Component Value Date    IRON 131 06/26/2023    TIBC 306 06/26/2023    FERRITIN 56 06/26/2023     Labs ordered due to decreased intake and sx of fatigue/ weakness   CBC available at time of documenting shows no evidence of anemia

## 2023-12-29 NOTE — PROGRESS NOTES
Name: Екатерина Godinez      : 1980      MRN: [id number]  Encounter Provider: ANNAMARIE Brown  Encounter Date: 2023   Encounter department: [facility]    Assessment & Plan     1. Iron deficiency  Assessment & Plan:  Lab Results   Component Value Date    WBC 6.45 2023    HGB 14.1 2023    HCT 41.4 2023    MCV 94 2023     2023     Lab Results   Component Value Date    IRON 131 2023    TIBC 306 2023    FERRITIN 56 2023     Labs ordered due to decreased intake and sx of fatigue/ weakness   CBC available at time of documenting shows no evidence of anemia       Orders:  -     CBC and differential; Future  -     Iron Panel (Includes Ferritin, Iron Sat%, Iron, and TIBC); Future    2. Underweight due to inadequate caloric intake  Assessment & Plan:  Wt Readings from Last 3 Encounters:   23 41.5 kg (91 lb 6.4 oz)   23 43.1 kg (95 lb)   10/31/23 43.4 kg (95 lb 9.6 oz)     Referral to dietician for dietary guidance     Orders:  -     Ambulatory Referral to Nutrition Services; Future    3. Osteoarthritis of right hip, unspecified osteoarthritis type  Assessment & Plan:  Mild right hip OA noted on recent CT abdomen, patient reports difficulty with ambulating-- especially steps   Recommend PT, sports medicine   Continue with lidocaine patches     Orders:  -     Ambulatory Referral to Sports Medicine; Future    4. Fatigue, unspecified type  -     CBC and differential; Future  -     Comprehensive metabolic panel; Future  -     Hemoglobin A1C; Future  -     Vitamin B12; Future  -     Iron Panel (Includes Ferritin, Iron Sat%, Iron, and TIBC); Future  -     TSH, 3rd generation with Free T4 reflex; Future  -     Vitamin D 25 hydroxy; Future    5. B12 deficiency  -     Vitamin B12; Future    6. Vitamin D deficiency  -     Vitamin D 25 hydroxy; Future    7. Unintended weight loss  -     CBC and differential; Future  -      Comprehensive metabolic panel; Future  -     Hemoglobin A1C; Future  -     Lipid panel; Future  -     Vitamin B12; Future  -     Iron Panel (Includes Ferritin, Iron Sat%, Iron, and TIBC); Future  -     TSH, 3rd generation with Free T4 reflex; Future  -     Folate; Future  -     Ambulatory Referral to Nutrition Services; Future    8. Pain, abdominal, RLQ  Assessment & Plan:  Slight distension and TTP on exam in the RLQ/ umbilical region   Suspect more muscular in etiology   Will check U/S    Orders:  -     US abdomen limited; Future; Expected date: 12/29/2023      BMI Counseling: Body mass index is 18.46 kg/m². The BMI is below normal. Patient advised to gain weight. Patient referred to nutritionist. Rationale for BMI follow-up plan is due to patient being underweight.         Subjective     Екатерина Godinez is a 43 y.o. female who  has a past medical history of Anemia, Anxiety, Depression, Gallstones, Migraine, Nausea, Panic attack, Rotator cuff disorder, right, and Vertigo. who presented to the office today for follow up.      She reports that she has been having a bulging sensation and pain at the periumbilical area. She had a CT abdomen done ordered by GI which had no new findings. She is also having difficulty with right hip pain as well as worsening fatigue.      The following portions of the patient's history were reviewed and updated as appropriate: allergies, current medications, past family history, past medical history, past social history, past surgical history and problem list.        Review of Systems   Constitutional:  Positive for fatigue. Negative for diaphoresis.   Eyes:  Negative for visual disturbance.   Respiratory:  Negative for chest tightness and shortness of breath.    Cardiovascular:  Negative for chest pain, palpitations and leg swelling.   Gastrointestinal:  Positive for abdominal pain. Negative for constipation and diarrhea.   Musculoskeletal:  Positive for gait problem.   Neurological:   Positive for weakness. Negative for numbness.       Past Medical History:   Diagnosis Date   • Anemia     in past   • Anxiety    • Arthritis 2022   • Depression    • Gallstones    • Headache(784.0)    • Inflammatory bowel disease    • Migraine    • Nausea     occ   • Panic attack    • Rotator cuff disorder, right    • Vertigo      Past Surgical History:   Procedure Laterality Date   • ABDOMINAL SURGERY      scar tissue removed   •  SECTION      last assessed: Oct 23, 2014    • CHOLECYSTECTOMY  2018   • HYSTERECTOMY     • MI LAPS SURG CHOLECYSTECTOMY W/CHOLANGIOGRAPHY N/A 2018    Procedure: CHOLECYSTECTOMY LAPAROSCOPIC  WITH  ATTEMPTED IOC;  Surgeon: Silvia Woods MD;  Location: [facility];  Service: General   • TUBAL LIGATION       Family History   Problem Relation Age of Onset   • No Known Problems Family    • Hypertension Mother    • Arthritis Mother    • Diabetes type II Father         mellitus    • Hypertension Father    • Diabetes Father    • Arthritis Father      Social History     Socioeconomic History   • Marital status: /Civil Union     Spouse name: None   • Number of children: None   • Years of education: None   • Highest education level: None   Occupational History   • None   Tobacco Use   • Smoking status: Former     Current packs/day: 0.00     Average packs/day: 0.5 packs/day for 5.0 years (2.5 ttl pk-yrs)     Types: Cigarettes     Quit date: 2013     Years since quitting: 10.8   • Smokeless tobacco: Never   Vaping Use   • Vaping status: Never Used   Substance and Sexual Activity   • Alcohol use: No     Comment: none since 2021   • Drug use: No   • Sexual activity: Yes     Partners: Male     Comment: Hysterectomy   Other Topics Concern   • None   Social History Narrative   • None     Social Determinants of Health     Financial Resource Strain: Medium Risk (2023)    Overall Financial Resource Strain (CARDIA)    • Difficulty of Paying Living Expenses:  Somewhat hard   Food Insecurity: No Food Insecurity (2/27/2023)    Hunger Vital Sign    • Worried About Running Out of Food in the Last Year: Never true    • Ran Out of Food in the Last Year: Never true   Transportation Needs: Unmet Transportation Needs (2/27/2023)    PRAPARE - Transportation    • Lack of Transportation (Medical): Yes    • Lack of Transportation (Non-Medical): Yes   Physical Activity: Not on file   Stress: Not on file   Social Connections: Not on file   Intimate Partner Violence: Not on file   Housing Stability: Not on file     Current Outpatient Medications on File Prior to Visit   Medication Sig   • baclofen 10 mg tablet TAKE 1 TABLET(10 MG) BY MOUTH THREE TIMES DAILY AS NEEDED FOR MUSCLE SPASMS   • cholecalciferol (VITAMIN D3) 1,000 units tablet Take 1 tablet (1,000 Units total) by mouth daily   • dicyclomine (BENTYL) 20 mg tablet Take 1 tablet (20 mg total) by mouth every 6 (six) hours As needed for abdominal pain   • docusate sodium (COLACE) 100 mg capsule TAKE 1 CAPSULE BY MOUTH EVERY MORNING   • DULoxetine (CYMBALTA) 30 mg delayed release capsule Take 1 capsule (30 mg total) by mouth daily   • Dyclonine-Glycerin (Cepacol Sore Throat Spray) 0.1-33 % LIQD Apply 1 spray to the mouth or throat every 6 (six) hours   • ferrous sulfate 324 (65 Fe) mg TAKE 1 TABLET BY MOUTH EVERY OTHER DAY   • fexofenadine (ALLEGRA) 180 MG tablet Take 1 tablet (180 mg total) by mouth daily   • fluticasone (FLONASE) 50 mcg/act nasal spray 1 spray into each nostril daily   • lidocaine (LIDODERM) 5 % APPLY 1 PATCH TO AFFECTED AREA EVERY DAY(REMOVE AND DISCARD PATCH WITHIN 12 HOURS OR AS DIRECTED BY MD)   • meclizine (ANTIVERT) 25 mg tablet TAKE 1 TABLET(25 MG) BY MOUTH THREE TIMES DAILY AS NEEDED FOR DIZZINESS   • mirtazapine (REMERON) 30 mg tablet Take 1 tablet (30 mg total) by mouth daily at bedtime   • polyethylene glycol (GLYCOLAX) 17 GM/SCOOP powder MIX AND DRINK 17 GRAMS BY MOUTH EVERY DAY   • prochlorperazine  (COMPAZINE) 5 mg tablet TAKE 1 TABLET(5 MG) BY MOUTH EVERY 6 HOURS AS NEEDED FOR NAUSEA OR VOMITING   • SUMAtriptan (IMITREX) 50 mg tablet TAKE 1 TABLET(50 MG) BY MOUTH 1 TIME FOR UP TO 1 DOSE AS NEEDED FOR MIGRAINE   • trospium chloride (SANCTURA) 20 mg tablet Take 20 mg by mouth 2 (two) times a day   • vitamin B-12 (VITAMIN B-12) 1,000 mcg tablet TAKE 1 TABLET BY MOUTH EVERY DAY   • famotidine (PEPCID) 40 MG tablet TAKE 1 TABLET(40 MG) BY MOUTH DAILY (Patient not taking: Reported on 12/4/2023)   • ondansetron (Zofran ODT) 4 mg disintegrating tablet Take 1 tablet (4 mg total) by mouth every 6 (six) hours as needed for nausea or vomiting for up to 10 days     No Known Allergies  Immunization History   Administered Date(s) Administered   • Influenza Quadrivalent Preservative Free 3 years and older IM 10/23/2014       Objective     /60 (BP Location: Left arm, Patient Position: Sitting, Cuff Size: Standard)   Pulse 96   Temp 97.8 °F (36.6 °C) (Temporal)   Wt 41.5 kg (91 lb 6.4 oz)   LMP 07/24/2017   SpO2 98%   BMI 18.46 kg/m²     Physical Exam  Vitals and nursing note reviewed.   Constitutional:       General: She is not in acute distress.     Appearance: She is underweight.   HENT:      Head: Normocephalic and atraumatic.      Right Ear: External ear normal.      Left Ear: External ear normal.   Eyes:      General:         Right eye: No discharge.         Left eye: No discharge.      Conjunctiva/sclera: Conjunctivae normal.   Cardiovascular:      Rate and Rhythm: Normal rate and regular rhythm.      Pulses: Normal pulses.   Pulmonary:      Effort: Pulmonary effort is normal. No respiratory distress.      Breath sounds: Normal breath sounds. No wheezing.   Abdominal:      Tenderness: There is abdominal tenderness in the right lower quadrant and periumbilical area.       Musculoskeletal:         General: Tenderness present.      Left hip: Tenderness present. Decreased range of motion.      Right lower leg:  No edema.      Left lower leg: No edema.   Skin:     General: Skin is warm and dry.   Neurological:      Mental Status: She is alert and oriented to person, place, and time.   Psychiatric:         Behavior: Behavior normal.       ANNAMARIE Brown

## 2023-12-29 NOTE — ASSESSMENT & PLAN NOTE
Slight distension and TTP on exam in the RLQ/ umbilical region   Suspect more muscular in etiology   Will check U/S

## 2023-12-29 NOTE — ASSESSMENT & PLAN NOTE
Wt Readings from Last 3 Encounters:   12/29/23 41.5 kg (91 lb 6.4 oz)   12/04/23 43.1 kg (95 lb)   10/31/23 43.4 kg (95 lb 9.6 oz)     Referral to dietician for dietary guidance

## 2024-01-09 ENCOUNTER — HOSPITAL ENCOUNTER (OUTPATIENT)
Dept: ULTRASOUND IMAGING | Facility: HOSPITAL | Age: 44
Discharge: HOME | End: 2024-01-09
Payer: MEDICARE

## 2024-01-09 DIAGNOSIS — R10.33 PERIUMBILICAL ABDOMINAL PAIN: ICD-10-CM

## 2024-01-09 DIAGNOSIS — R10.31 PAIN, ABDOMINAL, RLQ: ICD-10-CM

## 2024-01-09 PROCEDURE — 76705 ECHO EXAM OF ABDOMEN: CPT

## 2024-01-30 ENCOUNTER — OFFICE VISIT (OUTPATIENT)
Dept: OBGYN CLINIC | Facility: CLINIC | Age: 44
End: 2024-01-30
Payer: MEDICARE

## 2024-01-30 ENCOUNTER — APPOINTMENT (OUTPATIENT)
Dept: RADIOLOGY | Age: 44
End: 2024-01-30
Payer: MEDICARE

## 2024-01-30 VITALS
WEIGHT: 95 LBS | BODY MASS INDEX: 19.15 KG/M2 | SYSTOLIC BLOOD PRESSURE: 110 MMHG | HEIGHT: 59 IN | DIASTOLIC BLOOD PRESSURE: 77 MMHG | HEART RATE: 91 BPM

## 2024-01-30 DIAGNOSIS — M16.11 OSTEOARTHRITIS OF RIGHT HIP, UNSPECIFIED OSTEOARTHRITIS TYPE: ICD-10-CM

## 2024-01-30 DIAGNOSIS — M70.61 TROCHANTERIC BURSITIS OF RIGHT HIP: Primary | ICD-10-CM

## 2024-01-30 PROCEDURE — 99243 OFF/OP CNSLTJ NEW/EST LOW 30: CPT | Performed by: STUDENT IN AN ORGANIZED HEALTH CARE EDUCATION/TRAINING PROGRAM

## 2024-01-30 PROCEDURE — 73502 X-RAY EXAM HIP UNI 2-3 VIEWS: CPT

## 2024-01-30 NOTE — PROGRESS NOTES
Date: 24  Екатерина Godinez   MRN# [id number]  : 1980      Chief Complaint: Right Hip Pain    1. Trochanteric bursitis of right hip  FL guided needle plac bx/asp/inj    Ambulatory Referral to Physical Therapy      2. Osteoarthritis of right hip, unspecified osteoarthritis type  Ambulatory Referral to Sports Medicine    XR hip/pelv 2-3 vws right if performed    Ambulatory Referral to Physical Therapy            Assessment and Plan:    Екатерина upon examination and review of the x-rays of the right hip as well as the CT scan of the abdomen and pelvis does demonstrate moderate arthritic change of the hip joint with cystic change of the femoral head and acetabulum.  She does also demonstrate symptoms consistent with greater trochanteric bursitis.  Further delineation of care as described below.    -May weight-bear as tolerated    -Physical therapy for trochanteric bursitis and hip abductor tendinitis    -Steroid injection offered for trochanteric bursitis.  Patient consented and underwent injection without complication.    -FL guided steroid injection to the hip joint also offered.  Patient verbalized consent and referral was placed in chart.    -Follow-up as needed if symptoms persist.    The patient verbalized understanding of exam findings and treatment plan. We engaged in the shared decision-making process and treatment options were discussed at length with the patient. Surgical and conservative management discussed today along with risks and benefits. Patient was agreeable with the plan and all questions were answered to satisfaction.        Subjective:     Hip Pain  43 y.o. female presents to the office for evaluation of right hip pain.  She is referred to me today by Rosetta DE LUNA. She has been experiencing to related pain into the hip over the past several months. She does have a history of pelvic floor dysfunction and is following up with OB/GYN in regards to this.  She notes that she is  taking care of her grandchildren and states ascending and descending stairs are quite painful.  She does localize pain to the lateral aspect of her hip as well as into the groin area.  She describes the pain as moderate to severe sharp and aching pain that can radiate distally down the leg to the knee.  She denies any prior trauma or surgical history of her hip.  She does have a history of gastroparesis and cannot take NSAIDs and does also have liver issues and thus cannot take Tylenol.  She has not seek care such as physical therapy for her hip or other modes of treatment such as steroid injections.  Today she denies any distal paresthesias.        External Records Reviewed: office notes and radiology reports    Prior treatment:  NSAIDs No    Bracing No   Physical Therapy No   Cortisone Injections No     Allergy:  No Known Allergies  Medications:  all current active meds have been reviewed  Past Medical History:  Past Medical History:   Diagnosis Date    Anemia     in past    Anxiety     Arthritis 2022    Depression     Gallstones     Headache(784.0)     Inflammatory bowel disease     Migraine     Nausea     occ    Panic attack     Rotator cuff disorder, right     Vertigo      Past Surgical History:  Past Surgical History:   Procedure Laterality Date    ABDOMINAL SURGERY      scar tissue removed     SECTION      last assessed: Oct 23, 2014     CHOLECYSTECTOMY  2018    HYSTERECTOMY      AK LAPS SURG CHOLECYSTECTOMY W/CHOLANGIOGRAPHY N/A 2018    Procedure: CHOLECYSTECTOMY LAPAROSCOPIC  WITH  ATTEMPTED IOC;  Surgeon: Silvia Woods MD;  Location: [facility] OR;  Service: General    TUBAL LIGATION       Family History:  Family History   Problem Relation Age of Onset    No Known Problems Family     Hypertension Mother     Arthritis Mother     Diabetes type II Father         mellitus     Hypertension Father     Diabetes Father     Arthritis Father      Social History:  Social History  "    Substance and Sexual Activity   Alcohol Use No    Comment: none since 4/2021     Social History     Substance and Sexual Activity   Drug Use No     Social History     Tobacco Use   Smoking Status Former    Current packs/day: 0.00    Average packs/day: 0.5 packs/day for 5.0 years (2.5 ttl pk-yrs)    Types: Cigarettes    Quit date: 2/19/2013    Years since quitting: 10.9   Smokeless Tobacco Never           Review of Systems:  General- denies fever/chills  HEENT- denies hearing loss or sore throat  Eyes- denies eye pain or visual disturbances, denies red eyes  Respiratory- denies cough or SOB  Cardio- denies chest pain or palpitations  GI- denies abdominal pain  Endocrine- denies urinary frequency  Urinary- denies pain with urination  Musculoskeletal- Negative except noted above  Skin- denies rashes or wounds  Neurological- denies dizziness or headache  Psychiatric- denies anxiety or difficulty concentrating      Objective:   BP Readings from Last 1 Encounters:   01/30/24 110/77      Wt Readings from Last 1 Encounters:   01/30/24 43.1 kg (95 lb)      Pulse Readings from Last 1 Encounters:   01/30/24 91        BMI: Estimated body mass index is 19.19 kg/m² as calculated from the following:    Height as of this encounter: 4' 11\" (1.499 m).    Weight as of this encounter: 43.1 kg (95 lb).      Physical Exam  /77   Pulse 91   Ht 4' 11\" (1.499 m)   Wt 43.1 kg (95 lb)   LMP 07/24/2017   BMI 19.19 kg/m²   General/Constitutional: No apparent distress: well-nourished and well developed.  Eyes: normal ocular motion  Cardio: RRR, Normal S1S2, No m/r/g.   Lymphatic: No appreciable lymphadenopathy  Respiratory: Non-labored breathing, CTA b/l no w/c/r  Vascular: No edema, swelling or tenderness, except as noted in detailed exam. Extremities well perfused. No LE edema  Integumentary: No impressive skin lesions present, except as noted in detailed exam.  Neuro: No ataxia or tremors noted  Psych: Normal mood and affect, " oriented to person, place and time. Appropriate affect.  Musculoskeletal: Normal, except as noted in detailed exam and in HPI.    Gait and Station:   antalgic    Bilateral Lower Extremity   Right Hip     Inspection: Skin intact    TTP: Greater Trochanter     Range of Motion: 0-90 with pain    positive log roll   negative Trendelenburg sign  positive Stinchfield  positive ZULMA  positive FADDIR    Motor: 4/5 Q/HS/TA/GS/EHL/FHL    Vascular: Toes WWP with BCR    SILT DP/SP/Johnnie/Saph/Tib      Images:    I personally reviewed relevant images in the PACS system and my interpretation is as follows:    X-rays of the right hip reveals moderate joint space narrowing, subchondral sclerosis, subchondral cysts, osteophyte formation. No fracture or dislocation.     CT scan of the abdomen, pelvis obtained on December 11, 2023 demonstrates moderate osteoarthritis of the hip with subchondral cystic changes and subchondral sclerosis with osteophyte formation.      Scribe Attestation      I,:  Maxwell Shine am acting as a scribe while in the presence of the attending physician.:       I,:  Pradip Marie MD personally performed the services described in this documentation    as scribed in my presence.:               Pradip Marie MD  Adult Reconstruction Specialist   [facility]

## 2024-02-02 ENCOUNTER — HOSPITAL ENCOUNTER (EMERGENCY)
Facility: HOSPITAL | Age: 44
Discharge: HOME | End: 2024-02-02
Attending: EMERGENCY MEDICINE
Payer: MEDICARE

## 2024-02-02 VITALS
BODY MASS INDEX: 18.48 KG/M2 | WEIGHT: 91.49 LBS | SYSTOLIC BLOOD PRESSURE: 116 MMHG | OXYGEN SATURATION: 100 % | RESPIRATION RATE: 20 BRPM | DIASTOLIC BLOOD PRESSURE: 82 MMHG | HEART RATE: 85 BPM | TEMPERATURE: 97.5 F

## 2024-02-02 DIAGNOSIS — R10.9 ABDOMINAL PAIN, UNSPECIFIED ABDOMINAL LOCATION: Primary | ICD-10-CM

## 2024-02-02 LAB
ALBUMIN SERPL BCP-MCNC: 3.9 G/DL (ref 3.5–5)
ALP SERPL-CCNC: 53 U/L (ref 34–104)
ALT SERPL W P-5'-P-CCNC: 8 U/L (ref 7–52)
ANION GAP SERPL CALCULATED.3IONS-SCNC: 6 MMOL/L
AST SERPL W P-5'-P-CCNC: 10 U/L (ref 13–39)
BASOPHILS # BLD AUTO: 0.02 THOUSANDS/ÂΜL (ref 0–0.1)
BASOPHILS NFR BLD AUTO: 0 % (ref 0–1)
BILIRUB SERPL-MCNC: 0.52 MG/DL (ref 0.2–1)
BUN SERPL-MCNC: 10 MG/DL (ref 5–25)
CALCIUM SERPL-MCNC: 8.5 MG/DL (ref 8.4–10.2)
CARDIAC TROPONIN I PNL SERPL HS: <2 NG/L
CHLORIDE SERPL-SCNC: 102 MMOL/L (ref 96–108)
CO2 SERPL-SCNC: 27 MMOL/L (ref 21–32)
CREAT SERPL-MCNC: 0.65 MG/DL (ref 0.6–1.3)
EOSINOPHIL # BLD AUTO: 0.04 THOUSAND/ÂΜL (ref 0–0.61)
EOSINOPHIL NFR BLD AUTO: 0 % (ref 0–6)
ERYTHROCYTE [DISTWIDTH] IN BLOOD BY AUTOMATED COUNT: 12.3 % (ref 11.6–15.1)
GFR SERPL CREATININE-BSD FRML MDRD: 109 ML/MIN/1.73SQ M
GLUCOSE SERPL-MCNC: 95 MG/DL (ref 65–140)
HCT VFR BLD AUTO: 37 % (ref 34.8–46.1)
HGB BLD-MCNC: 13.2 G/DL (ref 11.5–15.4)
IMM GRANULOCYTES # BLD AUTO: 0.03 THOUSAND/UL (ref 0–0.2)
IMM GRANULOCYTES NFR BLD AUTO: 0 % (ref 0–2)
LIPASE SERPL-CCNC: 15 U/L (ref 11–82)
LYMPHOCYTES # BLD AUTO: 2.91 THOUSANDS/ÂΜL (ref 0.6–4.47)
LYMPHOCYTES NFR BLD AUTO: 29 % (ref 14–44)
MCH RBC QN AUTO: 33.1 PG (ref 26.8–34.3)
MCHC RBC AUTO-ENTMCNC: 35.7 G/DL (ref 31.4–37.4)
MCV RBC AUTO: 93 FL (ref 82–98)
MONOCYTES # BLD AUTO: 1.14 THOUSAND/ÂΜL (ref 0.17–1.22)
MONOCYTES NFR BLD AUTO: 11 % (ref 4–12)
NEUTROPHILS # BLD AUTO: 5.98 THOUSANDS/ÂΜL (ref 1.85–7.62)
NEUTS SEG NFR BLD AUTO: 60 % (ref 43–75)
NRBC BLD AUTO-RTO: 0 /100 WBCS
PLATELET # BLD AUTO: 213 THOUSANDS/UL (ref 149–390)
PMV BLD AUTO: 10.5 FL (ref 8.9–12.7)
POTASSIUM SERPL-SCNC: 3.3 MMOL/L (ref 3.5–5.3)
PROT SERPL-MCNC: 6.5 G/DL (ref 6.4–8.4)
RBC # BLD AUTO: 3.99 MILLION/UL (ref 3.81–5.12)
SODIUM SERPL-SCNC: 135 MMOL/L (ref 135–147)
WBC # BLD AUTO: 10.12 THOUSAND/UL (ref 4.31–10.16)

## 2024-02-02 PROCEDURE — 80053 COMPREHEN METABOLIC PANEL: CPT | Performed by: PHYSICIAN ASSISTANT

## 2024-02-02 PROCEDURE — 83690 ASSAY OF LIPASE: CPT | Performed by: PHYSICIAN ASSISTANT

## 2024-02-02 PROCEDURE — 36415 COLL VENOUS BLD VENIPUNCTURE: CPT | Performed by: PHYSICIAN ASSISTANT

## 2024-02-02 PROCEDURE — 93005 ELECTROCARDIOGRAM TRACING: CPT

## 2024-02-02 PROCEDURE — 99284 EMERGENCY DEPT VISIT MOD MDM: CPT | Performed by: PHYSICIAN ASSISTANT

## 2024-02-02 PROCEDURE — 84484 ASSAY OF TROPONIN QUANT: CPT | Performed by: PHYSICIAN ASSISTANT

## 2024-02-02 PROCEDURE — 96374 THER/PROPH/DIAG INJ IV PUSH: CPT

## 2024-02-02 PROCEDURE — 99284 EMERGENCY DEPT VISIT MOD MDM: CPT

## 2024-02-02 PROCEDURE — 85025 COMPLETE CBC W/AUTO DIFF WBC: CPT | Performed by: PHYSICIAN ASSISTANT

## 2024-02-02 RX ORDER — ONDANSETRON 4 MG/1
4 TABLET, ORALLY DISINTEGRATING ORAL ONCE
Status: COMPLETED | OUTPATIENT
Start: 2024-02-02 | End: 2024-02-02

## 2024-02-02 RX ORDER — MAGNESIUM HYDROXIDE/ALUMINUM HYDROXICE/SIMETHICONE 120; 1200; 1200 MG/30ML; MG/30ML; MG/30ML
30 SUSPENSION ORAL ONCE
Status: COMPLETED | OUTPATIENT
Start: 2024-02-02 | End: 2024-02-02

## 2024-02-02 RX ORDER — METOCLOPRAMIDE HYDROCHLORIDE 5 MG/ML
5 INJECTION INTRAMUSCULAR; INTRAVENOUS ONCE
Status: COMPLETED | OUTPATIENT
Start: 2024-02-02 | End: 2024-02-02

## 2024-02-02 RX ORDER — LIDOCAINE HYDROCHLORIDE 20 MG/ML
15 SOLUTION OROPHARYNGEAL ONCE
Status: COMPLETED | OUTPATIENT
Start: 2024-02-02 | End: 2024-02-02

## 2024-02-02 RX ORDER — METOCLOPRAMIDE 10 MG/1
5 TABLET ORAL EVERY 6 HOURS PRN
Qty: 30 TABLET | Refills: 0 | Status: SHIPPED | OUTPATIENT
Start: 2024-02-02 | End: 2024-02-17

## 2024-02-02 RX ADMIN — ONDANSETRON 4 MG: 4 TABLET, ORALLY DISINTEGRATING ORAL at 20:39

## 2024-02-02 RX ADMIN — LIDOCAINE HYDROCHLORIDE 15 ML: 20 SOLUTION ORAL; TOPICAL at 20:37

## 2024-02-02 RX ADMIN — ALUMINUM HYDROXIDE, MAGNESIUM HYDROXIDE, AND SIMETHICONE 30 ML: 200; 200; 20 SUSPENSION ORAL at 20:38

## 2024-02-02 RX ADMIN — METOCLOPRAMIDE 5 MG: 5 INJECTION, SOLUTION INTRAMUSCULAR; INTRAVENOUS at 21:54

## 2024-02-02 NOTE — Clinical Note
accompanied Екатеринаpreston Godinez to the emergency department on 2/2/2024.    Return date if applicable: 02/05/2024        If you have any questions or concerns, please don't hesitate to call.      Tiara Boyce PA-C

## 2024-02-03 LAB
ATRIAL RATE: 80 BPM
P AXIS: 66 DEGREES
PR INTERVAL: 152 MS
QRS AXIS: 82 DEGREES
QRSD INTERVAL: 76 MS
QT INTERVAL: 366 MS
QTC INTERVAL: 422 MS
T WAVE AXIS: 61 DEGREES
VENTRICULAR RATE: 80 BPM

## 2024-02-03 NOTE — ED PROVIDER NOTES
"History  Chief Complaint   Patient presents with    Abdominal Pain     Pt ate at [facility] around 1800. Now c/o abdominal cramping,nausea, and \"feel like I have to go (diarrhea)\". No vomiting. Took pepcid pta.      43-year-old female with history of prior cholecystectomy presents complaining of diffuse abdominal cramping with nausea after eating [facility].  Patient states that she eats this regularly but today had mashed potatoes and chicken and then began having nausea and cramping.  Denies vomiting.  Denies fever sick contacts recent travel or antibiotic use. Denies any other complaints       History provided by:  Patient   used: No        Prior to Admission Medications   Prescriptions Last Dose Informant Patient Reported? Taking?   DULoxetine (CYMBALTA) 30 mg delayed release capsule  Self No No   Sig: Take 1 capsule (30 mg total) by mouth daily   Dyclonine-Glycerin (Cepacol Sore Throat Spray) 0.1-33 % LIQD  Self No No   Sig: Apply 1 spray to the mouth or throat every 6 (six) hours   SUMAtriptan (IMITREX) 50 mg tablet  Self No No   Sig: TAKE 1 TABLET(50 MG) BY MOUTH 1 TIME FOR UP TO 1 DOSE AS NEEDED FOR MIGRAINE   baclofen 10 mg tablet  Self No No   Sig: TAKE 1 TABLET(10 MG) BY MOUTH THREE TIMES DAILY AS NEEDED FOR MUSCLE SPASMS   Patient not taking: Reported on 1/30/2024   cholecalciferol (VITAMIN D3) 1,000 units tablet  Self No No   Sig: Take 1 tablet (1,000 Units total) by mouth daily   dicyclomine (BENTYL) 20 mg tablet  Self No No   Sig: Take 1 tablet (20 mg total) by mouth every 6 (six) hours As needed for abdominal pain   docusate sodium (COLACE) 100 mg capsule  Self No No   Sig: TAKE 1 CAPSULE BY MOUTH EVERY MORNING   famotidine (PEPCID) 40 MG tablet  Self No No   Sig: TAKE 1 TABLET(40 MG) BY MOUTH DAILY   Patient not taking: Reported on 12/4/2023   ferrous sulfate 324 (65 Fe) mg  Self No No   Sig: TAKE 1 TABLET BY MOUTH EVERY OTHER DAY   fexofenadine (ALLEGRA) 180 MG tablet  Self No No   Sig: Take " 1 tablet (180 mg total) by mouth daily   fluticasone (FLONASE) 50 mcg/act nasal spray  Self No No   Si spray into each nostril daily   lidocaine (LIDODERM) 5 %  Self No No   Sig: APPLY 1 PATCH TO AFFECTED AREA EVERY DAY(REMOVE AND DISCARD PATCH WITHIN 12 HOURS OR AS DIRECTED BY MD)   meclizine (ANTIVERT) 25 mg tablet  Self No No   Sig: TAKE 1 TABLET(25 MG) BY MOUTH THREE TIMES DAILY AS NEEDED FOR DIZZINESS   mirtazapine (REMERON) 30 mg tablet  Self No No   Sig: Take 1 tablet (30 mg total) by mouth daily at bedtime   ondansetron (Zofran ODT) 4 mg disintegrating tablet   No No   Sig: Take 1 tablet (4 mg total) by mouth every 6 (six) hours as needed for nausea or vomiting for up to 10 days   polyethylene glycol (GLYCOLAX) 17 GM/SCOOP powder  Self No No   Sig: MIX AND DRINK 17 GRAMS BY MOUTH EVERY DAY   prochlorperazine (COMPAZINE) 5 mg tablet  Self No No   Sig: TAKE 1 TABLET(5 MG) BY MOUTH EVERY 6 HOURS AS NEEDED FOR NAUSEA OR VOMITING   trospium chloride (SANCTURA) 20 mg tablet  Self Yes No   Sig: Take 20 mg by mouth 2 (two) times a day   vitamin B-12 (VITAMIN B-12) 1,000 mcg tablet  Self No No   Sig: TAKE 1 TABLET BY MOUTH EVERY DAY      Facility-Administered Medications: None       Past Medical History:   Diagnosis Date    Anemia     in past    Anxiety     Arthritis 2022    Depression     Gallstones     Headache(784.0)     Inflammatory bowel disease     Migraine     Nausea     occ    Panic attack     Rotator cuff disorder, right     Vertigo        Past Surgical History:   Procedure Laterality Date    ABDOMINAL SURGERY      scar tissue removed     SECTION      last assessed: Oct 23, 2014     CHOLECYSTECTOMY  2018    HYSTERECTOMY      OH LAPS SURG CHOLECYSTECTOMY W/CHOLANGIOGRAPHY N/A 2018    Procedure: CHOLECYSTECTOMY LAPAROSCOPIC  WITH  ATTEMPTED IOC;  Surgeon: Silvia Woods MD;  Location: [facility];  Service: General    TUBAL LIGATION         Family History   Problem Relation  Age of Onset    No Known Problems Family     Hypertension Mother     Arthritis Mother     Diabetes type II Father         mellitus     Hypertension Father     Diabetes Father     Arthritis Father      I have reviewed and agree with the history as documented.    E-Cigarette/Vaping    E-Cigarette Use Never User      E-Cigarette/Vaping Substances     Social History     Tobacco Use    Smoking status: Former     Current packs/day: 0.00     Average packs/day: 0.5 packs/day for 5.0 years (2.5 ttl pk-yrs)     Types: Cigarettes     Quit date: 2/19/2013     Years since quitting: 10.9    Smokeless tobacco: Never   Vaping Use    Vaping status: Never Used   Substance Use Topics    Alcohol use: No     Comment: none since 4/2021    Drug use: No       Review of Systems   Constitutional: Negative.  Negative for chills and fatigue.   HENT:  Negative for ear pain and sore throat.    Eyes:  Negative for photophobia and redness.   Respiratory:  Negative for apnea, cough and shortness of breath.    Cardiovascular:  Negative for chest pain.   Gastrointestinal:  Positive for abdominal pain and nausea. Negative for vomiting.   Genitourinary:  Negative for dysuria.   Musculoskeletal:  Negative for arthralgias, neck pain and neck stiffness.   Skin:  Negative for rash.   Neurological:  Negative for dizziness, tremors, syncope and weakness.   Psychiatric/Behavioral:  Negative for suicidal ideas.        Physical Exam  Physical Exam  Constitutional:       General: She is not in acute distress.     Appearance: She is well-developed. She is not diaphoretic.   Eyes:      Pupils: Pupils are equal, round, and reactive to light.   Cardiovascular:      Rate and Rhythm: Normal rate and regular rhythm.   Pulmonary:      Effort: Pulmonary effort is normal. No respiratory distress.      Breath sounds: Normal breath sounds.   Abdominal:      General: Bowel sounds are normal. There is no distension.      Palpations: Abdomen is soft.      Tenderness: There is  abdominal tenderness. There is no right CVA tenderness, left CVA tenderness or guarding.      Comments: Prior surgical scars noted.  Mild diffuse nonspecific tenderness without guarding   Musculoskeletal:         General: Normal range of motion.      Cervical back: Normal range of motion and neck supple.   Skin:     General: Skin is warm and dry.   Neurological:      Mental Status: She is alert and oriented to person, place, and time.         Vital Signs  ED Triage Vitals   Temperature Pulse Respirations Blood Pressure SpO2   02/02/24 2041 02/02/24 2041 02/02/24 2041 02/02/24 2041 02/02/24 2041   98.8 °F (37.1 °C) 82 18 132/79 95 %      Temp Source Heart Rate Source Patient Position - Orthostatic VS BP Location FiO2 (%)   02/02/24 2041 02/02/24 2041 02/02/24 2107 02/02/24 2107 --   Oral Monitor Lying Left arm       Pain Score       --                  Vitals:    02/02/24 2041 02/02/24 2107   BP: 132/79 116/82   Pulse: 82 85   Patient Position - Orthostatic VS:  Lying         Visual Acuity      ED Medications  Medications   aluminum-magnesium hydroxide-simethicone (MAALOX) oral suspension 30 mL (30 mL Oral Given 2/2/24 2038)   ondansetron (ZOFRAN-ODT) dispersible tablet 4 mg (4 mg Oral Given 2/2/24 2039)   Lidocaine Viscous HCl (XYLOCAINE) 2 % mucosal solution 15 mL (15 mL Swish & Spit Given 2/2/24 2037)   metoclopramide (REGLAN) injection 5 mg (5 mg Intravenous Given 2/2/24 2154)       Diagnostic Studies  Results Reviewed       Procedure Component Value Units Date/Time    Comprehensive metabolic panel [[id number]]  (Abnormal) Collected: 02/02/24 2154    Lab Status: Final result Specimen: Blood from Arm, Right Updated: 02/02/24 2220     Sodium 135 mmol/L      Potassium 3.3 mmol/L      Chloride 102 mmol/L      CO2 27 mmol/L      ANION GAP 6 mmol/L      BUN 10 mg/dL      Creatinine 0.65 mg/dL      Glucose 95 mg/dL      Calcium 8.5 mg/dL      AST 10 U/L      ALT 8 U/L      Alkaline Phosphatase 53 U/L      Total Protein  6.5 g/dL      Albumin 3.9 g/dL      Total Bilirubin 0.52 mg/dL      eGFR 109 ml/min/1.73sq m     Narrative:      National Kidney Disease Foundation guidelines for Chronic Kidney Disease (CKD):     Stage 1 with normal or high GFR (GFR > 90 mL/min/1.73 square meters)    Stage 2 Mild CKD (GFR = 60-89 mL/min/1.73 square meters)    Stage 3A Moderate CKD (GFR = 45-59 mL/min/1.73 square meters)    Stage 3B Moderate CKD (GFR = 30-44 mL/min/1.73 square meters)    Stage 4 Severe CKD (GFR = 15-29 mL/min/1.73 square meters)    Stage 5 End Stage CKD (GFR <15 mL/min/1.73 square meters)  Note: GFR calculation is accurate only with a steady state creatinine    Lipase [[id number]]  (Normal) Collected: 02/02/24 2154    Lab Status: Final result Specimen: Blood from Arm, Right Updated: 02/02/24 2220     Lipase 15 u/L     HS Troponin 0hr (reflex protocol) [[id number]]  (Normal) Collected: 02/02/24 2115    Lab Status: Final result Specimen: Blood from Arm, Right Updated: 02/02/24 2146     hs TnI 0hr <2 ng/L     HS Troponin I 2hr [[id number]]     Lab Status: No result Specimen: Blood     CBC and differential [[id number]] Collected: 02/02/24 2115    Lab Status: Final result Specimen: Blood from Arm, Right Updated: 02/02/24 2123     WBC 10.12 Thousand/uL      RBC 3.99 Million/uL      Hemoglobin 13.2 g/dL      Hematocrit 37.0 %      MCV 93 fL      MCH 33.1 pg      MCHC 35.7 g/dL      RDW 12.3 %      MPV 10.5 fL      Platelets 213 Thousands/uL      nRBC 0 /100 WBCs      Neutrophils Relative 60 %      Immat GRANS % 0 %      Lymphocytes Relative 29 %      Monocytes Relative 11 %      Eosinophils Relative 0 %      Basophils Relative 0 %      Neutrophils Absolute 5.98 Thousands/µL      Immature Grans Absolute 0.03 Thousand/uL      Lymphocytes Absolute 2.91 Thousands/µL      Monocytes Absolute 1.14 Thousand/µL      Eosinophils Absolute 0.04 Thousand/µL      Basophils Absolute 0.02 Thousands/µL                    No orders to display               Procedures  ECG 12 Lead Documentation Only    Date/Time: 2/2/2024 9:07 PM    Performed by: Tiara Boyce PA-C  Authorized by: Tiara Boyce PA-C    Indications / Diagnosis:  Epigastric  ECG reviewed by me, the ED Provider: yes    Patient location:  ED  Interpretation:     Interpretation: normal    Rate:     ECG rate:  80    ECG rate assessment: normal    Rhythm:     Rhythm: sinus rhythm    Ectopy:     Ectopy: none    QRS:     QRS axis:  Normal    QRS intervals:  Normal  Conduction:     Conduction: normal    ST segments:     ST segments:  Normal  T waves:     T waves: normal             ED Course                               SBIRT 20yo+      Flowsheet Row Most Recent Value   Initial Alcohol Screen: US AUDIT-C     1. How often do you have a drink containing alcohol? 0 Filed at: 02/02/2024 2043   2. How many drinks containing alcohol do you have on a typical day you are drinking?  0 Filed at: 02/02/2024 2043   3a. Male UNDER 65: How often do you have five or more drinks on one occasion? 0 Filed at: 02/02/2024 2043   3b. FEMALE Any Age, or MALE 65+: How often do you have 4 or more drinks on one occassion? 0 Filed at: 02/02/2024 2043   Audit-C Score 0 Filed at: 02/02/2024 2043   ROYA: How many times in the past year have you...    Used an illegal drug or used a prescription medication for non-medical reasons? Never Filed at: 02/02/2024 2043                      Medical Decision Making  Patient had benign evaluation in the emergency department.  Patient had significant relief with medications given in the ER.  Symptoms thought to be related to patient's underlying gastroparesis status post cholecystectomy after eating fast food.  Patient was educated extensively in supportive care.  No other focal findings found on evaluation.  Given return precautions.  Discharged home.    Amount and/or Complexity of Data Reviewed  Labs: ordered.    Risk  OTC drugs.  Prescription drug management.              Disposition  Final diagnoses:   Abdominal pain, unspecified abdominal location     Time reflects when diagnosis was documented in both MDM as applicable and the Disposition within this note       Time User Action Codes Description Comment    2/2/2024 10:31 PM Tiara Boyce Add [R10.9] Abdominal pain, unspecified abdominal location           ED Disposition       ED Disposition   Discharge    Condition   Stable    Date/Time   Fri Feb 2, 2024 2231    Comment   Екатерина Godinez discharge to home/self care.                   Follow-up Information       Follow up With Specialties Details Why Contact Info Additional Information    On license of [facility] Emergency Department Emergency Medicine Go to  If symptoms worsen [address] [facility] [address] [phone] On license of [facility] Emergency Department    ANNAMARIE Brown [facility], Nurse Practitioner Call  As needed [address] [address]  [phone]               Patient's Medications   Discharge Prescriptions    METOCLOPRAMIDE (REGLAN) 10 MG TABLET    Take 0.5 tablets (5 mg total) by mouth every 6 (six) hours as needed (nausea)       Start Date: 2/2/2024  End Date: --       Order Dose: 5 mg       Quantity: 30 tablet    Refills: 0       No discharge procedures on file.    PDMP Review         Value Time User    PDMP Reviewed  Yes 5/30/2023  8:41 AM ANNAMARIE Otero            ED Provider  Electronically Signed by             Tiara Boyce PA-C  02/02/24 2239

## 2024-02-15 ENCOUNTER — TELEPHONE (OUTPATIENT)
Dept: RADIOLOGY | Facility: HOSPITAL | Age: 44
End: 2024-02-15

## 2024-02-15 NOTE — NURSING NOTE
Call placed to pt to discuss upcoming appointment at [facility] Radiology Department.  No answer.  Information left on pts voicemail.  Pt is having a R Hip CSI completed on 2/16/2024.  Pre procedure instructions given including diet and taking own medications.  Instructed pt that she may eat normally and take medications as usual before the procedure.  Reminded pt of the location, date and time of procedure.  Phone number given for pt to call with any questions.

## 2024-02-16 DIAGNOSIS — R11.0 NAUSEA: ICD-10-CM

## 2024-02-16 DIAGNOSIS — R42 VERTIGO: ICD-10-CM

## 2024-02-17 RX ORDER — MECLIZINE HYDROCHLORIDE 25 MG/1
TABLET ORAL
Qty: 30 TABLET | Refills: 0 | Status: SHIPPED | OUTPATIENT
Start: 2024-02-17

## 2024-02-17 RX ORDER — PROCHLORPERAZINE MALEATE 5 MG/1
TABLET ORAL
Qty: 30 TABLET | Refills: 0 | Status: SHIPPED | OUTPATIENT
Start: 2024-02-17

## 2024-02-19 ENCOUNTER — TELEPHONE (OUTPATIENT)
Dept: PSYCHIATRY | Facility: CLINIC | Age: 44
End: 2024-02-19

## 2024-02-19 NOTE — TELEPHONE ENCOUNTER
Writer called and left message to reschedule follow up appointment due to Provider not being the office.

## 2024-03-29 DIAGNOSIS — E55.9 VITAMIN D DEFICIENCY: ICD-10-CM

## 2024-04-09 ENCOUNTER — TELEMEDICINE (OUTPATIENT)
Dept: PSYCHIATRY | Facility: CLINIC | Age: 44
End: 2024-04-09

## 2024-04-09 DIAGNOSIS — F33.1 MODERATE EPISODE OF RECURRENT MAJOR DEPRESSIVE DISORDER (HCC): ICD-10-CM

## 2024-04-09 DIAGNOSIS — R63.6 UNDERWEIGHT DUE TO INADEQUATE CALORIC INTAKE: ICD-10-CM

## 2024-04-09 DIAGNOSIS — F41.1 GENERALIZED ANXIETY DISORDER: ICD-10-CM

## 2024-04-09 DIAGNOSIS — F41.1 GAD (GENERALIZED ANXIETY DISORDER): ICD-10-CM

## 2024-04-09 DIAGNOSIS — F32.1 CURRENT MODERATE EPISODE OF MAJOR DEPRESSIVE DISORDER WITHOUT PRIOR EPISODE (HCC): Primary | ICD-10-CM

## 2024-04-09 RX ORDER — MIRTAZAPINE 30 MG/1
30 TABLET, FILM COATED ORAL
Qty: 90 TABLET | Refills: 1 | Status: SHIPPED | OUTPATIENT
Start: 2024-04-09

## 2024-04-09 RX ORDER — DULOXETIN HYDROCHLORIDE 20 MG/1
40 CAPSULE, DELAYED RELEASE ORAL DAILY
Qty: 60 CAPSULE | Refills: 2 | Status: SHIPPED | OUTPATIENT
Start: 2024-04-09

## 2024-04-09 NOTE — PSYCH
MEDICATION MANAGEMENT NOTE        [facility]    Name and Date of Birth:  Екатерина Godinez 43 y.o. 1980 MRN: [id number]    Date of Visit: April 9, 2024    Visit Time    Visit Start Time: 1430   Visit Stop Time: 1500  Total Visit Duration:  30 minutes     The total visit duration detailed above includes: patient engagement, medication management, psychotherapy/counseling, discussion regarding treatment goals, and coordination of care.      Note Share Disclaimer:     This note was not shared with the patient due to reasonable likelihood of causing patient harm    No Known Allergies  SUBJECTIVE:    Екатерина is seen today for a follow up for Major Depressive Disorder, Generalized Anxiety Disorder, and OCD. She continues to experience on and off anxiety symptoms since the last visit.  Currently on Cymbalta 30 mg and had been doing better overall over the past couple of months.  However in the last couple of weeks some decompensation.  She does some bad news that her mother has tumor on her kidney and needs workup to rule out cancer.  Tearful once talking to me about this.  Mom currently living in [place] and the distance is very challenging.  Екатерина has noted some increase in anxiety over the past several weeks, worsening of her difficulty with eating and some noted weight loss.  She is in treatment for gastroparesis.  But also has anxiety symptoms that make her fearful of eating.  Also some increase in cleaning and other compulsive behaviors.  She is willing for individual therapy if it can be done virtually.  We will also increase her Cymbalta to 40 mg daily.  She denies any suicidal thoughts or passive death wish.    She denies any side effects from current psychiatric medications.      HPI ROS Appetite Changes and Sleep:     She reports fluctuating sleep pattern, decreased appetite, decreased energy. Denies homicidal ideation, denies suicidal ideation    Review Of  Systems:   all other systems are negative, ongoing issues with appetite and constantly feeling full due to her gastroparesis.  Recent weight loss         Mental Status Evaluation:    Appearance:  age appropriate   Behavior:  pleasant, cooperative   Speech:  normal rate, normal volume   Mood:  depressed, anxious   Affect:  flat   Thought Process:  goal directed   Associations: circumstantial associations   Thought Content:  obsessive thoughts, negative thoughts, ruminating thoughts   Perceptual Disturbances: none   Risk Potential: Suicidal ideation - None  Homicidal ideation - None  Potential for aggression - No   Sensorium:  oriented to person, place, and time/date   Memory:  recent and remote memory grossly intact   Consciousness:  alert and awake   Attention: decreased concentration and decreased attention span   Insight:  limited   Judgment: limited   Gait/Station: normal gait/station, normal balance   Motor Activity: no abnormal movements       History Review:The following portions of the patient's history were reviewed and updated as appropriate: psychiatric history, trauma history allergies, current medications, past family history, past medical history, past social history, past surgical history, and problem list   OBJECTIVE:     Vital signs in last 24 hours:    There were no vitals filed for this visit.  Laboratory Results: I have personally reviewed all pertinent laboratory/tests results.      Suicide/Homicide Risk Assessment:    The following interventions are recommended: no intervention changes needed. Although patient's acute lethality risk is low, long-term/chronic lethality risk is mildly elevated in the presence of worsening depression and anxiety. At the current moment, Екатерина is future-oriented, forward-thinking, and demonstrates ability to act in a self-preserving manner as evidenced by volitionally presenting to the clinic today, seeking treatment. To mitigate future risk, patient should adhere  to the recommendations below, avoid alcohol/illicit substance use, utilize community-based resources and familiar support and prioritize mental health treatment. Presently, patient denies active suicidal/homicidal ideation in addition to thoughts of self-injury; contracts for safety.  At conclusion of evaluation, patient is amenable to the recommendations below. Patient is amenable to calling/contacting the outpatient office including this writer if any acute adverse effects of their medication regimen arise in addition to any comments or concerns pertaining to their psychiatric management.  Patient is amenable to calling/contacting crisis and/or attending to the nearest emergency department if their clinical condition deteriorates to assure their safety and stability, stating that they are able to appropriately confide in their family and friends regarding their psychiatric state.    Assessment/Plan:     PLAN:  Diagnoses and all orders for this visit:    Current moderate episode of major depressive disorder without prior episode (HCC)  -     DULoxetine (Cymbalta) 20 mg capsule; Take 2 capsules (40 mg total) by mouth daily    Generalized anxiety disorder    Underweight due to inadequate caloric intake    Moderate episode of recurrent major depressive disorder (HCC)  -     mirtazapine (REMERON) 30 mg tablet; Take 1 tablet (30 mg total) by mouth daily at bedtime    HUEY (generalized anxiety disorder)  -     mirtazapine (REMERON) 30 mg tablet; Take 1 tablet (30 mg total) by mouth daily at bedtime         Treatment Recommendations/Precautions:    Increase Cymbalta to 40 mg daily for depression and anxiety  Continue Remeron 30 mg at bedtime for appetite, depression, sleep  We will refer for individual therapy as long as it can be virtual.  She will follow-up with her medical provider regarding gastroparesis and weight loss  Medication management every 3 months  Aware of 24 hour and weekend coverage for urgent situations  accessed by calling [facility] main practice number  Patient advised to call 911 if feeling suicidal or homicidal before acting out on their thoughts and they expressed understanding.    Medications Risks/Benefits      Risks, Benefits And Possible Side Effects Of Medications:    Discussed risks and benefits of treatment with patient including risk of suicidality, serotonin syndrome, increased QTc interval and SIADH related to treatment with antidepressants; Risk of induction of manic symptoms in certain patient populations         Controlled Medication Discussion:     Not applicable - controlled prescriptions are not prescribed by this practice    Psychotherapy Provided:     Individual psychotherapy provided: Medications, treatment progress and treatment plan reviewed with Екатерина.  Medication changes discussed with Екатерина.  Medication education provided to Екатерина.     Treatment Plan:    Completed and signed during the session: Yes - Treatment Plan done but not signed at time of office visit due to:  Plan reviewed by video and verbal consent given due to virtual visit.    This note was not shared with the patient due to reasonable likelihood of causing patient harm      ANNAMARIE Hawk   Virtual Regular Visit    Verification of patient location:    Patient is located at Home in the following state in which I hold an active license [place]      Assessment/Plan:    Problem List Items Addressed This Visit       Generalized anxiety disorder    Relevant Medications    DULoxetine (Cymbalta) 20 mg capsule    mirtazapine (REMERON) 30 mg tablet    Current moderate episode of major depressive disorder without prior episode (HCC) - Primary    Relevant Medications    DULoxetine (Cymbalta) 20 mg capsule    mirtazapine (REMERON) 30 mg tablet    Underweight due to inadequate caloric intake     Other Visit Diagnoses       Moderate episode of recurrent major depressive disorder (HCC)        Relevant Medications     DULoxetine (Cymbalta) 20 mg capsule    mirtazapine (REMERON) 30 mg tablet    HUEY (generalized anxiety disorder)        Relevant Medications    DULoxetine (Cymbalta) 20 mg capsule    mirtazapine (REMERON) 30 mg tablet              Reason for visit is Virtual follow up visit     Encounter provider ANNAMARIE Hawk    Provider located at [address] [address] [phone]      Recent Visits  Date Type Provider Dept   04/09/24 Telemedicine ANNAMARIE Hawk  [facility]   Showing recent visits within past 7 days and meeting all other requirements  Future Appointments  No visits were found meeting these conditions.  Showing future appointments within next 150 days and meeting all other requirements       The patient was identified by name and date of birth. Екатерина Godinez was informed that this is a telemedicine visit and that the visit is being conducted throughthe [employer] platform. She agrees to proceed..  My office door was closed. No one else was in the room.  She acknowledged consent and understanding of privacy and security of the video platform. The patient has agreed to participate and understands they can discontinue the visit at any time.    Patient is aware this is a billable service.

## 2024-04-09 NOTE — BH TREATMENT PLAN
TREATMENT PLAN (Medication Management Only)        [facility]    Name and Date of Birth:  Екатерина Godinez 43 y.o. 1980  Date of Treatment Plan: April 9, 2024  Diagnosis/Diagnoses:    1. Current moderate episode of major depressive disorder without prior episode (HCC)    2. Generalized anxiety disorder    3. Underweight due to inadequate caloric intake    4. Moderate episode of recurrent major depressive disorder (HCC)    5. HUEY (generalized anxiety disorder)      Strengths/Personal Resources for Self-Care: supportive family, supportive friends, taking medications as prescribed, ability to communicate well, ability to understand psychiatric illness.  Area/Areas of need (in own words): anxiety symptoms, depressive symptoms.  1. Long Term Goal: continue improvement in acceptable anxiety level.   Target Date: 6 months - 10/9/2024  Person/Persons responsible for completion of goal: Екатерина  2.  Short Term Objective (s) - How will we reach this goal?:   A.  Provider new recommended medication/dosage changes and/or continue medication(s): continue current medications as prescribed.  B.  Referral for individual therapy .    Target Date: 6 months - 10/9/2024  Person/Persons Responsible for Completion of Goal: Екатерина  Progress Towards Goals: continuing treatment  Treatment Modality: medication management every 3 months  Review due 6 months from date of this plan: 6 months - 10/9/2024  Expected length of service: ongoing treatment unless revised  My Physician/PA/NP and I have developed this plan together and I agree to work on the goals and objectives. I understand the treatment goals that were developed for my treatment.

## 2024-04-12 ENCOUNTER — TELEPHONE (OUTPATIENT)
Dept: PSYCHIATRY | Facility: CLINIC | Age: 44
End: 2024-04-12

## 2024-04-19 ENCOUNTER — OFFICE VISIT (OUTPATIENT)
Dept: GASTROENTEROLOGY | Facility: MEDICAL CENTER | Age: 44
End: 2024-04-19
Payer: MEDICARE

## 2024-04-19 ENCOUNTER — TELEPHONE (OUTPATIENT)
Dept: GASTROENTEROLOGY | Facility: MEDICAL CENTER | Age: 44
End: 2024-04-19

## 2024-04-19 VITALS
HEART RATE: 97 BPM | SYSTOLIC BLOOD PRESSURE: 98 MMHG | WEIGHT: 82.6 LBS | TEMPERATURE: 97.3 F | BODY MASS INDEX: 16.68 KG/M2 | DIASTOLIC BLOOD PRESSURE: 63 MMHG

## 2024-04-19 DIAGNOSIS — K21.9 GASTROESOPHAGEAL REFLUX DISEASE WITHOUT ESOPHAGITIS: ICD-10-CM

## 2024-04-19 DIAGNOSIS — K31.84 GASTROPARESIS: Primary | ICD-10-CM

## 2024-04-19 PROCEDURE — 99214 OFFICE O/P EST MOD 30 MIN: CPT | Performed by: INTERNAL MEDICINE

## 2024-04-19 RX ORDER — OMEPRAZOLE 40 MG/1
40 CAPSULE, DELAYED RELEASE ORAL DAILY
Qty: 90 CAPSULE | Refills: 0 | Status: SHIPPED | OUTPATIENT
Start: 2024-04-19

## 2024-04-19 RX ORDER — OMEPRAZOLE 40 MG/1
40 CAPSULE, DELAYED RELEASE ORAL DAILY
Qty: 30 CAPSULE | Refills: 2 | Status: SHIPPED | OUTPATIENT
Start: 2024-04-19 | End: 2024-04-19

## 2024-04-19 NOTE — TELEPHONE ENCOUNTER
Procedure: EGD with Botox  Date: 06/17/2024  Physician performing: Dr. Olea  Location of procedure:  [facility]  Instructions given to patient: N/A  Diabetic: N/A  Clearances: N/A

## 2024-04-19 NOTE — PROGRESS NOTES
[facility] - Outpatient Follow-up Note  Екатерина Godinez 43 y.o. female MRN: [id number]  Encounter: [id number]          ASSESSMENT AND PLAN:      Gastroparesis.  Nausea  Low BMI  Change in bowel habits  GERD    Miralax.   Omeprazole.   EGD with botox  Dr. Jeff regarding pyloromyotomy. If this is a concern due to her low BMI, I have offered her a referral or wait until we start doing G-POEM again at [facility] and we can check in with insurance then.   ______________________________________________________________________    SUBJECTIVE:  42 yo F f/u for gastroparesis with GES in 2022 showing 64% emptying at 4 hrs. Was planned for G-POEM, but not approved by insurance. Hx of marijuana use. Recent ED visit on 2/2/24 due to abd pain after eating [employer]. EGD/colon from 2022 normal showing normal gastric, duodenal bx and normal random colon bx. Plan to repeat 5 yrs for screening. Previous cholecystectomy.     Blood work in 2/24 : normal.   Celiac test negative     At present she is doing well.    She did change insurance, review of some uncertain if the G POEM will be approved through her new insurance.    She does have intermittent postprandial bloating, early satiety and nausea.  No weight loss.  She does have reflux symptoms as well.    Seen Dr. Olea in the pastShe has seen Dr. Olea in the past.  She has not responded to Reglan.  She was not interested in domperidone also due to financial constraints.    She does have intermittent constipation/diarrhea.    Answers submitted by the patient for this visit:  Abdominal Pain Questionnaire (Submitted on 4/18/2024)  Chief Complaint: Abdominal pain  Chronicity: chronic  Onset: more than 1 year ago  Onset quality: gradual  Frequency: daily  Episode duration: 4 Hours  Progression since onset: gradually worsening  Pain location: epigastric region, generalized abdominal region  Pain - numeric: 8/10  Pain quality: aching, cramping, dull, a sensation of  fullness, sharp  anorexia: Yes  arthralgias: Yes  belching: No  constipation: Yes  diarrhea: Yes  dysuria: No  fever: No  flatus: Yes  frequency: Yes  headaches: No  hematochezia: No  hematuria: No  melena: Yes  myalgias: Yes  nausea: Yes  weight loss: Yes  vomiting: No  Aggravated by: bowel movement, eating, movement  Relieved by: bowel movements, certain positions, passing flatus, recumbency, vomiting  Diagnostic workup: CT scan, GI consult, lower endoscopy, ultrasound, upper endoscopy        REVIEW OF SYSTEMS IS OTHERWISE NEGATIVE.      Historical Information   Past Medical History:   Diagnosis Date    Anemia     in past    Anxiety     Arthritis 2022    Depression     Gallstones     Headache(784.0)     Inflammatory bowel disease     Migraine     Nausea     occ    Panic attack     Rotator cuff disorder, right     Vertigo      Past Surgical History:   Procedure Laterality Date    ABDOMINAL SURGERY      scar tissue removed     SECTION      last assessed: Oct 23, 2014     CHOLECYSTECTOMY  2018    HYSTERECTOMY      VA LAPS SURG CHOLECYSTECTOMY W/CHOLANGIOGRAPHY N/A 2018    Procedure: CHOLECYSTECTOMY LAPAROSCOPIC  WITH  ATTEMPTED IOC;  Surgeon: Silvia Woods MD;  Location: [facility];  Service: General    TUBAL LIGATION       Social History   Social History     Substance and Sexual Activity   Alcohol Use No    Comment: none since 2021     Social History     Substance and Sexual Activity   Drug Use No     Social History     Tobacco Use   Smoking Status Former    Current packs/day: 0.00    Average packs/day: 0.5 packs/day for 5.0 years (2.5 ttl pk-yrs)    Types: Cigarettes    Quit date: 2013    Years since quittin.1   Smokeless Tobacco Never     Family History   Problem Relation Age of Onset    No Known Problems Family     Hypertension Mother     Arthritis Mother     Diabetes type II Father         mellitus     Hypertension Father     Diabetes Father     Arthritis Father         Meds/Allergies       Current Outpatient Medications:     cholecalciferol 1,000 units tablet    dicyclomine (BENTYL) 20 mg tablet    docusate sodium (COLACE) 100 mg capsule    DULoxetine (Cymbalta) 20 mg capsule    Dyclonine-Glycerin (Cepacol Sore Throat Spray) 0.1-33 % LIQD    famotidine (PEPCID) 40 MG tablet    ferrous sulfate 324 (65 Fe) mg    fexofenadine (ALLEGRA) 180 MG tablet    fluticasone (FLONASE) 50 mcg/act nasal spray    lidocaine (LIDODERM) 5 %    meclizine (ANTIVERT) 25 mg tablet    mirtazapine (REMERON) 30 mg tablet    polyethylene glycol (GLYCOLAX) 17 GM/SCOOP powder    prochlorperazine (COMPAZINE) 5 mg tablet    SUMAtriptan (IMITREX) 50 mg tablet    trospium chloride (SANCTURA) 20 mg tablet    vitamin B-12 (VITAMIN B-12) 1,000 mcg tablet    baclofen 10 mg tablet    omeprazole (PriLOSEC) 40 MG capsule    ondansetron (Zofran ODT) 4 mg disintegrating tablet    No Known Allergies        Objective     Blood pressure 98/63, pulse 97, temperature (!) 97.3 °F (36.3 °C), weight 37.5 kg (82 lb 9.6 oz), last menstrual period 07/24/2017, not currently breastfeeding. Body mass index is 16.68 kg/m².      PHYSICAL EXAM:      General Appearance:   Alert, cooperative, no distress   HEENT:   Normocephalic, atraumatic, anicteric.     Neck:  Supple, symmetrical, trachea midline   Lungs:   Respirations unlabored    Heart::   Regular rate    Abdomen:   Soft, non-tender, non-distended; normal bowel sounds; no masses, no organomegaly    Genitalia:   Deferred    Rectal:   Deferred    Extremities:  No cyanosis, clubbing or edema    Pulses:  Symmetric    Skin:  No jaundice, rashes, or lesions    Lymph nodes:  No palpable cervical lymphadenopathy        Lab Results:   No visits with results within 1 Day(s) from this visit.   Latest known visit with results is:   Admission on 02/02/2024, Discharged on 02/02/2024   Component Date Value    WBC 02/02/2024 10.12     RBC 02/02/2024 3.99     Hemoglobin 02/02/2024 13.2      Hematocrit 02/02/2024 37.0     MCV 02/02/2024 93     MCH 02/02/2024 33.1     MCHC 02/02/2024 35.7     RDW 02/02/2024 12.3     MPV 02/02/2024 10.5     Platelets 02/02/2024 213     nRBC 02/02/2024 0     Segmented % 02/02/2024 60     Immature Grans % 02/02/2024 0     Lymphocytes % 02/02/2024 29     Monocytes % 02/02/2024 11     Eosinophils Relative 02/02/2024 0     Basophils Relative 02/02/2024 0     Absolute Neutrophils 02/02/2024 5.98     Absolute Immature Grans 02/02/2024 0.03     Absolute Lymphocytes 02/02/2024 2.91     Absolute Monocytes 02/02/2024 1.14     Eosinophils Absolute 02/02/2024 0.04     Basophils Absolute 02/02/2024 0.02     Sodium 02/02/2024 135     Potassium 02/02/2024 3.3 (L)     Chloride 02/02/2024 102     CO2 02/02/2024 27     ANION GAP 02/02/2024 6     BUN 02/02/2024 10     Creatinine 02/02/2024 0.65     Glucose 02/02/2024 95     Calcium 02/02/2024 8.5     AST 02/02/2024 10 (L)     ALT 02/02/2024 8     Alkaline Phosphatase 02/02/2024 53     Total Protein 02/02/2024 6.5     Albumin 02/02/2024 3.9     Total Bilirubin 02/02/2024 0.52     eGFR 02/02/2024 109     Lipase 02/02/2024 15     hs TnI 0hr 02/02/2024 <2     Ventricular Rate 02/02/2024 80     Atrial Rate 02/02/2024 80     SD Interval 02/02/2024 152     QRSD Interval 02/02/2024 76     QT Interval 02/02/2024 366     QTC Interval 02/02/2024 422     P Axis 02/02/2024 66     QRS Axis 02/02/2024 82     T Wave [place] 02/02/2024 61          Radiology Results:   No results found.      Kendrick Field  GI Fellow

## 2024-04-23 ENCOUNTER — TELEPHONE (OUTPATIENT)
Dept: SURGERY | Facility: CLINIC | Age: 44
End: 2024-04-23

## 2024-04-25 ENCOUNTER — SOCIAL WORK (OUTPATIENT)
Dept: BEHAVIORAL/MENTAL HEALTH CLINIC | Facility: CLINIC | Age: 44
End: 2024-04-25

## 2024-04-25 DIAGNOSIS — F33.1 MAJOR DEPRESSIVE DISORDER, RECURRENT, MODERATE (HCC): Primary | ICD-10-CM

## 2024-04-25 DIAGNOSIS — F41.1 GENERALIZED ANXIETY DISORDER: ICD-10-CM

## 2024-04-25 NOTE — PSYCH
" Behavioral Health Psychotherapy Assessment    Date of Initial Psychotherapy Assessment: 04/25/24  Referral Source: \"self\"  Has a release of information been signed for the referral source? NA    Preferred Name: Екатерина Godinez  Preferred Pronouns: She/her  YOB: 1980 Age: 43 y.o.  Sex assigned at birth: female   Gender Identity: \"female\"  Race:   Preferred Language: English    Emergency Contact:  Full Name: Anupam Elliott  Relationship to Client: Spouse  Contact information: [phone]    Primary Care Physician:  ANNAMARIE Brown  [address] [place] [address]  [phone]  Has a release of information been signed? Yes    Physical Health History:  Past surgical procedures: please see chart   Do you have a history of any of the following: other n/a  Do you have any mobility issues? Yes, describe: please see chart     Relevant Family History:  \"I was raised with my parents and 3 siblings. I am not on good terms with my sister that lives nearby. My brother is in [place]. My mother was recently diagnosed with some health conditions and she needs help. My parents don't want me to know but I do. I have a loving  and 4 kids. My 19 year old daughter lives with me. My 3 sons all moved out.\"     Presenting Problem (What brings you in?)  \"I am just overwhelmed with me recently finding out 2 years ago I have a chronic condition and recently finding out my mom is very sick. I don't work and feel alone at home a lot of the time.\"     Mental Health Advance Directive:  Do you currently have a Mental Health Advance Directive?no    Diagnosis:  No diagnosis found.    Initial Assessment:     Current Mental Status:    Appearance: appropriate, casual and neat      Behavior/Manner: cooperative      Affect/Mood:  Stable and depressed    Speech:  Normal    Sleep:  Normal    Oriented to: oriented to self, oriented to place and oriented to time       Clinical Symptoms    Depression: yes  " "    Anxiety: yes      Depression Symptoms: depressed mood, serious loss of interest in things, social isolation, fatigue, poor concentration, recent weight loss and irritable      Anxiety Symptoms: excessive worry, fatigues easily, irritable, fear of losing control, nervous/anxious and difficulty controlling worry      Have you ever been assaultive to others or the environment: No      Have you ever been self-injurious: No      Counseling History:  Previous Counseling or Treatment  (Mental Health or Drug & Alcohol): No    Have you previously taken psychiatric medications: Yes      Suicide Risk Assessment  Have you ever had a suicide attempt: No    Have you had incidents of suicidal ideation: No    Are you currently experiencing suicidal thoughts: No      Substance Abuse/Addiction Assessment:  Alcohol: No    Heroin: No    Fentanyl: No    Opiates: No    Cocaine: No    Amphetamines: No    Hallucinogens: No    Club Drugs: No    Benzodiazepines: No    Other Rx Meds: No    Marijuana: No    Tobacco/Nicotine: No    Have you experienced blackouts as a result of substance use: No    Have you had any periods of abstinence: No    Have you experienced symptoms of withdrawal: No    Have you ever overdosed on any substances?: No    Are you currently using any Medication Assisted Treatment for Substance Use: No      Compulsive Behaviors:  Compulsive Behavior Information:  \"Nothing\"    Disordered Eating History:  Do you have a history of disordered eating: No      Social Determinants of Health:    SDOH:  Medical cost barrier, unemployment/underemployment, social isolation and stress    Trauma and Abuse History:    Have you ever been abused: No      Legal History:    Have you ever been arrested  or had a DUI: No      Have you been incarcerated: No      Are you currently on parole/probation: No      Any current Children and Youth involvement: No      Any pending legal charges: No      Relationship History:    Current marital status: "       Natural Supports:  Mother, father and siblings    Employment History    Are you currently employed: No      Currently seeking employment: No      Longest period of employment:  Years    Sources of income/financial support:  Family members     History:      Status: no history of  duty  Educational History:     Have you ever been diagnosed with a learning disability: No      Highest level of education:  High school graduate    Have you ever had an IEP or 504-plan: No      Do you need assistance with reading or writing: No      Recommended Treatment:     Psychotherapy:  Individual sessions    Frequency:  1 time    Session frequency:  Weekly    Visit start and stop times:    04/25/24

## 2024-05-01 DIAGNOSIS — E53.8 B12 DEFICIENCY: ICD-10-CM

## 2024-05-01 RX ORDER — LANOLIN ALCOHOL/MO/W.PET/CERES
1000 CREAM (GRAM) TOPICAL DAILY
Qty: 90 TABLET | Refills: 2 | Status: SHIPPED | OUTPATIENT
Start: 2024-05-01

## 2024-05-01 NOTE — TELEPHONE ENCOUNTER
Gary Almonte,  I think if we add topical antihistamine she might get the needed relief.  [facility], please advise.

## 2024-05-05 DIAGNOSIS — M25.50 POLYARTHRALGIA: ICD-10-CM

## 2024-05-06 ENCOUNTER — APPOINTMENT (EMERGENCY)
Dept: CT IMAGING | Facility: HOSPITAL | Age: 44
End: 2024-05-06
Payer: MEDICARE

## 2024-05-06 ENCOUNTER — TELEPHONE (OUTPATIENT)
Dept: PSYCHIATRY | Facility: CLINIC | Age: 44
End: 2024-05-06

## 2024-05-06 ENCOUNTER — HOSPITAL ENCOUNTER (EMERGENCY)
Facility: HOSPITAL | Age: 44
Discharge: HOME | End: 2024-05-06
Attending: EMERGENCY MEDICINE
Payer: MEDICARE

## 2024-05-06 VITALS
OXYGEN SATURATION: 100 % | SYSTOLIC BLOOD PRESSURE: 99 MMHG | TEMPERATURE: 98.1 F | DIASTOLIC BLOOD PRESSURE: 68 MMHG | BODY MASS INDEX: 16.56 KG/M2 | WEIGHT: 82.01 LBS | RESPIRATION RATE: 18 BRPM | HEART RATE: 96 BPM

## 2024-05-06 DIAGNOSIS — R10.9 ABDOMINAL PAIN: Primary | ICD-10-CM

## 2024-05-06 DIAGNOSIS — R11.0 NAUSEA: ICD-10-CM

## 2024-05-06 DIAGNOSIS — R19.7 DIARRHEA: ICD-10-CM

## 2024-05-06 LAB
ALBUMIN SERPL BCP-MCNC: 4 G/DL (ref 3.5–5)
ALP SERPL-CCNC: 54 U/L (ref 34–104)
ALT SERPL W P-5'-P-CCNC: 5 U/L (ref 7–52)
ANION GAP SERPL CALCULATED.3IONS-SCNC: 6 MMOL/L (ref 4–13)
AST SERPL W P-5'-P-CCNC: 10 U/L (ref 13–39)
ATRIAL RATE: 90 BPM
BASOPHILS # BLD AUTO: 0.01 THOUSANDS/ÂΜL (ref 0–0.1)
BASOPHILS NFR BLD AUTO: 0 % (ref 0–1)
BILIRUB SERPL-MCNC: 0.51 MG/DL (ref 0.2–1)
BILIRUB UR QL STRIP: NEGATIVE
BUN SERPL-MCNC: 7 MG/DL (ref 5–25)
CALCIUM SERPL-MCNC: 9.1 MG/DL (ref 8.4–10.2)
CHLORIDE SERPL-SCNC: 105 MMOL/L (ref 96–108)
CLARITY UR: CLEAR
CO2 SERPL-SCNC: 26 MMOL/L (ref 21–32)
COLOR UR: ABNORMAL
CREAT SERPL-MCNC: 0.67 MG/DL (ref 0.6–1.3)
EOSINOPHIL # BLD AUTO: 0.05 THOUSAND/ÂΜL (ref 0–0.61)
EOSINOPHIL NFR BLD AUTO: 1 % (ref 0–6)
ERYTHROCYTE [DISTWIDTH] IN BLOOD BY AUTOMATED COUNT: 11.9 % (ref 11.6–15.1)
EXT PREGNANCY TEST URINE: NEGATIVE
EXT. CONTROL: NORMAL
GFR SERPL CREATININE-BSD FRML MDRD: 108 ML/MIN/1.73SQ M
GLUCOSE SERPL-MCNC: 84 MG/DL (ref 65–140)
GLUCOSE UR STRIP-MCNC: NEGATIVE MG/DL
HCT VFR BLD AUTO: 37.3 % (ref 34.8–46.1)
HGB BLD-MCNC: 12.7 G/DL (ref 11.5–15.4)
HGB UR QL STRIP.AUTO: NEGATIVE
IMM GRANULOCYTES # BLD AUTO: 0.02 THOUSAND/UL (ref 0–0.2)
IMM GRANULOCYTES NFR BLD AUTO: 0 % (ref 0–2)
KETONES UR STRIP-MCNC: ABNORMAL MG/DL
LEUKOCYTE ESTERASE UR QL STRIP: NEGATIVE
LIPASE SERPL-CCNC: 10 U/L (ref 11–82)
LYMPHOCYTES # BLD AUTO: 1.93 THOUSANDS/ÂΜL (ref 0.6–4.47)
LYMPHOCYTES NFR BLD AUTO: 21 % (ref 14–44)
MAGNESIUM SERPL-MCNC: 1.8 MG/DL (ref 1.9–2.7)
MCH RBC QN AUTO: 31.3 PG (ref 26.8–34.3)
MCHC RBC AUTO-ENTMCNC: 34 G/DL (ref 31.4–37.4)
MCV RBC AUTO: 92 FL (ref 82–98)
MONOCYTES # BLD AUTO: 0.82 THOUSAND/ÂΜL (ref 0.17–1.22)
MONOCYTES NFR BLD AUTO: 9 % (ref 4–12)
NEUTROPHILS # BLD AUTO: 6.37 THOUSANDS/ÂΜL (ref 1.85–7.62)
NEUTS SEG NFR BLD AUTO: 69 % (ref 43–75)
NITRITE UR QL STRIP: NEGATIVE
NRBC BLD AUTO-RTO: 0 /100 WBCS
P AXIS: 69 DEGREES
PH UR STRIP.AUTO: 5.5 [PH]
PLATELET # BLD AUTO: 222 THOUSANDS/UL (ref 149–390)
PMV BLD AUTO: 10.6 FL (ref 8.9–12.7)
POTASSIUM SERPL-SCNC: 4.3 MMOL/L (ref 3.5–5.3)
PR INTERVAL: 150 MS
PROT SERPL-MCNC: 6.8 G/DL (ref 6.4–8.4)
PROT UR STRIP-MCNC: NEGATIVE MG/DL
QRS AXIS: 88 DEGREES
QRSD INTERVAL: 72 MS
QT INTERVAL: 332 MS
QTC INTERVAL: 406 MS
RBC # BLD AUTO: 4.06 MILLION/UL (ref 3.81–5.12)
SODIUM SERPL-SCNC: 137 MMOL/L (ref 135–147)
SP GR UR STRIP.AUTO: 1.01 (ref 1–1.03)
T WAVE AXIS: 49 DEGREES
UROBILINOGEN UR STRIP-ACNC: <2 MG/DL
VENTRICULAR RATE: 90 BPM
WBC # BLD AUTO: 9.2 THOUSAND/UL (ref 4.31–10.16)

## 2024-05-06 PROCEDURE — 81003 URINALYSIS AUTO W/O SCOPE: CPT | Performed by: EMERGENCY MEDICINE

## 2024-05-06 PROCEDURE — 85025 COMPLETE CBC W/AUTO DIFF WBC: CPT | Performed by: EMERGENCY MEDICINE

## 2024-05-06 PROCEDURE — 81025 URINE PREGNANCY TEST: CPT | Performed by: EMERGENCY MEDICINE

## 2024-05-06 PROCEDURE — 93010 ELECTROCARDIOGRAM REPORT: CPT | Performed by: INTERNAL MEDICINE

## 2024-05-06 PROCEDURE — 74177 CT ABD & PELVIS W/CONTRAST: CPT

## 2024-05-06 PROCEDURE — 83690 ASSAY OF LIPASE: CPT | Performed by: EMERGENCY MEDICINE

## 2024-05-06 PROCEDURE — 99285 EMERGENCY DEPT VISIT HI MDM: CPT | Performed by: EMERGENCY MEDICINE

## 2024-05-06 PROCEDURE — 99285 EMERGENCY DEPT VISIT HI MDM: CPT

## 2024-05-06 PROCEDURE — 83735 ASSAY OF MAGNESIUM: CPT | Performed by: EMERGENCY MEDICINE

## 2024-05-06 PROCEDURE — 36415 COLL VENOUS BLD VENIPUNCTURE: CPT | Performed by: EMERGENCY MEDICINE

## 2024-05-06 PROCEDURE — 96374 THER/PROPH/DIAG INJ IV PUSH: CPT

## 2024-05-06 PROCEDURE — 96361 HYDRATE IV INFUSION ADD-ON: CPT

## 2024-05-06 PROCEDURE — 80053 COMPREHEN METABOLIC PANEL: CPT | Performed by: EMERGENCY MEDICINE

## 2024-05-06 PROCEDURE — 96375 TX/PRO/DX INJ NEW DRUG ADDON: CPT

## 2024-05-06 PROCEDURE — 93005 ELECTROCARDIOGRAM TRACING: CPT

## 2024-05-06 RX ORDER — MORPHINE SULFATE 4 MG/ML
4 INJECTION, SOLUTION INTRAMUSCULAR; INTRAVENOUS ONCE
Status: COMPLETED | OUTPATIENT
Start: 2024-05-06 | End: 2024-05-06

## 2024-05-06 RX ORDER — DICYCLOMINE HCL 20 MG
20 TABLET ORAL EVERY 6 HOURS PRN
Qty: 40 TABLET | Refills: 0 | Status: SHIPPED | OUTPATIENT
Start: 2024-05-06

## 2024-05-06 RX ORDER — DICYCLOMINE HCL 20 MG
20 TABLET ORAL ONCE
Status: COMPLETED | OUTPATIENT
Start: 2024-05-06 | End: 2024-05-06

## 2024-05-06 RX ORDER — LIDOCAINE 50 MG/G
PATCH TOPICAL
Qty: 90 PATCH | Refills: 1 | Status: SHIPPED | OUTPATIENT
Start: 2024-05-06

## 2024-05-06 RX ORDER — ONDANSETRON 4 MG/1
4 TABLET, ORALLY DISINTEGRATING ORAL EVERY 8 HOURS PRN
Qty: 30 TABLET | Refills: 0 | Status: SHIPPED | OUTPATIENT
Start: 2024-05-06

## 2024-05-06 RX ORDER — ONDANSETRON 2 MG/ML
4 INJECTION INTRAMUSCULAR; INTRAVENOUS ONCE
Status: COMPLETED | OUTPATIENT
Start: 2024-05-06 | End: 2024-05-06

## 2024-05-06 RX ADMIN — IOHEXOL 85 ML: 350 INJECTION, SOLUTION INTRAVENOUS at 18:36

## 2024-05-06 RX ADMIN — SODIUM CHLORIDE 1000 ML: 0.9 INJECTION, SOLUTION INTRAVENOUS at 16:33

## 2024-05-06 RX ADMIN — IOHEXOL 50 ML: 240 INJECTION, SOLUTION INTRATHECAL; INTRAVASCULAR; INTRAVENOUS; ORAL at 18:36

## 2024-05-06 RX ADMIN — DICYCLOMINE HYDROCHLORIDE 20 MG: 20 TABLET ORAL at 20:19

## 2024-05-06 RX ADMIN — ONDANSETRON 4 MG: 2 INJECTION INTRAMUSCULAR; INTRAVENOUS at 16:34

## 2024-05-06 RX ADMIN — MORPHINE SULFATE 4 MG: 4 INJECTION INTRAVENOUS at 16:35

## 2024-05-06 NOTE — TELEPHONE ENCOUNTER
Spoke to Екатерина.  Since Thursday, she has been having nausea and diarrhea 3-4 times a day.  She is not sure if it might be from the Cymbalta or is it her Gastroparesis or a stomach bug.  States that she also gets stomach cramping and all she wants to do is sleep.  States she slept all weekend.  Informed her to follow up with her doctor or ER also to rule out medical concerns.  She will wait for a return call from the office.    Will refer to Viviane Parada for review.

## 2024-05-06 NOTE — TELEPHONE ENCOUNTER
Called Екатерина back and informed her that provider does not think diarrhea is from the Cymblata since she increased her dose about a month ago already. Informed her that if it doesn't resolve in a day or so, she should decrease dose back down to 20 MG until her next appointment.  Instructed her not to stop the medication just to decrease it.

## 2024-05-06 NOTE — TELEPHONE ENCOUNTER
Екатерина Godinez requested a call back to discuss side effect ashes' having with the higher dose of Cymbalta.    They can be reached at P# [phone].       Thank you.

## 2024-05-06 NOTE — Clinical Note
accompanied Екатерина Godinez to the emergency department on 5/6/2024.    Return date if applicable: 05/07/2024        If you have any questions or concerns, please don't hesitate to call.      Timmy Jones MD

## 2024-05-06 NOTE — ED PROVIDER NOTES
History  Chief Complaint   Patient presents with    Abdominal Pain     Generalized abd pain since Thursday with n/v/d. Feels like cramping pain. Gastroparesis patient.      Patient is a 43-year-old female.  She has a history of cholecystectomy.  She has had a tubal ligation and then hysterectomy.  She has a history of gastroparesis.  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  However, review of her last gastroenterology note does not mention any of these.  She is celiac test negative.  He presents to the emergency room with diarrhea that started on Thursday.  She is nauseated but has not vomited.  She is complaining of abdominal pain.  No fever or chills.  The diarrhea is not bloody.  No melena.  She does not have any urinary or vaginal symptoms.  Currently no constipation, though she reports problems with constipation in the past.  No foreign travel.  No sick contacts or suspect foods.  No recent hospitalizations or antibiotics.        Prior to Admission Medications   Prescriptions Last Dose Informant Patient Reported? Taking?   DULoxetine (Cymbalta) 20 mg capsule   No No   Sig: Take 2 capsules (40 mg total) by mouth daily   Dyclonine-Glycerin (Cepacol Sore Throat Spray) 0.1-33 % LIQD  Self No No   Sig: Apply 1 spray to the mouth or throat every 6 (six) hours   SUMAtriptan (IMITREX) 50 mg tablet  Self No No   Sig: TAKE 1 TABLET(50 MG) BY MOUTH 1 TIME FOR UP TO 1 DOSE AS NEEDED FOR MIGRAINE   baclofen 10 mg tablet  Self No No   Sig: TAKE 1 TABLET(10 MG) BY MOUTH THREE TIMES DAILY AS NEEDED FOR MUSCLE SPASMS   Patient not taking: Reported on 1/30/2024   cholecalciferol 1,000 units tablet   No No   Sig: Take 1 tablet (1,000 Units total) by mouth daily   dicyclomine (BENTYL) 20 mg tablet  Self No No   Sig: Take 1 tablet (20 mg total) by mouth every 6 (six) hours As needed for abdominal pain   docusate sodium (COLACE) 100 mg capsule  Self No No   Sig: TAKE 1 CAPSULE BY MOUTH EVERY MORNING    famotidine (PEPCID) 40 MG tablet  Self No No   Sig: TAKE 1 TABLET(40 MG) BY MOUTH DAILY   ferrous sulfate 324 (65 Fe) mg  Self No No   Sig: TAKE 1 TABLET BY MOUTH EVERY OTHER DAY   fexofenadine (ALLEGRA) 180 MG tablet  Self No No   Sig: Take 1 tablet (180 mg total) by mouth daily   fluticasone (FLONASE) 50 mcg/act nasal spray  Self No No   Si spray into each nostril daily   lidocaine (LIDODERM) 5 %   No No   Sig: UNWRAP AND APPLY 1 PATCH TOPICALLY TO THE AFFECTED AREA EVERY DAY(DO NOT LEAVE ON FOR MORE THAN 12 HOURS)   meclizine (ANTIVERT) 25 mg tablet   No No   Sig: TAKE 1 TABLET(25 MG) BY MOUTH THREE TIMES DAILY AS NEEDED FOR DIZZINESS   mirtazapine (REMERON) 30 mg tablet   No No   Sig: Take 1 tablet (30 mg total) by mouth daily at bedtime   omeprazole (PriLOSEC) 40 MG capsule   No No   Sig: TAKE 1 CAPSULE(40 MG) BY MOUTH DAILY   ondansetron (Zofran ODT) 4 mg disintegrating tablet   No No   Sig: Take 1 tablet (4 mg total) by mouth every 6 (six) hours as needed for nausea or vomiting for up to 10 days   polyethylene glycol (GLYCOLAX) 17 GM/SCOOP powder  Self No No   Sig: MIX AND DRINK 17 GRAMS BY MOUTH EVERY DAY   prochlorperazine (COMPAZINE) 5 mg tablet   No No   Sig: TAKE 1 TABLET(5 MG) BY MOUTH EVERY 6 HOURS AS NEEDED FOR NAUSEA OR VOMITING   trospium chloride (SANCTURA) 20 mg tablet  Self Yes No   Sig: Take 20 mg by mouth 2 (two) times a day   vitamin B-12 (VITAMIN B-12) 1,000 mcg tablet   No No   Sig: Take 1 tablet (1,000 mcg total) by mouth daily      Facility-Administered Medications: None       Past Medical History:   Diagnosis Date    Anemia     in past    Anxiety     Arthritis 2022    Depression     Gallstones     Headache(784.0)     Inflammatory bowel disease     Migraine     Nausea     occ    Panic attack     Rotator cuff disorder, right     Vertigo        Past Surgical History:   Procedure Laterality Date    ABDOMINAL SURGERY      scar tissue removed     SECTION      last  assessed: Oct 23, 2014     CHOLECYSTECTOMY  2018    HYSTERECTOMY      IA LAPS SURG CHOLECYSTECTOMY W/CHOLANGIOGRAPHY N/A 2018    Procedure: CHOLECYSTECTOMY LAPAROSCOPIC  WITH  ATTEMPTED IOC;  Surgeon: Silvia Woods MD;  Location: [facility];  Service: General    TUBAL LIGATION         Family History   Problem Relation Age of Onset    No Known Problems Family     Hypertension Mother     Arthritis Mother     Diabetes type II Father         mellitus     Hypertension Father     Diabetes Father     Arthritis Father      I have reviewed and agree with the history as documented.    E-Cigarette/Vaping    E-Cigarette Use Never User      E-Cigarette/Vaping Substances     Social History     Tobacco Use    Smoking status: Former     Current packs/day: 0.00     Average packs/day: 0.5 packs/day for 5.0 years (2.5 ttl pk-yrs)     Types: Cigarettes     Quit date: 2013     Years since quittin.2    Smokeless tobacco: Never   Vaping Use    Vaping status: Never Used   Substance Use Topics    Alcohol use: No     Comment: none since 2021    Drug use: No       Review of Systems   Constitutional:  Negative for chills and fever.   HENT:  Negative for rhinorrhea and sore throat.    Eyes:  Negative for pain, redness and visual disturbance.   Respiratory:  Negative for cough and shortness of breath.    Cardiovascular:  Negative for chest pain and leg swelling.   Gastrointestinal:  Positive for abdominal pain, diarrhea and nausea. Negative for vomiting.   Endocrine: Negative for polydipsia and polyuria.   Genitourinary:  Negative for dysuria, frequency, hematuria, vaginal bleeding and vaginal discharge.   Musculoskeletal:  Negative for back pain and neck pain.   Skin:  Negative for rash and wound.   Allergic/Immunologic: Negative for immunocompromised state.   Neurological:  Negative for weakness, numbness and headaches.   Hematological:  Does not bruise/bleed easily.   Psychiatric/Behavioral:  Negative for  hallucinations and suicidal ideas.    All other systems reviewed and are negative.      Physical Exam  Physical Exam  Vitals reviewed.   Constitutional:       General: She is not in acute distress.  HENT:      Head: Normocephalic and atraumatic.      Nose: Nose normal.      Mouth/Throat:      Mouth: Mucous membranes are moist.   Eyes:      General:         Right eye: No discharge.         Left eye: No discharge.      Conjunctiva/sclera: Conjunctivae normal.   Cardiovascular:      Rate and Rhythm: Regular rhythm. Tachycardia present.      Pulses: Normal pulses.      Heart sounds: Normal heart sounds. No murmur heard.     No friction rub. No gallop.   Pulmonary:      Effort: Pulmonary effort is normal. No respiratory distress.      Breath sounds: Normal breath sounds. No stridor. No wheezing, rhonchi or rales.   Abdominal:      General: Bowel sounds are normal. There is no distension.      Palpations: Abdomen is soft.      Tenderness: There is abdominal tenderness in the right lower quadrant and epigastric area. There is no right CVA tenderness, left CVA tenderness, guarding or rebound.   Musculoskeletal:         General: No swelling, tenderness, deformity or signs of injury. Normal range of motion.      Cervical back: Normal range of motion and neck supple. No rigidity.      Right lower leg: No edema.      Left lower leg: No edema.      Comments: No calf tenderness or unilateral leg swelling.   Skin:     General: Skin is warm and dry.      Coloration: Skin is not jaundiced.      Findings: No rash.   Neurological:      General: No focal deficit present.      Mental Status: She is alert and oriented to person, place, and time.      Sensory: No sensory deficit.      Motor: Motor function is intact.   Psychiatric:         Mood and Affect: Mood normal.         Behavior: Behavior normal.         Vital Signs  ED Triage Vitals [05/06/24 1455]   Temperature Pulse Respirations Blood Pressure SpO2   98.1 °F (36.7 °C) 101 18  115/76 97 %      Temp Source Heart Rate Source Patient Position - Orthostatic VS BP Location FiO2 (%)   Oral Monitor Sitting Right arm --      Pain Score       7           Vitals:    05/06/24 1455 05/06/24 1715 05/06/24 1921   BP: 115/76 124/84 99/68   Pulse: 101 84 96   Patient Position - Orthostatic VS: Sitting Lying Lying         Visual Acuity      ED Medications  Medications   sodium chloride 0.9 % bolus 1,000 mL (0 mL Intravenous Stopped 5/6/24 1751)   ondansetron (ZOFRAN) injection 4 mg (4 mg Intravenous Given 5/6/24 1634)   morphine injection 4 mg (4 mg Intravenous Given 5/6/24 1635)   iohexol (OMNIPAQUE) 350 MG/ML injection (MULTI-DOSE) 85 mL (85 mL Intravenous Given 5/6/24 1836)   iohexol (OMNIPAQUE) 240 MG/ML solution 50 mL (50 mL Oral Given 5/6/24 1836)   dicyclomine (BENTYL) tablet 20 mg (20 mg Oral Given 5/6/24 2019)       Diagnostic Studies  Results Reviewed       Procedure Component Value Units Date/Time    Comprehensive metabolic panel [[id number]]  (Abnormal) Collected: 05/06/24 1632    Lab Status: Final result Specimen: Blood from Arm, Right Updated: 05/06/24 1706     Sodium 137 mmol/L      Potassium 4.3 mmol/L      Chloride 105 mmol/L      CO2 26 mmol/L      ANION GAP 6 mmol/L      BUN 7 mg/dL      Creatinine 0.67 mg/dL      Glucose 84 mg/dL      Calcium 9.1 mg/dL      AST 10 U/L      ALT 5 U/L      Alkaline Phosphatase 54 U/L      Total Protein 6.8 g/dL      Albumin 4.0 g/dL      Total Bilirubin 0.51 mg/dL      eGFR 108 ml/min/1.73sq m     Narrative:      National Kid[employer] Foundation guidelines for Chronic Kidney Disease (CKD):     Stage 1 with normal or high GFR (GFR > 90 mL/min/1.73 square meters)    Stage 2 Mild CKD (GFR = 60-89 mL/min/1.73 square meters)    Stage 3A Moderate CKD (GFR = 45-59 mL/min/1.73 square meters)    Stage 3B Moderate CKD (GFR = 30-44 mL/min/1.73 square meters)    Stage 4 Severe CKD (GFR = 15-29 mL/min/1.73 square meters)    Stage 5 End Stage CKD (GFR <15 mL/min/1.73  square meters)  Note: GFR calculation is accurate only with a steady state creatinine    Lipase [[id number]]  (Abnormal) Collected: 05/06/24 1632    Lab Status: Final result Specimen: Blood from Arm, Right Updated: 05/06/24 1706     Lipase 10 u/L     Magnesium [[id number]]  (Abnormal) Collected: 05/06/24 1632    Lab Status: Final result Specimen: Blood from Arm, Right Updated: 05/06/24 1706     Magnesium 1.8 mg/dL     UA w Reflex to Microscopic w Reflex to Culture [[id number]]  (Abnormal) Collected: 05/06/24 1631    Lab Status: Final result Specimen: Urine, Clean Catch Updated: 05/06/24 1648     Color, UA Light Yellow     Clarity, UA Clear     Specific Gravity, UA 1.015     pH, UA 5.5     Leukocytes, UA Negative     Nitrite, UA Negative     Protein, UA Negative mg/dl      Glucose, UA Negative mg/dl      Ketones, UA Trace mg/dl      Urobilinogen, UA <2.0 mg/dl      Bilirubin, UA Negative     Occult Blood, UA Negative    CBC and differential [[id number]] Collected: 05/06/24 1632    Lab Status: Final result Specimen: Blood from Arm, Right Updated: 05/06/24 1643     WBC 9.20 Thousand/uL      RBC 4.06 Million/uL      Hemoglobin 12.7 g/dL      Hematocrit 37.3 %      MCV 92 fL      MCH 31.3 pg      MCHC 34.0 g/dL      RDW 11.9 %      MPV 10.6 fL      Platelets 222 Thousands/uL      nRBC 0 /100 WBCs      Segmented % 69 %      Immature Grans % 0 %      Lymphocytes % 21 %      Monocytes % 9 %      Eosinophils Relative 1 %      Basophils Relative 0 %      Absolute Neutrophils 6.37 Thousands/µL      Absolute Immature Grans 0.02 Thousand/uL      Absolute Lymphocytes 1.93 Thousands/µL      Absolute Monocytes 0.82 Thousand/µL      Eosinophils Absolute 0.05 Thousand/µL      Basophils Absolute 0.01 Thousands/µL     POCT pregnancy, urine [[id number]]  (Normal) Resulted: 05/06/24 1632    Lab Status: Final result Updated: 05/06/24 1632     EXT Preg Test, Ur Negative     Control Valid                   CT abdomen pelvis with contrast   Final  Result by Leeroy Smiley MD (05/06 2004)      No acute findings in the abdomen or pelvis.         Workstation performed: [id number]                    Procedures  ECG 12 Lead Documentation Only    Date/Time: 5/6/2024 4:51 PM    Performed by: Timmy Jones MD  Authorized by: Timmy Jones MD    ECG reviewed by me, the ED Provider: yes    Patient location:  ED  Interpretation:     Interpretation: normal    Rate:     ECG rate assessment: normal    Rhythm:     Rhythm: sinus rhythm    Ectopy:     Ectopy: none    QRS:     QRS axis:  Normal  Conduction:     Conduction: normal    ST segments:     ST segments:  Normal  T waves:     T waves: normal             ED Course                               SBIRT 20yo+      Flowsheet Row Most Recent Value   Initial Alcohol Screen: US AUDIT-C     1. How often do you have a drink containing alcohol? 0 Filed at: 05/06/2024 1621   2. How many drinks containing alcohol do you have on a typical day you are drinking?  0 Filed at: 05/06/2024 1621   3b. FEMALE Any Age, or MALE 65+: How often do you have 4 or more drinks on one occassion? 0 Filed at: 05/06/2024 1621   Audit-C Score 0 Filed at: 05/06/2024 1621   ROYA: How many times in the past year have you...    Used an illegal drug or used a prescription medication for non-medical reasons? Never Filed at: 05/06/2024 1621                      Medical Decision Making  CAT scan was unremarkable.  There was no colitis.  No bowel obstruction.  No appendicitis.  Patient is not pregnant.  This is not ectopic pregnancy.  Doubt pelvic inflammatory disease.  CT did not show acute cholecystitis.  Lipase did not indicate pancreatitis.  This sounds like a gastroenteritis.  Her  reports experiencing some nausea also.  Most likely viral.  Appropriate for discharge and outpatient management.    Amount and/or Complexity of Data Reviewed  External Data Reviewed: notes.  Labs: ordered. Decision-making details documented in ED  Course.  Radiology: ordered. Decision-making details documented in ED Course.  ECG/medicine tests: ordered and independent interpretation performed. Decision-making details documented in ED Course.    Risk  Prescription drug management.  Decision regarding hospitalization.             Disposition  Final diagnoses:   Abdominal pain   Diarrhea   Nausea     Time reflects when diagnosis was documented in both MDM as applicable and the Disposition within this note       Time User Action Codes Description Comment    5/6/2024  8:27 PM Timmy Jones Add [R10.9] Abdominal pain     5/6/2024  8:27 PM Timmy Jones Add [R19.7] Diarrhea     5/6/2024  8:27 PM Timmy Jones Add [R11.0] Nausea           ED Disposition       ED Disposition   Discharge    Condition   Stable    Date/Time   Mon May 6, 2024 2027    Comment   Екатерина Limaa discharge to home/self care.                   Follow-up Information       Follow up With Specialties Details Why Contact Info    Follow-up with your gastroenterologist within 1 week for reevaluation                Patient's Medications   Discharge Prescriptions    DICYCLOMINE (BENTYL) 20 MG TABLET    Take 1 tablet (20 mg total) by mouth every 6 (six) hours as needed (abd pain and diarrhea)       Start Date: 5/6/2024  End Date: --       Order Dose: 20 mg       Quantity: 40 tablet    Refills: 0    ONDANSETRON (ZOFRAN-ODT) 4 MG DISINTEGRATING TABLET    Take 1 tablet (4 mg total) by mouth every 8 (eight) hours as needed for nausea or vomiting       Start Date: 5/6/2024  End Date: --       Order Dose: 4 mg       Quantity: 30 tablet    Refills: 0       No discharge procedures on file.    PDMP Review         Value Time User    PDMP Reviewed  Yes 5/30/2023  8:41 AM ANNAMARIE Otero            ED Provider  Electronically Signed by             Timmy Jones MD  05/06/24 2029

## 2024-05-07 ENCOUNTER — TELEMEDICINE (OUTPATIENT)
Dept: BEHAVIORAL/MENTAL HEALTH CLINIC | Facility: CLINIC | Age: 44
End: 2024-05-07

## 2024-05-07 DIAGNOSIS — F33.1 MAJOR DEPRESSIVE DISORDER, RECURRENT, MODERATE (HCC): Primary | ICD-10-CM

## 2024-05-07 NOTE — BH TREATMENT PLAN
"Outpatient Behavioral Health Psychotherapy Treatment Plan    Екатерина Godinez  1980     Date of Initial Psychotherapy Assessment: 04/25/2024   Date of Current Treatment Plan: 05/07/24  Treatment Plan Target Date: 11/07/2024  Treatment Plan Expiration Date: 11/07/2024    Diagnosis:   No diagnosis found.    Area(s) of Need: depression, anxiety, chronic pain     Long Term Goal 1 (in the client's own words): \"to learn to cope with my stomach problems\"    Stage of Change: Action    Target Date for completion: 11/07/2024     Anticipated therapeutic modalities: radical acceptance     People identified to complete this goal: Екатерина      Objective 1: (identify the means of measuring success in meeting the objective): Екатерина will explore changes in her diet to better cope with her health condition       Objective 2: (identify the means of measuring success in meeting the objective): Екатерина will process her ongoing stress pertaining to her health condition by verbally processing with clinician for 15 minutes as needed       Long Term Goal 2 (in the client's own words): \"Work on my anxiety and depression\"    Stage of Change: Action    Target Date for completion: 11/07/2024     Anticipated therapeutic modalities: CBT, coping skills, reality testing     People identified to complete this goal: Екатерина      Objective 1: (identify the means of measuring success in meeting the objective): Екатерина will gain insight to her anxiety and depression through identifying 3 triggers to her anxiety or depression       Objective 2: (identify the means of measuring success in meeting the objective): Екатерина will better cope with her anxiety and depression through identifying 5 coping skills to her anxiety or depression       My [facility]'s psychiatric provider is: Viviane Parada    I am currently taking psychiatric medications: Yes, as prescribed    I feel that I will be ready for discharge from mental health care when I reach the following " (measurable goal/objective): Reduction in anxiety and depression     For children and adults who have a legal guardian:   Has there been any change to custody orders and/or guardianship status? Yes. If yes, attach updated documentation.    I have created my Crisis Plan and have been offered a copy of this plan    Behavioral Health Treatment Plan [facility]: Diagnosis and Treatment Plan explained to Екатерина Godinez acknowledges an understanding of their diagnosis. Екатерина Godinez agrees to this treatment plan.    I have been offered a copy of this Treatment Plan. yes

## 2024-05-07 NOTE — PSYCH
"Behavioral Health Psychotherapy Progress Note    Psychotherapy Provided: Individual Psychotherapy     1. Major depressive disorder, recurrent, moderate (HCC)            Goals addressed in session: Goal 1     DATA: Clinician began session with completion of check in. Екатерина reported doing \"alright.\" Clinician asked if there had been nay updates in her life since last session. Екатерина expressed her mother leaving back to [place]. She expressed feeling bad for feeling annoyed with her family being in her home, which was a new feeling for her. Clinician validated her emotions and provided possible contributors to this new feeling. Clinician asked Екатерина to review her goals for the treatment plan. Екатерина discussed wishing to improve her overall health and work on her depression. Clinician provided insight to objectives that would support these goals.     During this session, this clinician used the following therapeutic modalities: Client-centered Therapy, Solution-Focused Therapy, and Supportive Psychotherapy    Substance Abuse was not addressed during this session. If the client is diagnosed with a co-occurring substance use disorder, please indicate any changes in the frequency or amount of use: n/a. Stage of change for addressing substance use diagnoses: No substance use/Not applicable    ASSESSMENT:  Екатерина Godinez presents with a Euthymic/ normal mood.     her affect is Normal range and intensity, which is congruent, with her mood and the content of the session. The client has made progress on their goals.     Екатерина Godinez presents with a none risk of suicide, none risk of self-harm, and none risk of harm to others.    For any risk assessment that surpasses a \"low\" rating, a safety plan must be developed.    A safety plan was indicated: no  If yes, describe in detail n/a    PLAN: Between sessions, Екатерина Godinez will reflect on triggers to her anxiousness and depression. At the next session, the therapist will " use Client-centered Therapy, Cognitive Behavioral Therapy, Solution-Focused Therapy, and Supportive Psychotherapy to address depression.    Behavioral Health Treatment Plan and Discharge Planning: Екатерина Godinez is aware of and agrees to continue to work on their treatment plan. They have identified and are working toward their discharge goals. yes    Visit start and stop times:    05/07/2024  Start Time: 1500  Stop Time: 1551  Total Visit Time: 51 minutes    Virtual Regular Visit    Verification of patient location:    Patient is located at Home in the following state in which I hold an active license [place]      Assessment/Plan:    Problem List Items Addressed This Visit    None  Visit Diagnoses       Major depressive disorder, recurrent, moderate (HCC)    -  Primary            Goals addressed in session: Goal 1          Reason for visit is   Chief Complaint   Patient presents with    Virtual Regular Visit          Encounter provider Edel Nguyen      Recent Visits  Date Type Provider Dept   05/07/24 Telemedicine Edel [facility] Therapist [address]   Showing recent visits within past 7 days and meeting all other requirements  Future Appointments  No visits were found meeting these conditions.  Showing future appointments within next 150 days and meeting all other requirements       The patient was identified by name and date of birth. Екатерина Godinez was informed that this is a telemedicine visit and that the visit is being conducted throughthe [employer] platform. She agrees to proceed..  My office door was closed. No one else was in the room.  She acknowledged consent and understanding of privacy and security of the video platform. The patient has agreed to participate and understands they can discontinue the visit at any time.    Patient is aware this is a billable service.         HPI     Past Medical History:   Diagnosis Date    Anemia     in past    Anxiety     Arthritis September 2022     Depression     Gallstones     Headache(784.0)     Inflammatory bowel disease     Migraine     Nausea     occ    Panic attack     Rotator cuff disorder, right     Vertigo        Past Surgical History:   Procedure Laterality Date    ABDOMINAL SURGERY      scar tissue removed     SECTION      last assessed: Oct 23, 2014     CHOLECYSTECTOMY  2018    HYSTERECTOMY      CT LAPS SURG CHOLECYSTECTOMY W/CHOLANGIOGRAPHY N/A 2018    Procedure: CHOLECYSTECTOMY LAPAROSCOPIC  WITH  ATTEMPTED IOC;  Surgeon: Silvia Woods MD;  Location: [facility];  Service: General    TUBAL LIGATION         Current Outpatient Medications   Medication Sig Dispense Refill    baclofen 10 mg tablet TAKE 1 TABLET(10 MG) BY MOUTH THREE TIMES DAILY AS NEEDED FOR MUSCLE SPASMS (Patient not taking: Reported on 2024) 10 tablet 0    cholecalciferol 1,000 units tablet Take 1 tablet (1,000 Units total) by mouth daily 90 tablet 1    dicyclomine (BENTYL) 20 mg tablet Take 1 tablet (20 mg total) by mouth every 6 (six) hours As needed for abdominal pain 60 tablet 1    dicyclomine (BENTYL) 20 mg tablet Take 1 tablet (20 mg total) by mouth every 6 (six) hours as needed (abd pain and diarrhea) 40 tablet 0    docusate sodium (COLACE) 100 mg capsule TAKE 1 CAPSULE BY MOUTH EVERY MORNING 90 capsule 1    DULoxetine (Cymbalta) 20 mg capsule Take 2 capsules (40 mg total) by mouth daily 60 capsule 2    Dyclonine-Glycerin (Cepacol Sore Throat Spray) 0.1-33 % LIQD Apply 1 spray to the mouth or throat every 6 (six) hours 118 mL 0    famotidine (PEPCID) 40 MG tablet TAKE 1 TABLET(40 MG) BY MOUTH DAILY 90 tablet 0    ferrous sulfate 324 (65 Fe) mg TAKE 1 TABLET BY MOUTH EVERY OTHER DAY 90 tablet 3    fexofenadine (ALLEGRA) 180 MG tablet Take 1 tablet (180 mg total) by mouth daily 90 tablet 1    fluticasone (FLONASE) 50 mcg/act nasal spray 1 spray into each nostril daily 16 g 0    lidocaine (LIDODERM) 5 % UNWRAP AND APPLY 1 PATCH TOPICALLY TO THE  AFFECTED AREA EVERY DAY(DO NOT LEAVE ON FOR MORE THAN 12 HOURS) 90 patch 1    meclizine (ANTIVERT) 25 mg tablet TAKE 1 TABLET(25 MG) BY MOUTH THREE TIMES DAILY AS NEEDED FOR DIZZINESS 30 tablet 0    mirtazapine (REMERON) 30 mg tablet Take 1 tablet (30 mg total) by mouth daily at bedtime 90 tablet 1    omeprazole (PriLOSEC) 40 MG capsule TAKE 1 CAPSULE(40 MG) BY MOUTH DAILY 90 capsule 0    ondansetron (ZOFRAN-ODT) 4 mg disintegrating tablet Take 1 tablet (4 mg total) by mouth every 8 (eight) hours as needed for nausea or vomiting 30 tablet 0    polyethylene glycol (GLYCOLAX) 17 GM/SCOOP powder MIX AND DRINK 17 GRAMS BY MOUTH EVERY  g 0    prochlorperazine (COMPAZINE) 5 mg tablet TAKE 1 TABLET(5 MG) BY MOUTH EVERY 6 HOURS AS NEEDED FOR NAUSEA OR VOMITING 30 tablet 0    SUMAtriptan (IMITREX) 50 mg tablet TAKE 1 TABLET(50 MG) BY MOUTH 1 TIME FOR UP TO 1 DOSE AS NEEDED FOR MIGRAINE 9 tablet 0    trospium chloride (SANCTURA) 20 mg tablet Take 20 mg by mouth 2 (two) times a day      vitamin B-12 (VITAMIN B-12) 1,000 mcg tablet Take 1 tablet (1,000 mcg total) by mouth daily 90 tablet 2     No current facility-administered medications for this visit.        No Known Allergies    Review of Systems    Video Exam    There were no vitals filed for this visit.    Physical Exam     Visit Time    Visit Start Time: 1500  Visit Stop Time: 1551  Total Visit Duration:  51 minutes

## 2024-05-08 DIAGNOSIS — Z00.6 ENCOUNTER FOR EXAMINATION FOR NORMAL COMPARISON OR CONTROL IN CLINICAL RESEARCH PROGRAM: ICD-10-CM

## 2024-05-09 DIAGNOSIS — R42 VERTIGO: ICD-10-CM

## 2024-05-10 RX ORDER — MECLIZINE HYDROCHLORIDE 25 MG/1
TABLET ORAL
Qty: 30 TABLET | Refills: 0 | Status: SHIPPED | OUTPATIENT
Start: 2024-05-10

## 2024-05-14 ENCOUNTER — TELEMEDICINE (OUTPATIENT)
Dept: BEHAVIORAL/MENTAL HEALTH CLINIC | Facility: CLINIC | Age: 44
End: 2024-05-14

## 2024-05-14 ENCOUNTER — TELEPHONE (OUTPATIENT)
Dept: PSYCHIATRY | Facility: CLINIC | Age: 44
End: 2024-05-14

## 2024-05-14 DIAGNOSIS — F41.1 GENERALIZED ANXIETY DISORDER: ICD-10-CM

## 2024-05-14 DIAGNOSIS — F33.1 MAJOR DEPRESSIVE DISORDER, RECURRENT, MODERATE (HCC): Primary | ICD-10-CM

## 2024-05-14 NOTE — TELEPHONE ENCOUNTER
Pt called to get her appt for 5/14/24 at 3pm switched to a virtual visit due to patient does not drive or have a ride to appt.  Writer switched appt.

## 2024-05-15 NOTE — PSYCH
"Behavioral Health Psychotherapy Progress Note    Psychotherapy Provided: Individual Psychotherapy     1. Major depressive disorder, recurrent, moderate (HCC)        2. Generalized anxiety disorder            Goals addressed in session: Goal 1 and Goal 2     DATA: Clinician began session with completion of check in. Екатерина reported doing \"good.\" Clinician asked if there had been any updates in her life since last session. Ектаерина reported that her son and granddaughter were visiting today in her home. She expressed enjoying visits from them. Clinician asked if they could first complete the crisis plan. Екатерина appeared insightful through completion of the crisis plan with her signs and supports available to her. Clinician then guided Екатерина through completion of a personality test. Екатерина was able to reflect and explain on her feelings about her personality through completion of the exam. Екатерина got the result of an INFJ. Clinician and Екатерина compared and contrasted the similarities and differences between what Екатерина believed to be accurate and inaccurate of her test results.     During this session, this clinician used the following therapeutic modalities: Client-centered Therapy, Cognitive Behavioral Therapy, Solution-Focused Therapy, and Supportive Psychotherapy    Substance Abuse was not addressed during this session. If the client is diagnosed with a co-occurring substance use disorder, please indicate any changes in the frequency or amount of use: n/a. Stage of change for addressing substance use diagnoses: No substance use/Not applicable    ASSESSMENT:  Екатерина Godinez presents with a Euthymic/ normal mood.     her affect is Normal range and intensity, which is congruent, with her mood and the content of the session. The client has made progress on their goals.     Екатерина Godinez presents with a none risk of suicide, none risk of self-harm, and none risk of harm to others.    For any risk assessment that surpasses " "a \"low\" rating, a safety plan must be developed.    A safety plan was indicated: yes  If yes, describe in detail please see plan    PLAN: Between sessions, Екатерина Godinez will continue to reflect on her strengths as an individual. At the next session, the therapist will use Client-centered Therapy, Cognitive Behavioral Therapy, Solution-Focused Therapy, and Supportive Psychotherapy to address depression and anxiety.    Behavioral Health Treatment Plan and Discharge Planning: Екатерина Godinez is aware of and agrees to continue to work on their treatment plan. They have identified and are working toward their discharge goals. yes    Visit start and stop times:    05/14/2024  Start Time: 1500  Stop Time: 1555  Total Visit Time: 55 minutes    Virtual Regular Visit    Verification of patient location:    Patient is located at Home in the following state in which I hold an active license [place]      Assessment/Plan:    Problem List Items Addressed This Visit       Generalized anxiety disorder     Other Visit Diagnoses       Major depressive disorder, recurrent, moderate (HCC)    -  Primary            Goals addressed in session: Goal 2          Reason for visit is   Chief Complaint   Patient presents with    Virtual Regular Visit          Encounter provider Edel Nguyen      Recent Visits  Date Type Provider Dept   05/14/24 Telemedicine [facility] Therapist [address]   Showing recent visits within past 7 days and meeting all other requirements  Future Appointments  No visits were found meeting these conditions.  Showing future appointments within next 150 days and meeting all other requirements       The patient was identified by name and date of birth. Екатерина Godinez was informed that this is a telemedicine visit and that the visit is being conducted throughthe [employer] platform. She agrees to proceed..  My office door was closed. No one else was in the room.  She acknowledged consent and " understanding of privacy and security of the video platform. The patient has agreed to participate and understands they can discontinue the visit at any time.    Patient is aware this is a billable service.       HPI     Past Medical History:   Diagnosis Date    Anemia     in past    Anxiety     Arthritis 2022    Depression     Gallstones     Headache(784.0)     Inflammatory bowel disease     Migraine     Nausea     occ    Panic attack     Rotator cuff disorder, right     Vertigo        Past Surgical History:   Procedure Laterality Date    ABDOMINAL SURGERY      scar tissue removed     SECTION      last assessed: Oct 23, 2014     CHOLECYSTECTOMY  2018    HYSTERECTOMY      ID LAPS SURG CHOLECYSTECTOMY W/CHOLANGIOGRAPHY N/A 2018    Procedure: CHOLECYSTECTOMY LAPAROSCOPIC  WITH  ATTEMPTED IOC;  Surgeon: Silvia Woods MD;  Location: [facility];  Service: General    TUBAL LIGATION         Current Outpatient Medications   Medication Sig Dispense Refill    baclofen 10 mg tablet TAKE 1 TABLET(10 MG) BY MOUTH THREE TIMES DAILY AS NEEDED FOR MUSCLE SPASMS (Patient not taking: Reported on 2024) 10 tablet 0    cholecalciferol 1,000 units tablet Take 1 tablet (1,000 Units total) by mouth daily 90 tablet 1    dicyclomine (BENTYL) 20 mg tablet Take 1 tablet (20 mg total) by mouth every 6 (six) hours As needed for abdominal pain 60 tablet 1    dicyclomine (BENTYL) 20 mg tablet Take 1 tablet (20 mg total) by mouth every 6 (six) hours as needed (abd pain and diarrhea) 40 tablet 0    docusate sodium (COLACE) 100 mg capsule TAKE 1 CAPSULE BY MOUTH EVERY MORNING 90 capsule 1    DULoxetine (Cymbalta) 20 mg capsule Take 2 capsules (40 mg total) by mouth daily 60 capsule 2    Dyclonine-Glycerin (Cepacol Sore Throat Spray) 0.1-33 % LIQD Apply 1 spray to the mouth or throat every 6 (six) hours 118 mL 0    famotidine (PEPCID) 40 MG tablet TAKE 1 TABLET(40 MG) BY MOUTH DAILY 90 tablet 0    ferrous sulfate  324 (65 Fe) mg TAKE 1 TABLET BY MOUTH EVERY OTHER DAY 90 tablet 3    fexofenadine (ALLEGRA) 180 MG tablet Take 1 tablet (180 mg total) by mouth daily 90 tablet 1    fluticasone (FLONASE) 50 mcg/act nasal spray 1 spray into each nostril daily 16 g 0    lidocaine (LIDODERM) 5 % UNWRAP AND APPLY 1 PATCH TOPICALLY TO THE AFFECTED AREA EVERY DAY(DO NOT LEAVE ON FOR MORE THAN 12 HOURS) 90 patch 1    meclizine (ANTIVERT) 25 mg tablet TAKE 1 TABLET(25 MG) BY MOUTH THREE TIMES DAILY AS NEEDED FOR DIZZINESS 30 tablet 0    mirtazapine (REMERON) 30 mg tablet Take 1 tablet (30 mg total) by mouth daily at bedtime 90 tablet 1    omeprazole (PriLOSEC) 40 MG capsule TAKE 1 CAPSULE(40 MG) BY MOUTH DAILY 90 capsule 0    ondansetron (ZOFRAN-ODT) 4 mg disintegrating tablet Take 1 tablet (4 mg total) by mouth every 8 (eight) hours as needed for nausea or vomiting 30 tablet 0    polyethylene glycol (GLYCOLAX) 17 GM/SCOOP powder MIX AND DRINK 17 GRAMS BY MOUTH EVERY  g 0    prochlorperazine (COMPAZINE) 5 mg tablet TAKE 1 TABLET(5 MG) BY MOUTH EVERY 6 HOURS AS NEEDED FOR NAUSEA OR VOMITING 30 tablet 0    SUMAtriptan (IMITREX) 50 mg tablet TAKE 1 TABLET(50 MG) BY MOUTH 1 TIME FOR UP TO 1 DOSE AS NEEDED FOR MIGRAINE 9 tablet 0    trospium chloride (SANCTURA) 20 mg tablet Take 20 mg by mouth 2 (two) times a day      vitamin B-12 (VITAMIN B-12) 1,000 mcg tablet Take 1 tablet (1,000 mcg total) by mouth daily 90 tablet 2     No current facility-administered medications for this visit.        No Known Allergies    Review of Systems    Video Exam    There were no vitals filed for this visit.    Physical Exam     Visit Time    Visit Start Time: 1500  Visit Stop Time: 1555  Total Visit Duration:  55 minutes

## 2024-05-16 ENCOUNTER — CONSULT (OUTPATIENT)
Dept: SURGERY | Facility: CLINIC | Age: 44
End: 2024-05-16
Payer: MEDICARE

## 2024-05-16 VITALS
WEIGHT: 85 LBS | SYSTOLIC BLOOD PRESSURE: 109 MMHG | HEIGHT: 59 IN | BODY MASS INDEX: 17.14 KG/M2 | RESPIRATION RATE: 12 BRPM | DIASTOLIC BLOOD PRESSURE: 70 MMHG | OXYGEN SATURATION: 97 % | TEMPERATURE: 97.6 F | HEART RATE: 91 BPM

## 2024-05-16 DIAGNOSIS — K31.84 GASTROPARESIS: Primary | ICD-10-CM

## 2024-05-16 PROCEDURE — 99204 OFFICE O/P NEW MOD 45 MIN: CPT | Performed by: SURGERY

## 2024-05-16 NOTE — PROGRESS NOTES
Ambulatory Visit  Name: Екатерина Godinez      : 1980      MRN: [id number]  Encounter Provider: Pradip Jeff MD  Encounter Date: 2024   Encounter department: [facility] [place]    Assessment & Plan   1. Gastroparesis  Assessment & Plan:  43-year-old female with gastroparesis.    Plan:  We had a long conversation regarding the pathophysiology of idiopathic gastroparesis and its subsequent management strategies.  Given her current symptomatology, as well as her failure to tolerate medical management, makes her relatively good candidate for surgical intervention for her gastroparesis.  As it has been several years, I would like to repeat a gastric emptying study to objectively measure the amount of gastric emptying she is having.  After that study she can return to clinic and we can discuss next steps.  Given her most recent lab work, I do feel like she would heal appropriately from a surgical intervention, and would consider pyloroplasty moving forward.  To return to clinic after gastric emptying study.  Orders:  -     Ambulatory Referral to General Surgery  -     NM gastric emptying; Future; Expected date: 2024      History of Present Illness     Екатерина Godinez is a 43 y.o. female who presents with gastroparesis.  She has approximately a 4-year history of nausea, fullness, postprandial bloating with occasional vomiting.  She was originally diagnosed with idiopathic gastroparesis in  and underwent gastric emptying study which I reviewed in PACS from 2022 which shows 64% emptying at 4 hours consistent with gastroparesis.  Currently she has had struggles keeping down oral intake, but is able to take in baby food, several times per day as well as soft foods and Ensure.  Upon review of some recent lab work from 2024 she had an albumin of 4.0, within the normal range.  Today in the office she completed the gastroparesis cardinal symptom index score.  She scored 3.67.  Her  nausea/vomiting score was 2, her early satiety score was 5, and her bloating/distention score was 4.    Review of Systems   Constitutional:  Negative for chills, fatigue and fever.   HENT:  Negative for ear pain, facial swelling, sinus pressure and sinus pain.    Eyes:  Negative for pain.   Respiratory:  Negative for cough, shortness of breath and wheezing.    Cardiovascular:  Negative for chest pain.   Gastrointestinal:  Positive for abdominal distention, nausea and vomiting. Negative for abdominal pain, constipation and diarrhea.   Endocrine: Negative for cold intolerance and heat intolerance.   Genitourinary:  Negative for dysuria and flank pain.   Musculoskeletal:  Negative for back pain and neck pain.   Skin:  Negative for wound.   Neurological:  Negative for syncope, facial asymmetry, light-headedness and numbness.   Psychiatric/Behavioral:  Negative for behavioral problems, confusion and suicidal ideas.      Past Medical History   Past Medical History:   Diagnosis Date    Anemia     in past    Anxiety     Arthritis 2022    Depression     Gallstones     Headache(784.0)     Inflammatory bowel disease     Migraine     Nausea     occ    Panic attack     Rotator cuff disorder, right     Vertigo      Past Surgical History:   Procedure Laterality Date    ABDOMINAL SURGERY      scar tissue removed     SECTION      last assessed: Oct 23, 2014     CHOLECYSTECTOMY  2018    HYSTERECTOMY      [place] LAPS SURG CHOLECYSTECTOMY W/CHOLANGIOGRAPHY N/A 2018    Procedure: CHOLECYSTECTOMY LAPAROSCOPIC  WITH  ATTEMPTED IOC;  Surgeon: Silvia Woods MD;  Location: [facility];  Service: General    TUBAL LIGATION       Family History   Problem Relation Age of Onset    No Known Problems Family     Hypertension Mother     Arthritis Mother     Diabetes type II Father         mellitus     Hypertension Father     Diabetes Father     Arthritis Father      Current Outpatient Medications on File Prior to Visit    Medication Sig Dispense Refill    cholecalciferol 1,000 units tablet Take 1 tablet (1,000 Units total) by mouth daily 90 tablet 1    dicyclomine (BENTYL) 20 mg tablet Take 1 tablet (20 mg total) by mouth every 6 (six) hours as needed (abd pain and diarrhea) 40 tablet 0    docusate sodium (COLACE) 100 mg capsule TAKE 1 CAPSULE BY MOUTH EVERY MORNING 90 capsule 1    DULoxetine (Cymbalta) 20 mg capsule Take 2 capsules (40 mg total) by mouth daily 60 capsule 2    Dyclonine-Glycerin (Cepacol Sore Throat Spray) 0.1-33 % LIQD Apply 1 spray to the mouth or throat every 6 (six) hours 118 mL 0    famotidine (PEPCID) 40 MG tablet TAKE 1 TABLET(40 MG) BY MOUTH DAILY 90 tablet 0    ferrous sulfate 324 (65 Fe) mg TAKE 1 TABLET BY MOUTH EVERY OTHER DAY 90 tablet 3    fexofenadine (ALLEGRA) 180 MG tablet Take 1 tablet (180 mg total) by mouth daily 90 tablet 1    fluticasone (FLONASE) 50 mcg/act nasal spray 1 spray into each nostril daily 16 g 0    lidocaine (LIDODERM) 5 % UNWRAP AND APPLY 1 PATCH TOPICALLY TO THE AFFECTED AREA EVERY DAY(DO NOT LEAVE ON FOR MORE THAN 12 HOURS) 90 patch 1    meclizine (ANTIVERT) 25 mg tablet TAKE 1 TABLET(25 MG) BY MOUTH THREE TIMES DAILY AS NEEDED FOR DIZZINESS 30 tablet 0    mirtazapine (REMERON) 30 mg tablet Take 1 tablet (30 mg total) by mouth daily at bedtime 90 tablet 1    omeprazole (PriLOSEC) 40 MG capsule TAKE 1 CAPSULE(40 MG) BY MOUTH DAILY 90 capsule 0    ondansetron (ZOFRAN-ODT) 4 mg disintegrating tablet Take 1 tablet (4 mg total) by mouth every 8 (eight) hours as needed for nausea or vomiting 30 tablet 0    polyethylene glycol (GLYCOLAX) 17 GM/SCOOP powder MIX AND DRINK 17 GRAMS BY MOUTH EVERY  g 0    prochlorperazine (COMPAZINE) 5 mg tablet TAKE 1 TABLET(5 MG) BY MOUTH EVERY 6 HOURS AS NEEDED FOR NAUSEA OR VOMITING 30 tablet 0    SUMAtriptan (IMITREX) 50 mg tablet TAKE 1 TABLET(50 MG) BY MOUTH 1 TIME FOR UP TO 1 DOSE AS NEEDED FOR MIGRAINE 9 tablet 0    trospium chloride  (SANCTURA) 20 mg tablet Take 20 mg by mouth 2 (two) times a day      vitamin B-12 (VITAMIN B-12) 1,000 mcg tablet Take 1 tablet (1,000 mcg total) by mouth daily 90 tablet 2    baclofen 10 mg tablet TAKE 1 TABLET(10 MG) BY MOUTH THREE TIMES DAILY AS NEEDED FOR MUSCLE SPASMS (Patient not taking: Reported on 5/16/2024) 10 tablet 0    dicyclomine (BENTYL) 20 mg tablet Take 1 tablet (20 mg total) by mouth every 6 (six) hours As needed for abdominal pain 60 tablet 1     No current facility-administered medications on file prior to visit.   No Known Allergies   Current Outpatient Medications on File Prior to Visit   Medication Sig Dispense Refill    cholecalciferol 1,000 units tablet Take 1 tablet (1,000 Units total) by mouth daily 90 tablet 1    dicyclomine (BENTYL) 20 mg tablet Take 1 tablet (20 mg total) by mouth every 6 (six) hours as needed (abd pain and diarrhea) 40 tablet 0    docusate sodium (COLACE) 100 mg capsule TAKE 1 CAPSULE BY MOUTH EVERY MORNING 90 capsule 1    DULoxetine (Cymbalta) 20 mg capsule Take 2 capsules (40 mg total) by mouth daily 60 capsule 2    Dyclonine-Glycerin (Cepacol Sore Throat Spray) 0.1-33 % LIQD Apply 1 spray to the mouth or throat every 6 (six) hours 118 mL 0    famotidine (PEPCID) 40 MG tablet TAKE 1 TABLET(40 MG) BY MOUTH DAILY 90 tablet 0    ferrous sulfate 324 (65 Fe) mg TAKE 1 TABLET BY MOUTH EVERY OTHER DAY 90 tablet 3    fexofenadine (ALLEGRA) 180 MG tablet Take 1 tablet (180 mg total) by mouth daily 90 tablet 1    fluticasone (FLONASE) 50 mcg/act nasal spray 1 spray into each nostril daily 16 g 0    lidocaine (LIDODERM) 5 % UNWRAP AND APPLY 1 PATCH TOPICALLY TO THE AFFECTED AREA EVERY DAY(DO NOT LEAVE ON FOR MORE THAN 12 HOURS) 90 patch 1    meclizine (ANTIVERT) 25 mg tablet TAKE 1 TABLET(25 MG) BY MOUTH THREE TIMES DAILY AS NEEDED FOR DIZZINESS 30 tablet 0    mirtazapine (REMERON) 30 mg tablet Take 1 tablet (30 mg total) by mouth daily at bedtime 90 tablet 1    omeprazole  "(PriLOSEC) 40 MG capsule TAKE 1 CAPSULE(40 MG) BY MOUTH DAILY 90 capsule 0    ondansetron (ZOFRAN-ODT) 4 mg disintegrating tablet Take 1 tablet (4 mg total) by mouth every 8 (eight) hours as needed for nausea or vomiting 30 tablet 0    polyethylene glycol (GLYCOLAX) 17 GM/SCOOP powder MIX AND DRINK 17 GRAMS BY MOUTH EVERY  g 0    prochlorperazine (COMPAZINE) 5 mg tablet TAKE 1 TABLET(5 MG) BY MOUTH EVERY 6 HOURS AS NEEDED FOR NAUSEA OR VOMITING 30 tablet 0    SUMAtriptan (IMITREX) 50 mg tablet TAKE 1 TABLET(50 MG) BY MOUTH 1 TIME FOR UP TO 1 DOSE AS NEEDED FOR MIGRAINE 9 tablet 0    trospium chloride (SANCTURA) 20 mg tablet Take 20 mg by mouth 2 (two) times a day      vitamin B-12 (VITAMIN B-12) 1,000 mcg tablet Take 1 tablet (1,000 mcg total) by mouth daily 90 tablet 2    baclofen 10 mg tablet TAKE 1 TABLET(10 MG) BY MOUTH THREE TIMES DAILY AS NEEDED FOR MUSCLE SPASMS (Patient not taking: Reported on 2024) 10 tablet 0    dicyclomine (BENTYL) 20 mg tablet Take 1 tablet (20 mg total) by mouth every 6 (six) hours As needed for abdominal pain 60 tablet 1     No current facility-administered medications on file prior to visit.      Social History     Tobacco Use    Smoking status: Former     Current packs/day: 0.00     Average packs/day: 0.5 packs/day for 5.0 years (2.5 ttl pk-yrs)     Types: Cigarettes     Quit date: 2013     Years since quittin.2    Smokeless tobacco: Never   Vaping Use    Vaping status: Never Used   Substance and Sexual Activity    Alcohol use: No     Comment: none since 2021    Drug use: No    Sexual activity: Yes     Partners: Male     Comment: Hysterectomy     Objective     /70   Pulse 91   Temp 97.6 °F (36.4 °C) (Temporal)   Resp 12   Ht 4' 11\" (1.499 m)   Wt 38.6 kg (85 lb)   LMP 2017   SpO2 97%   BMI 17.17 kg/m²     Physical Exam  Vitals and nursing note reviewed.   Constitutional:       General: She is not in acute distress.     Appearance: Normal " appearance. She is not ill-appearing.   HENT:      Head: Normocephalic and atraumatic.      Mouth/Throat:      Mouth: Mucous membranes are moist.   Eyes:      Extraocular Movements: Extraocular movements intact.   Cardiovascular:      Rate and Rhythm: Normal rate and regular rhythm.   Pulmonary:      Effort: Pulmonary effort is normal. No respiratory distress.      Breath sounds: Normal breath sounds. No stridor.   Abdominal:      General: There is no distension.      Palpations: Abdomen is soft. There is no mass.      Tenderness: There is no abdominal tenderness.      Hernia: No hernia is present.      Comments: Well-healed laparoscopic port sites, no evidence of hernia.   Musculoskeletal:         General: No swelling or tenderness. Normal range of motion.   Skin:     General: Skin is warm and dry.      Findings: No lesion.   Neurological:      General: No focal deficit present.      Mental Status: She is alert and oriented to person, place, and time.   Psychiatric:         Mood and Affect: Mood normal.         Behavior: Behavior normal.       Administrative Statements

## 2024-05-16 NOTE — ASSESSMENT & PLAN NOTE
43-year-old female with gastroparesis.    Plan:  We had a long conversation regarding the pathophysiology of idiopathic gastroparesis and its subsequent management strategies.  Given her current symptomatology, as well as her failure to tolerate medical management, makes her relatively good candidate for surgical intervention for her gastroparesis.  As it has been several years, I would like to repeat a gastric emptying study to objectively measure the amount of gastric emptying she is having.  After that study she can return to clinic and we can discuss next steps.  Given her most recent lab work, I do feel like she would heal appropriately from a surgical intervention, and would consider pyloroplasty moving forward.  To return to clinic after gastric emptying study.

## 2024-05-22 ENCOUNTER — TELEMEDICINE (OUTPATIENT)
Dept: BEHAVIORAL/MENTAL HEALTH CLINIC | Facility: CLINIC | Age: 44
End: 2024-05-22

## 2024-05-22 DIAGNOSIS — F33.1 MAJOR DEPRESSIVE DISORDER, RECURRENT, MODERATE (HCC): Primary | ICD-10-CM

## 2024-05-22 NOTE — PSYCH
"Behavioral Health Psychotherapy Progress Note    Psychotherapy Provided: Individual Psychotherapy     1. Major depressive disorder, recurrent, moderate (HCC)            Goals addressed in session: Goal 1 and Goal 2     DATA: Therapist began session with completion of check in. Екатерина reported doing “pretty good.” therapist asked if there had been any updates in their license last session. In your denied any updates. Therapist asked to complete a strength list to help explore her confidence in her strength. Екатерина was able to reflect on numerous strengths that she felt she had and also discussed her strengths that she so she could improve on. She openly expressed feeling confidence was the one she struggled with the most therapists and Екатерина explored her struggles with her confidence and the cause of her struggle was confidence.    During this session, this clinician used the following therapeutic modalities: Client-centered Therapy, Cognitive Behavioral Therapy, and Supportive Psychotherapy    Substance Abuse was not addressed during this session. If the client is diagnosed with a co-occurring substance use disorder, please indicate any changes in the frequency or amount of use: n/a. Stage of change for addressing substance use diagnoses: No substance use/Not applicable    ASSESSMENT:  Екатерина Godinez presents with a Euthymic/ normal mood.     her affect is Normal range and intensity, which is congruent, with her mood and the content of the session. The client has made progress on their goals.     Екатерина Godinez presents with a none risk of suicide, none risk of self-harm, and none risk of harm to others.    For any risk assessment that surpasses a \"low\" rating, a safety plan must be developed.    A safety plan was indicated: no  If yes, describe in detail n/a    PLAN: Between sessions, Екатерина Godinez will continue to reflect on her strengths. At the next session, the therapist will use Client-centered Therapy, " Cognitive Behavioral Therapy, and Supportive Psychotherapy to address confidence.    Behavioral Health Treatment Plan and Discharge Planning: Екатерина Godinez is aware of and agrees to continue to work on their treatment plan. They have identified and are working toward their discharge goals. yes    Visit start and stop times:    05/22/24  Start Time: 1000  Stop Time: 1052  Total Visit Time: 52 minutes

## 2024-05-28 ENCOUNTER — APPOINTMENT (OUTPATIENT)
Dept: LAB | Facility: CLINIC | Age: 44
End: 2024-05-28

## 2024-05-28 DIAGNOSIS — Z00.6 ENCOUNTER FOR EXAMINATION FOR NORMAL COMPARISON OR CONTROL IN CLINICAL RESEARCH PROGRAM: ICD-10-CM

## 2024-05-28 PROCEDURE — 36415 COLL VENOUS BLD VENIPUNCTURE: CPT

## 2024-05-31 ENCOUNTER — TELEMEDICINE (OUTPATIENT)
Dept: BEHAVIORAL/MENTAL HEALTH CLINIC | Facility: CLINIC | Age: 44
End: 2024-05-31
Payer: COMMERCIAL

## 2024-05-31 DIAGNOSIS — F33.1 MAJOR DEPRESSIVE DISORDER, RECURRENT, MODERATE (HCC): Primary | ICD-10-CM

## 2024-05-31 PROCEDURE — 90837 PSYTX W PT 60 MINUTES: CPT

## 2024-05-31 NOTE — PSYCH
Behavioral Health Psychotherapy Progress Note    Psychotherapy Provided: Individual Psychotherapy     1. Major depressive disorder, recurrent, moderate (HCC)            Goals addressed in session: Goal 1 and Goal 2     DATA: Therapist began session with completion of check in. Екатерина expressed doing “alright.” therapist asked if there had been any updates in her life since last session. Екатерина stated that her appointments for her surgeries were scheduled. Expressed being both anxious and eager for these operations. Therapist validated her emotions and offered support. Therapist asked about factors contributing to her confidence given discussions of confidence in last session. Екатерина discussed how distant family relatives or others making comments on her body has caused her to become even more insecure and withdrawn. Therapist validated her emotions and offered support and supporting that this was not an appropriate response. Екатерина agreed and processed her feelings about the ongoing struggle with confidence her chronic condition has caused. She and therapist discussed confidence building activities to complete and future sessions for the remainder of session.    During this session, this clinician used the following therapeutic modalities: Client-centered Therapy, Solution-Focused Therapy, and Supportive Psychotherapy    Substance Abuse was addressed during this session. If the client is diagnosed with a co-occurring substance use disorder, please indicate any changes in the frequency or amount of use: n/a. Stage of change for addressing substance use diagnoses: No substance use/Not applicable    ASSESSMENT:  Екатерина Godinez presents with a Euthymic/ normal mood.     her affect is Normal range and intensity, which is congruent, with her mood and the content of the session. The client has made progress on their goals.     Екатерина Godinez presents with a none risk of suicide, none risk of self-harm, and none risk of harm  "to others.    For any risk assessment that surpasses a \"low\" rating, a safety plan must be developed.    A safety plan was indicated: no  If yes, describe in detail n/a    PLAN: Between sessions, Екатерина Godinez will continue to practice gratitude to cope with mood. At the next session, the therapist will use Client-centered Therapy, Solution-Focused Therapy, and Supportive Psychotherapy to address confidence.    Behavioral Health Treatment Plan and Discharge Planning: Екатерина Godinez is aware of and agrees to continue to work on their treatment plan. They have identified and are working toward their discharge goals. yes    Visit start and stop times:    05/31/2024  Start Time: 1100  Stop Time: 1153  Total Visit Time: 53 minutes    Virtual Regular Visit    Verification of patient location:    Patient is located at Home in the following state in which I hold an active license [place]      Assessment/Plan:    Problem List Items Addressed This Visit    None  Visit Diagnoses       Major depressive disorder, recurrent, moderate (HCC)    -  Primary            Goals addressed in session: Goal 1 and Goal 2          Reason for visit is   Chief Complaint   Patient presents with    Virtual Regular Visit          Encounter provider Edel Nguyen      Recent Visits  Date Type Provider Dept   05/31/24 Telemedicine Edel [facility] Therapist [address]   Showing recent visits within past 7 days and meeting all other requirements  Future Appointments  No visits were found meeting these conditions.  Showing future appointments within next 150 days and meeting all other requirements       The patient was identified by name and date of birth. Екатерина Godinez was informed that this is a telemedicine visit and that the visit is being conducted throughthe [employer] platform. She agrees to proceed..  My office door was closed. No one else was in the room.  She acknowledged consent and understanding of privacy and security " of the video platform. The patient has agreed to participate and understands they can discontinue the visit at any time.    Patient is aware this is a billable service.       HPI     Past Medical History:   Diagnosis Date    Anemia     in past    Anxiety     Arthritis 2022    Depression     Gallstones     Headache(784.0)     Inflammatory bowel disease     Migraine     Nausea     occ    Panic attack     Rotator cuff disorder, right     Vertigo        Past Surgical History:   Procedure Laterality Date    ABDOMINAL SURGERY      scar tissue removed     SECTION      last assessed: Oct 23, 2014     CHOLECYSTECTOMY  2018    HYSTERECTOMY      [place] LAPS SURG CHOLECYSTECTOMY W/CHOLANGIOGRAPHY N/A 2018    Procedure: CHOLECYSTECTOMY LAPAROSCOPIC  WITH  ATTEMPTED IOC;  Surgeon: Silvia Woods MD;  Location: [facility];  Service: General    TUBAL LIGATION         Current Outpatient Medications   Medication Sig Dispense Refill    baclofen 10 mg tablet TAKE 1 TABLET(10 MG) BY MOUTH THREE TIMES DAILY AS NEEDED FOR MUSCLE SPASMS (Patient not taking: Reported on 2024) 10 tablet 0    cholecalciferol 1,000 units tablet Take 1 tablet (1,000 Units total) by mouth daily 90 tablet 1    dicyclomine (BENTYL) 20 mg tablet Take 1 tablet (20 mg total) by mouth every 6 (six) hours As needed for abdominal pain 60 tablet 1    dicyclomine (BENTYL) 20 mg tablet Take 1 tablet (20 mg total) by mouth every 6 (six) hours as needed (abd pain and diarrhea) 40 tablet 0    docusate sodium (COLACE) 100 mg capsule TAKE 1 CAPSULE BY MOUTH EVERY MORNING 90 capsule 1    DULoxetine (Cymbalta) 20 mg capsule Take 2 capsules (40 mg total) by mouth daily 60 capsule 2    Dyclonine-Glycerin (Cepacol Sore Throat Spray) 0.1-33 % LIQD Apply 1 spray to the mouth or throat every 6 (six) hours 118 mL 0    famotidine (PEPCID) 40 MG tablet TAKE 1 TABLET(40 MG) BY MOUTH DAILY 90 tablet 0    ferrous sulfate 324 (65 Fe) mg TAKE 1 TABLET BY MOUTH  EVERY OTHER DAY 90 tablet 3    fexofenadine (ALLEGRA) 180 MG tablet Take 1 tablet (180 mg total) by mouth daily 90 tablet 1    fluticasone (FLONASE) 50 mcg/act nasal spray 1 spray into each nostril daily 16 g 0    lidocaine (LIDODERM) 5 % UNWRAP AND APPLY 1 PATCH TOPICALLY TO THE AFFECTED AREA EVERY DAY(DO NOT LEAVE ON FOR MORE THAN 12 HOURS) 90 patch 1    meclizine (ANTIVERT) 25 mg tablet TAKE 1 TABLET(25 MG) BY MOUTH THREE TIMES DAILY AS NEEDED FOR DIZZINESS 30 tablet 0    mirtazapine (REMERON) 30 mg tablet Take 1 tablet (30 mg total) by mouth daily at bedtime 90 tablet 1    omeprazole (PriLOSEC) 40 MG capsule TAKE 1 CAPSULE(40 MG) BY MOUTH DAILY 90 capsule 0    ondansetron (ZOFRAN-ODT) 4 mg disintegrating tablet Take 1 tablet (4 mg total) by mouth every 8 (eight) hours as needed for nausea or vomiting 30 tablet 0    polyethylene glycol (GLYCOLAX) 17 GM/SCOOP powder MIX AND DRINK 17 GRAMS BY MOUTH EVERY  g 0    prochlorperazine (COMPAZINE) 5 mg tablet TAKE 1 TABLET(5 MG) BY MOUTH EVERY 6 HOURS AS NEEDED FOR NAUSEA OR VOMITING 30 tablet 0    SUMAtriptan (IMITREX) 50 mg tablet TAKE 1 TABLET(50 MG) BY MOUTH 1 TIME FOR UP TO 1 DOSE AS NEEDED FOR MIGRAINE 9 tablet 0    trospium chloride (SANCTURA) 20 mg tablet Take 20 mg by mouth 2 (two) times a day      vitamin B-12 (VITAMIN B-12) 1,000 mcg tablet Take 1 tablet (1,000 mcg total) by mouth daily 90 tablet 2     No current facility-administered medications for this visit.        No Known Allergies    Review of Systems    Video Exam    There were no vitals filed for this visit.    Physical Exam     Visit Time    Visit Start Time: 1100  Visit Stop Time: 1153  Total Visit Duration:  53 minutes

## 2024-06-03 ENCOUNTER — TELEPHONE (OUTPATIENT)
Dept: PSYCHIATRY | Facility: CLINIC | Age: 44
End: 2024-06-03

## 2024-06-04 ENCOUNTER — SOCIAL WORK (OUTPATIENT)
Dept: BEHAVIORAL/MENTAL HEALTH CLINIC | Facility: CLINIC | Age: 44
End: 2024-06-04

## 2024-06-04 DIAGNOSIS — F33.1 MAJOR DEPRESSIVE DISORDER, RECURRENT, MODERATE (HCC): Primary | ICD-10-CM

## 2024-06-04 DIAGNOSIS — F41.1 GENERALIZED ANXIETY DISORDER: ICD-10-CM

## 2024-06-05 NOTE — PSYCH
Behavioral Health Psychotherapy Progress Note    Psychotherapy Provided: Individual Psychotherapy     1. Major depressive disorder, recurrent, moderate (HCC)        2. Generalized anxiety disorder            Goals addressed in session: Goal 1 and Goal 2     DATA: Therapist began session with completion of checking. Екатерина reported doing “okay I guess”. Therapist asked if there have been any updates in their lives since the last session. She reported a recent conflict with her daughter. She expressed feeling sad and frustrated due to her daughter reacting negatively over a pair of sunglasses in the home. You never expressed feeling that this is related to her daughter's mental illness, and she was not to blame for the situation. Therapist validated her emotions and offered support and processing. Therapist offered insight into the possible perspective of her daughter. Екатерина also expressed recent frustration with her cousin moving in with her with the intention of staying a month period she expressed feeling that her home was not as calming and peaceful due to his presence. She reported feeling under appreciated. Therapist validated her emotions. Therapist and Екатерина explored boundaries and communication skills for the near to implement with this family member for the remainder of session.    During this session, this clinician used the following therapeutic modalities: Client-centered Therapy, Solution-Focused Therapy, and Supportive Psychotherapy    Substance Abuse was not addressed during this session. If the client is diagnosed with a co-occurring substance use disorder, please indicate any changes in the frequency or amount of use: n/a. Stage of change for addressing substance use diagnoses: No substance use/Not applicable    ASSESSMENT:  Екатерина Godinez presents with a Euthymic/ normal mood.     her affect is Normal range and intensity, which is congruent, with her mood and the content of the session. The client  "has made progress on their goals.     Екатерина Godinez presents with a none risk of suicide, none risk of self-harm, and none risk of harm to others.    For any risk assessment that surpasses a \"low\" rating, a safety plan must be developed.    A safety plan was indicated: no  If yes, describe in detail n/a    PLAN: Between sessions, Екатерина Godinez will continue to use coping skills to process ongoing stressors at home as well as implement boundaries with family. At the next session, the therapist will use Client-centered Therapy, Solution-Focused Therapy, and Supportive Psychotherapy to address depression and home stressors.    Behavioral Health Treatment Plan and Discharge Planning: Екатерина Godinez is aware of and agrees to continue to work on their treatment plan. They have identified and are working toward their discharge goals. yes    Visit start and stop times:    06/05/24  Start Time: 1500  Stop Time: 1558  Total Visit Time: 58 minutes  "

## 2024-06-11 ENCOUNTER — TELEMEDICINE (OUTPATIENT)
Dept: BEHAVIORAL/MENTAL HEALTH CLINIC | Facility: CLINIC | Age: 44
End: 2024-06-11

## 2024-06-11 DIAGNOSIS — F33.1 MAJOR DEPRESSIVE DISORDER, RECURRENT, MODERATE (HCC): Primary | ICD-10-CM

## 2024-06-11 DIAGNOSIS — F41.1 GENERALIZED ANXIETY DISORDER: ICD-10-CM

## 2024-06-12 NOTE — PSYCH
"Behavioral Health Psychotherapy Progress Note    Psychotherapy Provided: Individual Psychotherapy     1. Major depressive disorder, recurrent, moderate (HCC)        2. Generalized anxiety disorder            Goals addressed in session: Goal 1 and Goal 2     DATA: Therapist began session with completion of check in. Екатерина reported “alright I guess.” Therapist asked for update on her interactions with her daughter. She reported her daughter speaking to her and her  once again. She reported that the argument has not been directly addressed at this time but feeling this was best for now. She expressed continued stress with her cousin staying with them. She spoke on a recent interaction with her cousin that she found disrespectful and off putting towards a mother of his child. Therapist validated her frustrations and office supportive processing. Therapist reassured Екатерина that she was being kind and validated in the boundary that she wished to set. Therapist and Екатерина discussed ongoing communication techniques and boundaries to set with her cousin for the remainder session.    During this session, this clinician used the following therapeutic modalities: Client-centered Therapy, Solution-Focused Therapy, and Supportive Psychotherapy    Substance Abuse was not addressed during this session. If the client is diagnosed with a co-occurring substance use disorder, please indicate any changes in the frequency or amount of use: n/a. Stage of change for addressing substance use diagnoses: No substance use/Not applicable    ASSESSMENT:  Екатерина Godinez presents with a Euthymic/ normal mood.     her affect is Normal range and intensity, which is congruent, with her mood and the content of the session. The client has made progress on their goals.     Екатерина Godinez presents with a none risk of suicide, none risk of self-harm, and none risk of harm to others.    For any risk assessment that surpasses a \"low\" rating, a safety " plan must be developed.    A safety plan was indicated: no  If yes, describe in detail n/a    PLAN: Between sessions, Екатерина Godinez will practice communication techniques and boundaries with her cousin. At the next session, the therapist will use Client-centered Therapy, Solution-Focused Therapy, and Supportive Psychotherapy to address anxiety.    Behavioral Health Treatment Plan and Discharge Planning: Екатерина Godinez is aware of and agrees to continue to work on their treatment plan. They have identified and are working toward their discharge goals. yes    Visit start and stop times:    06/11/2024  Start Time: 1500  Stop Time: 1559  Total Visit Time: 59 minutes    Virtual Regular Visit    Verification of patient location:    Patient is located at Home in the following state in which I hold an active license [place]      Assessment/Plan:    Problem List Items Addressed This Visit       Generalized anxiety disorder     Other Visit Diagnoses       Major depressive disorder, recurrent, moderate (HCC)    -  Primary            Goals addressed in session: Goal 1 and Goal 2          Reason for visit is   Chief Complaint   Patient presents with    Virtual Regular Visit          Encounter provider Edel Nguyen      Recent Visits  Date Type Provider Dept   06/11/24 Telemedicine Edel [facility] Therapist [address]   Showing recent visits within past 7 days and meeting all other requirements  Future Appointments  No visits were found meeting these conditions.  Showing future appointments within next 150 days and meeting all other requirements       The patient was identified by name and date of birth. Екатерина Godinez was informed that this is a telemedicine visit and that the visit is being conducted throughthe [employer] platform. She agrees to proceed..  My office door was closed. No one else was in the room.  She acknowledged consent and understanding of privacy and security of the video platform. The  patient has agreed to participate and understands they can discontinue the visit at any time.    Patient is aware this is a billable service.       HPI     Past Medical History:   Diagnosis Date    Anemia     in past    Anxiety     Arthritis 2022    Depression     Gallstones     Headache(784.0)     Inflammatory bowel disease     Migraine     Nausea     occ    Panic attack     Rotator cuff disorder, right     Vertigo        Past Surgical History:   Procedure Laterality Date    ABDOMINAL SURGERY      scar tissue removed     SECTION      last assessed: Oct 23, 2014     CHOLECYSTECTOMY  2018    HYSTERECTOMY      KS LAPS SURG CHOLECYSTECTOMY W/CHOLANGIOGRAPHY N/A 2018    Procedure: CHOLECYSTECTOMY LAPAROSCOPIC  WITH  ATTEMPTED IOC;  Surgeon: Silvia Woods MD;  Location: [facility];  Service: General    TUBAL LIGATION         Current Outpatient Medications   Medication Sig Dispense Refill    baclofen 10 mg tablet TAKE 1 TABLET(10 MG) BY MOUTH THREE TIMES DAILY AS NEEDED FOR MUSCLE SPASMS (Patient not taking: Reported on 2024) 10 tablet 0    cholecalciferol 1,000 units tablet Take 1 tablet (1,000 Units total) by mouth daily 90 tablet 1    dicyclomine (BENTYL) 20 mg tablet Take 1 tablet (20 mg total) by mouth every 6 (six) hours As needed for abdominal pain 60 tablet 1    dicyclomine (BENTYL) 20 mg tablet Take 1 tablet (20 mg total) by mouth every 6 (six) hours as needed (abd pain and diarrhea) 40 tablet 0    docusate sodium (COLACE) 100 mg capsule TAKE 1 CAPSULE BY MOUTH EVERY MORNING 90 capsule 1    DULoxetine (Cymbalta) 20 mg capsule Take 2 capsules (40 mg total) by mouth daily 60 capsule 2    Dyclonine-Glycerin (Cepacol Sore Throat Spray) 0.1-33 % LIQD Apply 1 spray to the mouth or throat every 6 (six) hours 118 mL 0    famotidine (PEPCID) 40 MG tablet TAKE 1 TABLET(40 MG) BY MOUTH DAILY 90 tablet 0    ferrous sulfate 324 (65 Fe) mg TAKE 1 TABLET BY MOUTH EVERY OTHER DAY 90 tablet 3     fexofenadine (ALLEGRA) 180 MG tablet Take 1 tablet (180 mg total) by mouth daily 90 tablet 1    fluticasone (FLONASE) 50 mcg/act nasal spray 1 spray into each nostril daily 16 g 0    lidocaine (LIDODERM) 5 % UNWRAP AND APPLY 1 PATCH TOPICALLY TO THE AFFECTED AREA EVERY DAY(DO NOT LEAVE ON FOR MORE THAN 12 HOURS) 90 patch 1    meclizine (ANTIVERT) 25 mg tablet TAKE 1 TABLET(25 MG) BY MOUTH THREE TIMES DAILY AS NEEDED FOR DIZZINESS 30 tablet 0    mirtazapine (REMERON) 30 mg tablet Take 1 tablet (30 mg total) by mouth daily at bedtime 90 tablet 1    omeprazole (PriLOSEC) 40 MG capsule TAKE 1 CAPSULE(40 MG) BY MOUTH DAILY 90 capsule 0    ondansetron (ZOFRAN-ODT) 4 mg disintegrating tablet Take 1 tablet (4 mg total) by mouth every 8 (eight) hours as needed for nausea or vomiting 30 tablet 0    polyethylene glycol (GLYCOLAX) 17 GM/SCOOP powder MIX AND DRINK 17 GRAMS BY MOUTH EVERY  g 0    prochlorperazine (COMPAZINE) 5 mg tablet TAKE 1 TABLET(5 MG) BY MOUTH EVERY 6 HOURS AS NEEDED FOR NAUSEA OR VOMITING 30 tablet 0    SUMAtriptan (IMITREX) 50 mg tablet TAKE 1 TABLET(50 MG) BY MOUTH 1 TIME FOR UP TO 1 DOSE AS NEEDED FOR MIGRAINE 9 tablet 0    trospium chloride (SANCTURA) 20 mg tablet Take 20 mg by mouth 2 (two) times a day      vitamin B-12 (VITAMIN B-12) 1,000 mcg tablet Take 1 tablet (1,000 mcg total) by mouth daily 90 tablet 2     No current facility-administered medications for this visit.        No Known Allergies    Review of Systems    Video Exam    There were no vitals filed for this visit.    Physical Exam     Visit Time    Visit Start Time: 1500  Visit Stop Time: 1559  Total Visit Duration:  59 minutes

## 2024-06-20 ENCOUNTER — TELEMEDICINE (OUTPATIENT)
Dept: PSYCHIATRY | Facility: CLINIC | Age: 44
End: 2024-06-20
Payer: COMMERCIAL

## 2024-06-20 DIAGNOSIS — F33.1 MODERATE EPISODE OF RECURRENT MAJOR DEPRESSIVE DISORDER (HCC): Primary | ICD-10-CM

## 2024-06-20 DIAGNOSIS — F32.1 CURRENT MODERATE EPISODE OF MAJOR DEPRESSIVE DISORDER WITHOUT PRIOR EPISODE (HCC): ICD-10-CM

## 2024-06-20 DIAGNOSIS — F41.1 GAD (GENERALIZED ANXIETY DISORDER): ICD-10-CM

## 2024-06-20 DIAGNOSIS — F41.1 GENERALIZED ANXIETY DISORDER: ICD-10-CM

## 2024-06-20 PROBLEM — N39.46 MIXED INCONTINENCE: Status: ACTIVE | Noted: 2022-03-25

## 2024-06-20 PROCEDURE — 99214 OFFICE O/P EST MOD 30 MIN: CPT | Performed by: NURSE PRACTITIONER

## 2024-06-20 RX ORDER — MIRTAZAPINE 30 MG/1
30 TABLET, FILM COATED ORAL
Qty: 90 TABLET | Refills: 1 | Status: SHIPPED | OUTPATIENT
Start: 2024-06-20

## 2024-06-20 RX ORDER — DULOXETIN HYDROCHLORIDE 30 MG/1
30 CAPSULE, DELAYED RELEASE ORAL DAILY
Qty: 90 CAPSULE | Refills: 1 | Status: SHIPPED | OUTPATIENT
Start: 2024-06-20

## 2024-06-20 RX ORDER — MIRABEGRON 25 MG/1
25 TABLET, FILM COATED, EXTENDED RELEASE ORAL DAILY
COMMUNITY
Start: 2024-05-20 | End: 2024-08-18

## 2024-06-20 NOTE — PSYCH
Virtual Regular Visit    Verification of patient location:    Patient is located at Home in the following state in which I hold an active license [place]      Assessment/Plan:    Problem List Items Addressed This Visit       Generalized anxiety disorder - Primary    Relevant Medications    DULoxetine (Cymbalta) 30 mg delayed release capsule    mirtazapine (REMERON) 30 mg tablet    Current moderate episode of major depressive disorder without prior episode (HCC)    Relevant Medications    DULoxetine (Cymbalta) 30 mg delayed release capsule    mirtazapine (REMERON) 30 mg tablet     Other Visit Diagnoses       Moderate episode of recurrent major depressive disorder (HCC)        Relevant Medications    DULoxetine (Cymbalta) 30 mg delayed release capsule    mirtazapine (REMERON) 30 mg tablet    HUEY (generalized anxiety disorder)        Relevant Medications    DULoxetine (Cymbalta) 30 mg delayed release capsule    mirtazapine (REMERON) 30 mg tablet            Reason for visit is   Chief Complaint   Patient presents with    Virtual Regular Visit          Encounter provider ANNAMARIE Hawk      Recent Visits  No visits were found meeting these conditions.  Showing recent visits within past 7 days and meeting all other requirements  Today's Visits  Date Type Provider Dept   06/20/24 Telemedicine ANNAMARIE Hawk [facility] [address]   Showing today's visits and meeting all other requirements  Future Appointments  No visits were found meeting these conditions.  Showing future appointments within next 150 days and meeting all other requirements       The patient was identified by name and date of birth. Екатерина Godinez was informed that this is a telemedicine visit and that the visit is being conducted throughthe [employer] platform. She agrees to proceed..  My office door was closed. No one else was in the room.  She acknowledged consent and understanding of privacy and security of the video platform. The patient has  agreed to participate and understands they can discontinue the visit at any time.    Patient is aware this is a billable service.     MEDICATION MANAGEMENT NOTE        [facility]    Name and Date of Birth:  Екатерина Godinez 43 y.o. 1980 MRN: [id number]    Date of Visit: June 20, 2024    Visit Time    Visit Start Time: 1400   Visit Stop Time: 1430  Total Visit Duration:  30 minutes     The total visit duration detailed above includes: patient engagement, medication management, psychotherapy/counseling, discussion regarding treatment goals, and coordination of care.      Note Share Disclaimer:     This note was not shared with the patient due to reasonable likelihood of causing patient harm    No Known Allergies  SUBJECTIVE:    Екатерина is seen today for a follow up for Major Depressive Disorder and Generalized Anxiety Disorder. She continues to improve gradually since the last visit.  She was not able to tolerate Cymbalta 40 mg, made her tired and too nauseous.  So she went back to taking 30 mg capsules.  However, she ran out of those and was using 20 mg tablets.  She has noted some dip in mood not on her correct dose of medications.  She also states she will occasionally forget her Remeron.  Notes she wakes up more irritable when this happens.  She is otherwise, doing pretty well overall.  Notes individual therapy with Elisabeth that has been most helpful and she continues to have improvements in mood and anxiety and looks forward to the sessions.  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  She is having a procedure to help with her bladder issues and also to help with her gastroparesis.  She is pleased to be taking care of herself and looking forward to some improvements in her symptoms.  She is putting off a trip to [place] until she gets her own health taken care of.  We will reorder her Cymbalta at 30 mg, continue Remeron at 30  mg.  She will continue individual therapy which is helped more than medication adjustments.  We will follow-up in 3 months.    She denies any side effects from current psychiatric medications.      HPI ROS Appetite Changes and Sleep:     She reports fluctuating sleep pattern, no weight change, fluctuating energy levels. Denies homicidal ideation, denies suicidal ideation    Review Of Systems:   no complaints, all other systems are negative         Mental Status Evaluation:    Appearance:  age appropriate   Behavior:  pleasant, cooperative   Speech:  normal rate, normal volume   Mood:  improved   Affect:  appropriate   Thought Process:  goal directed   Associations: intact associations   Thought Content:  no overt delusions   Perceptual Disturbances: none   Risk Potential: Suicidal ideation - None  Homicidal ideation - None  Potential for aggression - No   Sensorium:  oriented to person, place, and time/date   Memory:  recent and remote memory grossly intact   Consciousness:  alert and awake   Attention: attention span and concentration are age appropriate   Insight:  improved   Judgment: improved   Gait/Station: normal gait/station, normal balance   Motor Activity: no abnormal movements       History Review:The following portions of the patient's history were reviewed and updated as appropriate: psychiatric history, trauma history allergies, current medications, past family history, past medical history, past social history, past surgical history, and problem list   OBJECTIVE:     Vital signs in last 24 hours:    There were no vitals filed for this visit.  Laboratory Results: I have personally reviewed all pertinent laboratory/tests results.      Suicide/Homicide Risk Assessment:    The following interventions are recommended: no intervention changes needed. Although patient's acute lethality risk is low, long-term/chronic lethality risk is mildly elevated in the presence of depressive symptoms. At the current  moment, Екатерина is future-oriented, forward-thinking, and demonstrates ability to act in a self-preserving manner as evidenced by volitionally presenting to the clinic today, seeking treatment. To mitigate future risk, patient should adhere to the recommendations below, avoid alcohol/illicit substance use, utilize community-based resources and familiar support and prioritize mental health treatment. Presently, patient denies active suicidal/homicidal ideation in addition to thoughts of self-injury; contracts for safety.  At conclusion of evaluation, patient is amenable to the recommendations below. Patient is amenable to calling/contacting the outpatient office including this writer if any acute adverse effects of their medication regimen arise in addition to any comments or concerns pertaining to their psychiatric management.  Patient is amenable to calling/contacting crisis and/or attending to the nearest emergency department if their clinical condition deteriorates to assure their safety and stability, stating that they are able to appropriately confide in their family and friends regarding their psychiatric state.    Assessment/Plan:     PLAN:  Diagnoses and all orders for this visit:    Moderate episode of recurrent major depressive disorder (HCC)  -     mirtazapine (REMERON) 30 mg tablet; Take 1 tablet (30 mg total) by mouth daily at bedtime    Current moderate episode of major depressive disorder without prior episode (HCC)  -     DULoxetine (Cymbalta) 30 mg delayed release capsule; Take 1 capsule (30 mg total) by mouth daily    HUEY (generalized anxiety disorder)  -     mirtazapine (REMERON) 30 mg tablet; Take 1 tablet (30 mg total) by mouth daily at bedtime    Generalized anxiety disorder    Other orders  -     Mirabegron ER 25 MG TB24; Take 25 mg by mouth daily         Treatment Recommendations/Precautions:    Continue cymbalta 30 mg for depression and anxiety  .Continue Remeron 30 mg for sleep and  mood  Continue individual therapy  She will follow up with PCP and specific specialists for ongoing medical issues  Medication management every 3 months  Aware of 24 hour and weekend coverage for urgent situations accessed by calling [facility] main practice number  Patient advised to call 911 if feeling suicidal or homicidal before acting out on their thoughts and they expressed understanding.    Medications Risks/Benefits      Risks, Benefits And Possible Side Effects Of Medications:    Discussed risks and benefits of treatment with patient including risk of suicidality, serotonin syndrome, increased QTc interval and SIADH related to treatment with antidepressants; Risk of induction of manic symptoms in certain patient populations         Controlled Medication Discussion:     Not applicable - controlled prescriptions are not prescribed by this practice    Psychotherapy Provided:     Individual psychotherapy provided: Medications, treatment progress and treatment plan reviewed with Екатерина.  Medication changes discussed with Екатерина.  Medication education provided to Екатерина.  Reassurance and supportive therapy provided.   Crisis/safety plan discussed with Екатерина.     Treatment Plan:    Completed and signed during the session: Not applicable - Treatment Plan not due at this session    This note was not shared with the patient due to reasonable likelihood of causing patient harm      ANNAMARIE Hawk 06/20/24

## 2024-06-25 ENCOUNTER — TELEMEDICINE (OUTPATIENT)
Dept: BEHAVIORAL/MENTAL HEALTH CLINIC | Facility: CLINIC | Age: 44
End: 2024-06-25
Payer: COMMERCIAL

## 2024-06-25 DIAGNOSIS — F41.1 GENERALIZED ANXIETY DISORDER: ICD-10-CM

## 2024-06-25 DIAGNOSIS — F33.1 MAJOR DEPRESSIVE DISORDER, RECURRENT, MODERATE (HCC): Primary | ICD-10-CM

## 2024-06-25 PROCEDURE — 90834 PSYTX W PT 45 MINUTES: CPT

## 2024-06-26 NOTE — PSYCH
"Behavioral Health Psychotherapy Progress Note    Psychotherapy Provided: Individual Psychotherapy     1. Major depressive disorder, recurrent, moderate (HCC)        2. Generalized anxiety disorder            Goals addressed in session: Goal 1 and Goal 2     DATA: Therapist began session with completion of check in. Екатерина reported doing “alright.” Therapist said that there have been any updates since last session. Екатерина reported having a follow up doctor's appointment to schedule her surgery for her bladder. She expressed feeling anxious and excited for this operation. Therapist validated her emotions. She discussed ongoing frustration with her cousin living with her. Therapist asked to explore the boundaries that she felt comfortable continuing to set with her cousin. Екатерина appears committed to continuing to remind her cousin of his deadline to move out. Therapist and Екатерина discuss the progress she had made with her relationship with her daughter for the remainder of session.    During this session, this clinician used the following therapeutic modalities: Client-centered Therapy, Cognitive Behavioral Therapy, Solution-Focused Therapy, and Supportive Psychotherapy    Substance Abuse was not addressed during this session. If the client is diagnosed with a co-occurring substance use disorder, please indicate any changes in the frequency or amount of use: n/a. Stage of change for addressing substance use diagnoses: No substance use/Not applicable    ASSESSMENT:  Екатерина Godinez presents with a Euthymic/ normal mood.     her affect is Normal range and intensity, which is congruent, with her mood and the content of the session. The client has made progress on their goals.     Екатерина Godinez presents with a none risk of suicide, none risk of self-harm, and none risk of harm to others.    For any risk assessment that surpasses a \"low\" rating, a safety plan must be developed.    A safety plan was indicated: no  If yes, " describe in detail n/a    PLAN: Between sessions, Екатерина Godinez will continue to practice appropriate boundaries with family. At the next session, the therapist will use Client-centered Therapy, Cognitive Behavioral Therapy, Solution-Focused Therapy, and Supportive Psychotherapy to address depression and boundaries.    Behavioral Health Treatment Plan and Discharge Planning: Екатерина Godinez is aware of and agrees to continue to work on their treatment plan. They have identified and are working toward their discharge goals. yes    Visit start and stop times:    06/25/2024  Start Time: 1500  Stop Time: 1550  Total Visit Time: 50 minutes    Virtual Regular Visit    Verification of patient location:    Patient is located at Home in the following state in which I hold an active license [place]      Assessment/Plan:    Problem List Items Addressed This Visit       Generalized anxiety disorder     Other Visit Diagnoses       Major depressive disorder, recurrent, moderate (HCC)    -  Primary            Goals addressed in session: Goal 1 and Goal 2          Reason for visit is   Chief Complaint   Patient presents with    Virtual Regular Visit          Encounter provider Edel Nguyen      Recent Visits  Date Type Provider Dept   06/25/24 Telemedicine Edel [facility] Therapist [address]   Showing recent visits within past 7 days and meeting all other requirements  Future Appointments  No visits were found meeting these conditions.  Showing future appointments within next 150 days and meeting all other requirements       The patient was identified by name and date of birth. Екатерина Godinez was informed that this is a telemedicine visit and that the visit is being conducted throughthe [email or web address] platform. She agrees to proceed..  My office door was closed. No one else was in the room.  She acknowledged consent and understanding of privacy and security of the video platform. The patient has agreed to  participate and understands they can discontinue the visit at any time.    Patient is aware this is a billable service.       HPI     Past Medical History:   Diagnosis Date    Anemia     in past    Anxiety     Arthritis 2022    Depression     Gallstones     Headache(784.0)     Inflammatory bowel disease     Migraine     Nausea     occ    Panic attack     Rotator cuff disorder, right     Vertigo        Past Surgical History:   Procedure Laterality Date    ABDOMINAL SURGERY      scar tissue removed     SECTION      last assessed: Oct 23, 2014     CHOLECYSTECTOMY  2018    HYSTERECTOMY      MO LAPS SURG CHOLECYSTECTOMY W/CHOLANGIOGRAPHY N/A 2018    Procedure: CHOLECYSTECTOMY LAPAROSCOPIC  WITH  ATTEMPTED IOC;  Surgeon: Silvia Woods MD;  Location: [facility];  Service: General    TUBAL LIGATION         Current Outpatient Medications   Medication Sig Dispense Refill    cholecalciferol 1,000 units tablet Take 1 tablet (1,000 Units total) by mouth daily 90 tablet 1    docusate sodium (COLACE) 100 mg capsule TAKE 1 CAPSULE BY MOUTH EVERY MORNING 90 capsule 1    DULoxetine (Cymbalta) 30 mg delayed release capsule Take 1 capsule (30 mg total) by mouth daily 90 capsule 1    Dyclonine-Glycerin (Cepacol Sore Throat Spray) 0.1-33 % LIQD Apply 1 spray to the mouth or throat every 6 (six) hours 118 mL 0    famotidine (PEPCID) 40 MG tablet TAKE 1 TABLET(40 MG) BY MOUTH DAILY 90 tablet 0    ferrous sulfate 324 (65 Fe) mg TAKE 1 TABLET BY MOUTH EVERY OTHER DAY 90 tablet 3    fexofenadine (ALLEGRA) 180 MG tablet Take 1 tablet (180 mg total) by mouth daily 90 tablet 1    fluticasone (FLONASE) 50 mcg/act nasal spray 1 spray into each nostril daily 16 g 0    lidocaine (LIDODERM) 5 % UNWRAP AND APPLY 1 PATCH TOPICALLY TO THE AFFECTED AREA EVERY DAY(DO NOT LEAVE ON FOR MORE THAN 12 HOURS) 90 patch 1    meclizine (ANTIVERT) 25 mg tablet TAKE 1 TABLET(25 MG) BY MOUTH THREE TIMES DAILY AS NEEDED FOR DIZZINESS  30 tablet 0    Mirabegron ER 25 MG TB24 Take 25 mg by mouth daily      mirtazapine (REMERON) 30 mg tablet Take 1 tablet (30 mg total) by mouth daily at bedtime 90 tablet 1    omeprazole (PriLOSEC) 40 MG capsule TAKE 1 CAPSULE(40 MG) BY MOUTH DAILY 90 capsule 0    ondansetron (ZOFRAN-ODT) 4 mg disintegrating tablet Take 1 tablet (4 mg total) by mouth every 8 (eight) hours as needed for nausea or vomiting 30 tablet 0    polyethylene glycol (GLYCOLAX) 17 GM/SCOOP powder MIX AND DRINK 17 GRAMS BY MOUTH EVERY  g 0    prochlorperazine (COMPAZINE) 5 mg tablet TAKE 1 TABLET(5 MG) BY MOUTH EVERY 6 HOURS AS NEEDED FOR NAUSEA OR VOMITING 30 tablet 0    SUMAtriptan (IMITREX) 50 mg tablet TAKE 1 TABLET(50 MG) BY MOUTH 1 TIME FOR UP TO 1 DOSE AS NEEDED FOR MIGRAINE 9 tablet 0    trospium chloride (SANCTURA) 20 mg tablet Take 20 mg by mouth 2 (two) times a day      vitamin B-12 (VITAMIN B-12) 1,000 mcg tablet Take 1 tablet (1,000 mcg total) by mouth daily 90 tablet 2     No current facility-administered medications for this visit.        No Known Allergies    Review of Systems    Video Exam    There were no vitals filed for this visit.    Physical Exam     Visit Time    Visit Start Time: 1500  Visit Stop Time: 1550  Total Visit Duration:  50 minutes

## 2024-07-02 ENCOUNTER — TELEMEDICINE (OUTPATIENT)
Dept: BEHAVIORAL/MENTAL HEALTH CLINIC | Facility: CLINIC | Age: 44
End: 2024-07-02
Payer: COMMERCIAL

## 2024-07-02 DIAGNOSIS — F41.1 GENERALIZED ANXIETY DISORDER: ICD-10-CM

## 2024-07-02 DIAGNOSIS — F33.1 MAJOR DEPRESSIVE DISORDER, RECURRENT, MODERATE (HCC): Primary | ICD-10-CM

## 2024-07-02 PROCEDURE — 90834 PSYTX W PT 45 MINUTES: CPT

## 2024-07-03 NOTE — PSYCH
"Behavioral Health Psychotherapy Progress Note    Psychotherapy Provided: Individual Psychotherapy     1. Major depressive disorder, recurrent, moderate (HCC)        2. Generalized anxiety disorder            Goals addressed in session: Goal 1 and Goal 2     DATA: Therapist began session with completion of check in. Екатерина reported doing “good.” Therapist asked if there have been any updates in her life since last session. Екатерина stated that her two surgeries had been scheduled for September. Therapist asked how she felt about this. Екатерина stated feeling anxious, nervous and excited. Therapist reminded Екатерина of her strength an express admiration for her bravery. Therapist action era if they could complete positive affirmations unique to Екатерина and her strengths. She agreed. Therapist and Екатерина spent the remainder of session creating positive affirmations for her strength such as determination, bravery, forgiveness and more.    During this session, this clinician used the following therapeutic modalities: Client-centered Therapy, Cognitive Behavioral Therapy, Solution-Focused Therapy, and Supportive Psychotherapy    Substance Abuse was not addressed during this session. If the client is diagnosed with a co-occurring substance use disorder, please indicate any changes in the frequency or amount of use: n/a. Stage of change for addressing substance use diagnoses: No substance use/Not applicable    ASSESSMENT:  Екатерина Godinez presents with a Euthymic/ normal mood.     her affect is Normal range and intensity, which is congruent, with her mood and the content of the session. The client has made progress on their goals.     Екатерина Godinez presents with a none risk of suicide, none risk of self-harm, and none risk of harm to others.    For any risk assessment that surpasses a \"low\" rating, a safety plan must be developed.    A safety plan was indicated: no  If yes, describe in detail n/a    PLAN: Between sessions, Екатерина " Herbert will continue to use positive affirmations. At the next session, the therapist will use Client-centered Therapy, Cognitive Behavioral Therapy, Solution-Focused Therapy, and Supportive Psychotherapy to address depression.    Behavioral Health Treatment Plan and Discharge Planning: Екатерина Godinez is aware of and agrees to continue to work on their treatment plan. They have identified and are working toward their discharge goals. yes    Visit start and stop times:    07/02/2024  Start Time: 1500  Stop Time: 1551  Total Visit Time: 51 minutes    Virtual Regular Visit    Verification of patient location:    Patient is located at Home in the following state in which I hold an active license [place]      Assessment/Plan:    Problem List Items Addressed This Visit       Generalized anxiety disorder     Other Visit Diagnoses       Major depressive disorder, recurrent, moderate (HCC)    -  Primary            Goals addressed in session: Goal 1 and Goal 2          Reason for visit is   Chief Complaint   Patient presents with    Virtual Regular Visit          Encounter provider Edel Nguyen      Recent Visits  Date Type Provider Dept   07/02/24 Telemedicine [facility] Therapist [address]   Showing recent visits within past 7 days and meeting all other requirements  Future Appointments  No visits were found meeting these conditions.  Showing future appointments within next 150 days and meeting all other requirements       The patient was identified by name and date of birth. Екатерина Godinez was informed that this is a telemedicine visit and that the visit is being conducted throughthe [employer] platform. She agrees to proceed..  My office door was closed. No one else was in the room.  She acknowledged consent and understanding of privacy and security of the video platform. The patient has agreed to participate and understands they can discontinue the visit at any time.    Patient is aware this is  a billable service.       HPI     Past Medical History:   Diagnosis Date    Anemia     in past    Anxiety     Arthritis 2022    Depression     Gallstones     Headache(784.0)     Inflammatory bowel disease     Migraine     Nausea     occ    Panic attack     Rotator cuff disorder, right     Vertigo        Past Surgical History:   Procedure Laterality Date    ABDOMINAL SURGERY      scar tissue removed     SECTION      last assessed: Oct 23, 2014     CHOLECYSTECTOMY  2018    HYSTERECTOMY      [place] LAPS SURG CHOLECYSTECTOMY W/CHOLANGIOGRAPHY N/A 2018    Procedure: CHOLECYSTECTOMY LAPAROSCOPIC  WITH  ATTEMPTED IOC;  Surgeon: Silvia Woods MD;  Location: [facility];  Service: General    TUBAL LIGATION         Current Outpatient Medications   Medication Sig Dispense Refill    cholecalciferol 1,000 units tablet Take 1 tablet (1,000 Units total) by mouth daily 90 tablet 1    docusate sodium (COLACE) 100 mg capsule TAKE 1 CAPSULE BY MOUTH EVERY MORNING 90 capsule 1    DULoxetine (Cymbalta) 30 mg delayed release capsule Take 1 capsule (30 mg total) by mouth daily 90 capsule 1    Dyclonine-Glycerin (Cepacol Sore Throat Spray) 0.1-33 % LIQD Apply 1 spray to the mouth or throat every 6 (six) hours 118 mL 0    famotidine (PEPCID) 40 MG tablet TAKE 1 TABLET(40 MG) BY MOUTH DAILY 90 tablet 0    ferrous sulfate 324 (65 Fe) mg TAKE 1 TABLET BY MOUTH EVERY OTHER DAY 90 tablet 3    fexofenadine (ALLEGRA) 180 MG tablet Take 1 tablet (180 mg total) by mouth daily 90 tablet 1    fluticasone (FLONASE) 50 mcg/act nasal spray 1 spray into each nostril daily 16 g 0    lidocaine (LIDODERM) 5 % UNWRAP AND APPLY 1 PATCH TOPICALLY TO THE AFFECTED AREA EVERY DAY(DO NOT LEAVE ON FOR MORE THAN 12 HOURS) 90 patch 1    meclizine (ANTIVERT) 25 mg tablet TAKE 1 TABLET(25 MG) BY MOUTH THREE TIMES DAILY AS NEEDED FOR DIZZINESS 30 tablet 0    Mirabegron ER 25 MG TB24 Take 25 mg by mouth daily      mirtazapine (REMERON) 30 mg  tablet Take 1 tablet (30 mg total) by mouth daily at bedtime 90 tablet 1    omeprazole (PriLOSEC) 40 MG capsule TAKE 1 CAPSULE(40 MG) BY MOUTH DAILY 90 capsule 0    ondansetron (ZOFRAN-ODT) 4 mg disintegrating tablet Take 1 tablet (4 mg total) by mouth every 8 (eight) hours as needed for nausea or vomiting 30 tablet 0    polyethylene glycol (GLYCOLAX) 17 GM/SCOOP powder MIX AND DRINK 17 GRAMS BY MOUTH EVERY  g 0    prochlorperazine (COMPAZINE) 5 mg tablet TAKE 1 TABLET(5 MG) BY MOUTH EVERY 6 HOURS AS NEEDED FOR NAUSEA OR VOMITING 30 tablet 0    SUMAtriptan (IMITREX) 50 mg tablet TAKE 1 TABLET(50 MG) BY MOUTH 1 TIME FOR UP TO 1 DOSE AS NEEDED FOR MIGRAINE 9 tablet 0    trospium chloride (SANCTURA) 20 mg tablet Take 20 mg by mouth 2 (two) times a day      vitamin B-12 (VITAMIN B-12) 1,000 mcg tablet Take 1 tablet (1,000 mcg total) by mouth daily 90 tablet 2     No current facility-administered medications for this visit.        No Known Allergies    Review of Systems    Video Exam    There were no vitals filed for this visit.    Physical Exam     Visit Time    Visit Start Time: 1500  Visit Stop Time: 1551  Total Visit Duration:  51 minutes

## 2024-07-16 ENCOUNTER — TELEMEDICINE (OUTPATIENT)
Dept: BEHAVIORAL/MENTAL HEALTH CLINIC | Facility: CLINIC | Age: 44
End: 2024-07-16
Payer: COMMERCIAL

## 2024-07-16 DIAGNOSIS — F33.1 MAJOR DEPRESSIVE DISORDER, RECURRENT, MODERATE (HCC): Primary | ICD-10-CM

## 2024-07-16 DIAGNOSIS — F41.1 GENERALIZED ANXIETY DISORDER: ICD-10-CM

## 2024-07-16 PROCEDURE — 90837 PSYTX W PT 60 MINUTES: CPT

## 2024-07-17 NOTE — PSYCH
"Behavioral Health Psychotherapy Progress Note    Psychotherapy Provided: Individual Psychotherapy     1. Major depressive disorder, recurrent, moderate (HCC)        2. Generalized anxiety disorder            Goals addressed in session: Goal 1 and Goal 2     DATA: Therapist began session with completion of check in. Екатерина reported doing “good, trying to stay cook from the heat.” Therapist asked about any updates in her life since last session. Екатерина stated that her surgeries had been scheduled as she was feeling anxious. Therapist validated her emotions and asked to discuss her care plan. Екатерина stated that her daughter and  would be taking off work to help care for her. Екатерина discussed her cousins still being in the home and her wanting him to leave. After processing this, therapist and Екатерина explored her values through completion of a value list. Therapist and Екатерина discussed how her values correlated with her strength further remainder of session.    During this session, this clinician used the following therapeutic modalities: Client-centered Therapy, Cognitive Behavioral Therapy, Solution-Focused Therapy, and Supportive Psychotherapy    Substance Abuse was not addressed during this session. If the client is diagnosed with a co-occurring substance use disorder, please indicate any changes in the frequency or amount of use: n/a. Stage of change for addressing substance use diagnoses: No substance use/Not applicable    ASSESSMENT:  Екатерина Godinez presents with a Euthymic/ normal mood.     her affect is Normal range and intensity, which is congruent, with her mood and the content of the session. The client has made progress on their goals.     Екатерина Godinez presents with a none risk of suicide, none risk of self-harm, and none risk of harm to others.    For any risk assessment that surpasses a \"low\" rating, a safety plan must be developed.    A safety plan was indicated: no  If yes, describe in detail " n/a    PLAN: Between sessions, Екатерина Godinez will continue to use self care to cope with upcoming appts. At the next session, the therapist will use Client-centered Therapy, Cognitive Behavioral Therapy, Solution-Focused Therapy, and Supportive Psychotherapy to address depression and anxiousness.    Behavioral Health Treatment Plan and Discharge Planning: Екатерина Godinez is aware of and agrees to continue to work on their treatment plan. They have identified and are working toward their discharge goals. yes    Visit start and stop times:    07/16/2024  Start Time: 1500  Stop Time: 1558  Total Visit Time: 58 minutes    Virtual Regular Visit    Verification of patient location:    Patient is located at Home in the following state in which I hold an active license [place]      Assessment/Plan:    Problem List Items Addressed This Visit       Generalized anxiety disorder     Other Visit Diagnoses       Major depressive disorder, recurrent, moderate (HCC)    -  Primary            Goals addressed in session: Goal 1 and Goal 2          Reason for visit is   Chief Complaint   Patient presents with    Virtual Regular Visit          Encounter provider Edel Nguyen      Recent Visits  Date Type Provider Dept   07/16/24 Telemedicine [facility] Therapist [address]   Showing recent visits within past 7 days and meeting all other requirements  Future Appointments  No visits were found meeting these conditions.  Showing future appointments within next 150 days and meeting all other requirements       The patient was identified by name and date of birth. Екатерина Godinez was informed that this is a telemedicine visit and that the visit is being conducted throughthe [employer] platform. She agrees to proceed..  My office door was closed. No one else was in the room.  She acknowledged consent and understanding of privacy and security of the video platform. The patient has agreed to participate and understands  they can discontinue the visit at any time.    Patient is aware this is a billable service.         HPI     Past Medical History:   Diagnosis Date    Anemia     in past    Anxiety     Arthritis 2022    Depression     Gallstones     Headache(784.0)     Inflammatory bowel disease     Migraine     Nausea     occ    Panic attack     Rotator cuff disorder, right     Vertigo        Past Surgical History:   Procedure Laterality Date    ABDOMINAL SURGERY      scar tissue removed     SECTION      last assessed: Oct 23, 2014     CHOLECYSTECTOMY  2018    HYSTERECTOMY      [place] LAPS SURG CHOLECYSTECTOMY W/CHOLANGIOGRAPHY N/A 2018    Procedure: CHOLECYSTECTOMY LAPAROSCOPIC  WITH  ATTEMPTED IOC;  Surgeon: Silvia Woods MD;  Location: [facility];  Service: General    TUBAL LIGATION         Current Outpatient Medications   Medication Sig Dispense Refill    cholecalciferol 1,000 units tablet Take 1 tablet (1,000 Units total) by mouth daily 90 tablet 1    docusate sodium (COLACE) 100 mg capsule TAKE 1 CAPSULE BY MOUTH EVERY MORNING 90 capsule 1    DULoxetine (Cymbalta) 30 mg delayed release capsule Take 1 capsule (30 mg total) by mouth daily 90 capsule 1    Dyclonine-Glycerin (Cepacol Sore Throat Spray) 0.1-33 % LIQD Apply 1 spray to the mouth or throat every 6 (six) hours 118 mL 0    famotidine (PEPCID) 40 MG tablet TAKE 1 TABLET(40 MG) BY MOUTH DAILY 90 tablet 0    ferrous sulfate 324 (65 Fe) mg TAKE 1 TABLET BY MOUTH EVERY OTHER DAY 90 tablet 3    fexofenadine (ALLEGRA) 180 MG tablet Take 1 tablet (180 mg total) by mouth daily 90 tablet 1    fluticasone (FLONASE) 50 mcg/act nasal spray 1 spray into each nostril daily 16 g 0    lidocaine (LIDODERM) 5 % UNWRAP AND APPLY 1 PATCH TOPICALLY TO THE AFFECTED AREA EVERY DAY(DO NOT LEAVE ON FOR MORE THAN 12 HOURS) 90 patch 1    meclizine (ANTIVERT) 25 mg tablet TAKE 1 TABLET(25 MG) BY MOUTH THREE TIMES DAILY AS NEEDED FOR DIZZINESS 30 tablet 0    Mirabegron  ER 25 MG TB24 Take 25 mg by mouth daily      mirtazapine (REMERON) 30 mg tablet Take 1 tablet (30 mg total) by mouth daily at bedtime 90 tablet 1    omeprazole (PriLOSEC) 40 MG capsule TAKE 1 CAPSULE(40 MG) BY MOUTH DAILY 90 capsule 0    ondansetron (ZOFRAN-ODT) 4 mg disintegrating tablet Take 1 tablet (4 mg total) by mouth every 8 (eight) hours as needed for nausea or vomiting 30 tablet 0    polyethylene glycol (GLYCOLAX) 17 GM/SCOOP powder MIX AND DRINK 17 GRAMS BY MOUTH EVERY  g 0    prochlorperazine (COMPAZINE) 5 mg tablet TAKE 1 TABLET(5 MG) BY MOUTH EVERY 6 HOURS AS NEEDED FOR NAUSEA OR VOMITING 30 tablet 0    SUMAtriptan (IMITREX) 50 mg tablet TAKE 1 TABLET(50 MG) BY MOUTH 1 TIME FOR UP TO 1 DOSE AS NEEDED FOR MIGRAINE 9 tablet 0    trospium chloride (SANCTURA) 20 mg tablet Take 20 mg by mouth 2 (two) times a day      vitamin B-12 (VITAMIN B-12) 1,000 mcg tablet Take 1 tablet (1,000 mcg total) by mouth daily 90 tablet 2     No current facility-administered medications for this visit.        No Known Allergies    Review of Systems    Video Exam    There were no vitals filed for this visit.    Physical Exam     Visit Time    Visit Start Time: 1500  Visit Stop Time: 1558  Total Visit Duration:  58 minutes

## 2024-07-18 ENCOUNTER — HOSPITAL ENCOUNTER (OUTPATIENT)
Dept: RADIOLOGY | Facility: HOSPITAL | Age: 44
End: 2024-07-18
Attending: SURGERY
Payer: MEDICARE

## 2024-07-18 DIAGNOSIS — K31.84 GASTROPARESIS: ICD-10-CM

## 2024-07-18 PROCEDURE — A9541 TC99M SULFUR COLLOID: HCPCS

## 2024-07-18 PROCEDURE — 78264 GASTRIC EMPTYING IMG STUDY: CPT

## 2024-07-19 ENCOUNTER — OFFICE VISIT (OUTPATIENT)
Dept: SURGERY | Facility: CLINIC | Age: 44
End: 2024-07-19
Payer: MEDICARE

## 2024-07-19 VITALS
SYSTOLIC BLOOD PRESSURE: 110 MMHG | RESPIRATION RATE: 18 BRPM | OXYGEN SATURATION: 96 % | BODY MASS INDEX: 17.01 KG/M2 | DIASTOLIC BLOOD PRESSURE: 70 MMHG | WEIGHT: 84.4 LBS | TEMPERATURE: 98.4 F | HEART RATE: 80 BPM | HEIGHT: 59 IN

## 2024-07-19 DIAGNOSIS — K31.84 GASTROPARESIS: Primary | ICD-10-CM

## 2024-07-19 PROCEDURE — 99213 OFFICE O/P EST LOW 20 MIN: CPT | Performed by: SURGERY

## 2024-07-19 NOTE — PROGRESS NOTES
Ambulatory Visit  Name: Екатерина Godinez      : 1980      MRN: [id number]  Encounter Provider: Pradip Jeff MD  Encounter Date: 2024   Encounter department: [facility] [place]    Assessment & Plan   1. Gastroparesis  Assessment & Plan:  42yo F with medically refractory gastroparesis    Plan:  I discussed at length with the patient the pathophysiology of gastroparesis, and its symptomatology.  Given that the patient has objective findings on gastric emptying study of gastroparesis, and has failed medical management we had a discussion about surgical and endoscopic procedures that can aid is symptom resolution of gastroparesis.    After lengthy discussion, we will plan to proceed with robotic pyloroplasty and EGD as a first-line procedure all option for management of gastroparesis.  We discussed at length the risks and benefits of surgery including, but not limited to suture line leakage, failure of symptom improvement, and need for additional procedures.  After discussion, the patient was amenable to proceed to the operating room, and consent was signed.          History of Present Illness     Екатерина Godinez is a 43 y.o. female who presents with persistent, medically refractory gastroparesis. She continues to struggle with PO intake and nausea/vomiting. Since our last visit she underwent repeat gastric emptying study which I reviewed in PACS from 24, which showed 62% emptying at 4 hours.     Review of Systems   Constitutional:  Negative for chills, fatigue and fever.   HENT:  Negative for ear pain, facial swelling, sinus pressure and sinus pain.    Eyes:  Negative for pain.   Respiratory:  Negative for cough, shortness of breath and wheezing.    Cardiovascular:  Negative for chest pain.   Gastrointestinal:  Positive for abdominal distention, nausea and vomiting. Negative for abdominal pain, constipation and diarrhea.   Endocrine: Negative for cold intolerance and heat intolerance.    Genitourinary:  Negative for dysuria and flank pain.   Musculoskeletal:  Negative for back pain and neck pain.   Skin:  Negative for wound.   Neurological:  Negative for syncope, facial asymmetry, light-headedness and numbness.   Psychiatric/Behavioral:  Negative for behavioral problems, confusion and suicidal ideas.      Past Medical History   Past Medical History:   Diagnosis Date    Anemia     in past    Anxiety     Arthritis 2022    Depression     Gallstones     Headache(784.0)     Inflammatory bowel disease     Migraine     Nausea     occ    Panic attack     Rotator cuff disorder, right     Vertigo      Past Surgical History:   Procedure Laterality Date    ABDOMINAL SURGERY      scar tissue removed     SECTION      last assessed: Oct 23, 2014     CHOLECYSTECTOMY  2018    HYSTERECTOMY      [place] LAPS SURG CHOLECYSTECTOMY W/CHOLANGIOGRAPHY N/A 2018    Procedure: CHOLECYSTECTOMY LAPAROSCOPIC  WITH  ATTEMPTED IOC;  Surgeon: Silvia Woods MD;  Location: [facility];  Service: General    TUBAL LIGATION       Family History   Problem Relation Age of Onset    No Known Problems Family     Hypertension Mother     Arthritis Mother     Diabetes type II Father         mellitus     Hypertension Father     Diabetes Father     Arthritis Father      Current Outpatient Medications on File Prior to Visit   Medication Sig Dispense Refill    cholecalciferol 1,000 units tablet Take 1 tablet (1,000 Units total) by mouth daily 90 tablet 1    docusate sodium (COLACE) 100 mg capsule TAKE 1 CAPSULE BY MOUTH EVERY MORNING 90 capsule 1    DULoxetine (Cymbalta) 30 mg delayed release capsule Take 1 capsule (30 mg total) by mouth daily 90 capsule 1    Dyclonine-Glycerin (Cepacol Sore Throat Spray) 0.1-33 % LIQD Apply 1 spray to the mouth or throat every 6 (six) hours 118 mL 0    famotidine (PEPCID) 40 MG tablet TAKE 1 TABLET(40 MG) BY MOUTH DAILY 90 tablet 0    ferrous sulfate 324 (65 Fe) mg TAKE 1 TABLET BY  MOUTH EVERY OTHER DAY 90 tablet 3    fexofenadine (ALLEGRA) 180 MG tablet Take 1 tablet (180 mg total) by mouth daily 90 tablet 1    fluticasone (FLONASE) 50 mcg/act nasal spray 1 spray into each nostril daily 16 g 0    lidocaine (LIDODERM) 5 % UNWRAP AND APPLY 1 PATCH TOPICALLY TO THE AFFECTED AREA EVERY DAY(DO NOT LEAVE ON FOR MORE THAN 12 HOURS) 90 patch 1    meclizine (ANTIVERT) 25 mg tablet TAKE 1 TABLET(25 MG) BY MOUTH THREE TIMES DAILY AS NEEDED FOR DIZZINESS 30 tablet 0    Mirabegron ER 25 MG TB24 Take 25 mg by mouth daily      mirtazapine (REMERON) 30 mg tablet Take 1 tablet (30 mg total) by mouth daily at bedtime 90 tablet 1    omeprazole (PriLOSEC) 40 MG capsule TAKE 1 CAPSULE(40 MG) BY MOUTH DAILY 90 capsule 0    ondansetron (ZOFRAN-ODT) 4 mg disintegrating tablet Take 1 tablet (4 mg total) by mouth every 8 (eight) hours as needed for nausea or vomiting 30 tablet 0    polyethylene glycol (GLYCOLAX) 17 GM/SCOOP powder MIX AND DRINK 17 GRAMS BY MOUTH EVERY  g 0    prochlorperazine (COMPAZINE) 5 mg tablet TAKE 1 TABLET(5 MG) BY MOUTH EVERY 6 HOURS AS NEEDED FOR NAUSEA OR VOMITING 30 tablet 0    SUMAtriptan (IMITREX) 50 mg tablet TAKE 1 TABLET(50 MG) BY MOUTH 1 TIME FOR UP TO 1 DOSE AS NEEDED FOR MIGRAINE 9 tablet 0    trospium chloride (SANCTURA) 20 mg tablet Take 20 mg by mouth 2 (two) times a day      vitamin B-12 (VITAMIN B-12) 1,000 mcg tablet Take 1 tablet (1,000 mcg total) by mouth daily 90 tablet 2     No current facility-administered medications on file prior to visit.   No Known Allergies   Current Outpatient Medications on File Prior to Visit   Medication Sig Dispense Refill    cholecalciferol 1,000 units tablet Take 1 tablet (1,000 Units total) by mouth daily 90 tablet 1    docusate sodium (COLACE) 100 mg capsule TAKE 1 CAPSULE BY MOUTH EVERY MORNING 90 capsule 1    DULoxetine (Cymbalta) 30 mg delayed release capsule Take 1 capsule (30 mg total) by mouth daily 90 capsule 1     Dyclonine-Glycerin (Cepacol Sore Throat Spray) 0.1-33 % LIQD Apply 1 spray to the mouth or throat every 6 (six) hours 118 mL 0    famotidine (PEPCID) 40 MG tablet TAKE 1 TABLET(40 MG) BY MOUTH DAILY 90 tablet 0    ferrous sulfate 324 (65 Fe) mg TAKE 1 TABLET BY MOUTH EVERY OTHER DAY 90 tablet 3    fexofenadine (ALLEGRA) 180 MG tablet Take 1 tablet (180 mg total) by mouth daily 90 tablet 1    fluticasone (FLONASE) 50 mcg/act nasal spray 1 spray into each nostril daily 16 g 0    lidocaine (LIDODERM) 5 % UNWRAP AND APPLY 1 PATCH TOPICALLY TO THE AFFECTED AREA EVERY DAY(DO NOT LEAVE ON FOR MORE THAN 12 HOURS) 90 patch 1    meclizine (ANTIVERT) 25 mg tablet TAKE 1 TABLET(25 MG) BY MOUTH THREE TIMES DAILY AS NEEDED FOR DIZZINESS 30 tablet 0    Mirabegron ER 25 MG TB24 Take 25 mg by mouth daily      mirtazapine (REMERON) 30 mg tablet Take 1 tablet (30 mg total) by mouth daily at bedtime 90 tablet 1    omeprazole (PriLOSEC) 40 MG capsule TAKE 1 CAPSULE(40 MG) BY MOUTH DAILY 90 capsule 0    ondansetron (ZOFRAN-ODT) 4 mg disintegrating tablet Take 1 tablet (4 mg total) by mouth every 8 (eight) hours as needed for nausea or vomiting 30 tablet 0    polyethylene glycol (GLYCOLAX) 17 GM/SCOOP powder MIX AND DRINK 17 GRAMS BY MOUTH EVERY  g 0    prochlorperazine (COMPAZINE) 5 mg tablet TAKE 1 TABLET(5 MG) BY MOUTH EVERY 6 HOURS AS NEEDED FOR NAUSEA OR VOMITING 30 tablet 0    SUMAtriptan (IMITREX) 50 mg tablet TAKE 1 TABLET(50 MG) BY MOUTH 1 TIME FOR UP TO 1 DOSE AS NEEDED FOR MIGRAINE 9 tablet 0    trospium chloride (SANCTURA) 20 mg tablet Take 20 mg by mouth 2 (two) times a day      vitamin B-12 (VITAMIN B-12) 1,000 mcg tablet Take 1 tablet (1,000 mcg total) by mouth daily 90 tablet 2     No current facility-administered medications on file prior to visit.      Social History     Tobacco Use    Smoking status: Former     Current packs/day: 0.00     Average packs/day: 0.5 packs/day for 5.0 years (2.5 ttl pk-yrs)     Types:  "Cigarettes     Quit date: 2013     Years since quittin.4    Smokeless tobacco: Never   Vaping Use    Vaping status: Never Used   Substance and Sexual Activity    Alcohol use: No     Comment: none since 2021    Drug use: No    Sexual activity: Yes     Partners: Male     Comment: Hysterectomy     Objective     /70 (BP Location: Left arm, Patient Position: Sitting, Cuff Size: Standard)   Pulse 80   Temp 98.4 °F (36.9 °C) (Temporal)   Resp 18   Ht 4' 11\" (1.499 m)   Wt 38.3 kg (84 lb 6.4 oz)   LMP 2017   SpO2 96%   BMI 17.05 kg/m²     Physical Exam  Vitals and nursing note reviewed.   Constitutional:       General: She is not in acute distress.     Appearance: Normal appearance. She is not ill-appearing.   HENT:      Head: Normocephalic and atraumatic.      Mouth/Throat:      Mouth: Mucous membranes are moist.   Eyes:      Extraocular Movements: Extraocular movements intact.   Cardiovascular:      Rate and Rhythm: Normal rate and regular rhythm.   Pulmonary:      Effort: Pulmonary effort is normal. No respiratory distress.      Breath sounds: Normal breath sounds. No stridor.   Abdominal:      General: There is no distension.      Palpations: Abdomen is soft. There is no mass.      Tenderness: There is no abdominal tenderness.      Hernia: No hernia is present.   Musculoskeletal:         General: No swelling or tenderness. Normal range of motion.   Skin:     General: Skin is warm and dry.      Findings: No lesion.   Neurological:      General: No focal deficit present.      Mental Status: She is alert and oriented to person, place, and time.   Psychiatric:         Mood and Affect: Mood normal.         Behavior: Behavior normal.       Administrative Statements           "

## 2024-07-19 NOTE — ASSESSMENT & PLAN NOTE
44yo F with medically refractory gastroparesis    Plan:  I discussed at length with the patient the pathophysiology of gastroparesis, and its symptomatology.  Given that the patient has objective findings on gastric emptying study of gastroparesis, and has failed medical management we had a discussion about surgical and endoscopic procedures that can aid is symptom resolution of gastroparesis.    After lengthy discussion, we will plan to proceed with robotic pyloroplasty and EGD as a first-line procedure all option for management of gastroparesis.  We discussed at length the risks and benefits of surgery including, but not limited to suture line leakage, failure of symptom improvement, and need for additional procedures.  After discussion, the patient was amenable to proceed to the operating room, and consent was signed.

## 2024-07-22 ENCOUNTER — TELEPHONE (OUTPATIENT)
Age: 44
End: 2024-07-22

## 2024-07-22 DIAGNOSIS — K21.9 GASTROESOPHAGEAL REFLUX DISEASE WITHOUT ESOPHAGITIS: ICD-10-CM

## 2024-07-22 RX ORDER — OMEPRAZOLE 40 MG/1
40 CAPSULE, DELAYED RELEASE ORAL DAILY
Qty: 100 CAPSULE | Refills: 1 | Status: SHIPPED | OUTPATIENT
Start: 2024-07-22

## 2024-07-22 NOTE — TELEPHONE ENCOUNTER
Addended by: IRA DELGADO on: 3/10/2022 11:47 AM     Modules accepted: Orders     Patient called office requesting to change appointment to virtual. Writer changed appointment and sending link via my chart.

## 2024-07-30 ENCOUNTER — TELEMEDICINE (OUTPATIENT)
Dept: BEHAVIORAL/MENTAL HEALTH CLINIC | Facility: CLINIC | Age: 44
End: 2024-07-30
Payer: COMMERCIAL

## 2024-07-30 DIAGNOSIS — F41.1 GENERALIZED ANXIETY DISORDER: ICD-10-CM

## 2024-07-30 DIAGNOSIS — F33.1 MAJOR DEPRESSIVE DISORDER, RECURRENT, MODERATE (HCC): Primary | ICD-10-CM

## 2024-07-30 PROCEDURE — 90834 PSYTX W PT 45 MINUTES: CPT

## 2024-07-31 NOTE — PSYCH
"Behavioral Health Psychotherapy Progress Note    Psychotherapy Provided: Individual Psychotherapy     1. Major depressive disorder, recurrent, moderate (HCC)        2. Generalized anxiety disorder            Goals addressed in session: Goal 1 and Goal 2     DATA: Therapist began session with completion of check in. Екатерина reported doing “good.” Therapist asked how she was processing her upcoming surgeries. Екатерина stated that she was preparing for the surgeries through preparing of food and cleaning up the house. Therapist asked if she would like to complete an emotional intelligence activity. Екатерина agreed. Therapist guided Екатерина through a 55 question assessment for emotional intelligence. Екатерина scored ad above average. Therapist and Екатерина reflected and compared on the results to her behavior. Екатерина spoke on ongoing struggles with juggling her surgery preparation and her cousin living with her. Therapist encouraged nupur to communicate her boundaries with her cousin for the remainder of session.     During this session, this clinician used the following therapeutic modalities: Client-centered Therapy, Cognitive Behavioral Therapy, Solution-Focused Therapy, and Supportive Psychotherapy    Substance Abuse was not addressed during this session. If the client is diagnosed with a co-occurring substance use disorder, please indicate any changes in the frequency or amount of use: n/a. Stage of change for addressing substance use diagnoses: No substance use/Not applicable    ASSESSMENT:  Екатерина Godinez presents with a Euthymic/ normal mood.     her affect is Normal range and intensity, which is congruent, with her mood and the content of the session. The client has made progress on their goals.     Екатерина Godinez presents with a none risk of suicide, none risk of self-harm, and none risk of harm to others.    For any risk assessment that surpasses a \"low\" rating, a safety plan must be developed.    A safety plan was " indicated: no  If yes, describe in detail n/a    PLAN: Between sessions, Екатерина Godinez will continue to use self care and coping skills. At the next session, the therapist will use Client-centered Therapy, Cognitive Behavioral Therapy, Solution-Focused Therapy, and Supportive Psychotherapy to address anxiousness.    Behavioral Health Treatment Plan and Discharge Planning: Екатерина Godinez is aware of and agrees to continue to work on their treatment plan. They have identified and are working toward their discharge goals. yes    Visit start and stop times:    07/30/2024  Start Time: 1500  Stop Time: 1552  Total Visit Time: 52 minutes    Virtual Regular Visit    Verification of patient location:    Patient is located at Home in the following state in which I hold an active license [place]      Assessment/Plan:    Problem List Items Addressed This Visit       Generalized anxiety disorder     Other Visit Diagnoses       Major depressive disorder, recurrent, moderate (HCC)    -  Primary            Goals addressed in session: Goal 1 and Goal 2          Reason for visit is   Chief Complaint   Patient presents with    Virtual Regular Visit          Encounter provider Edel Nguyen      Recent Visits  Date Type Provider Dept   07/30/24 Telemedicine Edel [facility] Therapist [address]   Showing recent visits within past 7 days and meeting all other requirements  Future Appointments  No visits were found meeting these conditions.  Showing future appointments within next 150 days and meeting all other requirements       The patient was identified by name and date of birth. Екатерина Godinez was informed that this is a telemedicine visit and that the visit is being conducted throughthe [employer] platform. She agrees to proceed..  My office door was closed. No one else was in the room.  She acknowledged consent and understanding of privacy and security of the video platform. The patient has agreed to participate  and understands they can discontinue the visit at any time.    Patient is aware this is a billable service.         HPI     Past Medical History:   Diagnosis Date    Anemia     in past    Anxiety     Arthritis 2022    Depression     Gallstones     Headache(784.0)     Inflammatory bowel disease     Migraine     Nausea     occ    Panic attack     Rotator cuff disorder, right     Vertigo        Past Surgical History:   Procedure Laterality Date    ABDOMINAL SURGERY      scar tissue removed     SECTION      last assessed: Oct 23, 2014     CHOLECYSTECTOMY  2018    HYSTERECTOMY      [place] LAPS SURG CHOLECYSTECTOMY W/CHOLANGIOGRAPHY N/A 2018    Procedure: CHOLECYSTECTOMY LAPAROSCOPIC  WITH  ATTEMPTED IOC;  Surgeon: Silvia Woods MD;  Location: [facility];  Service: General    TUBAL LIGATION         Current Outpatient Medications   Medication Sig Dispense Refill    cholecalciferol 1,000 units tablet Take 1 tablet (1,000 Units total) by mouth daily 90 tablet 1    docusate sodium (COLACE) 100 mg capsule TAKE 1 CAPSULE BY MOUTH EVERY MORNING 90 capsule 1    DULoxetine (Cymbalta) 30 mg delayed release capsule Take 1 capsule (30 mg total) by mouth daily 90 capsule 1    Dyclonine-Glycerin (Cepacol Sore Throat Spray) 0.1-33 % LIQD Apply 1 spray to the mouth or throat every 6 (six) hours 118 mL 0    famotidine (PEPCID) 40 MG tablet TAKE 1 TABLET(40 MG) BY MOUTH DAILY 90 tablet 0    ferrous sulfate 324 (65 Fe) mg TAKE 1 TABLET BY MOUTH EVERY OTHER DAY 90 tablet 3    fexofenadine (ALLEGRA) 180 MG tablet Take 1 tablet (180 mg total) by mouth daily 90 tablet 1    fluticasone (FLONASE) 50 mcg/act nasal spray 1 spray into each nostril daily 16 g 0    lidocaine (LIDODERM) 5 % UNWRAP AND APPLY 1 PATCH TOPICALLY TO THE AFFECTED AREA EVERY DAY(DO NOT LEAVE ON FOR MORE THAN 12 HOURS) 90 patch 1    meclizine (ANTIVERT) 25 mg tablet TAKE 1 TABLET(25 MG) BY MOUTH THREE TIMES DAILY AS NEEDED FOR DIZZINESS 30 tablet 0     Mirabegron ER 25 MG TB24 Take 25 mg by mouth daily      mirtazapine (REMERON) 30 mg tablet Take 1 tablet (30 mg total) by mouth daily at bedtime 90 tablet 1    omeprazole (PriLOSEC) 40 MG capsule TAKE 1 CAPSULE(40 MG) BY MOUTH DAILY 100 capsule 1    ondansetron (ZOFRAN-ODT) 4 mg disintegrating tablet Take 1 tablet (4 mg total) by mouth every 8 (eight) hours as needed for nausea or vomiting 30 tablet 0    polyethylene glycol (GLYCOLAX) 17 GM/SCOOP powder MIX AND DRINK 17 GRAMS BY MOUTH EVERY  g 0    prochlorperazine (COMPAZINE) 5 mg tablet TAKE 1 TABLET(5 MG) BY MOUTH EVERY 6 HOURS AS NEEDED FOR NAUSEA OR VOMITING 30 tablet 0    SUMAtriptan (IMITREX) 50 mg tablet TAKE 1 TABLET(50 MG) BY MOUTH 1 TIME FOR UP TO 1 DOSE AS NEEDED FOR MIGRAINE 9 tablet 0    trospium chloride (SANCTURA) 20 mg tablet Take 20 mg by mouth 2 (two) times a day      vitamin B-12 (VITAMIN B-12) 1,000 mcg tablet Take 1 tablet (1,000 mcg total) by mouth daily 90 tablet 2     No current facility-administered medications for this visit.        No Known Allergies    Review of Systems    Video Exam    There were no vitals filed for this visit.    Physical Exam     Visit Time    Visit Start Time: 1500  Visit Stop Time: 1552  Total Visit Duration:  52 minutes

## 2024-08-06 ENCOUNTER — TELEMEDICINE (OUTPATIENT)
Dept: BEHAVIORAL/MENTAL HEALTH CLINIC | Facility: CLINIC | Age: 44
End: 2024-08-06
Payer: COMMERCIAL

## 2024-08-06 DIAGNOSIS — F33.1 MAJOR DEPRESSIVE DISORDER, RECURRENT, MODERATE (HCC): Primary | ICD-10-CM

## 2024-08-06 DIAGNOSIS — F41.1 GENERALIZED ANXIETY DISORDER: ICD-10-CM

## 2024-08-06 PROCEDURE — 90837 PSYTX W PT 60 MINUTES: CPT

## 2024-08-07 NOTE — PSYCH
"Behavioral Health Psychotherapy Progress Note    Psychotherapy Provided: Individual Psychotherapy     1. Major depressive disorder, recurrent, moderate (HCC)        2. Generalized anxiety disorder            Goals addressed in session: Goal 1 and Goal 2     DATA: Therapist began session with completion of check in. Екатерина reported doing \"alright.\" Therapist tasked if there had been any updates in her life since last session. Екатерина reported recently spending some time with her daughter and sister. Екатерина also reported continued stress and tension from her cousin not leaving the home and being resistant to contributing to the house hold. Екатерина became tearful and expressed conflict between her family and herself due to his continued resistance to helping. Therapist validated her emotions. Therapist offered boundaries Екатерина could use. Екаетрина discussed the boundaries of giving him a hard deadline in September to leave or pay rent due to her upcoming surgery.     During this session, this clinician used the following therapeutic modalities: Client-centered Therapy, Cognitive Behavioral Therapy, Solution-Focused Therapy, and Supportive Psychotherapy    Substance Abuse was addressed during this session. If the client is diagnosed with a co-occurring substance use disorder, please indicate any changes in the frequency or amount of use: n/a. Stage of change for addressing substance use diagnoses: No substance use/Not applicable    ASSESSMENT:  Екатерина Godinez presents with a Euthymic/ normal mood.     her affect is Normal range and intensity, which is congruent, with her mood and the content of the session. The client has made progress on their goals.     Екатерина Godinez presents with a none risk of suicide, none risk of self-harm, and none risk of harm to others.    For any risk assessment that surpasses a \"low\" rating, a safety plan must be developed.    A safety plan was indicated: no  If yes, describe in detail " n/a    PLAN: Between sessions, Екатерина Godinez will continue to use self care to prepare for upcoming surgery. At the next session, the therapist will use Client-centered Therapy, Cognitive Behavioral Therapy, Solution-Focused Therapy, and Supportive Psychotherapy to address stress of family conflict.    Behavioral Health Treatment Plan and Discharge Planning: Екатерина Godinez is aware of and agrees to continue to work on their treatment plan. They have identified and are working toward their discharge goals. yes    Visit start and stop times:    08/06/2024  Start Time: 1500  Stop Time: 1557  Total Visit Time: 57 minutes    Virtual Regular Visit    Verification of patient location:    Patient is located at Home in the following state in which I hold an active license [place]      Assessment/Plan:    Problem List Items Addressed This Visit       Generalized anxiety disorder     Other Visit Diagnoses       Major depressive disorder, recurrent, moderate (HCC)    -  Primary            Goals addressed in session: Goal 1 and Goal 2          Reason for visit is   Chief Complaint   Patient presents with    Virtual Regular Visit          Encounter provider Edel Nguyen      Recent Visits  Date Type Provider Dept   08/06/24 Telemedicine Edel [facility] Therapist [address]   Showing recent visits within past 7 days and meeting all other requirements  Future Appointments  No visits were found meeting these conditions.  Showing future appointments within next 150 days and meeting all other requirements       The patient was identified by name and date of birth. Екатерина Godinez was informed that this is a telemedicine visit and that the visit is being conducted throughthe [employer] platform. She agrees to proceed..  My office door was closed. No one else was in the room.  She acknowledged consent and understanding of privacy and security of the video platform. The patient has agreed to participate and understands  they can discontinue the visit at any time.    Patient is aware this is a billable service.       HPI     Past Medical History:   Diagnosis Date    Anemia     in past    Anxiety     Arthritis 2022    Depression     Gallstones     Headache(784.0)     Inflammatory bowel disease     Migraine     Nausea     occ    Panic attack     Rotator cuff disorder, right     Vertigo        Past Surgical History:   Procedure Laterality Date    ABDOMINAL SURGERY      scar tissue removed     SECTION      last assessed: Oct 23, 2014     CHOLECYSTECTOMY  2018    HYSTERECTOMY      DE LAPS SURG CHOLECYSTECTOMY W/CHOLANGIOGRAPHY N/A 2018    Procedure: CHOLECYSTECTOMY LAPAROSCOPIC  WITH  ATTEMPTED IOC;  Surgeon: Silvia Woods MD;  Location: [facility];  Service: General    TUBAL LIGATION         Current Outpatient Medications   Medication Sig Dispense Refill    cholecalciferol 1,000 units tablet Take 1 tablet (1,000 Units total) by mouth daily 90 tablet 1    docusate sodium (COLACE) 100 mg capsule TAKE 1 CAPSULE BY MOUTH EVERY MORNING 90 capsule 1    DULoxetine (Cymbalta) 30 mg delayed release capsule Take 1 capsule (30 mg total) by mouth daily 90 capsule 1    Dyclonine-Glycerin (Cepacol Sore Throat Spray) 0.1-33 % LIQD Apply 1 spray to the mouth or throat every 6 (six) hours 118 mL 0    famotidine (PEPCID) 40 MG tablet TAKE 1 TABLET(40 MG) BY MOUTH DAILY 90 tablet 0    ferrous sulfate 324 (65 Fe) mg TAKE 1 TABLET BY MOUTH EVERY OTHER DAY 90 tablet 3    fexofenadine (ALLEGRA) 180 MG tablet Take 1 tablet (180 mg total) by mouth daily 90 tablet 1    fluticasone (FLONASE) 50 mcg/act nasal spray 1 spray into each nostril daily 16 g 0    lidocaine (LIDODERM) 5 % UNWRAP AND APPLY 1 PATCH TOPICALLY TO THE AFFECTED AREA EVERY DAY(DO NOT LEAVE ON FOR MORE THAN 12 HOURS) 90 patch 1    meclizine (ANTIVERT) 25 mg tablet TAKE 1 TABLET(25 MG) BY MOUTH THREE TIMES DAILY AS NEEDED FOR DIZZINESS 30 tablet 0    Mirabegron ER  25 MG TB24 Take 25 mg by mouth daily      mirtazapine (REMERON) 30 mg tablet Take 1 tablet (30 mg total) by mouth daily at bedtime 90 tablet 1    omeprazole (PriLOSEC) 40 MG capsule TAKE 1 CAPSULE(40 MG) BY MOUTH DAILY 100 capsule 1    ondansetron (ZOFRAN-ODT) 4 mg disintegrating tablet Take 1 tablet (4 mg total) by mouth every 8 (eight) hours as needed for nausea or vomiting 30 tablet 0    polyethylene glycol (GLYCOLAX) 17 GM/SCOOP powder MIX AND DRINK 17 GRAMS BY MOUTH EVERY  g 0    prochlorperazine (COMPAZINE) 5 mg tablet TAKE 1 TABLET(5 MG) BY MOUTH EVERY 6 HOURS AS NEEDED FOR NAUSEA OR VOMITING 30 tablet 0    SUMAtriptan (IMITREX) 50 mg tablet TAKE 1 TABLET(50 MG) BY MOUTH 1 TIME FOR UP TO 1 DOSE AS NEEDED FOR MIGRAINE 9 tablet 0    trospium chloride (SANCTURA) 20 mg tablet Take 20 mg by mouth 2 (two) times a day      vitamin B-12 (VITAMIN B-12) 1,000 mcg tablet Take 1 tablet (1,000 mcg total) by mouth daily 90 tablet 2     No current facility-administered medications for this visit.        No Known Allergies    Review of Systems    Video Exam    There were no vitals filed for this visit.    Physical Exam     Visit Time    Visit Start Time: 1500  Visit Stop Time: 1557  Total Visit Duration:  57 minutes

## 2024-08-13 ENCOUNTER — TELEMEDICINE (OUTPATIENT)
Dept: BEHAVIORAL/MENTAL HEALTH CLINIC | Facility: CLINIC | Age: 44
End: 2024-08-13
Payer: COMMERCIAL

## 2024-08-13 DIAGNOSIS — F33.1 MAJOR DEPRESSIVE DISORDER, RECURRENT, MODERATE (HCC): Primary | ICD-10-CM

## 2024-08-13 DIAGNOSIS — F41.1 GENERALIZED ANXIETY DISORDER: ICD-10-CM

## 2024-08-13 PROCEDURE — 90837 PSYTX W PT 60 MINUTES: CPT

## 2024-08-14 NOTE — PSYCH
"Behavioral Health Psychotherapy Progress Note    Psychotherapy Provided: Individual Psychotherapy     1. Major depressive disorder, recurrent, moderate (HCC)        2. Generalized anxiety disorder            Goals addressed in session: Goal 1 and Goal 2     DATA: Therapist began session with completion of check in. Екатерина reported doing \"alright.\" Therapist asked if there had been any updates in her life since last session. Екатерина spoke on her daughter's job changing her scheduled once again. She expressed frustration with this. Therapist validated her emotions. Екатерина reported no other changes otherwise. Therapist asked to complete a reflection exercise. Therapist asked Екатерина to reflect on the most impactful moments during stages of her life. For school age, Екатерина reported little to any impactful events. She spoke in detail on the journey of each of her pregnancies. She discussed the decline of the relationship with her father of her 3 eldest children. Екатерина expressed having not spoken much on difficult moments during her pregnancies. Therapist encouraged Екатерина to have pride in her accomplishments as a mother. Екатерина reflected on her parenting journey for the remainder of session.     During this session, this clinician used the following therapeutic modalities: Client-centered Therapy, Cognitive Behavioral Therapy, Solution-Focused Therapy, and Supportive Psychotherapy    Substance Abuse was not addressed during this session. If the client is diagnosed with a co-occurring substance use disorder, please indicate any changes in the frequency or amount of use: n/a. Stage of change for addressing substance use diagnoses: No substance use/Not applicable    ASSESSMENT:  Екатерина Godinez presents with a Euthymic/ normal mood.     her affect is Normal range and intensity, which is congruent, with her mood and the content of the session. The client has made progress on their goals.     Екатерина Godinez presents with a none " "risk of suicide, none risk of self-harm, and none risk of harm to others.    For any risk assessment that surpasses a \"low\" rating, a safety plan must be developed.    A safety plan was indicated: no  If yes, describe in detail n/a    PLAN: Between sessions, Екатерина Godinez will continue to prepare for upcoming surgery. At the next session, the therapist will use Client-centered Therapy, Cognitive Behavioral Therapy, Solution-Focused Therapy, and Supportive Psychotherapy to address upcoming surgery.    Behavioral Health Treatment Plan and Discharge Planning: Екатерина Godinez is aware of and agrees to continue to work on their treatment plan. They have identified and are working toward their discharge goals. yes    Visit start and stop times:    08/13/2024  Start Time: 1500  Stop Time: 1556  Total Visit Time: 56 minutes    Virtual Regular Visit    Verification of patient location:    Patient is located at Home in the following state in which I hold an active license [place]      Assessment/Plan:    Problem List Items Addressed This Visit       Generalized anxiety disorder     Other Visit Diagnoses       Major depressive disorder, recurrent, moderate (HCC)    -  Primary            Goals addressed in session: Goal 1 and Goal 2          Reason for visit is   Chief Complaint   Patient presents with    Virtual Regular Visit          Encounter provider Edel Nguyen      Recent Visits  Date Type Provider Dept   08/13/24 Telemedicine [facility] Therapist [address]   Showing recent visits within past 7 days and meeting all other requirements  Future Appointments  No visits were found meeting these conditions.  Showing future appointments within next 150 days and meeting all other requirements       The patient was identified by name and date of birth. Екатерина Godinez was informed that this is a telemedicine visit and that the visit is being conducted throughthe [employer] platform. She agrees to proceed.. "  My office door was closed. No one else was in the room.  She acknowledged consent and understanding of privacy and security of the video platform. The patient has agreed to participate and understands they can discontinue the visit at any time.    Patient is aware this is a billable service.       HPI     Past Medical History:   Diagnosis Date    Anemia     in past    Anxiety     Arthritis 2022    Depression     Gallstones     Headache(784.0)     Inflammatory bowel disease     Migraine     Nausea     occ    Panic attack     Rotator cuff disorder, right     Vertigo        Past Surgical History:   Procedure Laterality Date    ABDOMINAL SURGERY      scar tissue removed     SECTION      last assessed: Oct 23, 2014     CHOLECYSTECTOMY  2018    HYSTERECTOMY      MS LAPS SURG CHOLECYSTECTOMY W/CHOLANGIOGRAPHY N/A 2018    Procedure: CHOLECYSTECTOMY LAPAROSCOPIC  WITH  ATTEMPTED IOC;  Surgeon: Silvia Woods MD;  Location: [facility];  Service: General    TUBAL LIGATION         Current Outpatient Medications   Medication Sig Dispense Refill    cholecalciferol 1,000 units tablet Take 1 tablet (1,000 Units total) by mouth daily 90 tablet 1    docusate sodium (COLACE) 100 mg capsule TAKE 1 CAPSULE BY MOUTH EVERY MORNING 90 capsule 1    DULoxetine (Cymbalta) 30 mg delayed release capsule Take 1 capsule (30 mg total) by mouth daily 90 capsule 1    Dyclonine-Glycerin (Cepacol Sore Throat Spray) 0.1-33 % LIQD Apply 1 spray to the mouth or throat every 6 (six) hours 118 mL 0    famotidine (PEPCID) 40 MG tablet TAKE 1 TABLET(40 MG) BY MOUTH DAILY 90 tablet 0    ferrous sulfate 324 (65 Fe) mg TAKE 1 TABLET BY MOUTH EVERY OTHER DAY 90 tablet 3    fexofenadine (ALLEGRA) 180 MG tablet Take 1 tablet (180 mg total) by mouth daily 90 tablet 1    fluticasone (FLONASE) 50 mcg/act nasal spray 1 spray into each nostril daily 16 g 0    lidocaine (LIDODERM) 5 % UNWRAP AND APPLY 1 PATCH TOPICALLY TO THE AFFECTED  AREA EVERY DAY(DO NOT LEAVE ON FOR MORE THAN 12 HOURS) 90 patch 1    meclizine (ANTIVERT) 25 mg tablet TAKE 1 TABLET(25 MG) BY MOUTH THREE TIMES DAILY AS NEEDED FOR DIZZINESS 30 tablet 0    Mirabegron ER 25 MG TB24 Take 25 mg by mouth daily      mirtazapine (REMERON) 30 mg tablet Take 1 tablet (30 mg total) by mouth daily at bedtime 90 tablet 1    omeprazole (PriLOSEC) 40 MG capsule TAKE 1 CAPSULE(40 MG) BY MOUTH DAILY 100 capsule 1    ondansetron (ZOFRAN-ODT) 4 mg disintegrating tablet Take 1 tablet (4 mg total) by mouth every 8 (eight) hours as needed for nausea or vomiting 30 tablet 0    polyethylene glycol (GLYCOLAX) 17 GM/SCOOP powder MIX AND DRINK 17 GRAMS BY MOUTH EVERY  g 0    prochlorperazine (COMPAZINE) 5 mg tablet TAKE 1 TABLET(5 MG) BY MOUTH EVERY 6 HOURS AS NEEDED FOR NAUSEA OR VOMITING 30 tablet 0    SUMAtriptan (IMITREX) 50 mg tablet TAKE 1 TABLET(50 MG) BY MOUTH 1 TIME FOR UP TO 1 DOSE AS NEEDED FOR MIGRAINE 9 tablet 0    trospium chloride (SANCTURA) 20 mg tablet Take 20 mg by mouth 2 (two) times a day      vitamin B-12 (VITAMIN B-12) 1,000 mcg tablet Take 1 tablet (1,000 mcg total) by mouth daily 90 tablet 2     No current facility-administered medications for this visit.        No Known Allergies    Review of Systems    Video Exam    There were no vitals filed for this visit.    Physical Exam     Visit Time    Visit Start Time: 1500  Visit Stop Time: 1556  Total Visit Duration:  56 minutes

## 2024-08-20 ENCOUNTER — TELEPHONE (OUTPATIENT)
Age: 44
End: 2024-08-20

## 2024-08-20 NOTE — TELEPHONE ENCOUNTER
Patient called in to make sure their appointment for today was cancelled via [employer].    Writer confirmed it had been cancelled and that their next appointment is on 8/27/24 @3

## 2024-08-27 ENCOUNTER — TELEMEDICINE (OUTPATIENT)
Dept: BEHAVIORAL/MENTAL HEALTH CLINIC | Facility: CLINIC | Age: 44
End: 2024-08-27
Payer: COMMERCIAL

## 2024-08-27 DIAGNOSIS — F41.1 GENERALIZED ANXIETY DISORDER: ICD-10-CM

## 2024-08-27 DIAGNOSIS — F33.1 MAJOR DEPRESSIVE DISORDER, RECURRENT, MODERATE (HCC): Primary | ICD-10-CM

## 2024-08-27 PROCEDURE — 90834 PSYTX W PT 45 MINUTES: CPT

## 2024-08-27 NOTE — PSYCH
"Behavioral Health Psychotherapy Progress Note    Psychotherapy Provided: Individual Psychotherapy     1. Major depressive disorder, recurrent, moderate (HCC)        2. Generalized anxiety disorder            Goals addressed in session: Goal 1 and Goal 2     DATA: Therapist began session with completion of check in. Екатерина reported doing \"pretty good.\" Therapist asked how she was feeling in regards to preparation of her upcoming surgery. Екатерина reported being both nervous and excited. She reported being slightly disappointed her mother, who volunteered to visit, would not be coming due to concerns of the weather. Therapist validated her emotions. She spoke on her mother being supportive with the living situation involving her cousin. She expressed some relief and feeling more positive about being prepared for her surgery. Therapist reminded her of therapist being out of office from aug 31 to Sept 10. Екатерина stated she would have surgery this week and unable to see therapist anyway. Therapist and Екатерина completed a positive reflection activity for the remainder of session.     During this session, this clinician used the following therapeutic modalities: Client-centered Therapy, Cognitive Behavioral Therapy, Solution-Focused Therapy, and Supportive Psychotherapy    Substance Abuse was not addressed during this session. If the client is diagnosed with a co-occurring substance use disorder, please indicate any changes in the frequency or amount of use: n/a. Stage of change for addressing substance use diagnoses: No substance use/Not applicable    ASSESSMENT:  Екатерина Godinez presents with a Euthymic/ normal mood.     her affect is Normal range and intensity, which is congruent, with her mood and the content of the session. The client has made progress on their goals.     Екатерина Godinez presents with a none risk of suicide, none risk of self-harm, and none risk of harm to others.    For any risk assessment that surpasses a " "\"low\" rating, a safety plan must be developed.    A safety plan was indicated: no  If yes, describe in detail n/a    PLAN: Between sessions, Екатерина Godinez will prepare for upcoming operation through open communication with family. At the next session, the therapist will use Client-centered Therapy, Cognitive Behavioral Therapy, Solution-Focused Therapy, and Supportive Psychotherapy to address processing upcoming surgery.    Behavioral Health Treatment Plan and Discharge Planning: Екатерина Godinez is aware of and agrees to continue to work on their treatment plan. They have identified and are working toward their discharge goals. yes    Visit start and stop times:    08/27/2024  Start Time: 1500  Stop Time: 1551  Total Visit Time: 51 minutes    Virtual Regular Visit    Verification of patient location:    Patient is located at Home in the following state in which I hold an active license [place]      Assessment/Plan:    Problem List Items Addressed This Visit       Generalized anxiety disorder     Other Visit Diagnoses       Major depressive disorder, recurrent, moderate (HCC)    -  Primary            Goals addressed in session: Goal 1 and Goal 2          Reason for visit is   Chief Complaint   Patient presents with    Virtual Regular Visit          Encounter provider Edel Nguyen      Recent Visits  Date Type Provider Dept   08/27/24 Telemedicine [facility] Therapist [address]   Showing recent visits within past 7 days and meeting all other requirements  Future Appointments  No visits were found meeting these conditions.  Showing future appointments within next 150 days and meeting all other requirements       The patient was identified by name and date of birth. Екатерина Godinez was informed that this is a telemedicine visit and that the visit is being conducted throughthe [employer] platform. She agrees to proceed..  My office door was closed. No one else was in the room.  She acknowledged " consent and understanding of privacy and security of the video platform. The patient has agreed to participate and understands they can discontinue the visit at any time.    Patient is aware this is a billable service.           HPI     Past Medical History:   Diagnosis Date    Anemia     in past    Anxiety     Arthritis 2022    Depression     Gallstones     Headache(784.0)     Inflammatory bowel disease     Migraine     Nausea     occ    Panic attack     Rotator cuff disorder, right     Vertigo        Past Surgical History:   Procedure Laterality Date    ABDOMINAL SURGERY      scar tissue removed     SECTION      last assessed: Oct 23, 2014     CHOLECYSTECTOMY  2018    HYSTERECTOMY      MN LAPS SURG CHOLECYSTECTOMY W/CHOLANGIOGRAPHY N/A 2018    Procedure: CHOLECYSTECTOMY LAPAROSCOPIC  WITH  ATTEMPTED IOC;  Surgeon: Silvia Woods MD;  Location: [facility];  Service: General    TUBAL LIGATION         Current Outpatient Medications   Medication Sig Dispense Refill    cholecalciferol 1,000 units tablet Take 1 tablet (1,000 Units total) by mouth daily 90 tablet 1    docusate sodium (COLACE) 100 mg capsule TAKE 1 CAPSULE BY MOUTH EVERY MORNING 90 capsule 1    DULoxetine (Cymbalta) 30 mg delayed release capsule Take 1 capsule (30 mg total) by mouth daily 90 capsule 1    Dyclonine-Glycerin (Cepacol Sore Throat Spray) 0.1-33 % LIQD Apply 1 spray to the mouth or throat every 6 (six) hours 118 mL 0    famotidine (PEPCID) 40 MG tablet TAKE 1 TABLET(40 MG) BY MOUTH DAILY 90 tablet 0    ferrous sulfate 324 (65 Fe) mg TAKE 1 TABLET BY MOUTH EVERY OTHER DAY 90 tablet 3    fexofenadine (ALLEGRA) 180 MG tablet Take 1 tablet (180 mg total) by mouth daily 90 tablet 1    fluticasone (FLONASE) 50 mcg/act nasal spray 1 spray into each nostril daily 16 g 0    lidocaine (LIDODERM) 5 % UNWRAP AND APPLY 1 PATCH TOPICALLY TO THE AFFECTED AREA EVERY DAY(DO NOT LEAVE ON FOR MORE THAN 12 HOURS) 90 patch 1     meclizine (ANTIVERT) 25 mg tablet TAKE 1 TABLET(25 MG) BY MOUTH THREE TIMES DAILY AS NEEDED FOR DIZZINESS 30 tablet 0    Mirabegron ER 25 MG TB24 Take 25 mg by mouth daily      mirtazapine (REMERON) 30 mg tablet Take 1 tablet (30 mg total) by mouth daily at bedtime 90 tablet 1    omeprazole (PriLOSEC) 40 MG capsule TAKE 1 CAPSULE(40 MG) BY MOUTH DAILY 100 capsule 1    ondansetron (ZOFRAN-ODT) 4 mg disintegrating tablet Take 1 tablet (4 mg total) by mouth every 8 (eight) hours as needed for nausea or vomiting 30 tablet 0    polyethylene glycol (GLYCOLAX) 17 GM/SCOOP powder MIX AND DRINK 17 GRAMS BY MOUTH EVERY  g 0    prochlorperazine (COMPAZINE) 5 mg tablet TAKE 1 TABLET(5 MG) BY MOUTH EVERY 6 HOURS AS NEEDED FOR NAUSEA OR VOMITING 30 tablet 0    SUMAtriptan (IMITREX) 50 mg tablet TAKE 1 TABLET(50 MG) BY MOUTH 1 TIME FOR UP TO 1 DOSE AS NEEDED FOR MIGRAINE 9 tablet 0    trospium chloride (SANCTURA) 20 mg tablet Take 20 mg by mouth 2 (two) times a day      vitamin B-12 (VITAMIN B-12) 1,000 mcg tablet Take 1 tablet (1,000 mcg total) by mouth daily 90 tablet 2     No current facility-administered medications for this visit.        No Known Allergies    Review of Systems    Video Exam    There were no vitals filed for this visit.    Physical Exam     Visit Time    Visit Start Time: 1500  Visit Stop Time: 1551  Total Visit Duration:  51 minutes

## 2024-09-03 DIAGNOSIS — R11.0 NAUSEA: ICD-10-CM

## 2024-09-03 DIAGNOSIS — R63.6 UNDERWEIGHT DUE TO INADEQUATE CALORIC INTAKE: Primary | ICD-10-CM

## 2024-09-03 RX ORDER — PROCHLORPERAZINE MALEATE 5 MG
TABLET ORAL
Qty: 30 TABLET | Refills: 0 | Status: SHIPPED | OUTPATIENT
Start: 2024-09-03

## 2024-09-04 LAB
APOB+LDLR+PCSK9 GENE MUT ANL BLD/T: NOT DETECTED
BRCA1+BRCA2 DEL+DUP + FULL MUT ANL BLD/T: NOT DETECTED
MLH1+MSH2+MSH6+PMS2 GN DEL+DUP+FUL M: NOT DETECTED

## 2024-09-12 ENCOUNTER — TELEMEDICINE (OUTPATIENT)
Dept: BEHAVIORAL/MENTAL HEALTH CLINIC | Facility: CLINIC | Age: 44
End: 2024-09-12
Payer: COMMERCIAL

## 2024-09-12 DIAGNOSIS — F41.1 GENERALIZED ANXIETY DISORDER: ICD-10-CM

## 2024-09-12 DIAGNOSIS — F33.1 MAJOR DEPRESSIVE DISORDER, RECURRENT, MODERATE (HCC): Primary | ICD-10-CM

## 2024-09-12 LAB
DME PARACHUTE DELIVERY DATE ACTUAL: NORMAL
DME PARACHUTE DELIVERY DATE REQUESTED: NORMAL
DME PARACHUTE ITEM DESCRIPTION: NORMAL
DME PARACHUTE ORDER STATUS: NORMAL
DME PARACHUTE SUPPLIER NAME: NORMAL
DME PARACHUTE SUPPLIER PHONE: NORMAL

## 2024-09-12 PROCEDURE — 90834 PSYTX W PT 45 MINUTES: CPT

## 2024-09-12 NOTE — PSYCH
"Behavioral Health Psychotherapy Progress Note    Psychotherapy Provided: Individual Psychotherapy     1. Major depressive disorder, recurrent, moderate (HCC)        2. Generalized anxiety disorder            Goals addressed in session: Goal 1 and Goal 2     DATA: Therapist began session with completion of check in. Екатерина reported doing \"okay.\" Therapist asked how the surgery had gone. Екатерина reported the surgery to have been successful and working thus far. She reported being able to sleep through the night. Therapist asked how she was coping with her recovery. Екатеирна became tearful and expressed being challenging at times. She expressed being stressed due to limited mobility and feeling she was a burden. Therapist validated her emotions. She reported her mother coming for the second surgery to support her. Екатерина expressed guilt due to feeling like a burden to her family. Therapist validated her emotions and reminded her of the family's mutual goal for Екатерина to have a good recovery. Екатерина spoke on ongoing family conflict due to her cousin still not having left her home. Ways to manage this stress were discussed for the remainder of session.     During this session, this clinician used the following therapeutic modalities: Client-centered Therapy, Cognitive Behavioral Therapy, Solution-Focused Therapy, and Supportive Psychotherapy    Substance Abuse was not addressed during this session. If the client is diagnosed with a co-occurring substance use disorder, please indicate any changes in the frequency or amount of use: n/a. Stage of change for addressing substance use diagnoses: No substance use/Not applicable    ASSESSMENT:  Екатерина Godinez presents with a Euthymic/ normal mood.     her affect is Normal range and intensity, which is congruent, with her mood and the content of the session. The client has made progress on their goals.     Екатерина Godinez presents with a none risk of suicide, none risk of self-harm, " "and none risk of harm to others.    For any risk assessment that surpasses a \"low\" rating, a safety plan must be developed.    A safety plan was indicated: no  If yes, describe in detail n/a    PLAN: Between sessions, Екатерина Godinez will continue to use self care to cope with ongoing pain and stress. At the next session, the therapist will use Client-centered Therapy, Cognitive Behavioral Therapy, Solution-Focused Therapy, and Supportive Psychotherapy to address ongoing stress and recovery process.    Behavioral Health Treatment Plan and Discharge Planning: Екатерина Godinez is aware of and agrees to continue to work on their treatment plan. They have identified and are working toward their discharge goals. yes    Visit start and stop times:    09/12/2024  Start Time: 1700  Stop Time: 1752  Total Visit Time: 52 minutes    Virtual Regular Visit    Verification of patient location:    Patient is located at Home in the following state in which I hold an active license [place]      Assessment/Plan:    Problem List Items Addressed This Visit       Generalized anxiety disorder     Other Visit Diagnoses       Major depressive disorder, recurrent, moderate (HCC)    -  Primary            Goals addressed in session: Goal 1 and Goal 2          Reason for visit is   Chief Complaint   Patient presents with    Virtual Regular Visit          Encounter provider Edel Nguyen      Recent Visits  Date Type Provider Dept   09/12/24 Telemedicine Edel [facility] Therapist [address]   Showing recent visits within past 7 days and meeting all other requirements  Future Appointments  No visits were found meeting these conditions.  Showing future appointments within next 150 days and meeting all other requirements       The patient was identified by name and date of birth. Екатерина Godinez was informed that this is a telemedicine visit and that the visit is being conducted throughthe Experiment platform. She agrees to proceed.. "  My office door was closed. No one else was in the room.  She acknowledged consent and understanding of privacy and security of the video platform. The patient has agreed to participate and understands they can discontinue the visit at any time.    Patient is aware this is a billable service.       HPI     Past Medical History:   Diagnosis Date    Anemia     in past    Anxiety     Arthritis 2022    Depression     Gallstones     Headache(784.0)     Inflammatory bowel disease     Migraine     Nausea     occ    Panic attack     Rotator cuff disorder, right     Vertigo        Past Surgical History:   Procedure Laterality Date    ABDOMINAL SURGERY      scar tissue removed     SECTION      last assessed: Oct 23, 2014     CHOLECYSTECTOMY  2018    HYSTERECTOMY      DE LAPS SURG CHOLECYSTECTOMY W/CHOLANGIOGRAPHY N/A 2018    Procedure: CHOLECYSTECTOMY LAPAROSCOPIC  WITH  ATTEMPTED IOC;  Surgeon: Silvia Woods MD;  Location: [facility];  Service: General    TUBAL LIGATION         Current Outpatient Medications   Medication Sig Dispense Refill    cholecalciferol 1,000 units tablet Take 1 tablet (1,000 Units total) by mouth daily 90 tablet 1    docusate sodium (COLACE) 100 mg capsule TAKE 1 CAPSULE BY MOUTH EVERY MORNING 90 capsule 1    DULoxetine (Cymbalta) 30 mg delayed release capsule Take 1 capsule (30 mg total) by mouth daily 90 capsule 1    Dyclonine-Glycerin (Cepacol Sore Throat Spray) 0.1-33 % LIQD Apply 1 spray to the mouth or throat every 6 (six) hours 118 mL 0    famotidine (PEPCID) 40 MG tablet TAKE 1 TABLET(40 MG) BY MOUTH DAILY 90 tablet 0    ferrous sulfate 324 (65 Fe) mg TAKE 1 TABLET BY MOUTH EVERY OTHER DAY 90 tablet 3    fexofenadine (ALLEGRA) 180 MG tablet Take 1 tablet (180 mg total) by mouth daily 90 tablet 1    fluticasone (FLONASE) 50 mcg/act nasal spray 1 spray into each nostril daily 16 g 0    lidocaine (LIDODERM) 5 % UNWRAP AND APPLY 1 PATCH TOPICALLY TO THE AFFECTED  AREA EVERY DAY(DO NOT LEAVE ON FOR MORE THAN 12 HOURS) 90 patch 1    meclizine (ANTIVERT) 25 mg tablet TAKE 1 TABLET(25 MG) BY MOUTH THREE TIMES DAILY AS NEEDED FOR DIZZINESS 30 tablet 0    Mirabegron ER 25 MG TB24 Take 25 mg by mouth daily      mirtazapine (REMERON) 30 mg tablet Take 1 tablet (30 mg total) by mouth daily at bedtime 90 tablet 1    omeprazole (PriLOSEC) 40 MG capsule TAKE 1 CAPSULE(40 MG) BY MOUTH DAILY 100 capsule 1    ondansetron (ZOFRAN-ODT) 4 mg disintegrating tablet Take 1 tablet (4 mg total) by mouth every 8 (eight) hours as needed for nausea or vomiting 30 tablet 0    polyethylene glycol (GLYCOLAX) 17 GM/SCOOP powder MIX AND DRINK 17 GRAMS BY MOUTH EVERY  g 0    prochlorperazine (COMPAZINE) 5 mg tablet TAKE 1 TABLET(5 MG) BY MOUTH EVERY 6 HOURS AS NEEDED FOR NAUSEA OR VOMITING 30 tablet 0    SUMAtriptan (IMITREX) 50 mg tablet TAKE 1 TABLET(50 MG) BY MOUTH 1 TIME FOR UP TO 1 DOSE AS NEEDED FOR MIGRAINE 9 tablet 0    trospium chloride (SANCTURA) 20 mg tablet Take 20 mg by mouth 2 (two) times a day      vitamin B-12 (VITAMIN B-12) 1,000 mcg tablet Take 1 tablet (1,000 mcg total) by mouth daily 90 tablet 2     No current facility-administered medications for this visit.        No Known Allergies    Review of Systems    Video Exam    There were no vitals filed for this visit.    Physical Exam     Visit Time    Visit Start Time: 1700  Visit Stop Time: 1752  Total Visit Duration:  52 minutes

## 2024-09-17 ENCOUNTER — TELEMEDICINE (OUTPATIENT)
Dept: BEHAVIORAL/MENTAL HEALTH CLINIC | Facility: CLINIC | Age: 44
End: 2024-09-17
Payer: COMMERCIAL

## 2024-09-17 ENCOUNTER — TELEMEDICINE (OUTPATIENT)
Dept: PSYCHIATRY | Facility: CLINIC | Age: 44
End: 2024-09-17
Payer: COMMERCIAL

## 2024-09-17 ENCOUNTER — TELEPHONE (OUTPATIENT)
Dept: PSYCHIATRY | Facility: CLINIC | Age: 44
End: 2024-09-17

## 2024-09-17 DIAGNOSIS — F32.1 CURRENT MODERATE EPISODE OF MAJOR DEPRESSIVE DISORDER WITHOUT PRIOR EPISODE (HCC): Primary | ICD-10-CM

## 2024-09-17 DIAGNOSIS — F33.1 MODERATE EPISODE OF RECURRENT MAJOR DEPRESSIVE DISORDER (HCC): ICD-10-CM

## 2024-09-17 DIAGNOSIS — F33.1 MAJOR DEPRESSIVE DISORDER, RECURRENT, MODERATE (HCC): Primary | ICD-10-CM

## 2024-09-17 DIAGNOSIS — F41.1 GENERALIZED ANXIETY DISORDER: ICD-10-CM

## 2024-09-17 DIAGNOSIS — F41.1 GAD (GENERALIZED ANXIETY DISORDER): ICD-10-CM

## 2024-09-17 PROBLEM — Z12.39 BREAST CANCER SCREENING, HIGH RISK PATIENT: Status: ACTIVE | Noted: 2023-01-24

## 2024-09-17 PROCEDURE — 99214 OFFICE O/P EST MOD 30 MIN: CPT | Performed by: NURSE PRACTITIONER

## 2024-09-17 PROCEDURE — 90834 PSYTX W PT 45 MINUTES: CPT

## 2024-09-17 RX ORDER — ALPRAZOLAM 0.25 MG
0.25 TABLET ORAL
Qty: 30 TABLET | Refills: 2 | Status: SHIPPED | OUTPATIENT
Start: 2024-09-17

## 2024-09-17 RX ORDER — MIRTAZAPINE 30 MG/1
30 TABLET, FILM COATED ORAL
Qty: 90 TABLET | Refills: 1 | Status: SHIPPED | OUTPATIENT
Start: 2024-09-17

## 2024-09-17 RX ORDER — DULOXETIN HYDROCHLORIDE 30 MG/1
30 CAPSULE, DELAYED RELEASE ORAL DAILY
Qty: 90 CAPSULE | Refills: 1 | Status: SHIPPED | OUTPATIENT
Start: 2024-09-17

## 2024-09-17 NOTE — PSYCH
Virtual Regular Visit    Verification of patient location:    Patient is located at Home in the following state in which I hold an active license [place]      Assessment/Plan:    Problem List Items Addressed This Visit       Generalized anxiety disorder    Current moderate episode of major depressive disorder without prior episode (HCC) - Primary            Reason for visit is   Chief Complaint   Patient presents with    Virtual Regular Visit          Encounter provider ANNAMARIE Hawk      Recent Visits  No visits were found meeting these conditions.  Showing recent visits within past 7 days and meeting all other requirements  Today's Visits  Date Type Provider Dept   09/17/24 Telemedicine ANNAMARIE [facility] [address]   Showing today's visits and meeting all other requirements  Future Appointments  No visits were found meeting these conditions.  Showing future appointments within next 150 days and meeting all other requirements       The patient was identified by name and date of birth. Екатерина Godinez was informed that this is a telemedicine visit and that the visit is being conducted throughthe [employer] platform. She agrees to proceed..  My office door was closed. No one else was in the room.  She acknowledged consent and understanding of privacy and security of the video platform. The patient has agreed to participate and understands they can discontinue the visit at any time.    Patient is aware this is a billable service.       PHP MEDICATION MANAGEMENT NOTE        [facility]    Name and Date of Birth:  Екатерина Godinez 44 y.o. 1980 MRN: [id number]    Date of Visit: September 17, 2024    Reason for Visit:   Chief Complaint   Patient presents with    Virtual Regular Visit       No Known Allergies      Assessment & Plan  Current moderate episode of major depressive disorder without prior episode (HCC)  Continue Cymbalta 30 mg daily  Continue  Remeron 30 mg at bedtime  Continue individual therapy with Edel  Orders:    DULoxetine (Cymbalta) 30 mg delayed release capsule; Take 1 capsule (30 mg total) by mouth daily    Generalized anxiety disorder  Add Xanax as needed at bedtime.  She has had good results with this in the past to help her with sleep and anxiety.  She has increased anxiety due to pending surgeries  Orders:    ALPRAZolam (XANAX) 0.25 mg tablet; Take 1 tablet (0.25 mg total) by mouth daily at bedtime as needed for anxiety or sleep    Moderate episode of recurrent major depressive disorder (HCC)    Orders:    mirtazapine (REMERON) 30 mg tablet; Take 1 tablet (30 mg total) by mouth daily at bedtime    HUEY (generalized anxiety disorder)    Orders:    mirtazapine (REMERON) 30 mg tablet; Take 1 tablet (30 mg total) by mouth daily at bedtime         SUBJECTIVE:    Екатерина is seen today for a follow up for Major Depressive Disorder and Generalized Anxiety Disorder. She continues to experience on and off symptoms since the last visit.  She had a procedure for her bladder with follow-up scheduled in October.  She has great deal of anxiety regarding her medical issues.  Continues to struggle with eating due to gastroparesis and anxiety and currently reports her weight is at 79 pounds.  Trouble sleeping even with her Remeron, sleep is interrupted and she wakes up very anxious.  Mood is still depressed but she has not been able to tolerate any increase of her Cymbalta.  She has been working with her therapist, Edel, on relaxation techniques and with good results.  We will restart as needed Xanax that she can at once a day at bedtime.  Hopefully will only need this for the next 3 months or so.  We will follow-up in 8 weeks.    She denies any side effects from current medications.    PLAN:  Continue Cymbalta 30 mg daily for depression and anxiety  Continue Remeron 30 mg at bedtime for sleep and appetite and mood  She continues individual therapy with  Edel  Follow up with her medical provider for ongoing ongoing medical issues  Aware of 24 hour and weekend coverage for urgent situations accessed by calling [facility] main practice number  Medication management every 3 months      Current Outpatient Medications on File Prior to Visit   Medication Sig Dispense Refill    cholecalciferol 1,000 units tablet Take 1 tablet (1,000 Units total) by mouth daily 90 tablet 1    docusate sodium (COLACE) 100 mg capsule TAKE 1 CAPSULE BY MOUTH EVERY MORNING 90 capsule 1    DULoxetine (Cymbalta) 30 mg delayed release capsule Take 1 capsule (30 mg total) by mouth daily 90 capsule 1    Dyclonine-Glycerin (Cepacol Sore Throat Spray) 0.1-33 % LIQD Apply 1 spray to the mouth or throat every 6 (six) hours 118 mL 0    famotidine (PEPCID) 40 MG tablet TAKE 1 TABLET(40 MG) BY MOUTH DAILY 90 tablet 0    ferrous sulfate 324 (65 Fe) mg TAKE 1 TABLET BY MOUTH EVERY OTHER DAY 90 tablet 3    fexofenadine (ALLEGRA) 180 MG tablet Take 1 tablet (180 mg total) by mouth daily 90 tablet 1    fluticasone (FLONASE) 50 mcg/act nasal spray 1 spray into each nostril daily 16 g 0    lidocaine (LIDODERM) 5 % UNWRAP AND APPLY 1 PATCH TOPICALLY TO THE AFFECTED AREA EVERY DAY(DO NOT LEAVE ON FOR MORE THAN 12 HOURS) 90 patch 1    meclizine (ANTIVERT) 25 mg tablet TAKE 1 TABLET(25 MG) BY MOUTH THREE TIMES DAILY AS NEEDED FOR DIZZINESS 30 tablet 0    Mirabegron ER 25 MG TB24 Take 25 mg by mouth daily      mirtazapine (REMERON) 30 mg tablet Take 1 tablet (30 mg total) by mouth daily at bedtime 90 tablet 1    omeprazole (PriLOSEC) 40 MG capsule TAKE 1 CAPSULE(40 MG) BY MOUTH DAILY 100 capsule 1    ondansetron (ZOFRAN-ODT) 4 mg disintegrating tablet Take 1 tablet (4 mg total) by mouth every 8 (eight) hours as needed for nausea or vomiting 30 tablet 0    polyethylene glycol (GLYCOLAX) 17 GM/SCOOP powder MIX AND DRINK 17 GRAMS BY MOUTH EVERY  g 0    prochlorperazine (COMPAZINE) 5 mg  tablet TAKE 1 TABLET(5 MG) BY MOUTH EVERY 6 HOURS AS NEEDED FOR NAUSEA OR VOMITING 30 tablet 0    SUMAtriptan (IMITREX) 50 mg tablet TAKE 1 TABLET(50 MG) BY MOUTH 1 TIME FOR UP TO 1 DOSE AS NEEDED FOR MIGRAINE 9 tablet 0    trospium chloride (SANCTURA) 20 mg tablet Take 20 mg by mouth 2 (two) times a day      vitamin B-12 (VITAMIN B-12) 1,000 mcg tablet Take 1 tablet (1,000 mcg total) by mouth daily 90 tablet 2     No current facility-administered medications on file prior to visit.       HPI ROS Appetite Changes and Sleep:     She reports fluctuating sleep pattern, fluctuating appetite, fluctuating energy levels. Denies homicidal ideation, denies suicidal ideation    Review Of Systems:   all other systems are negative other than as noted above         Mental Status Evaluation:    Appearance:  age appropriate   Behavior:  pleasant   Speech:  normal rate, normal volume   Mood:  improved   Affect:  brighter   Thought Process:  goal directed   Associations: intact associations   Thought Content:  no overt delusions   Perceptual Disturbances: none   Risk Potential: Suicidal ideation - None  Homicidal ideation - None  Potential for aggression - No   Sensorium:  oriented to person, place, and time/date   Memory:  recent and remote memory grossly intact   Consciousness:  alert and awake   Attention: decreased concentration and decreased attention span   Insight:  limited   Judgment: limited   Gait/Station: normal gait/station, normal balance   Motor Activity: no abnormal movements       History Review:The following portions of the patient's history were reviewed and updated as appropriate: psychiatric history, trauma history allergies, current medications, past family history, past medical history, past social history, past surgical history, and problem list   OBJECTIVE:     Vital signs in last 24 hours:    There were no vitals filed for this visit.  Laboratory Results: I have personally reviewed all pertinent  laboratory/tests results.    Medications Risks/Benefits:      Risks, Benefits And Possible Side Effects Of Medications:    Discussed risks and benefits of treatment with patient including risk of suicidality, serotonin syndrome, increased QTc interval and SIADH related to treatment with antidepressants; Risk of induction of manic symptoms in certain patient populations     Controlled Medication Discussion:     No recent records found for controlled prescriptions according to [place] Prescription Drug Monitoring Program  Not applicable - controlled prescriptions are not prescribed by this practice    Note Share Disclaimer:     This note was not shared with the patient due to reasonable likelihood of causing patient harm    ANNAMARIE Hawk 09/17/24    This note was completed in part utilizing Dragon dictation Software. Grammatical, translation, syntax errors, random word insertions, spelling mistakes, and incomplete sentences may be an occasional consequence of this system secondary to software limitations with voice recognition, ambient noise, and hardware issues. If you have any questions or concerns about the content, text, or information contained within the body of this dictation, please contact the provider for clarification.

## 2024-09-17 NOTE — ASSESSMENT & PLAN NOTE
Add Xanax as needed at bedtime.  She has had good results with this in the past to help her with sleep and anxiety.  She has increased anxiety due to pending surgeries  Orders:    ALPRAZolam (XANAX) 0.25 mg tablet; Take 1 tablet (0.25 mg total) by mouth daily at bedtime as needed for anxiety or sleep

## 2024-09-17 NOTE — ASSESSMENT & PLAN NOTE
Continue Cymbalta 30 mg daily  Continue Remeron 30 mg at bedtime  Continue individual therapy with Edel  Orders:    DULoxetine (Cymbalta) 30 mg delayed release capsule; Take 1 capsule (30 mg total) by mouth daily

## 2024-09-18 NOTE — PSYCH
"Behavioral Health Psychotherapy Progress Note    Psychotherapy Provided: Individual Psychotherapy     1. Major depressive disorder, recurrent, moderate (HCC)        2. Generalized anxiety disorder            Goals addressed in session: Goal 1 and Goal 2     DATA: Therapist began session with completion of check in. Екатерина reported doing \"pretty good.\" Therapist asked if there had been any updates in her life since last session. Екатерина stated the battery had been removed and she was scheduled for her next surgery for the following week. Therapist asked how she was feeling about the upcoming surgery. Екатерина reported feeling excited and nervous. She spoke on struggling with accepting help from her family due to not wanting to be a burden. She spoke on her mother coming to visit soon to assist with her recovery for the second recovery. Therapist validated her emotions and reminded Екатерина of her having always a been a support to her family and this being the time for her to be supported out of her love. Екатерина and therapist discussed coping skills and relaxation techniques she could ling during the recovery time, as she will be primarily bed ridden. Екатерина reflected on hobbies she used to do in the past that she could use the time to get back into. Екатерина discussed the regina she currently felt with her family visiting.     During this session, this clinician used the following therapeutic modalities: Client-centered Therapy, Cognitive Behavioral Therapy, Solution-Focused Therapy, and Supportive Psychotherapy    Substance Abuse was not addressed during this session. If the client is diagnosed with a co-occurring substance use disorder, please indicate any changes in the frequency or amount of use: n/a. Stage of change for addressing substance use diagnoses: No substance use/Not applicable    ASSESSMENT:  Екатерина Godinez presents with a Euthymic/ normal mood.     her affect is Normal range and intensity, which is congruent, " "with her mood and the content of the session. The client has not made progress on their goals.     Екатерина Godinez presents with a none risk of suicide, none risk of self-harm, and none risk of harm to others.    For any risk assessment that surpasses a \"low\" rating, a safety plan must be developed.    A safety plan was indicated: no  If yes, describe in detail n/a    PLAN: Between sessions, Екатерина Godinez will continue to use self care for pain management. At the next session, the therapist will use Client-centered Therapy, Cognitive Behavioral Therapy, Solution-Focused Therapy, and Supportive Psychotherapy to address surgery recovery.    Behavioral Health Treatment Plan and Discharge Planning: Екатерина Godinez is aware of and agrees to continue to work on their treatment plan. They have identified and are working toward their discharge goals. yes    Visit start and stop times:    09/17/2024  Start Time: 1500  Stop Time: 1550  Total Visit Time: 50 minutes    Virtual Regular Visit    Verification of patient location:    Patient is located at Home in the following state in which I hold an active license [place]      Assessment/Plan:    Problem List Items Addressed This Visit       Generalized anxiety disorder     Other Visit Diagnoses       Major depressive disorder, recurrent, moderate (HCC)    -  Primary            Goals addressed in session: Goal 1 and Goal 2          Reason for visit is   Chief Complaint   Patient presents with    Virtual Regular Visit          Encounter provider Edel Nguyen      Recent Visits  Date Type Provider Dept   09/17/24 Telemedicine Edel [facility] Therapist [facility]   09/12/24 Telemedicine Edel [facility] Therapist [address]   Showing recent visits within past 7 days and meeting all other requirements  Future Appointments  No visits were found meeting these conditions.  Showing future appointments within next 150 days and meeting all other " requirements       The patient was identified by name and date of birth. Екатерина Godinez was informed that this is a telemedicine visit and that the visit is being conducted throughthe [employer] platform. She agrees to proceed..  My office door was closed. No one else was in the room.  She acknowledged consent and understanding of privacy and security of the video platform. The patient has agreed to participate and understands they can discontinue the visit at any time.    Patient is aware this is a billable service.       HPI     Past Medical History:   Diagnosis Date    Anemia     in past    Anxiety     Arthritis 2022    Depression     Gallstones     Headache(784.0)     Inflammatory bowel disease     Migraine     Nausea     occ    Panic attack     Rotator cuff disorder, right     Vertigo        Past Surgical History:   Procedure Laterality Date    ABDOMINAL SURGERY      scar tissue removed     SECTION      last assessed: Oct 23, 2014     CHOLECYSTECTOMY  2018    HYSTERECTOMY      [place] LAPS SURG CHOLECYSTECTOMY W/CHOLANGIOGRAPHY N/A 2018    Procedure: CHOLECYSTECTOMY LAPAROSCOPIC  WITH  ATTEMPTED IOC;  Surgeon: Silvia Woods MD;  Location: [facility] OR;  Service: General    TUBAL LIGATION         Current Outpatient Medications   Medication Sig Dispense Refill    ALPRAZolam (XANAX) 0.25 mg tablet Take 1 tablet (0.25 mg total) by mouth daily at bedtime as needed for anxiety or sleep 30 tablet 2    cholecalciferol 1,000 units tablet Take 1 tablet (1,000 Units total) by mouth daily 90 tablet 1    docusate sodium (COLACE) 100 mg capsule TAKE 1 CAPSULE BY MOUTH EVERY MORNING 90 capsule 1    DULoxetine (Cymbalta) 30 mg delayed release capsule Take 1 capsule (30 mg total) by mouth daily 90 capsule 1    Dyclonine-Glycerin (Cepacol Sore Throat Spray) 0.1-33 % LIQD Apply 1 spray to the mouth or throat every 6 (six) hours 118 mL 0    famotidine (PEPCID) 40 MG tablet TAKE 1 TABLET(40 MG) BY MOUTH  DAILY 90 tablet 0    ferrous sulfate 324 (65 Fe) mg TAKE 1 TABLET BY MOUTH EVERY OTHER DAY 90 tablet 3    fexofenadine (ALLEGRA) 180 MG tablet Take 1 tablet (180 mg total) by mouth daily 90 tablet 1    fluticasone (FLONASE) 50 mcg/act nasal spray 1 spray into each nostril daily 16 g 0    lidocaine (LIDODERM) 5 % UNWRAP AND APPLY 1 PATCH TOPICALLY TO THE AFFECTED AREA EVERY DAY(DO NOT LEAVE ON FOR MORE THAN 12 HOURS) 90 patch 1    meclizine (ANTIVERT) 25 mg tablet TAKE 1 TABLET(25 MG) BY MOUTH THREE TIMES DAILY AS NEEDED FOR DIZZINESS 30 tablet 0    Mirabegron ER 25 MG TB24 Take 25 mg by mouth daily      mirtazapine (REMERON) 30 mg tablet Take 1 tablet (30 mg total) by mouth daily at bedtime 90 tablet 1    omeprazole (PriLOSEC) 40 MG capsule TAKE 1 CAPSULE(40 MG) BY MOUTH DAILY 100 capsule 1    ondansetron (ZOFRAN-ODT) 4 mg disintegrating tablet Take 1 tablet (4 mg total) by mouth every 8 (eight) hours as needed for nausea or vomiting 30 tablet 0    polyethylene glycol (GLYCOLAX) 17 GM/SCOOP powder MIX AND DRINK 17 GRAMS BY MOUTH EVERY  g 0    prochlorperazine (COMPAZINE) 5 mg tablet TAKE 1 TABLET(5 MG) BY MOUTH EVERY 6 HOURS AS NEEDED FOR NAUSEA OR VOMITING 30 tablet 0    SUMAtriptan (IMITREX) 50 mg tablet TAKE 1 TABLET(50 MG) BY MOUTH 1 TIME FOR UP TO 1 DOSE AS NEEDED FOR MIGRAINE 9 tablet 0    trospium chloride (SANCTURA) 20 mg tablet Take 20 mg by mouth 2 (two) times a day      vitamin B-12 (VITAMIN B-12) 1,000 mcg tablet Take 1 tablet (1,000 mcg total) by mouth daily 90 tablet 2     No current facility-administered medications for this visit.        No Known Allergies    Review of Systems    Video Exam    There were no vitals filed for this visit.    Physical Exam     Visit Time    Visit Start Time: 1500  Visit Stop Time: 1550  Total Visit Duration:  50 minutes

## 2024-09-24 ENCOUNTER — TELEPHONE (OUTPATIENT)
Age: 44
End: 2024-09-24

## 2024-09-24 NOTE — TELEPHONE ENCOUNTER
Patient is calling regarding cancelling an appointment.    Date/Time: 9/24/2024 3pm    Reason: patient had surgery yesterday and is not feeling well    Patient was rescheduled: YES [] NO [x]  If yes, when was Patient reschedule for:     Patient requesting call back to reschedule: YES [] NO [x]

## 2024-09-29 DIAGNOSIS — R11.0 NAUSEA: ICD-10-CM

## 2024-09-30 RX ORDER — PROCHLORPERAZINE MALEATE 5 MG
TABLET ORAL
Qty: 30 TABLET | Refills: 0 | Status: SHIPPED | OUTPATIENT
Start: 2024-09-30

## 2024-10-01 ENCOUNTER — TELEMEDICINE (OUTPATIENT)
Dept: BEHAVIORAL/MENTAL HEALTH CLINIC | Facility: CLINIC | Age: 44
End: 2024-10-01
Payer: COMMERCIAL

## 2024-10-01 DIAGNOSIS — F41.1 GENERALIZED ANXIETY DISORDER: ICD-10-CM

## 2024-10-01 DIAGNOSIS — F33.1 MAJOR DEPRESSIVE DISORDER, RECURRENT, MODERATE (HCC): Primary | ICD-10-CM

## 2024-10-01 PROCEDURE — 90837 PSYTX W PT 60 MINUTES: CPT

## 2024-10-01 NOTE — PSYCH
"Behavioral Health Psychotherapy Progress Note    Psychotherapy Provided: Individual Psychotherapy     No diagnosis found.    Goals addressed in session: Goal 1 and Goal 2     DATA: Therapist began session with completion of check in. Екатерина reported \"hanging in there.\" Therapist asked how she was adjusting to the recovery from surgery. Екатерина reported she was doing \"okay\" but struggling to cope with the pain. She reported not wanting to be overly reliant on pain medication and trying not to take it when possible. She reported adjusting to her limitations and still being able to do some tasks such as cooking. She spoke on feeling guilty from her family taking time away from their day to support her. Therapist validated her emotions and remind her of her self worth and family's desire to support her during this time. Екатерина spoke on her mother coming to visit soon to help with recovery and her cousin now moving out by the middle of October. She spoke on the relief of this and recently learning her daughter in law was pregnant with her and her son's 5th child. She discussed her concern for her daughter in law's health for the remainder of session. Therapist validated and supported as needed.     During this session, this clinician used the following therapeutic modalities: Client-centered Therapy, Cognitive Behavioral Therapy, Solution-Focused Therapy, and Supportive Psychotherapy    Substance Abuse was not addressed during this session. If the client is diagnosed with a co-occurring substance use disorder, please indicate any changes in the frequency or amount of use: n/a. Stage of change for addressing substance use diagnoses: No substance use/Not applicable    ASSESSMENT:  Екатерина Godinez presents with a Euthymic/ normal mood.     her affect is Normal range and intensity, which is congruent, with her mood and the content of the session. The client has made progress on their goals.     Екатерина Godinez presents with a " "none risk of suicide, none risk of self-harm, and none risk of harm to others.    For any risk assessment that surpasses a \"low\" rating, a safety plan must be developed.    A safety plan was indicated: no  If yes, describe in detail n/a    PLAN: Between sessions, Екатерина Godinez will continue to use self care. At the next session, the therapist will use Client-centered Therapy, Cognitive Behavioral Therapy, Solution-Focused Therapy, and Supportive Psychotherapy to address pain of recovery.    Behavioral Health Treatment Plan and Discharge Planning: Екатерина Godinez is aware of and agrees to continue to work on their treatment plan. They have identified and are working toward their discharge goals. yes    Visit start and stop times:    10/02/24  Start Time: 1500  Stop Time: 1558  Total Visit Time: 58 minutes  "

## 2024-10-08 ENCOUNTER — TELEMEDICINE (OUTPATIENT)
Dept: BEHAVIORAL/MENTAL HEALTH CLINIC | Facility: CLINIC | Age: 44
End: 2024-10-08
Payer: COMMERCIAL

## 2024-10-08 DIAGNOSIS — F33.1 MAJOR DEPRESSIVE DISORDER, RECURRENT, MODERATE (HCC): Primary | ICD-10-CM

## 2024-10-08 DIAGNOSIS — F41.1 GENERALIZED ANXIETY DISORDER: ICD-10-CM

## 2024-10-08 PROCEDURE — 90837 PSYTX W PT 60 MINUTES: CPT
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
## 2024-10-10 DIAGNOSIS — K59.00 CONSTIPATION, UNSPECIFIED CONSTIPATION TYPE: ICD-10-CM

## 2024-10-11 RX ORDER — DOCUSATE SODIUM 100 MG/1
100 CAPSULE, LIQUID FILLED ORAL EVERY MORNING
Qty: 100 CAPSULE | Refills: 1 | Status: SHIPPED | OUTPATIENT
Start: 2024-10-11

## 2024-10-15 ENCOUNTER — TELEMEDICINE (OUTPATIENT)
Dept: BEHAVIORAL/MENTAL HEALTH CLINIC | Facility: CLINIC | Age: 44
End: 2024-10-15
Payer: COMMERCIAL

## 2024-10-15 DIAGNOSIS — F41.1 GENERALIZED ANXIETY DISORDER: ICD-10-CM

## 2024-10-15 DIAGNOSIS — F33.1 MAJOR DEPRESSIVE DISORDER, RECURRENT, MODERATE (HCC): Primary | ICD-10-CM

## 2024-10-15 PROCEDURE — 90834 PSYTX W PT 45 MINUTES: CPT
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
## 2024-10-21 ENCOUNTER — APPOINTMENT (EMERGENCY)
Dept: RADIOLOGY | Facility: HOSPITAL | Age: 44
End: 2024-10-21
Payer: MEDICARE

## 2024-10-21 ENCOUNTER — HOSPITAL ENCOUNTER (EMERGENCY)
Facility: HOSPITAL | Age: 44
Discharge: HOME | End: 2024-10-21
Attending: EMERGENCY MEDICINE
Payer: MEDICARE

## 2024-10-21 VITALS
BODY MASS INDEX: 16.68 KG/M2 | WEIGHT: 82.6 LBS | RESPIRATION RATE: 18 BRPM | TEMPERATURE: 98.6 F | SYSTOLIC BLOOD PRESSURE: 115 MMHG | DIASTOLIC BLOOD PRESSURE: 77 MMHG | HEART RATE: 68 BPM | OXYGEN SATURATION: 98 %

## 2024-10-21 DIAGNOSIS — R20.2 PARESTHESIA OF RIGHT ARM: ICD-10-CM

## 2024-10-21 DIAGNOSIS — M25.511 RIGHT SHOULDER PAIN: Primary | ICD-10-CM

## 2024-10-21 PROCEDURE — 99283 EMERGENCY DEPT VISIT LOW MDM: CPT

## 2024-10-21 PROCEDURE — 73030 X-RAY EXAM OF SHOULDER: CPT

## 2024-10-21 PROCEDURE — 99284 EMERGENCY DEPT VISIT MOD MDM: CPT | Performed by: PHYSICIAN ASSISTANT

## 2024-10-21 RX ORDER — HYDROCODONE BITARTRATE AND ACETAMINOPHEN 5; 325 MG/1; MG/1
1 TABLET ORAL ONCE
Status: COMPLETED | OUTPATIENT
Start: 2024-10-21 | End: 2024-10-21

## 2024-10-21 RX ORDER — PREDNISONE 20 MG/1
40 TABLET ORAL ONCE
Status: COMPLETED | OUTPATIENT
Start: 2024-10-21 | End: 2024-10-21

## 2024-10-21 RX ORDER — HYDROCODONE BITARTRATE AND ACETAMINOPHEN 5; 325 MG/1; MG/1
1 TABLET ORAL EVERY 6 HOURS PRN
Qty: 8 TABLET | Refills: 0 | Status: SHIPPED | OUTPATIENT
Start: 2024-10-21 | End: 2024-10-23

## 2024-10-21 RX ORDER — PREDNISONE 20 MG/1
40 TABLET ORAL DAILY
Qty: 10 TABLET | Refills: 0 | Status: SHIPPED | OUTPATIENT
Start: 2024-10-22 | End: 2024-10-27

## 2024-10-21 RX ADMIN — PREDNISONE 40 MG: 20 TABLET ORAL at 17:49

## 2024-10-21 RX ADMIN — HYDROCODONE BITARTRATE AND ACETAMINOPHEN 1 TABLET: 5; 325 TABLET ORAL at 17:49

## 2024-10-21 NOTE — ED PROVIDER NOTES
"Time reflects when diagnosis was documented in both MDM as applicable and the Disposition within this note       Time User Action Codes Description Comment    10/21/2024  5:41 PM Eli Atwood Add [M25.511] Right shoulder pain     10/21/2024  5:42 PM Eli Atwood Add [R20.2] Paresthesia of right arm           ED Disposition       ED Disposition   Discharge    Condition   Stable    Date/Time   Mon Oct 21, 2024  5:41 PM    Comment   Екатерина Godinez discharge to home/self care.                   Assessment & Plan       Medical Decision Making  Differential diagnosis includes but is not limited to: Rotator cuff tendinitis, bursitis, OA, cervical radiculopathy, muscle strain, cubital tunnel, carpal tunnel    Patient agrees to follow-up with orthopedics    I or my delegate reviewed this patient through the [employer] portal and did not find any evidence of narcotic abuse or doctor shopping.      Amount and/or Complexity of Data Reviewed  Radiology: ordered and independent interpretation performed.    Risk  Prescription drug management.             Medications   HYDROcodone-acetaminophen (NORCO) 5-325 mg per tablet 1 tablet (1 tablet Oral Given 10/21/24 1749)   predniSONE tablet 40 mg (40 mg Oral Given 10/21/24 1749)       ED Risk Strat Scores                                               History of Present Illness       Chief Complaint   Patient presents with    Arm Pain     Right arm pain. \"I can't sleep on my back because of surgery so I have been sleeping on the right side, maybe that's Why\"       Past Medical History:   Diagnosis Date    Anemia     in past    Anxiety     Arthritis 2022    Depression     Gallstones     Headache(784.0)     Inflammatory bowel disease     Migraine     Nausea     occ    Panic attack     Rotator cuff disorder, right     Vertigo       Past Surgical History:   Procedure Laterality Date    ABDOMINAL SURGERY      scar tissue removed     SECTION      last assessed: Oct 23, 2014 "     CHOLECYSTECTOMY  2018    HYSTERECTOMY      [place] LAPS SURG CHOLECYSTECTOMY W/CHOLANGIOGRAPHY N/A 2018    Procedure: CHOLECYSTECTOMY LAPAROSCOPIC  WITH  ATTEMPTED IOC;  Surgeon: Silvia Woods MD;  Location: [facility];  Service: General    TUBAL LIGATION        Family History   Problem Relation Age of Onset    No Known Problems Family     Hypertension Mother     Arthritis Mother     Diabetes type II Father         mellitus     Hypertension Father     Diabetes Father     Arthritis Father       Social History     Tobacco Use    Smoking status: Former     Current packs/day: 0.00     Average packs/day: 0.5 packs/day for 5.0 years (2.5 ttl pk-yrs)     Types: Cigarettes     Quit date: 2013     Years since quittin.6    Smokeless tobacco: Never   Vaping Use    Vaping status: Never Used   Substance Use Topics    Alcohol use: No     Comment: none since 2021    Drug use: No      E-Cigarette/Vaping    E-Cigarette Use Never User       E-Cigarette/Vaping Substances      I have reviewed and agree with the history as documented.     44-year-old female presents emergency room for evaluation of right shoulder pain.  Worsening over the past few weeks.  Pain now radiates down her right arm and into her hand.  She has bouts of tingling that come and go from her elbow all the way to the palmar side of her hand and fingers.  Denies decreased strength.  Admits to some right sided neck pain.  Patient states she has noticed all the starting since she is restricted to sleeping on her right side due to recent placement of a bladder stimulator.  She does admit to a history of rotator cuff problems years ago which had improved with PT and she did never had to have surgery.  Pain is worse with movement of the right shoulder.  Denies fever.  Denies rash.  Taking naproxen without relief      History provided by:  Patient  Arm Pain  Associated symptoms: no chest pain, no fever, no nausea, no rash, no shortness of breath  and no vomiting        Review of Systems   Constitutional:  Negative for chills and fever.   Respiratory:  Negative for shortness of breath.    Cardiovascular:  Negative for chest pain.   Gastrointestinal:  Negative for nausea and vomiting.   Musculoskeletal:  Positive for neck pain. Negative for joint swelling.   Skin:  Negative for rash and wound.   Neurological:  Negative for weakness and numbness.           Objective       ED Triage Vitals   Temperature Pulse Blood Pressure Respirations SpO2 Patient Position - Orthostatic VS   10/21/24 1635 10/21/24 1635 10/21/24 1635 10/21/24 1635 10/21/24 1751 10/21/24 1635   98.6 °F (37 °C) 68 115/77 18 98 % Sitting      Temp Source Heart Rate Source BP Location FiO2 (%) Pain Score    10/21/24 1635 10/21/24 1635 10/21/24 1635 -- 10/21/24 1635    Tympanic Left;Radial Left arm  9      Vitals      Date and Time Temp Pulse SpO2 Resp BP Pain Score FACES Pain Rating User   10/21/24 1751 -- -- 98 % -- -- -- -- KR   10/21/24 1749 -- -- -- -- -- 8 -- KR   10/21/24 1635 98.6 °F (37 °C) 68 -- 18 115/77 9 -- SN            Physical Exam  Vitals and nursing note reviewed.   Constitutional:       Appearance: Normal appearance. She is well-developed.   HENT:      Head: Atraumatic.   Eyes:      Conjunctiva/sclera: Conjunctivae normal.   Cardiovascular:      Pulses: Normal pulses.   Musculoskeletal:      Right shoulder: Tenderness and bony tenderness present. Decreased range of motion.      Right upper arm: Normal.      Right elbow: No effusion. Normal range of motion.      Right forearm: Normal.      Right wrist: No bony tenderness.      Right hand: No tenderness or bony tenderness.      Comments: Patient has recurrent symptoms of paresthesia upon tapping on her cubital tunnel and over the carpal tunnel-positive Tinel's sign   Skin:     General: Skin is warm and dry.      Capillary Refill: Capillary refill takes less than 2 seconds.   Neurological:      Mental Status: She is alert.    Psychiatric:         Mood and Affect: Mood normal.         Results Reviewed       None            XR shoulder 2+ vw right   ED Interpretation by Eli Atwood PA-C (10/21 871)   NAD          Procedures    ED Medication and Procedure Management   Prior to Admission Medications   Prescriptions Last Dose Informant Patient Reported? Taking?   ALPRAZolam (XANAX) 0.25 mg tablet   No No   Sig: Take 1 tablet (0.25 mg total) by mouth daily at bedtime as needed for anxiety or sleep   DULoxetine (Cymbalta) 30 mg delayed release capsule   No No   Sig: Take 1 capsule (30 mg total) by mouth daily   Dyclonine-Glycerin (Cepacol Sore Throat Spray) 0.1-33 % LIQD  Self No No   Sig: Apply 1 spray to the mouth or throat every 6 (six) hours   Mirabegron ER 25 MG TB24   Yes No   Sig: Take 25 mg by mouth daily   SUMAtriptan (IMITREX) 50 mg tablet  Self No No   Sig: TAKE 1 TABLET(50 MG) BY MOUTH 1 TIME FOR UP TO 1 DOSE AS NEEDED FOR MIGRAINE   cholecalciferol 1,000 units tablet   No No   Sig: Take 1 tablet (1,000 Units total) by mouth daily   docusate sodium (COLACE) 100 mg capsule   No No   Sig: Take 1 capsule (100 mg total) by mouth every morning   famotidine (PEPCID) 40 MG tablet  Self No No   Sig: TAKE 1 TABLET(40 MG) BY MOUTH DAILY   ferrous sulfate 324 (65 Fe) mg  Self No No   Sig: TAKE 1 TABLET BY MOUTH EVERY OTHER DAY   fexofenadine (ALLEGRA) 180 MG tablet  Self No No   Sig: Take 1 tablet (180 mg total) by mouth daily   fluticasone (FLONASE) 50 mcg/act nasal spray  Self No No   Si spray into each nostril daily   lidocaine (LIDODERM) 5 %   No No   Sig: UNWRAP AND APPLY 1 PATCH TOPICALLY TO THE AFFECTED AREA EVERY DAY(DO NOT LEAVE ON FOR MORE THAN 12 HOURS)   meclizine (ANTIVERT) 25 mg tablet   No No   Sig: TAKE 1 TABLET(25 MG) BY MOUTH THREE TIMES DAILY AS NEEDED FOR DIZZINESS   mirtazapine (REMERON) 30 mg tablet   No No   Sig: Take 1 tablet (30 mg total) by mouth daily at bedtime   omeprazole (PriLOSEC) 40 MG capsule   No  No   Sig: TAKE 1 CAPSULE(40 MG) BY MOUTH DAILY   ondansetron (ZOFRAN-ODT) 4 mg disintegrating tablet   No No   Sig: Take 1 tablet (4 mg total) by mouth every 8 (eight) hours as needed for nausea or vomiting   polyethylene glycol (GLYCOLAX) 17 GM/SCOOP powder  Self No No   Sig: MIX AND DRINK 17 GRAMS BY MOUTH EVERY DAY   prochlorperazine (COMPAZINE) 5 mg tablet   No No   Sig: TAKE 1 TABLET(5 MG) BY MOUTH EVERY 6 HOURS AS NEEDED FOR NAUSEA OR VOMITING   trospium chloride (SANCTURA) 20 mg tablet  Self Yes No   Sig: Take 20 mg by mouth 2 (two) times a day   vitamin B-12 (VITAMIN B-12) 1,000 mcg tablet   No No   Sig: Take 1 tablet (1,000 mcg total) by mouth daily      Facility-Administered Medications: None     Discharge Medication List as of 10/21/2024  5:45 PM        START taking these medications    Details   HYDROcodone-acetaminophen (Norco) 5-325 mg per tablet Take 1 tablet by mouth every 6 (six) hours as needed for pain for up to 2 days Max Daily Amount: 4 tablets, Starting Mon 10/21/2024, Until Wed 10/23/2024 at 2359, Normal      predniSONE 20 mg tablet Take 2 tablets (40 mg total) by mouth daily for 5 days Do not start before October 22, 2024., Starting Tue 10/22/2024, Until Sun 10/27/2024, Normal           CONTINUE these medications which have NOT CHANGED    Details   ALPRAZolam (XANAX) 0.25 mg tablet Take 1 tablet (0.25 mg total) by mouth daily at bedtime as needed for anxiety or sleep, Starting Tue 9/17/2024, Normal      cholecalciferol 1,000 units tablet Take 1 tablet (1,000 Units total) by mouth daily, Starting Fri 3/29/2024, Normal      docusate sodium (COLACE) 100 mg capsule Take 1 capsule (100 mg total) by mouth every morning, Starting Fri 10/11/2024, Normal      DULoxetine (Cymbalta) 30 mg delayed release capsule Take 1 capsule (30 mg total) by mouth daily, Starting Tue 9/17/2024, Normal      Dyclonine-Glycerin (Cepacol Sore Throat Spray) 0.1-33 % LIQD Apply 1 spray to the mouth or throat every 6 (six)  hours, Starting Thu 3/16/2023, Normal      famotidine (PEPCID) 40 MG tablet TAKE 1 TABLET(40 MG) BY MOUTH DAILY, Normal      ferrous sulfate 324 (65 Fe) mg TAKE 1 TABLET BY MOUTH EVERY OTHER DAY, Starting Wed 11/29/2023, Normal      fexofenadine (ALLEGRA) 180 MG tablet Take 1 tablet (180 mg total) by mouth daily, Starting Thu 3/16/2023, Normal      fluticasone (FLONASE) 50 mcg/act nasal spray 1 spray into each nostril daily, Starting Thu 3/16/2023, Normal      lidocaine (LIDODERM) 5 % UNWRAP AND APPLY 1 PATCH TOPICALLY TO THE AFFECTED AREA EVERY DAY(DO NOT LEAVE ON FOR MORE THAN 12 HOURS), Normal      meclizine (ANTIVERT) 25 mg tablet TAKE 1 TABLET(25 MG) BY MOUTH THREE TIMES DAILY AS NEEDED FOR DIZZINESS, Normal      Mirabegron ER 25 MG TB24 Take 25 mg by mouth daily, Starting Mon 5/20/2024, Until Sun 8/18/2024, Historical Med      mirtazapine (REMERON) 30 mg tablet Take 1 tablet (30 mg total) by mouth daily at bedtime, Starting Tue 9/17/2024, Normal      omeprazole (PriLOSEC) 40 MG capsule TAKE 1 CAPSULE(40 MG) BY MOUTH DAILY, Starting Mon 7/22/2024, Normal      ondansetron (ZOFRAN-ODT) 4 mg disintegrating tablet Take 1 tablet (4 mg total) by mouth every 8 (eight) hours as needed for nausea or vomiting, Starting Mon 5/6/2024, Normal      polyethylene glycol (GLYCOLAX) 17 GM/SCOOP powder MIX AND DRINK 17 GRAMS BY MOUTH EVERY DAY, Normal      prochlorperazine (COMPAZINE) 5 mg tablet TAKE 1 TABLET(5 MG) BY MOUTH EVERY 6 HOURS AS NEEDED FOR NAUSEA OR VOMITING, Normal      SUMAtriptan (IMITREX) 50 mg tablet TAKE 1 TABLET(50 MG) BY MOUTH 1 TIME FOR UP TO 1 DOSE AS NEEDED FOR MIGRAINE, Normal      trospium chloride (SANCTURA) 20 mg tablet Take 20 mg by mouth 2 (two) times a day, Starting Fri 5/6/2022, Historical Med      vitamin B-12 (VITAMIN B-12) 1,000 mcg tablet Take 1 tablet (1,000 mcg total) by mouth daily, Starting Wed 5/1/2024, Normal           No discharge procedures on file.  ED SEPSIS DOCUMENTATION   Time  reflects when diagnosis was documented in both MDM as applicable and the Disposition within this note       Time User Action Codes Description Comment    10/21/2024  5:41 PM Eli Atwood [M25.511] Right shoulder pain     10/21/2024  5:42 PM Eli Atwood [R20.2] Paresthesia of right arm                  Eli Atwood PA-C  10/21/24 0059

## 2024-10-22 ENCOUNTER — TELEPHONE (OUTPATIENT)
Age: 44
End: 2024-10-22

## 2024-10-22 NOTE — TELEPHONE ENCOUNTER
Patient called to schedule her surgery with Dr. Jeff. She had a consult on 07/19/24 and signed all the paperwork for surgery for robotic pyloroplasty and an EGD.  She was not able to schedule for August because she already had another surgery scheduled for September. She had told [facility] she would call and schedule after the other surgery was completed.  Please call patient to schedule with Dr. Jeff.

## 2024-10-24 ENCOUNTER — TELEMEDICINE (OUTPATIENT)
Dept: BEHAVIORAL/MENTAL HEALTH CLINIC | Facility: CLINIC | Age: 44
End: 2024-10-24
Payer: COMMERCIAL

## 2024-10-24 DIAGNOSIS — F33.1 MAJOR DEPRESSIVE DISORDER, RECURRENT, MODERATE (HCC): Primary | ICD-10-CM

## 2024-10-24 DIAGNOSIS — F41.1 GENERALIZED ANXIETY DISORDER: ICD-10-CM

## 2024-10-24 PROCEDURE — 90834 PSYTX W PT 45 MINUTES: CPT

## 2024-10-24 NOTE — PSYCH
Behavioral Health Psychotherapy Progress Note    Psychotherapy Provided: Individual Psychotherapy     1. Major depressive disorder, recurrent, moderate (HCC)        2. Generalized anxiety disorder            Goals addressed in session: Goal 1 and Goal 2     DATA: Therapist began session with completion of check in. Екатерина expressed being “pretty stressed out.” they're asked if she could elaborate. She spoke on a lot of recent events taking place this week causing stress. She spoke on her shoulder injury worsening once again and needing to go to the emergency room period she spoke on being told several years ago that she should get surgery which she did not want. Екатерина spoke on her water accidentally being turned off by the company even though the bill was paid on time. She also spoke on her  needing to take the car for inspection due to the inspection lapsing. Therapist validated her frustration supported her own processing the stress from this morning. Екатерина and therapist broke and list of priorities of tasks to get done in order to resolve some of these concerns. She spoke on the ongoing conflict between her family and her cousin. Therapist supported her on processing as needed for the remainder of session.    During this session, this clinician used the following therapeutic modalities: Client-centered Therapy, Cognitive Behavioral Therapy, Solution-Focused Therapy, and Supportive Psychotherapy    Substance Abuse was not addressed during this session. If the client is diagnosed with a co-occurring substance use disorder, please indicate any changes in the frequency or amount of use: n/a. Stage of change for addressing substance use diagnoses: No substance use/Not applicable    ASSESSMENT:  Екатерина Godinez presents with a Euthymic/ normal mood.     her affect is Normal range and intensity, which is congruent, with her mood and the content of the session. The client has made progress on their goals.      "Екатерина Godinez presents with a none risk of suicide, none risk of self-harm, and none risk of harm to others.    For any risk assessment that surpasses a \"low\" rating, a safety plan must be developed.    A safety plan was indicated: no  If yes, describe in detail n/a    PLAN: Between sessions, Екатерина Godinez will continue to use coping skills to process ongoing stressors. At the next session, the therapist will use Client-centered Therapy, Cognitive Behavioral Therapy, Solution-Focused Therapy, and Supportive Psychotherapy to address anxiousness.    Behavioral Health Treatment Plan and Discharge Planning: Екатерина Godinez is aware of and agrees to continue to work on their treatment plan. They have identified and are working toward their discharge goals. yes    Visit start and stop times:    10/24/24  Start Time: 1000  Stop Time: 1050  Total Visit Time: 50 minutes    Virtual Regular Visit    Verification of patient location:    Patient is located at Home in the following state in which I hold an active license [place]      Assessment/Plan:    Problem List Items Addressed This Visit       Generalized anxiety disorder     Other Visit Diagnoses       Major depressive disorder, recurrent, moderate (HCC)    -  Primary            Goals addressed in session: Goal 1 and Goal 2          Reason for visit is   Chief Complaint   Patient presents with    Virtual Regular Visit          Encounter provider Edel Guthrie      Recent Visits  No visits were found meeting these conditions.  Showing recent visits within past 7 days and meeting all other requirements  Today's Visits  Date Type Provider Dept   10/24/24 Telemedicine Edel Guthrie [facility] Therapist [address]   Showing today's visits and meeting all other requirements  Future Appointments  No visits were found meeting these conditions.  Showing future appointments within next 150 days and meeting all other requirements       The patient was identified by name and date " of birth. Екатерина Godinez was informed that this is a telemedicine visit and that the visit is being conducted throughthe [employer] platform. She agrees to proceed..  My office door was closed. No one else was in the room.  She acknowledged consent and understanding of privacy and security of the video platform. The patient has agreed to participate and understands they can discontinue the visit at any time.    Patient is aware this is a billable service.       HPI     Past Medical History:   Diagnosis Date    Anemia     in past    Anxiety     Arthritis 2022    Depression     Gallstones     Headache(784.0)     Inflammatory bowel disease     Migraine     Nausea     occ    Panic attack     Rotator cuff disorder, right     Vertigo        Past Surgical History:   Procedure Laterality Date    ABDOMINAL SURGERY      scar tissue removed     SECTION      last assessed: Oct 23, 2014     CHOLECYSTECTOMY  2018    HYSTERECTOMY      GA LAPS SURG CHOLECYSTECTOMY W/CHOLANGIOGRAPHY N/A 2018    Procedure: CHOLECYSTECTOMY LAPAROSCOPIC  WITH  ATTEMPTED IOC;  Surgeon: Silvia Woods MD;  Location: [facility] OR;  Service: General    TUBAL LIGATION         Current Outpatient Medications   Medication Sig Dispense Refill    ALPRAZolam (XANAX) 0.25 mg tablet Take 1 tablet (0.25 mg total) by mouth daily at bedtime as needed for anxiety or sleep 30 tablet 2    cholecalciferol 1,000 units tablet Take 1 tablet (1,000 Units total) by mouth daily 90 tablet 1    docusate sodium (COLACE) 100 mg capsule Take 1 capsule (100 mg total) by mouth every morning 100 capsule 1    DULoxetine (Cymbalta) 30 mg delayed release capsule Take 1 capsule (30 mg total) by mouth daily 90 capsule 1    Dyclonine-Glycerin (Cepacol Sore Throat Spray) 0.1-33 % LIQD Apply 1 spray to the mouth or throat every 6 (six) hours 118 mL 0    famotidine (PEPCID) 40 MG tablet TAKE 1 TABLET(40 MG) BY MOUTH DAILY 90 tablet 0    ferrous sulfate 324 (65 Fe)  mg TAKE 1 TABLET BY MOUTH EVERY OTHER DAY 90 tablet 3    fexofenadine (ALLEGRA) 180 MG tablet Take 1 tablet (180 mg total) by mouth daily 90 tablet 1    fluticasone (FLONASE) 50 mcg/act nasal spray 1 spray into each nostril daily 16 g 0    lidocaine (LIDODERM) 5 % UNWRAP AND APPLY 1 PATCH TOPICALLY TO THE AFFECTED AREA EVERY DAY(DO NOT LEAVE ON FOR MORE THAN 12 HOURS) 90 patch 1    meclizine (ANTIVERT) 25 mg tablet TAKE 1 TABLET(25 MG) BY MOUTH THREE TIMES DAILY AS NEEDED FOR DIZZINESS 30 tablet 0    Mirabegron ER 25 MG TB24 Take 25 mg by mouth daily      mirtazapine (REMERON) 30 mg tablet Take 1 tablet (30 mg total) by mouth daily at bedtime 90 tablet 1    omeprazole (PriLOSEC) 40 MG capsule TAKE 1 CAPSULE(40 MG) BY MOUTH DAILY 100 capsule 1    ondansetron (ZOFRAN-ODT) 4 mg disintegrating tablet Take 1 tablet (4 mg total) by mouth every 8 (eight) hours as needed for nausea or vomiting 30 tablet 0    polyethylene glycol (GLYCOLAX) 17 GM/SCOOP powder MIX AND DRINK 17 GRAMS BY MOUTH EVERY  g 0    predniSONE 20 mg tablet Take 2 tablets (40 mg total) by mouth daily for 5 days Do not start before October 22, 2024. 10 tablet 0    prochlorperazine (COMPAZINE) 5 mg tablet TAKE 1 TABLET(5 MG) BY MOUTH EVERY 6 HOURS AS NEEDED FOR NAUSEA OR VOMITING 30 tablet 0    SUMAtriptan (IMITREX) 50 mg tablet TAKE 1 TABLET(50 MG) BY MOUTH 1 TIME FOR UP TO 1 DOSE AS NEEDED FOR MIGRAINE 9 tablet 0    trospium chloride (SANCTURA) 20 mg tablet Take 20 mg by mouth 2 (two) times a day      vitamin B-12 (VITAMIN B-12) 1,000 mcg tablet Take 1 tablet (1,000 mcg total) by mouth daily 90 tablet 2     No current facility-administered medications for this visit.        No Known Allergies    Review of Systems    Video Exam    There were no vitals filed for this visit.    Physical Exam     Visit Time    Visit Start Time: 1000  Visit Stop Time: 1050  Total Visit Duration:  50 minutes

## 2024-10-25 NOTE — TELEPHONE ENCOUNTER
Patient checking to see if she can schedule her surgery. Advised her that Maritza will be back on Monday, 10/28/24, and she will return her call.

## 2024-11-01 ENCOUNTER — OFFICE VISIT (OUTPATIENT)
Dept: FAMILY MEDICINE CLINIC | Facility: CLINIC | Age: 44
End: 2024-11-01

## 2024-11-01 VITALS
RESPIRATION RATE: 16 BRPM | BODY MASS INDEX: 16.53 KG/M2 | HEART RATE: 83 BPM | OXYGEN SATURATION: 95 % | WEIGHT: 82 LBS | HEIGHT: 59 IN | DIASTOLIC BLOOD PRESSURE: 60 MMHG | SYSTOLIC BLOOD PRESSURE: 110 MMHG | TEMPERATURE: 98 F

## 2024-11-01 DIAGNOSIS — R63.6 UNDERWEIGHT DUE TO INADEQUATE CALORIC INTAKE: ICD-10-CM

## 2024-11-01 DIAGNOSIS — E53.8 B12 DEFICIENCY: ICD-10-CM

## 2024-11-01 DIAGNOSIS — E55.9 VITAMIN D DEFICIENCY: ICD-10-CM

## 2024-11-01 DIAGNOSIS — R63.4 UNINTENDED WEIGHT LOSS: ICD-10-CM

## 2024-11-01 DIAGNOSIS — E61.1 IRON DEFICIENCY: Primary | ICD-10-CM

## 2024-11-01 DIAGNOSIS — K52.9 COLITIS: ICD-10-CM

## 2024-11-01 PROCEDURE — 99213 OFFICE O/P EST LOW 20 MIN: CPT | Performed by: NURSE PRACTITIONER

## 2024-11-01 RX ORDER — ZINC OXIDE 216 MG/ML
LOTION TOPICAL
Qty: 3900 ML | Refills: 0 | Status: SHIPPED | OUTPATIENT
Start: 2024-11-01

## 2024-11-01 RX ORDER — ZINC OXIDE 216 MG/ML
LOTION TOPICAL
Qty: 3900 ML | Refills: 0 | Status: SHIPPED | OUTPATIENT
Start: 2024-11-01 | End: 2024-11-01

## 2024-11-01 NOTE — ASSESSMENT & PLAN NOTE
Orders:    CBC and differential; Future    Comprehensive metabolic panel; Future    Hemoglobin A1C; Future    Lipid panel; Future    TSH, 3rd generation with Free T4 reflex; Future    Iron Panel (Includes Ferritin, Iron Sat%, Iron, and TIBC); Future    Vitamin D 25 hydroxy; Future    Vitamin B12/Folate, Serum Panel; Future    Vitamin B1, whole blood; Future    Nutritional Supplement DAXA; [employer] Standard 1.4 Medical Nutrition Sh[employer]

## 2024-11-01 NOTE — PROGRESS NOTES
Ambulatory Visit  Name: Екатерина Godinez      : 1980      MRN: [id number]  Encounter Provider: ANNAMARIE Brown  Encounter Date: 2024   [facility]    Assessment & Plan  Iron deficiency    Orders:    Iron Panel (Includes Ferritin, Iron Sat%, Iron, and TIBC); Future    Vitamin D deficiency    Orders:    Vitamin D 25 hydroxy; Future    B12 deficiency    Orders:    Vitamin B12/Folate, Serum Panel; Future    Colitis    Orders:    CBC and differential; Future    Comprehensive metabolic panel; Future    Hemoglobin A1C; Future    Lipid panel; Future    TSH, 3rd generation with Free T4 reflex; Future    Iron Panel (Includes Ferritin, Iron Sat%, Iron, and TIBC); Future    Vitamin D 25 hydroxy; Future    Vitamin B12/Folate, Serum Panel; Future    Vitamin B1, whole blood; Future    Nutritional Supplement LIQD; [employer] Standard 1.4 Medical Nutrition [employer]    Unintended weight loss    Orders:    CBC and differential; Future    Comprehensive metabolic panel; Future    Hemoglobin A1C; Future    Lipid panel; Future    TSH, 3rd generation with Free T4 reflex; Future    Iron Panel (Includes Ferritin, Iron Sat%, Iron, and TIBC); Future    Vitamin D 25 hydroxy; Future    Vitamin B12/Folate, Serum Panel; Future    Vitamin B1, whole blood; Future    Nutritional Supplement LIQD; [employer] Standard 1.4 Medical Nutrition [employer]    Underweight due to inadequate caloric intake    Orders:    CBC and differential; Future    Comprehensive metabolic panel; Future    Hemoglobin A1C; Future    Lipid panel; Future    TSH, 3rd generation with Free T4 reflex; Future    Iron Panel (Includes Ferritin, Iron Sat%, Iron, and TIBC); Future    Vitamin D 25 hydroxy; Future    Vitamin B12/Folate, Serum Panel; Future    Vitamin B1, whole blood; Future    Nutritional Supplement LIQD; [employer] Standard 1.4 Medical Nutrition [employer]    BMI Counseling: Body mass index is 16.56 kg/m².  "The BMI is below normal. Patient advised to gain weight. Rationale for BMI follow-up plan is due to patient being underweight.       History of Present Illness     Екатерина Godinez is a 43 y.o. female who  has a past medical history of Anemia, Anxiety, Depression, Gallstones, Migraine, Nausea, Panic attack, Rotator cuff disorder, right, and Vertigo. who presented to the office today for follow up.      She reports that she recently had a bladder stimulator surgically inserted and since has had improvement to QOL.  She has an upcoming procedure with GI and would like to have labs before having this done.      The following portions of the patient's history were reviewed and updated as appropriate: allergies, current medications, past family history, past medical history, past social history, past surgical history and problem list.            Review of Systems   Constitutional:  Negative for appetite change, chills, fever and unexpected weight change.   HENT:  Negative for ear pain and sore throat.    Eyes:  Negative for pain and visual disturbance.   Respiratory:  Negative for cough and shortness of breath.    Cardiovascular:  Negative for chest pain and palpitations.   Gastrointestinal:  Negative for abdominal pain and vomiting.   Genitourinary:  Negative for dysuria and hematuria.   Musculoskeletal:  Negative for arthralgias and back pain.   Skin:  Negative for color change and rash.   Neurological:  Negative for seizures and syncope.   All other systems reviewed and are negative.          Objective     /60 (BP Location: Right arm, Patient Position: Sitting, Cuff Size: Standard)   Pulse 83   Temp 98 °F (36.7 °C) (Temporal)   Resp 16   Ht 4' 11\" (1.499 m)   Wt 37.2 kg (82 lb)   LMP 07/24/2017   SpO2 95%   BMI 16.56 kg/m²     Physical Exam  Vitals and nursing note reviewed.   Constitutional:       General: She is not in acute distress.     Appearance: She is well-developed.   HENT:      Head: Normocephalic " and atraumatic.      Right Ear: Tympanic membrane and external ear normal.      Left Ear: Tympanic membrane and external ear normal.      Nose: Nose normal.      Mouth/Throat:      Mouth: Mucous membranes are moist.   Eyes:      Conjunctiva/sclera: Conjunctivae normal.      Pupils: Pupils are equal, round, and reactive to light.   Cardiovascular:      Rate and Rhythm: Normal rate and regular rhythm.      Heart sounds: No murmur heard.  Pulmonary:      Effort: Pulmonary effort is normal. No respiratory distress.      Breath sounds: Normal breath sounds.   Musculoskeletal:         General: No swelling.      Cervical back: Neck supple.   Skin:     General: Skin is warm and dry.      Capillary Refill: Capillary refill takes less than 2 seconds.   Neurological:      General: No focal deficit present.      Mental Status: She is alert and oriented to person, place, and time.   Psychiatric:         Mood and Affect: Mood normal.

## 2024-11-01 NOTE — ASSESSMENT & PLAN NOTE
Orders:    CBC and differential; Future    Comprehensive metabolic panel; Future    Hemoglobin A1C; Future    Lipid panel; Future    TSH, 3rd generation with Free T4 reflex; Future    Iron Panel (Includes Ferritin, Iron Sat%, Iron, and TIBC); Future    Vitamin D 25 hydroxy; Future    Vitamin B12/Folate, Serum Panel; Future    Vitamin B1, whole blood; Future    Nutritional Supplement DAXA; [employer] Standard 1.4 Medical Nutrition Shakes

## 2024-11-02 ENCOUNTER — APPOINTMENT (OUTPATIENT)
Dept: LAB | Facility: HOSPITAL | Age: 44
End: 2024-11-02
Payer: MEDICARE

## 2024-11-02 DIAGNOSIS — R63.4 UNINTENDED WEIGHT LOSS: ICD-10-CM

## 2024-11-02 DIAGNOSIS — E55.9 VITAMIN D DEFICIENCY: ICD-10-CM

## 2024-11-02 DIAGNOSIS — E61.1 IRON DEFICIENCY: ICD-10-CM

## 2024-11-02 DIAGNOSIS — K52.9 COLITIS: Primary | ICD-10-CM

## 2024-11-02 DIAGNOSIS — R63.6 UNDERWEIGHT DUE TO INADEQUATE CALORIC INTAKE: ICD-10-CM

## 2024-11-02 LAB
25(OH)D3 SERPL-MCNC: 34.2 NG/ML (ref 30–100)
ALBUMIN SERPL BCG-MCNC: 4.3 G/DL (ref 3.5–5)
ALP SERPL-CCNC: 69 U/L (ref 34–104)
ALT SERPL W P-5'-P-CCNC: 13 U/L (ref 7–52)
ANION GAP SERPL CALCULATED.3IONS-SCNC: 9 MMOL/L (ref 4–13)
AST SERPL W P-5'-P-CCNC: 14 U/L (ref 13–39)
BASOPHILS # BLD AUTO: 0.03 THOUSANDS/ÂΜL (ref 0–0.1)
BASOPHILS NFR BLD AUTO: 0 % (ref 0–1)
BILIRUB SERPL-MCNC: 0.82 MG/DL (ref 0.2–1)
BUN SERPL-MCNC: 16 MG/DL (ref 5–25)
CALCIUM SERPL-MCNC: 9.3 MG/DL (ref 8.4–10.2)
CHLORIDE SERPL-SCNC: 105 MMOL/L (ref 96–108)
CHOLEST SERPL-MCNC: 134 MG/DL
CO2 SERPL-SCNC: 26 MMOL/L (ref 21–32)
CREAT SERPL-MCNC: 0.72 MG/DL (ref 0.6–1.3)
EOSINOPHIL # BLD AUTO: 0.14 THOUSAND/ÂΜL (ref 0–0.61)
EOSINOPHIL NFR BLD AUTO: 2 % (ref 0–6)
ERYTHROCYTE [DISTWIDTH] IN BLOOD BY AUTOMATED COUNT: 12.4 % (ref 11.6–15.1)
EST. AVERAGE GLUCOSE BLD GHB EST-MCNC: 111 MG/DL
FERRITIN SERPL-MCNC: 94 NG/ML (ref 11–307)
FOLATE SERPL-MCNC: 10.2 NG/ML
GFR SERPL CREATININE-BSD FRML MDRD: 102 ML/MIN/1.73SQ M
GLUCOSE P FAST SERPL-MCNC: 79 MG/DL (ref 65–99)
HBA1C MFR BLD: 5.5 %
HCT VFR BLD AUTO: 36 % (ref 34.8–46.1)
HDLC SERPL-MCNC: 70 MG/DL
HGB BLD-MCNC: 12.2 G/DL (ref 11.5–15.4)
IMM GRANULOCYTES # BLD AUTO: 0.02 THOUSAND/UL (ref 0–0.2)
IMM GRANULOCYTES NFR BLD AUTO: 0 % (ref 0–2)
IRON SATN MFR SERPL: 42 % (ref 15–50)
IRON SERPL-MCNC: 135 UG/DL (ref 50–212)
LDLC SERPL CALC-MCNC: 49 MG/DL (ref 0–100)
LYMPHOCYTES # BLD AUTO: 2.4 THOUSANDS/ÂΜL (ref 0.6–4.47)
LYMPHOCYTES NFR BLD AUTO: 32 % (ref 14–44)
MCH RBC QN AUTO: 31.6 PG (ref 26.8–34.3)
MCHC RBC AUTO-ENTMCNC: 33.9 G/DL (ref 31.4–37.4)
MCV RBC AUTO: 93 FL (ref 82–98)
MONOCYTES # BLD AUTO: 1.09 THOUSAND/ÂΜL (ref 0.17–1.22)
MONOCYTES NFR BLD AUTO: 15 % (ref 4–12)
NEUTROPHILS # BLD AUTO: 3.79 THOUSANDS/ÂΜL (ref 1.85–7.62)
NEUTS SEG NFR BLD AUTO: 51 % (ref 43–75)
NONHDLC SERPL-MCNC: 64 MG/DL
NRBC BLD AUTO-RTO: 0 /100 WBCS
PLATELET # BLD AUTO: 290 THOUSANDS/UL (ref 149–390)
PMV BLD AUTO: 10.3 FL (ref 8.9–12.7)
POTASSIUM SERPL-SCNC: 3.9 MMOL/L (ref 3.5–5.3)
PROT SERPL-MCNC: 7.1 G/DL (ref 6.4–8.4)
RBC # BLD AUTO: 3.86 MILLION/UL (ref 3.81–5.12)
SODIUM SERPL-SCNC: 140 MMOL/L (ref 135–147)
TIBC SERPL-MCNC: 324 UG/DL (ref 250–450)
TRIGL SERPL-MCNC: 76 MG/DL
TSH SERPL DL<=0.05 MIU/L-ACNC: 1.55 UIU/ML (ref 0.45–4.5)
UIBC SERPL-MCNC: 189 UG/DL (ref 155–355)
VIT B12 SERPL-MCNC: 710 PG/ML (ref 180–914)
WBC # BLD AUTO: 7.47 THOUSAND/UL (ref 4.31–10.16)

## 2024-11-02 PROCEDURE — 36415 COLL VENOUS BLD VENIPUNCTURE: CPT

## 2024-11-02 PROCEDURE — 82306 VITAMIN D 25 HYDROXY: CPT

## 2024-11-02 PROCEDURE — 82728 ASSAY OF FERRITIN: CPT

## 2024-11-02 PROCEDURE — 84443 ASSAY THYROID STIM HORMONE: CPT

## 2024-11-02 PROCEDURE — 80061 LIPID PANEL: CPT

## 2024-11-02 PROCEDURE — 82746 ASSAY OF FOLIC ACID SERUM: CPT

## 2024-11-02 PROCEDURE — 83036 HEMOGLOBIN GLYCOSYLATED A1C: CPT

## 2024-11-02 PROCEDURE — 85025 COMPLETE CBC W/AUTO DIFF WBC: CPT

## 2024-11-02 PROCEDURE — 83540 ASSAY OF IRON: CPT

## 2024-11-02 PROCEDURE — 82607 VITAMIN B-12: CPT

## 2024-11-02 PROCEDURE — 83550 IRON BINDING TEST: CPT

## 2024-11-02 PROCEDURE — 80053 COMPREHEN METABOLIC PANEL: CPT

## 2024-11-02 PROCEDURE — 84425 ASSAY OF VITAMIN B-1: CPT

## 2024-11-05 ENCOUNTER — SOCIAL WORK (OUTPATIENT)
Dept: BEHAVIORAL/MENTAL HEALTH CLINIC | Facility: CLINIC | Age: 44
End: 2024-11-05
Payer: COMMERCIAL

## 2024-11-05 DIAGNOSIS — F33.1 MAJOR DEPRESSIVE DISORDER, RECURRENT, MODERATE (HCC): Primary | ICD-10-CM

## 2024-11-05 DIAGNOSIS — F41.1 GENERALIZED ANXIETY DISORDER: ICD-10-CM

## 2024-11-05 PROCEDURE — 90837 PSYTX W PT 60 MINUTES: CPT

## 2024-11-06 LAB — VIT B1 BLD-SCNC: 83.1 NMOL/L (ref 66.5–200)

## 2024-11-06 NOTE — BH TREATMENT PLAN
"Outpatient Behavioral Health Psychotherapy Treatment Plan    Екатерина Godinez  1980     Date of Initial Psychotherapy Assessment: 04/25/2024   Date of Current Treatment Plan: 11/06/24  Treatment Plan Target Date: 05/06/2025  Treatment Plan Expiration Date: 05/06/2025    Diagnosis:   1. Major depressive disorder, recurrent, moderate (HCC)        2. Generalized anxiety disorder            Area(s) of Need: depression, anxiety, chronic pain     Long Term Goal 1 (in the client's own words): \"to learn to cope with my stomach problems\"    Stage of Change: Action    Target Date for completion: 05/06/2025 11/06/2024 update: кЕатерина reports feeling she had made progress but with upcoming stress this was still a work in progress for her     Anticipated therapeutic modalities: radical acceptance     People identified to complete this goal: Екатерина      Objective 1: (identify the means of measuring success in meeting the objective): Екатерина will explore changes in her diet to better cope with her health condition       Objective 2: (identify the means of measuring success in meeting the objective): Екатерина will process her ongoing stress pertaining to her health condition by verbally processing with clinician for 15 minutes as needed       Long Term Goal 2 (in the client's own words): \"Work on my anxiety and depression\"  11/06/2024 update: Екатерина spoke on feeling she made progress but still wanted this to be a main goal with upcoming surgery    Stage of Change: Action    Target Date for completion: 05/06/2025     Anticipated therapeutic modalities: CBT, coping skills, reality testing     People identified to complete this goal: Екатерина      Objective 1: (identify the means of measuring success in meeting the objective): Екатерина will gain insight to her anxiety and depression through identifying 3 triggers to her anxiety or depression       Objective 2: (identify the means of measuring success in meeting the objective): Екатерина will " better cope with her anxiety and depression through identifying 5 coping skills to her anxiety or depression       My [facility] psychiatric provider is: Viviane Parada    I am currently taking psychiatric medications: Yes, as prescribed    I feel that I will be ready for discharge from mental health care when I reach the following (measurable goal/objective): Reduction in anxiety and depression     For children and adults who have a legal guardian:   Has there been any change to custody orders and/or guardianship status? Yes. If yes, attach updated documentation.    I have created my Crisis Plan and have been offered a copy of this plan    Behavioral Health Treatment Plan [facility]: Diagnosis and Treatment Plan explained to Екатерина Hernandezpreston Godinez acknowledges an understanding of their diagnosis. Екатерина Godinez agrees to this treatment plan.    I have been offered a copy of this Treatment Plan. yes

## 2024-11-06 NOTE — PSYCH
"Behavioral Health Psychotherapy Progress Note    Psychotherapy Provided: Individual Psychotherapy     1. Major depressive disorder, recurrent, moderate (HCC)        2. Generalized anxiety disorder            Goals addressed in session: Goal 1 and Goal 2     DATA: Therapist began session with completion of check in. Екатерина reported doing \"good.\" Therapist asked if there had been any updates in her life since last session. Екатерина reported that her stomach surgery had been scheduled for Dec 11 and her mother would be coming to stay and help during her recovery. Therapist asked how she felt about the surgery now being scheduled. Екатерина spoke on and processed her anxiousness and excitement about the surgery and asking her cousin to leave the home. Екатерина and therapist updates safety and treatment plan for remainder of session.     During this session, this clinician used the following therapeutic modalities: Client-centered Therapy, Cognitive Behavioral Therapy, Solution-Focused Therapy, and Supportive Psychotherapy    Substance Abuse was not addressed during this session. If the client is diagnosed with a co-occurring substance use disorder, please indicate any changes in the frequency or amount of use: n/a. Stage of change for addressing substance use diagnoses: No substance use/Not applicable    ASSESSMENT:  Екатерина Godinez presents with a Euthymic/ normal mood.     her affect is Normal range and intensity, which is congruent, with her mood and the content of the session. The client has made progress on their goals.     Екатерина Godinez presents with a none risk of suicide, none risk of self-harm, and none risk of harm to others.    For any risk assessment that surpasses a \"low\" rating, a safety plan must be developed.    A safety plan was indicated: no  If yes, describe in detail n/a    PLAN: Between sessions, Екатерина Godinez will continue to use self care and communicate boundaries. At the next session, the therapist will " use Client-centered Therapy, Cognitive Behavioral Therapy, Solution-Focused Therapy, and Supportive Psychotherapy to address stress.    Behavioral Health Treatment Plan and Discharge Planning: Екатерина Godinez is aware of and agrees to continue to work on their treatment plan. They have identified and are working toward their discharge goals. yes    Visit start and stop times:    11/05/2024  Start Time: 1500  Stop Time: 1558  Total Visit Time: 58 minutes

## 2024-11-07 ENCOUNTER — TELEMEDICINE (OUTPATIENT)
Dept: PSYCHIATRY | Facility: CLINIC | Age: 44
End: 2024-11-07
Payer: COMMERCIAL

## 2024-11-07 DIAGNOSIS — F41.1 GAD (GENERALIZED ANXIETY DISORDER): ICD-10-CM

## 2024-11-07 DIAGNOSIS — F32.1 CURRENT MODERATE EPISODE OF MAJOR DEPRESSIVE DISORDER WITHOUT PRIOR EPISODE (HCC): Primary | ICD-10-CM

## 2024-11-07 DIAGNOSIS — F33.1 MODERATE EPISODE OF RECURRENT MAJOR DEPRESSIVE DISORDER (HCC): ICD-10-CM

## 2024-11-07 DIAGNOSIS — F41.1 GENERALIZED ANXIETY DISORDER: ICD-10-CM

## 2024-11-07 PROCEDURE — 99214 OFFICE O/P EST MOD 30 MIN: CPT | Performed by: NURSE PRACTITIONER

## 2024-11-07 RX ORDER — ALPRAZOLAM 0.25 MG/1
TABLET ORAL
Qty: 90 TABLET | Refills: 3 | Status: SHIPPED | OUTPATIENT
Start: 2024-11-07

## 2024-11-08 ENCOUNTER — TELEPHONE (OUTPATIENT)
Dept: FAMILY MEDICINE CLINIC | Facility: CLINIC | Age: 44
End: 2024-11-08

## 2024-11-08 NOTE — ASSESSMENT & PLAN NOTE
Orders:    ALPRAZolam (XANAX) 0.25 mg tablet; May take one tablet in AM as needed for anxiety and one or two tablets at HS for anxiety/sleep as needed

## 2024-11-08 NOTE — PSYCH
Virtual Regular Visit    Verification of patient location:    Patient is located at Home in the following state in which I hold an active license [place]      Assessment/Plan:    Problem List Items Addressed This Visit       Generalized anxiety disorder    Relevant Medications    ALPRAZolam (XANAX) 0.25 mg tablet    Current moderate episode of major depressive disorder without prior episode (HCC) - Primary    Relevant Medications    ALPRAZolam (XANAX) 0.25 mg tablet       Reason for visit is   Chief Complaint   Patient presents with    Virtual Regular Visit          Encounter provider ANNAMARIE Hawk      Recent Visits  Date Type Provider Dept   11/07/24 Telemedicine ANNAMARIE Hawk [facility] [address]   11/01/24 Office Visit Rosetta BurkANNAMARIE Omalley  [facility]   Showing recent visits within past 7 days and meeting all other requirements  Future Appointments  No visits were found meeting these conditions.  Showing future appointments within next 150 days and meeting all other requirements       The patient was identified by name and date of birth. Екатерина Godinez was informed that this is a telemedicine visit and that the visit is being conducted throughthe Epic Embedded platform. She agrees to proceed..  My office door was closed. No one else was in the room.  She acknowledged consent and understanding of privacy and security of the video platform. The patient has agreed to participate and understands they can discontinue the visit at any time.    Patient is aware this is a billable service.     MEDICATION MANAGEMENT NOTE        [facility]    Name and Date of Birth:  Екатерина Godinez 44 y.o. 1980 MRN: [id number]    Date of Visit: November 7, 2024    Reason for Visit: Psychiatric medication management follow-up visit    No Known Allergies      Assessment & Plan  Generalized anxiety disorder    Orders:    ALPRAZolam (XANAX) 0.25 mg tablet; May take one  tablet in AM as needed for anxiety and one or two tablets at HS for anxiety/sleep as needed    Current moderate episode of major depressive disorder without prior episode (HCC)                SUBJECTIVE:    Екатерина is seen today for a follow up for Major Depressive Disorder and Generalized Anxiety Disorder. She continues to do relatively well since the last visit.  She has upcoming surgery to address her gastroparesis.  States she has had some recent weight loss again due to difficulty eating.  She is hopeful that this procedure will help.  And glad to have some answers as to why she struggles with eating.  States her mom will be coming in December to assist her postsurgery.  She is tolerating this stress fairly well.  She reports medications have been helping.  She reports therapy has been very helpful.  Appetite decreased as noted, but sleep has been good.  We will follow up in 3 months.    She denies any side effects from current psychiatric medications.      HPI ROS Appetite Changes and Sleep:     She reports fluctuating sleep pattern, decreased appetite, recent weight loss , fluctuating energy levels. Denies homicidal ideation, denies suicidal ideation    Review Of Systems:   no complaints, all other systems are negative , except as noted above         Mental Status Evaluation:    Appearance:  age appropriate   Behavior:  pleasant, cooperative   Speech:  normal rate, normal volume   Mood:  improved   Affect:  brighter   Thought Process:  goal directed   Associations: intact associations   Thought Content:  no overt delusions   Perceptual Disturbances: none   Risk Potential: Suicidal ideation - None  Homicidal ideation - None  Potential for aggression - No   Sensorium:  oriented to person, place, and time/date   Memory:  recent and remote memory grossly intact   Consciousness:  alert and awake   Attention: attention span and concentration are age appropriate   Insight:  improved   Judgment: improved    Gait/Station: normal gait/station, normal balance   Motor Activity: no abnormal movements       History Review:The following portions of the patient's history were reviewed and updated as appropriate: psychiatric history, trauma history allergies, current medications, past family history, past medical history, past social history, past surgical history, and problem list   OBJECTIVE:     Vital signs in last 24 hours:    There were no vitals filed for this visit.  Laboratory Results: I have personally reviewed all pertinent laboratory/tests results.      Treatment Recommendations/Precautions:    Continue Remeron 30 mg at bedtime  Continue Cymbalta 30 mg daily  Can use as needed Xanax up to 3 times a day as needed for anxiety.  Aware of need to follow up with family physician for medical issues  Aware of 24 hour and weekend coverage for urgent situations accessed by calling [facility] main practice number  Medication management every 3 months  Continue psychotherapy with own therapist  Patient advised to call 911 if feeling suicidal or homicidal before acting out on their thoughts and they expressed understanding.    Medications Risks/Benefits      Risks, Benefits And Possible Side Effects Of Medications:    Discussed risks and benefits of treatment with patient including risk of suicidality, serotonin syndrome, increased QTc interval and SIADH related to treatment with antidepressants; Risk of induction of manic symptoms in certain patient populations and risks of misuse, abuse or dependence, sedation and respiratory depression related to treatment with benzodiazepine medications         Controlled Medication Discussion:     Екатерина has been filling controlled prescriptions on time as prescribed according to [place] Prescription Drug Monitoring Program    Psychotherapy Provided:     Individual psychotherapy provided: Medications, treatment progress and treatment plan reviewed with  Екатерина.  Medication changes discussed with Екатерина.  Medication education provided to Екатерина.  Reassurance and supportive therapy provided.      Treatment Plan:    Completed and signed during the session: Yes - Treatment Plan done but not signed at time of office visit due to:  Plan reviewed by video and verbal consent given due to virtual visit.      Total Visit Duration:  30 minutes     The total visit duration detailed above includes: patient engagement, medication management, psychotherapy/counseling, discussion regarding treatment goals, and coordination of care.      Note Share Disclaimer:     This note was not shared with the patient due to this is a psychotherapy note    ANNAMARIE Hawk     This note was completed in part utilizing Dragon dictation Software. Grammatical, translation, syntax errors, random word insertions, spelling mistakes, and incomplete sentences may be an occasional consequence of this system secondary to software limitations with voice recognition, ambient noise, and hardware issues. If you have any questions or concerns about the content, text, or information contained within the body of this dictation, please contact the  provider for clarification.

## 2024-11-08 NOTE — BH TREATMENT PLAN
TREATMENT PLAN (Medication Management Only)        [facility]    Name and Date of Birth:  Екатерина Godinez 44 y.o. 1980  Date of Treatment Plan: November 7, 2024  Diagnosis/Diagnoses:    1. Current moderate episode of major depressive disorder without prior episode (HCC)    2. Generalized anxiety disorder    3. Moderate episode of recurrent major depressive disorder (HCC)    4. HUEY (generalized anxiety disorder)      Strengths/Personal Resources for Self-Care: supportive family, supportive friends.  Area/Areas of need (in own words): anxiety symptoms  1. Long Term Goal: continue improvement in acceptable anxiety level.  Target Date:6 months - 5/8/2025  Person/Persons responsible for completion of goal: Екатерина  2.  Short Term Objective (s) - How will we reach this goal?:   A. Provider new recommended medication/dosage changes and/or continue medication(s): continue current medications as prescribed (may use as needed Xanax up to 3 times a day).  B. Attend medication management appointments regularly.  C. Attend psychotherapy regularly.  Target Date:6 months - 5/8/2025  Person/Persons Responsible for Completion of Goal: Екатерина  Progress Towards Goals: continuing treatment  Treatment Modality: medication management every 3 months  Review due 180 days from date of this plan: 6 months - 5/8/2025  Expected length of service: ongoing treatment  My Physician/PA/NP and I have developed this plan together and I agree to work on the goals and objectives. I understand the treatment goals that were developed for my treatment.

## 2024-11-08 NOTE — TELEPHONE ENCOUNTER
PCP SIGNATURE NEEDED FOR [employer] FORM RECEIVED VIA FAX AND PLACED IN PCP FOLDER TO BE DELIVERED AT ASSIGNED TIMES.       Duagnosis (ICD10) R63.4    Please indicate the method of administration

## 2024-11-13 ENCOUNTER — RESULTS FOLLOW-UP (OUTPATIENT)
Dept: FAMILY MEDICINE CLINIC | Facility: CLINIC | Age: 44
End: 2024-11-13

## 2024-11-14 ENCOUNTER — TELEMEDICINE (OUTPATIENT)
Dept: BEHAVIORAL/MENTAL HEALTH CLINIC | Facility: CLINIC | Age: 44
End: 2024-11-14

## 2024-11-14 DIAGNOSIS — F33.1 MAJOR DEPRESSIVE DISORDER, RECURRENT, MODERATE (HCC): Primary | ICD-10-CM

## 2024-11-14 DIAGNOSIS — F41.1 GENERALIZED ANXIETY DISORDER: ICD-10-CM

## 2024-11-14 NOTE — PSYCH
No Call. No Show. No Charge    Екатерина Godinez no showed 11/14/24 appointment , therapist attempted to call client. Was unable to leave a voicemail.     Treatment Plan not due at this session.

## 2024-11-19 ENCOUNTER — TELEPHONE (OUTPATIENT)
Age: 44
End: 2024-11-19

## 2024-11-19 ENCOUNTER — TELEMEDICINE (OUTPATIENT)
Dept: BEHAVIORAL/MENTAL HEALTH CLINIC | Facility: CLINIC | Age: 44
End: 2024-11-19
Payer: COMMERCIAL

## 2024-11-19 DIAGNOSIS — F41.1 GENERALIZED ANXIETY DISORDER: ICD-10-CM

## 2024-11-19 DIAGNOSIS — F33.1 MAJOR DEPRESSIVE DISORDER, RECURRENT, MODERATE (HCC): Primary | ICD-10-CM

## 2024-11-19 PROCEDURE — 90837 PSYTX W PT 60 MINUTES: CPT

## 2024-11-19 NOTE — TELEPHONE ENCOUNTER
Patient called office requesting to change appt to virtual. Writer changed appt for today 11/19/24 at 3pm. Please check patient in for appt.

## 2024-11-19 NOTE — PSYCH
"Behavioral Health Psychotherapy Progress Note    Psychotherapy Provided: Individual Psychotherapy     No diagnosis found.    Goals addressed in session: Goal 1 and Goal 2     DATA: Therapist began session with completion of check in. Екатерина reported doing “alright.” Therapist asked if there had been any updates in her life since last session. Екатерина denied any updates. Therapist asked how she was coping with her upcoming surgery. Екатерина stated that her surgery had been moved but she was OK with this happening because it was more time for preparation. Therapist suggested to completing a self-care assessment to ensure that she is making progress with her self-care habits.  Therapist and Екатерина focused on completion of self-care assessment for remainder of session.    During this session, this clinician used the following therapeutic modalities: Client-centered Therapy, Cognitive Behavioral Therapy, Solution-Focused Therapy, and Supportive Psychotherapy    Substance Abuse was not addressed during this session. If the client is diagnosed with a co-occurring substance use disorder, please indicate any changes in the frequency or amount of use: n/a. Stage of change for addressing substance use diagnoses: No substance use/Not applicable    ASSESSMENT:  Екатерина Godinez presents with a Euthymic/ normal mood.     her affect is Normal range and intensity, which is congruent, with her mood and the content of the session. The client has made progress on their goals.     Екатерина Godinez presents with a none risk of suicide, none risk of self-harm, and none risk of harm to others.    For any risk assessment that surpasses a \"low\" rating, a safety plan must be developed.    A safety plan was indicated: no  If yes, describe in detail n/a    PLAN: Between sessions, Екатерина Godinez will continue to use self care. At the next session, the therapist will use Client-centered Therapy, Cognitive Behavioral Therapy, Solution-Focused Therapy, and " Supportive Psychotherapy to address anxiety.    Behavioral Health Treatment Plan and Discharge Planning: Екатерина Godinez is aware of and agrees to continue to work on their treatment plan. They have identified and are working toward their discharge goals. yes    Visit start and stop times:    11/19/2024  Start Time: 1500  Stop Time: 1554  Total Visit Time: 54 minutes    Virtual Regular Visit    Verification of patient location:    Patient is located at Home in the following state in which I hold an active license [place]      Assessment/Plan:    Problem List Items Addressed This Visit    None      Goals addressed in session: Goal 1 and Goal 2          Reason for visit is   Chief Complaint   Patient presents with    Virtual Regular Visit          Encounter provider Edel Guthrie      Recent Visits  Date Type Provider Dept   11/19/24 Telemedicine Edel Guthrie [facility] [address]   Showing recent visits within past 7 days and meeting all other requirements  Future Appointments  No visits were found meeting these conditions.  Showing future appointments within next 150 days and meeting all other requirements       The patient was identified by name and date of birth. Екатерина Godinez was informed that this is a telemedicine visit and that the visit is being conducted throughthe [employer] platform. She agrees to proceed..  My office door was closed. No one else was in the room.  She acknowledged consent and understanding of privacy and security of the video platform. The patient has agreed to participate and understands they can discontinue the visit at any time.    Patient is aware this is a billable service.       HPI     Past Medical History:   Diagnosis Date    Anemia     in past    Anxiety     Arthritis September 2022    Depression     Gallstones     Headache(784.0)     Inflammatory bowel disease     Migraine     Nausea     occ    Panic attack     Rotator cuff disorder, right     Vertigo        Past  Surgical History:   Procedure Laterality Date    ABDOMINAL SURGERY      scar tissue removed     SECTION      last assessed: Oct 23, 2014     CHOLECYSTECTOMY  2018    HYSTERECTOMY      [place] LAPS SURG CHOLECYSTECTOMY W/CHOLANGIOGRAPHY N/A 2018    Procedure: CHOLECYSTECTOMY LAPAROSCOPIC  WITH  ATTEMPTED IOC;  Surgeon: Silvia Woods MD;  Location: [facility];  Service: General    TUBAL LIGATION         Current Outpatient Medications   Medication Sig Dispense Refill    ALPRAZolam (XANAX) 0.25 mg tablet May take one tablet in AM as needed for anxiety and one or two tablets at HS for anxiety/sleep as needed 90 tablet 3    cholecalciferol 1,000 units tablet Take 1 tablet (1,000 Units total) by mouth daily 90 tablet 1    docusate sodium (COLACE) 100 mg capsule Take 1 capsule (100 mg total) by mouth every morning 100 capsule 1    DULoxetine (Cymbalta) 30 mg delayed release capsule Take 1 capsule (30 mg total) by mouth daily 90 capsule 1    Dyclonine-Glycerin (Cepacol Sore Throat Spray) 0.1-33 % LIQD Apply 1 spray to the mouth or throat every 6 (six) hours 118 mL 0    famotidine (PEPCID) 40 MG tablet TAKE 1 TABLET(40 MG) BY MOUTH DAILY 90 tablet 0    ferrous sulfate 324 (65 Fe) mg TAKE 1 TABLET BY MOUTH EVERY OTHER DAY 90 tablet 3    fexofenadine (ALLEGRA) 180 MG tablet Take 1 tablet (180 mg total) by mouth daily 90 tablet 1    fluticasone (FLONASE) 50 mcg/act nasal spray 1 spray into each nostril daily 16 g 0    lidocaine (LIDODERM) 5 % UNWRAP AND APPLY 1 PATCH TOPICALLY TO THE AFFECTED AREA EVERY DAY(DO NOT LEAVE ON FOR MORE THAN 12 HOURS) 90 patch 1    meclizine (ANTIVERT) 25 mg tablet TAKE 1 TABLET(25 MG) BY MOUTH THREE TIMES DAILY AS NEEDED FOR DIZZINESS 30 tablet 0    Mirabegron ER 25 MG TB24 Take 25 mg by mouth daily      mirtazapine (REMERON) 30 mg tablet Take 1 tablet (30 mg total) by mouth daily at bedtime 90 tablet 1    Nutritional Supplement LIQD [employer] Standard 1.4 Medical Nutrition Acturis  3900 mL 0    omeprazole (PriLOSEC) 40 MG capsule TAKE 1 CAPSULE(40 MG) BY MOUTH DAILY 100 capsule 1    ondansetron (ZOFRAN-ODT) 4 mg disintegrating tablet Take 1 tablet (4 mg total) by mouth every 8 (eight) hours as needed for nausea or vomiting 30 tablet 0    polyethylene glycol (GLYCOLAX) 17 GM/SCOOP powder MIX AND DRINK 17 GRAMS BY MOUTH EVERY  g 0    prochlorperazine (COMPAZINE) 5 mg tablet TAKE 1 TABLET(5 MG) BY MOUTH EVERY 6 HOURS AS NEEDED FOR NAUSEA OR VOMITING 30 tablet 0    SUMAtriptan (IMITREX) 50 mg tablet TAKE 1 TABLET(50 MG) BY MOUTH 1 TIME FOR UP TO 1 DOSE AS NEEDED FOR MIGRAINE 9 tablet 0    trospium chloride (SANCTURA) 20 mg tablet Take 20 mg by mouth 2 (two) times a day      vitamin B-12 (VITAMIN B-12) 1,000 mcg tablet Take 1 tablet (1,000 mcg total) by mouth daily 90 tablet 2     No current facility-administered medications for this visit.        No Known Allergies    Review of Systems    Video Exam    There were no vitals filed for this visit.    Physical Exam     Visit Time    Visit Start Time: 1500  Visit Stop Time: 1554  Total Visit Duration:  54 minutes

## 2024-11-26 ENCOUNTER — TELEMEDICINE (OUTPATIENT)
Dept: BEHAVIORAL/MENTAL HEALTH CLINIC | Facility: CLINIC | Age: 44
End: 2024-11-26
Payer: COMMERCIAL

## 2024-11-26 DIAGNOSIS — F33.1 MAJOR DEPRESSIVE DISORDER, RECURRENT, MODERATE (HCC): Primary | ICD-10-CM

## 2024-11-26 DIAGNOSIS — F41.1 GENERALIZED ANXIETY DISORDER: ICD-10-CM

## 2024-11-26 PROCEDURE — 90834 PSYTX W PT 45 MINUTES: CPT

## 2024-11-26 NOTE — PSYCH
"Behavioral Health Psychotherapy Progress Note    Psychotherapy Provided: Individual Psychotherapy     1. Major depressive disorder, recurrent, moderate (HCC)        2. Generalized anxiety disorder            Goals addressed in session: Goal 1 and Goal 2     DATA: Therapist began session with completion of check in. Екатерина reported doing “okay but not great.” Therapist asked if she could elaborate. She's spoken just feeling off, likely due to not sleeping well and feeling under the weather. Therapist validated her not feeling well. Therapist asked about ongoing updates with cousin moving out and preparing for upcoming surgery. Екатерина expressed feeling excited to see her parents and feeling better prepared for this surgery than the last. She spoke on all the food and preparation she had already done. She spoken of finding it difficult to take assistance from others but more so seeing it as spending time with her parents. Therapist and Екатерина discussed ways to keep herself busy during recovery for the remainder of session    During this session, this clinician used the following therapeutic modalities: Client-centered Therapy, Cognitive Behavioral Therapy, Solution-Focused Therapy, and Supportive Psychotherapy    Substance Abuse was not addressed during this session. If the client is diagnosed with a co-occurring substance use disorder, please indicate any changes in the frequency or amount of use: n/a. Stage of change for addressing substance use diagnoses: No substance use/Not applicable    ASSESSMENT:  Екатерина Godinez presents with a Euthymic/ normal mood.     her affect is Normal range and intensity, which is congruent, with her mood and the content of the session. The client has made progress on their goals.     Екатерина Godinez presents with a none risk of suicide, none risk of self-harm, and none risk of harm to others.    For any risk assessment that surpasses a \"low\" rating, a safety plan must be developed.    A " safety plan was indicated: no  If yes, describe in detail n/a    PLAN: Between sessions, Екатерина Godinez will continue to practice coping skills. At the next session, the therapist will use Client-centered Therapy, Cognitive Behavioral Therapy, Solution-Focused Therapy, and Supportive Psychotherapy to address anxiety.    Behavioral Health Treatment Plan and Discharge Planning: Екатерина Godinez is aware of and agrees to continue to work on their treatment plan. They have identified and are working toward their discharge goals. yes    Visit start and stop times:    11/26/2024  Start Time: 1500  Stop Time: 1550  Total Visit Time: 50 minutes    Virtual Regular Visit    Verification of patient location:    Patient is located at Home in the following state in which I hold an active license [place]      Assessment/Plan:    Problem List Items Addressed This Visit       Generalized anxiety disorder     Other Visit Diagnoses         Major depressive disorder, recurrent, moderate (HCC)    -  Primary            Goals addressed in session: Goal 1 and Goal 2          Reason for visit is   Chief Complaint   Patient presents with    Virtual Regular Visit          Encounter provider Edel Guthrie      Recent Visits  Date Type Provider Dept   11/26/24 Telemedicine Edel Guthrie [facility] [address]   Showing recent visits within past 7 days and meeting all other requirements  Future Appointments  No visits were found meeting these conditions.  Showing future appointments within next 150 days and meeting all other requirements       The patient was identified by name and date of birth. Екатерина Godinez was informed that this is a telemedicine visit and that the visit is being conducted throughthe [employer] platform. She agrees to proceed..  My office door was closed. No one else was in the room.  She acknowledged consent and understanding of privacy and security of the video platform. The patient has agreed to participate  and understands they can discontinue the visit at any time.    Patient is aware this is a billable service.     HPI     Past Medical History:   Diagnosis Date    Anemia     in past    Anxiety     Arthritis 2022    Depression     Gallstones     Headache(784.0)     Inflammatory bowel disease     Migraine     Nausea     occ    Panic attack     Rotator cuff disorder, right     Vertigo        Past Surgical History:   Procedure Laterality Date    ABDOMINAL SURGERY      scar tissue removed     SECTION      last assessed: Oct 23, 2014     CHOLECYSTECTOMY  2018    HYSTERECTOMY      [place] LAPS SURG CHOLECYSTECTOMY W/CHOLANGIOGRAPHY N/A 2018    Procedure: CHOLECYSTECTOMY LAPAROSCOPIC  WITH  ATTEMPTED IOC;  Surgeon: Silvia Woods MD;  Location: [facility];  Service: General    TUBAL LIGATION         Current Outpatient Medications   Medication Sig Dispense Refill    ALPRAZolam (XANAX) 0.25 mg tablet May take one tablet in AM as needed for anxiety and one or two tablets at HS for anxiety/sleep as needed 90 tablet 3    cholecalciferol 1,000 units tablet Take 1 tablet (1,000 Units total) by mouth daily 90 tablet 1    docusate sodium (COLACE) 100 mg capsule Take 1 capsule (100 mg total) by mouth every morning 100 capsule 1    DULoxetine (Cymbalta) 30 mg delayed release capsule Take 1 capsule (30 mg total) by mouth daily 90 capsule 1    Dyclonine-Glycerin (Cepacol Sore Throat Spray) 0.1-33 % LIQD Apply 1 spray to the mouth or throat every 6 (six) hours 118 mL 0    famotidine (PEPCID) 40 MG tablet TAKE 1 TABLET(40 MG) BY MOUTH DAILY 90 tablet 0    ferrous sulfate 324 (65 Fe) mg TAKE 1 TABLET BY MOUTH EVERY OTHER DAY 90 tablet 3    fexofenadine (ALLEGRA) 180 MG tablet Take 1 tablet (180 mg total) by mouth daily 90 tablet 1    fluticasone (FLONASE) 50 mcg/act nasal spray 1 spray into each nostril daily 16 g 0    lidocaine (LIDODERM) 5 % UNWRAP AND APPLY 1 PATCH TOPICALLY TO THE AFFECTED AREA EVERY DAY(DO  NOT LEAVE ON FOR MORE THAN 12 HOURS) 90 patch 1    meclizine (ANTIVERT) 25 mg tablet TAKE 1 TABLET(25 MG) BY MOUTH THREE TIMES DAILY AS NEEDED FOR DIZZINESS 30 tablet 0    Mirabegron ER 25 MG TB24 Take 25 mg by mouth daily      mirtazapine (REMERON) 30 mg tablet Take 1 tablet (30 mg total) by mouth daily at bedtime 90 tablet 1    Nutritional Supplement LIQD [employer] Standard 1.4 Medical Nutrition Shakes 3900 mL 0    omeprazole (PriLOSEC) 40 MG capsule TAKE 1 CAPSULE(40 MG) BY MOUTH DAILY 100 capsule 1    ondansetron (ZOFRAN-ODT) 4 mg disintegrating tablet Take 1 tablet (4 mg total) by mouth every 8 (eight) hours as needed for nausea or vomiting 30 tablet 0    polyethylene glycol (GLYCOLAX) 17 GM/SCOOP powder MIX AND DRINK 17 GRAMS BY MOUTH EVERY  g 0    prochlorperazine (COMPAZINE) 5 mg tablet TAKE 1 TABLET(5 MG) BY MOUTH EVERY 6 HOURS AS NEEDED FOR NAUSEA OR VOMITING 30 tablet 0    SUMAtriptan (IMITREX) 50 mg tablet TAKE 1 TABLET(50 MG) BY MOUTH 1 TIME FOR UP TO 1 DOSE AS NEEDED FOR MIGRAINE 9 tablet 0    trospium chloride (SANCTURA) 20 mg tablet Take 20 mg by mouth 2 (two) times a day      vitamin B-12 (VITAMIN B-12) 1,000 mcg tablet Take 1 tablet (1,000 mcg total) by mouth daily 90 tablet 2     No current facility-administered medications for this visit.        No Known Allergies    Review of Systems    Video Exam    There were no vitals filed for this visit.    Physical Exam     Visit Time    Visit Start Time: 1500  Visit Stop Time: 1550  Total Visit Duration:  50 minutes

## 2024-12-03 ENCOUNTER — TELEMEDICINE (OUTPATIENT)
Dept: BEHAVIORAL/MENTAL HEALTH CLINIC | Facility: CLINIC | Age: 44
End: 2024-12-03
Payer: COMMERCIAL

## 2024-12-03 DIAGNOSIS — F33.1 MAJOR DEPRESSIVE DISORDER, RECURRENT, MODERATE (HCC): Primary | ICD-10-CM

## 2024-12-03 DIAGNOSIS — F41.1 GENERALIZED ANXIETY DISORDER: ICD-10-CM

## 2024-12-03 PROCEDURE — 90834 PSYTX W PT 45 MINUTES: CPT

## 2024-12-03 NOTE — PSYCH
"Behavioral Health Psychotherapy Progress Note    Psychotherapy Provided: Individual Psychotherapy     1. Major depressive disorder, recurrent, moderate (HCC)        2. Generalized anxiety disorder            Goals addressed in session: Goal 1 and Goal 2     DATA: Therapist began session with completion of check in. Екатерина reported doing “good.” Therapist asked how her Thanksgiving had gone. She reported her Thanksgiving having gone fairly well. Therapist asked to complete a storytelling activity. Therapist provided writing prompts that guided Екатерина through completion of writing about her past traumatic events and experiences. Екатерина spoke in detail about her pregnancy and journey being a single mother before meeting her . She spoke on this really supporting her and growing as a person. Therapist and Екатерина agreed to finish the activity focused on present issues and future goals in next session.    During this session, this clinician used the following therapeutic modalities: Client-centered Therapy, Cognitive Behavioral Therapy, Solution-Focused Therapy, and Supportive Psychotherapy    Substance Abuse was not addressed during this session. If the client is diagnosed with a co-occurring substance use disorder, please indicate any changes in the frequency or amount of use: n/a. Stage of change for addressing substance use diagnoses: No substance use/Not applicable    ASSESSMENT:  Екатерина Godinez presents with a Euthymic/ normal mood.     her affect is Normal range and intensity, which is congruent, with her mood and the content of the session. The client has made progress on their goals.     Екатерина Godinez presents with a none risk of suicide, none risk of self-harm, and none risk of harm to others.    For any risk assessment that surpasses a \"low\" rating, a safety plan must be developed.    A safety plan was indicated: no  If yes, describe in detail n/a    PLAN: Between sessions, Екатерина Godinez will continue to " prepare for upcoming surgery. At the next session, the therapist will use Client-centered Therapy, Cognitive Behavioral Therapy, Solution-Focused Therapy, and Supportive Psychotherapy to address depression.    Behavioral Health Treatment Plan and Discharge Planning: Екатерина Godinez is aware of and agrees to continue to work on their treatment plan. They have identified and are working toward their discharge goals. yes    Visit start and stop times:    12/03/2024  Start Time: 1500  Stop Time: 1551  Total Visit Time: 51 minutes    Virtual Regular Visit    Verification of patient location:    Patient is located at Home in the following state in which I hold an active license [place]      Assessment/Plan:    Problem List Items Addressed This Visit       Generalized anxiety disorder     Other Visit Diagnoses         Major depressive disorder, recurrent, moderate (HCC)    -  Primary            Goals addressed in session: Goal 1 and Goal 2          Reason for visit is   Chief Complaint   Patient presents with    Virtual Regular Visit          Encounter provider Edel Guthrie      Recent Visits  Date Type Provider Dept   12/03/24 Telemedicine Edel Guthrie [facility] Therapist [address]   Showing recent visits within past 7 days and meeting all other requirements  Future Appointments  No visits were found meeting these conditions.  Showing future appointments within next 150 days and meeting all other requirements       The patient was identified by name and date of birth. Екатерина Godinez was informed that this is a telemedicine visit and that the visit is being conducted throughthe [employer] platform. She agrees to proceed..  My office door was closed. No one else was in the room.  She acknowledged consent and understanding of privacy and security of the video platform. The patient has agreed to participate and understands they can discontinue the visit at any time.    Patient is aware this is a billable service.      HPI     Past Medical History:   Diagnosis Date    Anemia     in past    Anxiety     Arthritis 2022    Depression     Gallstones     Headache(784.0)     Inflammatory bowel disease     Migraine     Nausea     occ    Panic attack     Rotator cuff disorder, right     Vertigo        Past Surgical History:   Procedure Laterality Date    ABDOMINAL SURGERY      scar tissue removed     SECTION      last assessed: Oct 23, 2014     CHOLECYSTECTOMY  2018    HYSTERECTOMY      [place] LAPS SURG CHOLECYSTECTOMY W/CHOLANGIOGRAPHY N/A 2018    Procedure: CHOLECYSTECTOMY LAPAROSCOPIC  WITH  ATTEMPTED IOC;  Surgeon: Silvia Woods MD;  Location: [facility];  Service: General    TUBAL LIGATION         Current Outpatient Medications   Medication Sig Dispense Refill    ALPRAZolam (XANAX) 0.25 mg tablet May take one tablet in AM as needed for anxiety and one or two tablets at HS for anxiety/sleep as needed 90 tablet 3    cholecalciferol 1,000 units tablet Take 1 tablet (1,000 Units total) by mouth daily 90 tablet 1    docusate sodium (COLACE) 100 mg capsule Take 1 capsule (100 mg total) by mouth every morning 100 capsule 1    DULoxetine (Cymbalta) 30 mg delayed release capsule Take 1 capsule (30 mg total) by mouth daily 90 capsule 1    Dyclonine-Glycerin (Cepacol Sore Throat Spray) 0.1-33 % LIQD Apply 1 spray to the mouth or throat every 6 (six) hours 118 mL 0    famotidine (PEPCID) 40 MG tablet TAKE 1 TABLET(40 MG) BY MOUTH DAILY 90 tablet 0    ferrous sulfate 324 (65 Fe) mg TAKE 1 TABLET BY MOUTH EVERY OTHER DAY 90 tablet 3    fexofenadine (ALLEGRA) 180 MG tablet Take 1 tablet (180 mg total) by mouth daily 90 tablet 1    fluticasone (FLONASE) 50 mcg/act nasal spray 1 spray into each nostril daily 16 g 0    lidocaine (LIDODERM) 5 % UNWRAP AND APPLY 1 PATCH TOPICALLY TO THE AFFECTED AREA EVERY DAY(DO NOT LEAVE ON FOR MORE THAN 12 HOURS) 90 patch 1    meclizine (ANTIVERT) 25 mg tablet TAKE 1 TABLET(25 MG) BY  MOUTH THREE TIMES DAILY AS NEEDED FOR DIZZINESS 30 tablet 0    Mirabegron ER 25 MG TB24 Take 25 mg by mouth daily      mirtazapine (REMERON) 30 mg tablet Take 1 tablet (30 mg total) by mouth daily at bedtime 90 tablet 1    Nutritional Supplement DAXA [facility] Standard 1.4 Medical Nutrition Shakes 3900 mL 0    omeprazole (PriLOSEC) 40 MG capsule TAKE 1 CAPSULE(40 MG) BY MOUTH DAILY 100 capsule 1    ondansetron (ZOFRAN-ODT) 4 mg disintegrating tablet Take 1 tablet (4 mg total) by mouth every 8 (eight) hours as needed for nausea or vomiting 30 tablet 0    polyethylene glycol (GLYCOLAX) 17 GM/SCOOP powder MIX AND DRINK 17 GRAMS BY MOUTH EVERY  g 0    prochlorperazine (COMPAZINE) 5 mg tablet TAKE 1 TABLET(5 MG) BY MOUTH EVERY 6 HOURS AS NEEDED FOR NAUSEA OR VOMITING 30 tablet 0    SUMAtriptan (IMITREX) 50 mg tablet TAKE 1 TABLET(50 MG) BY MOUTH 1 TIME FOR UP TO 1 DOSE AS NEEDED FOR MIGRAINE 9 tablet 0    trospium chloride (SANCTURA) 20 mg tablet Take 20 mg by mouth 2 (two) times a day      vitamin B-12 (VITAMIN B-12) 1,000 mcg tablet Take 1 tablet (1,000 mcg total) by mouth daily 90 tablet 2     No current facility-administered medications for this visit.        No Known Allergies    Review of Systems    Video Exam    There were no vitals filed for this visit.    Physical Exam     Visit Time    Visit Start Time: 1500  Visit Stop Time: 1551  Total Visit Duration:  51 minutes

## 2024-12-04 ENCOUNTER — ANESTHESIA EVENT (OUTPATIENT)
Dept: PERIOP | Facility: HOSPITAL | Age: 44
End: 2024-12-04
Payer: MEDICARE

## 2024-12-04 DIAGNOSIS — R42 VERTIGO: ICD-10-CM

## 2024-12-04 RX ORDER — MECLIZINE HYDROCHLORIDE 25 MG/1
TABLET ORAL
Qty: 30 TABLET | Refills: 0 | Status: SHIPPED | OUTPATIENT
Start: 2024-12-04

## 2024-12-10 ENCOUNTER — TELEMEDICINE (OUTPATIENT)
Dept: BEHAVIORAL/MENTAL HEALTH CLINIC | Facility: CLINIC | Age: 44
End: 2024-12-10
Payer: COMMERCIAL

## 2024-12-10 DIAGNOSIS — F41.1 GENERALIZED ANXIETY DISORDER: ICD-10-CM

## 2024-12-10 DIAGNOSIS — F33.1 MAJOR DEPRESSIVE DISORDER, RECURRENT, MODERATE (HCC): Primary | ICD-10-CM

## 2024-12-10 PROCEDURE — 90834 PSYTX W PT 45 MINUTES: CPT

## 2024-12-11 NOTE — PSYCH
"Behavioral Health Psychotherapy Progress Note    Psychotherapy Provided: Individual Psychotherapy     1. Major depressive disorder, recurrent, moderate (HCC)        2. Generalized anxiety disorder            Goals addressed in session: Goal 1 and Goal 2     DATA: Therapist began session with completion of check in. Екатерина reported doing “good.” Therapist asked about updates of her surgery preparation. Екатерина reported that her cousin was moving out Thursday and she prepared the room over the weekend for her surgery on Tuesday. Therapist asked about her feelings. He never reported being both anxious and excited. Therapist asked to complete the true life activities started in last session. Екатерина discussed her perceptions on her current life situation and goals. She spoke on the growth that she feels she has had due to the past events in her life. Therapist and Екатерина discussed the person she would like to work towards in the future.    During this session, this clinician used the following therapeutic modalities: Client-centered Therapy, Cognitive Behavioral Therapy, Solution-Focused Therapy, and Supportive Psychotherapy    Substance Abuse was not addressed during this session. If the client is diagnosed with a co-occurring substance use disorder, please indicate any changes in the frequency or amount of use: n/a. Stage of change for addressing substance use diagnoses: No substance use/Not applicable    ASSESSMENT:  Екатерина Godinez presents with a Euthymic/ normal mood.     her affect is Normal range and intensity, which is congruent, with her mood and the content of the session. The client has made progress on their goals.     Екатерина Godinez presents with a none risk of suicide, none risk of self-harm, and none risk of harm to others.    For any risk assessment that surpasses a \"low\" rating, a safety plan must be developed.    A safety plan was indicated: no  If yes, describe in detail n/a    PLAN: Between sessions, " Екатерина Godinez will continue preparing for upcoming surgery. At the next session, the therapist will use Client-centered Therapy, Cognitive Behavioral Therapy, Solution-Focused Therapy, and Supportive Psychotherapy to address anxiousness.    Behavioral Health Treatment Plan and Discharge Planning: Екатерина Godinez is aware of and agrees to continue to work on their treatment plan. They have identified and are working toward their discharge goals. yes    Depression Follow-up Plan Completed: No    Visit start and stop times:    12/10/2024  Start Time: 1500  Stop Time: 1549  Total Visit Time: 49 minutes    Virtual Regular Visit    Verification of patient location:    Patient is located at Home in the following state in which I hold an active license [place]      Assessment/Plan:    Problem List Items Addressed This Visit       Generalized anxiety disorder     Other Visit Diagnoses         Major depressive disorder, recurrent, moderate (HCC)    -  Primary            Goals addressed in session: Goal 1 and Goal 2     Depression Follow-up Plan Completed: No    Reason for visit is   Chief Complaint   Patient presents with    Virtual Regular Visit          Encounter provider Edel Guthrie      Recent Visits  Date Type Provider Dept   12/10/24 Telemedicine Edel Guthrie [facility] Therapist [address]   Showing recent visits within past 7 days and meeting all other requirements  Future Appointments  No visits were found meeting these conditions.  Showing future appointments within next 150 days and meeting all other requirements       The patient was identified by name and date of birth. Екатерина Godinez was informed that this is a telemedicine visit and that the visit is being conducted throughthe [employer] platform. She agrees to proceed..  My office door was closed. No one else was in the room.  She acknowledged consent and understanding of privacy and security of the video platform. The patient has agreed to participate  and understands they can discontinue the visit at any time.    Patient is aware this is a billable service.     HPI     Past Medical History:   Diagnosis Date    Anemia     in past    Anxiety     Arthritis 2022    Depression     Gallstones     Headache(784.0)     Inflammatory bowel disease     Migraine     Nausea     occ    Panic attack     Rotator cuff disorder, right     Vertigo        Past Surgical History:   Procedure Laterality Date    ABDOMINAL SURGERY      scar tissue removed     SECTION      last assessed: Oct 23, 2014     CHOLECYSTECTOMY  2018    HYSTERECTOMY      [place] LAPS SURG CHOLECYSTECTOMY W/CHOLANGIOGRAPHY N/A 2018    Procedure: CHOLECYSTECTOMY LAPAROSCOPIC  WITH  ATTEMPTED IOC;  Surgeon: Silvia Woods MD;  Location: [facility];  Service: General    TUBAL LIGATION         Current Outpatient Medications   Medication Sig Dispense Refill    ALPRAZolam (XANAX) 0.25 mg tablet May take one tablet in AM as needed for anxiety and one or two tablets at HS for anxiety/sleep as needed 90 tablet 3    cholecalciferol 1,000 units tablet Take 1 tablet (1,000 Units total) by mouth daily 90 tablet 1    docusate sodium (COLACE) 100 mg capsule Take 1 capsule (100 mg total) by mouth every morning 100 capsule 1    DULoxetine (Cymbalta) 30 mg delayed release capsule Take 1 capsule (30 mg total) by mouth daily 90 capsule 1    Dyclonine-Glycerin (Cepacol Sore Throat Spray) 0.1-33 % LIQD Apply 1 spray to the mouth or throat every 6 (six) hours 118 mL 0    famotidine (PEPCID) 40 MG tablet TAKE 1 TABLET(40 MG) BY MOUTH DAILY 90 tablet 0    ferrous sulfate 324 (65 Fe) mg TAKE 1 TABLET BY MOUTH EVERY OTHER DAY 90 tablet 3    fexofenadine (ALLEGRA) 180 MG tablet Take 1 tablet (180 mg total) by mouth daily 90 tablet 1    fluticasone (FLONASE) 50 mcg/act nasal spray 1 spray into each nostril daily 16 g 0    lidocaine (LIDODERM) 5 % UNWRAP AND APPLY 1 PATCH TOPICALLY TO THE AFFECTED AREA EVERY DAY(DO  NOT LEAVE ON FOR MORE THAN 12 HOURS) 90 patch 1    meclizine (ANTIVERT) 25 mg tablet TAKE 1 TABLET(25 MG) BY MOUTH THREE TIMES DAILY AS NEEDED FOR DIZZINESS 30 tablet 0    Mirabegron ER 25 MG TB24 Take 25 mg by mouth daily      mirtazapine (REMERON) 30 mg tablet Take 1 tablet (30 mg total) by mouth daily at bedtime 90 tablet 1    Nutritional Supplement LIQD [employer] Standard 1.4 Medical Nutrition Shakes 3900 mL 0    omeprazole (PriLOSEC) 40 MG capsule TAKE 1 CAPSULE(40 MG) BY MOUTH DAILY 100 capsule 1    ondansetron (ZOFRAN-ODT) 4 mg disintegrating tablet Take 1 tablet (4 mg total) by mouth every 8 (eight) hours as needed for nausea or vomiting 30 tablet 0    polyethylene glycol (GLYCOLAX) 17 GM/SCOOP powder MIX AND DRINK 17 GRAMS BY MOUTH EVERY  g 0    prochlorperazine (COMPAZINE) 5 mg tablet TAKE 1 TABLET(5 MG) BY MOUTH EVERY 6 HOURS AS NEEDED FOR NAUSEA OR VOMITING 30 tablet 0    SUMAtriptan (IMITREX) 50 mg tablet TAKE 1 TABLET(50 MG) BY MOUTH 1 TIME FOR UP TO 1 DOSE AS NEEDED FOR MIGRAINE 9 tablet 0    trospium chloride (SANCTURA) 20 mg tablet Take 20 mg by mouth 2 (two) times a day      vitamin B-12 (VITAMIN B-12) 1,000 mcg tablet Take 1 tablet (1,000 mcg total) by mouth daily 90 tablet 2     No current facility-administered medications for this visit.        No Known Allergies    Review of Systems    Video Exam    There were no vitals filed for this visit.    Physical Exam     Visit Time    Visit Start Time: 1500  Visit Stop Time: 1550  Total Visit Duration:  50 minutes

## 2024-12-12 NOTE — PRE-PROCEDURE INSTRUCTIONS
Pre-Surgery Instructions:   Medication Instructions    ALPRAZolam (XANAX) 0.25 mg tablet Take day of surgery.    DULoxetine (Cymbalta) 30 mg delayed release capsule Take day of surgery.    famotidine (PEPCID) 40 MG tablet Uses PRN- OK to take day of surgery    lidocaine (LIDODERM) 5 % Hold day of surgery.    meclizine (ANTIVERT) 25 mg tablet Uses PRN- OK to take day of surgery    mirtazapine (REMERON) 30 mg tablet Take night before surgery    omeprazole (PriLOSEC) 40 MG capsule Take day of surgery.    ondansetron (ZOFRAN-ODT) 4 mg disintegrating tablet Uses PRN- OK to take day of surgery    prochlorperazine (COMPAZINE) 5 mg tablet Uses PRN- OK to take day of surgery    SUMAtriptan (IMITREX) 50 mg tablet Uses PRN- OK to take day of surgery      Medication instructions for day surgery reviewed. Please use only a sip of water to take your instructed medications. Avoid all over the counter vitamins, supplements and NSAIDS starting today 12/12. Tylenol is ok to take as needed.     You will receive a call one business day prior to surgery with an arrival time and hospital directions. If your surgery is scheduled on a Monday, the hospital will be calling you on the Friday prior to your surgery. If you have not heard from anyone by 8pm, please call the hospital supervisor through the hospital  at [phone]. ([place] [phone] or [place] [phone]).    Do not eat or drink anything after midnight the night before your surgery, including candy, mints, lifesavers, or chewing gum. Do not drink alcohol 24hrs before your surgery. Try not to smoke at least 24hrs before your surgery.       Follow the pre surgery showering instructions as listed in the “My Surgical Experience Booklet” or otherwise provided by your surgeon's office. Do not use a blade to shave the surgical area 1 week before surgery. It is okay to use a clean electric clippers up to 24 hours before surgery. Do not apply any lotions, creams,  including makeup, cologne, deodorant, or perfumes after showering on the day of your surgery. Do not use dry shampoo, hair spray, hair gel, or any type of hair products.     No contact lenses, eye make-up, or artificial eyelashes. Remove nail polish, including gel polish, and any artificial, gel, or acrylic nails if possible. Remove all jewelry including rings and body piercing jewelry.     Wear causal clothing that is easy to take on and off. Consider your type of surgery.    Keep any valuables, jewelry, piercings at home. Please bring any specially ordered equipment (sling, braces) if indicated.    Arrange for a responsible person to drive you to and from the hospital on the day of your surgery. Please confirm the visitor policy for the day of your procedure when you receive your phone call with an arrival time.     Call the surgeon's office with any new illnesses, exposures, or additional questions prior to surgery.    Please reference your “My Surgical Experience Booklet” for additional information to prepare for your upcoming surgery.

## 2024-12-13 ENCOUNTER — TELEPHONE (OUTPATIENT)
Dept: SURGERY | Facility: CLINIC | Age: 44
End: 2024-12-13

## 2024-12-13 NOTE — TELEPHONE ENCOUNTER
Pt is scheduled for surgery with Dr. Jeff on 12/17/24.    On 12/5/24 I faxed clinicals to [facility] and spoke with rep; was told no pre auth was needed for this surgery.    On 12/9/24 [facility] told me that surgery needed pre auth.  I re-faxed info to [facility]. On 12/12, I called [facility] and was told that they did not receive clinicals; I refaxed them to the Nurse Reviewer; was told that response would be given quickly.    On 12/13, pre-auth [facility] team reached out again, stating that there was no pre-auth noted and that we would need to cancel the surgery.  I called [facility]; team was gone for the day.  Was told by Maritza at [facility] to re-fax again and call on Monday to speak with pre-auth team to expedite.    mb

## 2024-12-16 ENCOUNTER — TELEMEDICINE (OUTPATIENT)
Dept: BEHAVIORAL/MENTAL HEALTH CLINIC | Facility: CLINIC | Age: 44
End: 2024-12-16
Payer: COMMERCIAL

## 2024-12-16 DIAGNOSIS — F33.1 MAJOR DEPRESSIVE DISORDER, RECURRENT, MODERATE (HCC): Primary | ICD-10-CM

## 2024-12-16 DIAGNOSIS — F41.1 GENERALIZED ANXIETY DISORDER: ICD-10-CM

## 2024-12-16 PROCEDURE — 90834 PSYTX W PT 45 MINUTES: CPT

## 2024-12-16 NOTE — PSYCH
"Behavioral Health Psychotherapy Progress Note    Psychotherapy Provided: Individual Psychotherapy     1. Major depressive disorder, recurrent, moderate (HCC)        2. Generalized anxiety disorder            Goals addressed in session: Goal 1 and Goal 2     DATA: Therapist began session with completion of check in. Екатерина reported doing “good.” Therapist asked if she was feeling prepared for her upcoming surgery tomorrow. Екатерина stated that she was both excited and anxious. Therapist validated her emotions reminded her of all the preparations she had made for it. Екатерина stated that she was not nervous with the surgery itself but more so for the recovery process. Therapist asked to review all the preparations she made for the recovery process including her coping skills and supports. Therapist also asked to review all her achievements for the year 2024 given that they were closing in on the end of the year. You never spoke on achievements such as improving her relationship with her daughter, improving her confidence and improving her boundaries.  During this session, this clinician used the following therapeutic modalities: Client-centered Therapy, Cognitive Behavioral Therapy, Solution-Focused Therapy, and Supportive Psychotherapy    Substance Abuse was not addressed during this session. If the client is diagnosed with a co-occurring substance use disorder, please indicate any changes in the frequency or amount of use: n/a. Stage of change for addressing substance use diagnoses: No substance use/Not applicable    ASSESSMENT:  Екатерина Godinez presents with a Euthymic/ normal mood.     her affect is Normal range and intensity, which is congruent, with her mood and the content of the session. The client has made progress on their goals.     Екатерина Godinez presents with a none risk of suicide, none risk of self-harm, and none risk of harm to others.    For any risk assessment that surpasses a \"low\" rating, a safety plan " must be developed.    A safety plan was indicated: no  If yes, describe in detail n/a    PLAN: Between sessions, Екатерина Godinez will continue to reflect on her achievements for the year. At the next session, the therapist will use Client-centered Therapy, Cognitive Behavioral Therapy, Solution-Focused Therapy, and Supportive Psychotherapy to address positive thinking.    Behavioral Health Treatment Plan and Discharge Planning: Екатерина Godinez is aware of and agrees to continue to work on their treatment plan. They have identified and are working toward their discharge goals. yes    Depression Follow-up Plan Completed: No    Visit start and stop times:    12/16/2024  Start Time: 1500  Stop Time: 1550  Total Visit Time: 50 minutes    Virtual Regular Visit    Verification of patient location:    Patient is located at Home in the following state in which I hold an active license [place]      Assessment/Plan:    Problem List Items Addressed This Visit       Generalized anxiety disorder     Other Visit Diagnoses         Major depressive disorder, recurrent, moderate (HCC)    -  Primary            Goals addressed in session: Goal 1 and Goal 2     Depression Follow-up Plan Completed: No    Reason for visit is   Chief Complaint   Patient presents with    Virtual Regular Visit          Encounter provider Edel Guthrie      Recent Visits  Date Type Provider Dept   12/16/24 Telemedicine Edel Guthrie [facility] Therapist [address]   12/10/24 Telemedicine Edel Guthrie [facility] Therapist [address]   Showing recent visits within past 7 days and meeting all other requirements  Future Appointments  No visits were found meeting these conditions.  Showing future appointments within next 150 days and meeting all other requirements       The patient was identified by name and date of birth. Екатерина Godinez was informed that this is a telemedicine visit and that the [facility] is being [facility]. She agrees to proceed..  My office door was closed. No one else was in the room.  She acknowledged consent and understanding of privacy and security of the video platform. The patient has agreed to participate and understands they can discontinue the visit at any time.    Patient is aware this is a billable service.       HPI     Past Medical History:   Diagnosis Date    Anemia     in past    Anxiety     Arthritis 2022    Depression     Gallstones     GERD (gastroesophageal reflux disease)     Headache(784.0)     Inflammatory bowel disease     Migraine     Nausea     occ    Panic attack     Rotator cuff disorder, right     Vertigo        Past Surgical History:   Procedure Laterality Date    ABDOMINAL SURGERY      scar tissue removed     SECTION      last assessed: Oct 23, 2014     CHOLECYSTECTOMY  2018    HYSTERECTOMY      [place] LAPS SURG CHOLECYSTECTOMY W/CHOLANGIOGRAPHY N/A 2018    Procedure: CHOLECYSTECTOMY LAPAROSCOPIC  WITH  ATTEMPTED IOC;  Surgeon: Silvia Woods MD;  Location: [facility];  Service: General    REMOVAL BLADDER STIMULATOR Left 2024    stimulator placed 2024-L. side, has remote    TUBAL LIGATION         No current facility-administered medications for this visit.     No current outpatient medications on file.     Facility-Administered Medications Ordered in Other Visits   Medication Dose Route Frequency Provider Last Rate Last Admin    dexamethasone (PF) (DECADRON) injection   Intravenous PRN Megan Hahn, CRNA   4 mg at 24 0800    ePHEDrine injection   Intravenous PRN Megan Hahn, CRNA   10 mg at 24 0805    fentaNYL injection   Intravenous PRN Megan Hahn, CRNA   50 mcg at 24 0753    lactated ringers infusion   Intravenous Continuous PRN Megan Dubow, CRNA   New Bag at 24 0703    lactated ringers infusion   Intravenous Continuous PRN Megan Loyolaow, CRNA   New Bag at 24 0817    lidocaine (PF) (XYLOCAINE-MPF) 1 % injection    Intravenous PRN Megan Dubow, CRNA   50 mg at 12/17/24 0737    midazolam (VERSED) injection   Intravenous PRN Megan Dubow, CRNA   1 mg at 12/17/24 0730    ondansetron (ZOFRAN) injection   Intravenous PRN Megan Dubow, CRNA   4 mg at 12/17/24 0801    propofol (DIPRIVAN) 200 MG/20ML bolus injection   Intravenous PRN Megan Dubow, CRNA   50 mg at 12/17/24 0820    ROCuronium (ZEMURON) injection   Intravenous PRN Megan Dubow, CRNA   10 mg at 12/17/24 0819    Succinylcholine Chloride 100 mg/5 mL syringe   Intravenous PRN Megan Dubow, CRNA   100 mg at 12/17/24 0737    Sugammadex Sodium (BRIDION)   Intravenous PRN Megan Dubow, CRNA   100 mg at 12/17/24 0840        No Known Allergies    Review of Systems    Video Exam    There were no vitals filed for this visit.    Physical Exam     Visit Time    Visit Start Time: 1500  Visit Stop Time: 1550  Total Visit Duration:  50 minutes

## 2024-12-17 ENCOUNTER — ANESTHESIA (OUTPATIENT)
Dept: PERIOP | Facility: HOSPITAL | Age: 44
End: 2024-12-17
Payer: MEDICARE

## 2024-12-17 ENCOUNTER — HOSPITAL ENCOUNTER (OUTPATIENT)
Facility: HOSPITAL | Age: 44
Setting detail: OUTPATIENT SURGERY
Discharge: HOME | End: 2024-12-18
Attending: SURGERY | Admitting: SURGERY
Payer: MEDICARE

## 2024-12-17 DIAGNOSIS — K31.84 GASTROPARESIS: Primary | ICD-10-CM

## 2024-12-17 LAB
PLATELET # BLD AUTO: 210 THOUSANDS/UL (ref 149–390)
PMV BLD AUTO: 10.9 FL (ref 8.9–12.7)

## 2024-12-17 PROCEDURE — NC001 PR NO CHARGE: Performed by: SURGERY

## 2024-12-17 PROCEDURE — S2900 ROBOTIC SURGICAL SYSTEM: HCPCS | Performed by: PHYSICIAN ASSISTANT

## 2024-12-17 PROCEDURE — 85049 AUTOMATED PLATELET COUNT: CPT | Performed by: SURGERY

## 2024-12-17 PROCEDURE — 43800 PYLOROPLASTY: CPT | Performed by: PHYSICIAN ASSISTANT

## 2024-12-17 PROCEDURE — 43800 PYLOROPLASTY: CPT | Performed by: SURGERY

## 2024-12-17 PROCEDURE — S2900 ROBOTIC SURGICAL SYSTEM: HCPCS | Performed by: SURGERY

## 2024-12-17 RX ORDER — FENTANYL CITRATE 50 UG/ML
INJECTION, SOLUTION INTRAMUSCULAR; INTRAVENOUS AS NEEDED
Status: DISCONTINUED | OUTPATIENT
Start: 2024-12-17 | End: 2024-12-17

## 2024-12-17 RX ORDER — CEFAZOLIN SODIUM 2 G/50ML
2000 SOLUTION INTRAVENOUS ONCE
Status: COMPLETED | OUTPATIENT
Start: 2024-12-17 | End: 2024-12-17

## 2024-12-17 RX ORDER — HYDROMORPHONE HCL IN WATER/PF 6 MG/30 ML
0.2 PATIENT CONTROLLED ANALGESIA SYRINGE INTRAVENOUS
Status: DISCONTINUED | OUTPATIENT
Start: 2024-12-17 | End: 2024-12-17 | Stop reason: HOSPADM

## 2024-12-17 RX ORDER — ROCURONIUM BROMIDE 10 MG/ML
INJECTION, SOLUTION INTRAVENOUS AS NEEDED
Status: DISCONTINUED | OUTPATIENT
Start: 2024-12-17 | End: 2024-12-17

## 2024-12-17 RX ORDER — SODIUM CHLORIDE, SODIUM LACTATE, POTASSIUM CHLORIDE, CALCIUM CHLORIDE 600; 310; 30; 20 MG/100ML; MG/100ML; MG/100ML; MG/100ML
125 INJECTION, SOLUTION INTRAVENOUS CONTINUOUS
Status: DISCONTINUED | OUTPATIENT
Start: 2024-12-17 | End: 2024-12-18

## 2024-12-17 RX ORDER — HEPARIN SODIUM 5000 [USP'U]/ML
5000 INJECTION, SOLUTION INTRAVENOUS; SUBCUTANEOUS EVERY 8 HOURS SCHEDULED
Status: DISCONTINUED | OUTPATIENT
Start: 2024-12-17 | End: 2024-12-18 | Stop reason: HOSPADM

## 2024-12-17 RX ORDER — OXYCODONE HYDROCHLORIDE 5 MG/1
5 TABLET ORAL EVERY 4 HOURS PRN
Refills: 0 | Status: DISCONTINUED | OUTPATIENT
Start: 2024-12-17 | End: 2024-12-18 | Stop reason: HOSPADM

## 2024-12-17 RX ORDER — HYDROMORPHONE HCL/PF 1 MG/ML
0.5 SYRINGE (ML) INJECTION
Refills: 0 | Status: DISCONTINUED | OUTPATIENT
Start: 2024-12-17 | End: 2024-12-18 | Stop reason: HOSPADM

## 2024-12-17 RX ORDER — DEXAMETHASONE SODIUM PHOSPHATE 10 MG/ML
INJECTION, SOLUTION INTRAMUSCULAR; INTRAVENOUS AS NEEDED
Status: DISCONTINUED | OUTPATIENT
Start: 2024-12-17 | End: 2024-12-17

## 2024-12-17 RX ORDER — ALPRAZOLAM 0.25 MG/1
0.25 TABLET ORAL
Status: DISCONTINUED | OUTPATIENT
Start: 2024-12-17 | End: 2024-12-18 | Stop reason: HOSPADM

## 2024-12-17 RX ORDER — SODIUM CHLORIDE, SODIUM LACTATE, POTASSIUM CHLORIDE, CALCIUM CHLORIDE 600; 310; 30; 20 MG/100ML; MG/100ML; MG/100ML; MG/100ML
INJECTION, SOLUTION INTRAVENOUS CONTINUOUS PRN
Status: DISCONTINUED | OUTPATIENT
Start: 2024-12-17 | End: 2024-12-17

## 2024-12-17 RX ORDER — SUCRALFATE 1 G/1
1 TABLET ORAL
Status: DISCONTINUED | OUTPATIENT
Start: 2024-12-17 | End: 2024-12-18 | Stop reason: HOSPADM

## 2024-12-17 RX ORDER — ONDANSETRON 2 MG/ML
INJECTION INTRAMUSCULAR; INTRAVENOUS AS NEEDED
Status: DISCONTINUED | OUTPATIENT
Start: 2024-12-17 | End: 2024-12-17

## 2024-12-17 RX ORDER — OXYCODONE HYDROCHLORIDE 10 MG/1
10 TABLET ORAL EVERY 4 HOURS PRN
Refills: 0 | Status: DISCONTINUED | OUTPATIENT
Start: 2024-12-17 | End: 2024-12-18 | Stop reason: HOSPADM

## 2024-12-17 RX ORDER — PROPOFOL 10 MG/ML
INJECTION, EMULSION INTRAVENOUS AS NEEDED
Status: DISCONTINUED | OUTPATIENT
Start: 2024-12-17 | End: 2024-12-17

## 2024-12-17 RX ORDER — MIRTAZAPINE 30 MG/1
30 TABLET, FILM COATED ORAL
Status: DISCONTINUED | OUTPATIENT
Start: 2024-12-17 | End: 2024-12-18 | Stop reason: HOSPADM

## 2024-12-17 RX ORDER — MIDAZOLAM HYDROCHLORIDE 2 MG/2ML
INJECTION, SOLUTION INTRAMUSCULAR; INTRAVENOUS AS NEEDED
Status: DISCONTINUED | OUTPATIENT
Start: 2024-12-17 | End: 2024-12-17

## 2024-12-17 RX ORDER — PANTOPRAZOLE SODIUM 20 MG/1
20 TABLET, DELAYED RELEASE ORAL
Status: DISCONTINUED | OUTPATIENT
Start: 2024-12-17 | End: 2024-12-18 | Stop reason: HOSPADM

## 2024-12-17 RX ORDER — FAMOTIDINE 20 MG/1
40 TABLET, FILM COATED ORAL DAILY
Status: DISCONTINUED | OUTPATIENT
Start: 2024-12-17 | End: 2024-12-18 | Stop reason: HOSPADM

## 2024-12-17 RX ORDER — FENTANYL CITRATE/PF 50 MCG/ML
25 SYRINGE (ML) INJECTION
Status: DISCONTINUED | OUTPATIENT
Start: 2024-12-17 | End: 2024-12-17 | Stop reason: HOSPADM

## 2024-12-17 RX ORDER — SUCCINYLCHOLINE/SOD CL,ISO/PF 100 MG/5ML
SYRINGE (ML) INTRAVENOUS AS NEEDED
Status: DISCONTINUED | OUTPATIENT
Start: 2024-12-17 | End: 2024-12-17

## 2024-12-17 RX ORDER — BUPIVACAINE HYDROCHLORIDE 2.5 MG/ML
INJECTION, SOLUTION EPIDURAL; INFILTRATION; INTRACAUDAL AS NEEDED
Status: DISCONTINUED | OUTPATIENT
Start: 2024-12-17 | End: 2024-12-17 | Stop reason: HOSPADM

## 2024-12-17 RX ORDER — DULOXETIN HYDROCHLORIDE 30 MG/1
30 CAPSULE, DELAYED RELEASE ORAL DAILY
Status: DISCONTINUED | OUTPATIENT
Start: 2024-12-17 | End: 2024-12-18 | Stop reason: HOSPADM

## 2024-12-17 RX ORDER — ACETAMINOPHEN 325 MG/1
975 TABLET ORAL EVERY 6 HOURS PRN
Status: DISCONTINUED | OUTPATIENT
Start: 2024-12-17 | End: 2024-12-18 | Stop reason: HOSPADM

## 2024-12-17 RX ORDER — SODIUM CHLORIDE, SODIUM LACTATE, POTASSIUM CHLORIDE, CALCIUM CHLORIDE 600; 310; 30; 20 MG/100ML; MG/100ML; MG/100ML; MG/100ML
75 INJECTION, SOLUTION INTRAVENOUS CONTINUOUS
Status: DISCONTINUED | OUTPATIENT
Start: 2024-12-17 | End: 2024-12-18

## 2024-12-17 RX ORDER — EPHEDRINE SULFATE 50 MG/ML
INJECTION INTRAVENOUS AS NEEDED
Status: DISCONTINUED | OUTPATIENT
Start: 2024-12-17 | End: 2024-12-17

## 2024-12-17 RX ORDER — METRONIDAZOLE 500 MG/100ML
500 INJECTION, SOLUTION INTRAVENOUS ONCE
Status: COMPLETED | OUTPATIENT
Start: 2024-12-17 | End: 2024-12-17

## 2024-12-17 RX ORDER — ONDANSETRON 2 MG/ML
4 INJECTION INTRAMUSCULAR; INTRAVENOUS EVERY 4 HOURS PRN
Status: DISCONTINUED | OUTPATIENT
Start: 2024-12-17 | End: 2024-12-18 | Stop reason: HOSPADM

## 2024-12-17 RX ORDER — LIDOCAINE HYDROCHLORIDE 10 MG/ML
INJECTION, SOLUTION EPIDURAL; INFILTRATION; INTRACAUDAL; PERINEURAL AS NEEDED
Status: DISCONTINUED | OUTPATIENT
Start: 2024-12-17 | End: 2024-12-17

## 2024-12-17 RX ORDER — ONDANSETRON 2 MG/ML
4 INJECTION INTRAMUSCULAR; INTRAVENOUS ONCE AS NEEDED
Status: COMPLETED | OUTPATIENT
Start: 2024-12-17 | End: 2024-12-17

## 2024-12-17 RX ADMIN — FAMOTIDINE 40 MG: 20 TABLET, FILM COATED ORAL at 16:18

## 2024-12-17 RX ADMIN — SODIUM CHLORIDE, SODIUM LACTATE, POTASSIUM CHLORIDE, AND CALCIUM CHLORIDE 125 ML/HR: .6; .31; .03; .02 INJECTION, SOLUTION INTRAVENOUS at 13:05

## 2024-12-17 RX ADMIN — HYDROMORPHONE HYDROCHLORIDE 0.2 MG: 0.2 INJECTION, SOLUTION INTRAMUSCULAR; INTRAVENOUS; SUBCUTANEOUS at 10:32

## 2024-12-17 RX ADMIN — MIRTAZAPINE 30 MG: 30 TABLET, FILM COATED ORAL at 21:14

## 2024-12-17 RX ADMIN — MIDAZOLAM 1 MG: 1 INJECTION INTRAMUSCULAR; INTRAVENOUS at 07:30

## 2024-12-17 RX ADMIN — FENTANYL CITRATE 50 MCG: 50 INJECTION INTRAMUSCULAR; INTRAVENOUS at 07:53

## 2024-12-17 RX ADMIN — OXYCODONE HYDROCHLORIDE 10 MG: 10 TABLET ORAL at 21:15

## 2024-12-17 RX ADMIN — SUCRALFATE 1 G: 1 TABLET ORAL at 16:18

## 2024-12-17 RX ADMIN — ROCURONIUM BROMIDE 10 MG: 10 INJECTION, SOLUTION INTRAVENOUS at 08:19

## 2024-12-17 RX ADMIN — Medication 100 MG: at 07:37

## 2024-12-17 RX ADMIN — PANTOPRAZOLE SODIUM 20 MG: 20 TABLET, DELAYED RELEASE ORAL at 16:19

## 2024-12-17 RX ADMIN — OXYCODONE HYDROCHLORIDE 10 MG: 10 TABLET ORAL at 13:24

## 2024-12-17 RX ADMIN — ONDANSETRON 4 MG: 2 INJECTION INTRAMUSCULAR; INTRAVENOUS at 16:16

## 2024-12-17 RX ADMIN — METRONIDAZOLE: 500 SOLUTION INTRAVENOUS at 07:30

## 2024-12-17 RX ADMIN — HEPARIN SODIUM 5000 UNITS: 5000 INJECTION, SOLUTION INTRAVENOUS; SUBCUTANEOUS at 21:15

## 2024-12-17 RX ADMIN — FENTANYL CITRATE 50 MCG: 50 INJECTION INTRAMUSCULAR; INTRAVENOUS at 07:37

## 2024-12-17 RX ADMIN — FENTANYL CITRATE 25 MCG: 50 INJECTION INTRAMUSCULAR; INTRAVENOUS at 09:17

## 2024-12-17 RX ADMIN — ONDANSETRON 4 MG: 2 INJECTION INTRAMUSCULAR; INTRAVENOUS at 08:01

## 2024-12-17 RX ADMIN — FENTANYL CITRATE 25 MCG: 50 INJECTION INTRAMUSCULAR; INTRAVENOUS at 09:08

## 2024-12-17 RX ADMIN — EPHEDRINE SULFATE 10 MG: 50 INJECTION, SOLUTION INTRAVENOUS at 08:02

## 2024-12-17 RX ADMIN — PROPOFOL 100 MG: 10 INJECTION, EMULSION INTRAVENOUS at 07:37

## 2024-12-17 RX ADMIN — HYDROMORPHONE HYDROCHLORIDE 0.2 MG: 0.2 INJECTION, SOLUTION INTRAMUSCULAR; INTRAVENOUS; SUBCUTANEOUS at 09:32

## 2024-12-17 RX ADMIN — SODIUM CHLORIDE, SODIUM LACTATE, POTASSIUM CHLORIDE, CALCIUM CHLORIDE: 600; 310; 30; 20 INJECTION, SOLUTION INTRAVENOUS at 08:17

## 2024-12-17 RX ADMIN — ONDANSETRON 4 MG: 2 INJECTION INTRAMUSCULAR; INTRAVENOUS at 21:15

## 2024-12-17 RX ADMIN — HYDROMORPHONE HYDROCHLORIDE 0.2 MG: 0.2 INJECTION, SOLUTION INTRAMUSCULAR; INTRAVENOUS; SUBCUTANEOUS at 09:48

## 2024-12-17 RX ADMIN — HEPARIN SODIUM 5000 UNITS: 5000 INJECTION, SOLUTION INTRAVENOUS; SUBCUTANEOUS at 16:19

## 2024-12-17 RX ADMIN — LIDOCAINE HYDROCHLORIDE 50 MG: 10 INJECTION, SOLUTION EPIDURAL; INFILTRATION; INTRACAUDAL; PERINEURAL at 07:37

## 2024-12-17 RX ADMIN — SODIUM CHLORIDE, SODIUM LACTATE, POTASSIUM CHLORIDE, AND CALCIUM CHLORIDE: .6; .31; .03; .02 INJECTION, SOLUTION INTRAVENOUS at 07:03

## 2024-12-17 RX ADMIN — SUCRALFATE 1 G: 1 TABLET ORAL at 13:25

## 2024-12-17 RX ADMIN — SUCRALFATE 1 G: 1 TABLET ORAL at 21:15

## 2024-12-17 RX ADMIN — EPHEDRINE SULFATE 10 MG: 50 INJECTION, SOLUTION INTRAVENOUS at 08:05

## 2024-12-17 RX ADMIN — DEXAMETHASONE SODIUM PHOSPHATE 4 MG: 10 INJECTION, SOLUTION INTRAMUSCULAR; INTRAVENOUS at 08:00

## 2024-12-17 RX ADMIN — ONDANSETRON 4 MG: 2 INJECTION INTRAMUSCULAR; INTRAVENOUS at 09:28

## 2024-12-17 RX ADMIN — DULOXETINE HYDROCHLORIDE 30 MG: 30 CAPSULE, DELAYED RELEASE ORAL at 13:24

## 2024-12-17 RX ADMIN — ROCURONIUM BROMIDE 20 MG: 10 INJECTION, SOLUTION INTRAVENOUS at 07:49

## 2024-12-17 RX ADMIN — CEFAZOLIN SODIUM 2000 MG: 2 SOLUTION INTRAVENOUS at 07:30

## 2024-12-17 RX ADMIN — PROPOFOL 50 MG: 10 INJECTION, EMULSION INTRAVENOUS at 08:20

## 2024-12-17 RX ADMIN — SUGAMMADEX 100 MG: 100 INJECTION, SOLUTION INTRAVENOUS at 08:40

## 2024-12-17 NOTE — QUICK NOTE
"Post-Op Check    Assessment:  S/p 12/17 robotic pyloroplasty.    Subjective:  Pt reports abdominal soreness. Mild nausea after CLD, denies chest pain, SOB. Voiding.    Objective:  General: NAD  CV: nl rate  Lungs: nl wob. No resp distress.  ABD: Soft, ND, upper tenderness, CDI  Extrem: No CCE    Patient Vitals for the past 24 hrs:   BP Temp Temp src Pulse Resp SpO2 Height Weight   12/17/24 1228 112/70 97.8 °F (36.6 °C) -- 67 -- 97 % -- --   12/17/24 1100 100/63 -- -- 78 (!) 27 98 % -- --   12/17/24 1000 98/66 97.8 °F (36.6 °C) -- 65 21 100 % -- --   12/17/24 0945 108/64 -- -- 67 (!) 24 100 % -- --   12/17/24 0930 115/59 -- -- 72 (!) 32 99 % -- --   12/17/24 0915 127/58 -- -- 64 -- 98 % -- --   12/17/24 0900 138/70 97.6 °F (36.4 °C) -- 66 (!) 11 100 % -- --   12/17/24 0618 116/70 98 °F (36.7 °C) Temporal 80 18 99 % 4' 11\" (1.499 m) 37.2 kg (82 lb)       "

## 2024-12-17 NOTE — ANESTHESIA PREPROCEDURE EVALUATION
Procedure:  PYLOROPLASTY LAPAROSCOPIC WITH ROBOT (Abdomen)  ESOPHAGOGASTRODUODENOSCOPY (EGD) (Abdomen)    Relevant Problems   CARDIO   (+) Common migraine with intractable migraine   (+) Migraine with aura and without status migrainosus, not intractable      GI/HEPATIC   (+) SMAS (superior mesenteric artery syndrome) (HCC)      MUSCULOSKELETAL   (+) Osteoarthritis of right hip      NEURO/PSYCH   (+) Common migraine with intractable migraine   (+) Current moderate episode of major depressive disorder without prior episode (HCC)   (+) Generalized anxiety disorder   (+) Migraine with aura and without status migrainosus, not intractable      Lab Results   Component Value Date    WBC 7.47 11/02/2024    HGB 12.2 11/02/2024    HCT 36.0 11/02/2024    MCV 93 11/02/2024     11/02/2024     Lab Results   Component Value Date     10/31/2014    K 3.9 11/02/2024    CO2 26 11/02/2024     11/02/2024    BUN 16 11/02/2024    CREATININE 0.72 11/02/2024     Lab Results   Component Value Date    GLUCOSE 99 10/31/2014    GLUCOSE 85 02/01/2014       Lab Results   Component Value Date    HGBA1C 5.5 11/02/2024       Physical Exam    Airway    Mallampati score: I  TM Distance: <3 FB  Neck ROM: full     Dental       Cardiovascular      Pulmonary      Other Findings  post-pubertal.      Anesthesia Plan  ASA Score- 2     Anesthesia Type- general with ASA Monitors.         Additional Monitors:     Airway Plan:            Plan Factors-Exercise tolerance (METS): >4 METS.    Chart reviewed.   Existing labs reviewed. Patient summary reviewed.                  Induction- intravenous.    Postoperative Plan- Plan for postoperative opioid use. Planned trial extubation        Informed Consent- Anesthetic plan and risks discussed with patient.  I personally reviewed this patient with the CRNA. Discussed and agreed on the Anesthesia Plan with the CRNA..

## 2024-12-17 NOTE — DISCHARGE INSTR - AVS FIRST PAGE
Post-Operative Care Instructions             Dr. Pradip Jeff M.D.    1. General: You will feel pulling sensations around the wound and/or aches and pains around the incisions. This is normal. Even minor surgery is a change in your body and this is your body’s way of reaction to it. If you have had abdominal surgery, it may help to support the incision with a small pillow or blanket for comfort when moving or coughing.    2. Wound care:    Bandage/Dressing - Make sure to remove the bandage in about 48 hours, unless instructed otherwise. You usually don't have to redress the wound after 24-48 hours, unless for comfort. Keep the incision clean and dry. Let air get to it. If the Steri-Strips fall off, just keep the wound clean.     Glue - Leave glue alone, it will fall off on its own, no need for an additional dressings    3. Water: You may shower over the wound, unless there are drain tubes left in place. Do not bathe or use a pool or hot tub until cleared by the physician. You may shower right over the staples, glue or Steri-Strips and rinse wound with soapy water but do not scrub incision pat dry when you are done.    4. Activity: You may go up and down stairs, walk as much as you are comfortable, but walk at least 3 times each day. If you have had abdominal or hernia surgery, do not lift anything heavier than 15 pounds for at least 4 weeks.    5. Diet: You may resume a regular diet. If you had a same-day surgery or overnight stay surgery, you may wish to eat lightly for a few days: soups, crackers, and sandwiches. You may resume a regular diet when ready.    6. Medications: Resume all of your previous medications, unless told otherwise by the doctor. Tylenol and ibuoprofen is always fine, unless you are taking any narcotic pain medication containing Tylenol (such as Percocet, Darvocet, Vicodin, or anything containing acetaminophen). Do not take Tylenol if you're taking these medications. You do not need to take  the narcotic pain medications unless you are having significant pain and discomfort.    7. Driving: He will need someone to drive you home on the day of surgery. Do not drive or make any important decisions while on narcotic pain medication or 24 hours and after anesthesia or sedation for surgery. Generally, you may drive when your off all narcotic pain medications, and you can turn in your seat comfortably to check your blind spot.     8. Upset Stomach: You may take Maalox, Tums, or similar items for an upset stomach. If your narcotic pain medication causes an upset stomach, do not take it on an empty stomach. Try taking it with at least some crackers or toast.     9. Constipation: Patients often experienced constipation after surgery. You may take over-the-counter medication for this, such as Metamucil, Senokot, Dulcolax, milk of magnesia, etc. You may take a suppository unless you have had anorectal surgery such as a procedure on your hemorrhoids. If you experience significant nausea or vomiting after abdominal surgery, call the office before trying any of these medications.    10. Call the office: If you are experiencing any of the following, fevers above 101.5°, significant nausea or vomiting, if the wound develops drainage and/or is excessive redness around the wound, or if you have significant diarrhea or other worsening symptoms.    11. Pain: You may be given a prescription for pain. This will be given to the hospital, the day of surgery.    12. Sexual Activity: You may resume sexual activity when you feel ready and comfortable and your incision is sealed and healed without apparent infection risk.    [facility]  Phone: [phone]      Please continue on a full liquid diet until 12/23/2024, at which time you may commence the diet as below.    Post-op Foregut Diet    · Foods should be very soft consistency; semi solid or fork tender.    · Chew foods very well before swallowing; allow  20-30 minutes per meal.    · Eat every 3-4 hours, have 3 small meals and 2-3 small snacks    · Follow this diet for at least 3 weeks after surgery or until otherwise directed by your surgeon.    Acceptable Foods     · Eggs or egg beaters  · Greek yogurt  · Cottage cheese  · Cheese sticks or shredded cheese  · Soft fish (salmon, white fish, tuna)  · Beans, lentils, or lentil soup  · Tuna or egg salad  · Refried beans  · Protein shakes  · Loosely cooked/crumbled ground turkey, chicken, or beef  · Thinned oatmeal or cream of wheat  · Over cooked, soft/mushy vegetables  · Fruits canned in natural juices  · Pureed food  · Sweet potatoes, well cooked, no skin  · Winter squash, well cooked, no skin  · Applesauce  · Tomato, carrot gen or butternut squash soup ·       Foods to Avoid  Solid/Tough meats: chicken, steak, hamburgers, hot dogs, pork chops, meatloaf, etc.   · Bread, rice, pasta, macaroni and cheese   · High-fat foods   · High-sugar foods   · Raw vegetables   · Junk food   · Crunchy foods (chips, popcorn, nuts, pumpkin and sunflower seeds)

## 2024-12-17 NOTE — H&P
H&P - Surgery-General   Name: Екатерина Godinez 44 y.o. female I MRN: [id number]  Unit/Bed#: OR POOL I Date of Admission: 12/17/2024   Date of Service: 12/17/2024 I Hospital Day: 0     Assessment & Plan  Gastroparesis    Will plan for robotic pyloroplasty and EGD.  Patient is amenable to risk benefits, to proceed back to the operating room expeditiously  History of Present Illness   Екатерина Godinez is a 44 y.o. female who presents with gastroparesis.  Patient with a several year history of nausea and vomiting as well as abdominal pain.  Workup was consistent with gastroparesis.  Denies any fevers, chills, chest pain, shortness of breath.    Review of Systems   Constitutional:  Negative for chills, fatigue and fever.   HENT:  Negative for ear pain, facial swelling, sinus pressure and sinus pain.    Eyes:  Negative for pain.   Respiratory:  Negative for cough, shortness of breath and wheezing.    Cardiovascular:  Negative for chest pain.   Gastrointestinal:  Positive for nausea and vomiting. Negative for abdominal pain, constipation and diarrhea.   Endocrine: Negative for cold intolerance and heat intolerance.   Genitourinary:  Negative for dysuria and flank pain.   Musculoskeletal:  Negative for back pain and neck pain.   Skin:  Negative for wound.   Neurological:  Negative for syncope, facial asymmetry, light-headedness and numbness.   Psychiatric/Behavioral:  Negative for behavioral problems, confusion and suicidal ideas.      I have reviewed the patient's PMH, PSH, Social History, Family History, Meds, and Allergies  Historical Information   Past Medical History:   Diagnosis Date    Anemia     in past    Anxiety     Arthritis September 2022    Depression     Gallstones     GERD (gastroesophageal reflux disease)     Headache(784.0)     Inflammatory bowel disease     Migraine     Nausea     occ    Panic attack     Rotator cuff disorder, right     Vertigo      Past Surgical History:   Procedure Laterality Date    ABDOMINAL  SURGERY      scar tissue removed     SECTION      last assessed: Oct 23, 2014     CHOLECYSTECTOMY  2018    HYSTERECTOMY      [place] LAPS SURG CHOLECYSTECTOMY W/CHOLANGIOGRAPHY N/A 2018    Procedure: CHOLECYSTECTOMY LAPAROSCOPIC  WITH  ATTEMPTED IOC;  Surgeon: Silvia Woods MD;  Location: [facility];  Service: General    REMOVAL BLADDER STIMULATOR Left 2024    stimulator placed 2024-L. side, has remote    TUBAL LIGATION       Social History     Tobacco Use    Smoking status: Former     Current packs/day: 0.00     Average packs/day: 0.5 packs/day for 5.0 years (2.5 ttl pk-yrs)     Types: Cigarettes     Quit date: 2013     Years since quittin.8    Smokeless tobacco: Never   Vaping Use    Vaping status: Every Day    Substances: THC   Substance and Sexual Activity    Alcohol use: No     Comment: none since 2021    Drug use: Yes     Types: Marijuana     Comment: medical marijuana    Sexual activity: Yes     Partners: Male     Comment: Hysterectomy     E-Cigarette/Vaping    E-Cigarette Use Current Every Day User     Comments medical marijuana      E-Cigarette/Vaping Substances    Nicotine No     THC Yes     CBD No     Flavoring No     Other No     Unknown No      Family History   Problem Relation Age of Onset    No Known Problems Family     Hypertension Mother     Arthritis Mother     Diabetes type II Father         mellitus     Hypertension Father     Diabetes Father     Arthritis Father      Social History     Tobacco Use    Smoking status: Former     Current packs/day: 0.00     Average packs/day: 0.5 packs/day for 5.0 years (2.5 ttl pk-yrs)     Types: Cigarettes     Quit date: 2013     Years since quittin.8    Smokeless tobacco: Never   Vaping Use    Vaping status: Every Day    Substances: THC   Substance and Sexual Activity    Alcohol use: No     Comment: none since 2021    Drug use: Yes     Types: Marijuana     Comment: medical marijuana    Sexual activity: Yes      Partners: Male     Comment: Hysterectomy     No current facility-administered medications for this encounter.  Prior to Admission Medications   Prescriptions Last Dose Informant Patient Reported? Taking?   ALPRAZolam (XANAX) 0.25 mg tablet Past Week  No Yes   Sig: May take one tablet in AM as needed for anxiety and one or two tablets at HS for anxiety/sleep as needed   DULoxetine (Cymbalta) 30 mg delayed release capsule 2024  No Yes   Sig: Take 1 capsule (30 mg total) by mouth daily   Dyclonine-Glycerin (Cepacol Sore Throat Spray) 0.1-33 % LIQD Unknown Self No No   Sig: Apply 1 spray to the mouth or throat every 6 (six) hours   Nutritional Supplement LIQD 2024  No Yes   Sig: [employer] Standard 1.4 Medical Nutrition Checkd.In   SUMAtriptan (IMITREX) 50 mg tablet Unknown Self No No   Sig: TAKE 1 TABLET(50 MG) BY MOUTH 1 TIME FOR UP TO 1 DOSE AS NEEDED FOR MIGRAINE   cholecalciferol 1,000 units tablet 12/10/2024  No No   Sig: Take 1 tablet (1,000 Units total) by mouth daily   docusate sodium (COLACE) 100 mg capsule Unknown  No No   Sig: Take 1 capsule (100 mg total) by mouth every morning   famotidine (PEPCID) 40 MG tablet 2024 Self No Yes   Sig: TAKE 1 TABLET(40 MG) BY MOUTH DAILY   ferrous sulfate 324 (65 Fe) mg 12/10/2024 Self No No   Sig: TAKE 1 TABLET BY MOUTH EVERY OTHER DAY   fexofenadine (ALLEGRA) 180 MG tablet Unknown Self No No   Sig: Take 1 tablet (180 mg total) by mouth daily   fluticasone (FLONASE) 50 mcg/act nasal spray Unknown Self No No   Si spray into each nostril daily   lidocaine (LIDODERM) 5 % Past Month  No Yes   Sig: UNWRAP AND APPLY 1 PATCH TOPICALLY TO THE AFFECTED AREA EVERY DAY(DO NOT LEAVE ON FOR MORE THAN 12 HOURS)   meclizine (ANTIVERT) 25 mg tablet Unknown  No No   Sig: TAKE 1 TABLET(25 MG) BY MOUTH THREE TIMES DAILY AS NEEDED FOR DIZZINESS   mirtazapine (REMERON) 30 mg tablet 2024  No Yes   Sig: Take 1 tablet (30 mg total) by mouth daily at bedtime   omeprazole  (PriLOSEC) 40 MG capsule 12/16/2024  No Yes   Sig: TAKE 1 CAPSULE(40 MG) BY MOUTH DAILY   ondansetron (ZOFRAN-ODT) 4 mg disintegrating tablet Past Week  No Yes   Sig: Take 1 tablet (4 mg total) by mouth every 8 (eight) hours as needed for nausea or vomiting   polyethylene glycol (GLYCOLAX) 17 GM/SCOOP powder  Self No No   Sig: MIX AND DRINK 17 GRAMS BY MOUTH EVERY DAY   prochlorperazine (COMPAZINE) 5 mg tablet Past Week  No Yes   Sig: TAKE 1 TABLET(5 MG) BY MOUTH EVERY 6 HOURS AS NEEDED FOR NAUSEA OR VOMITING   vitamin B-12 (VITAMIN B-12) 1,000 mcg tablet 12/10/2024  No No   Sig: Take 1 tablet (1,000 mcg total) by mouth daily      Facility-Administered Medications: None     Patient has no known allergies.    Objective :  Temp:  [98 °F (36.7 °C)] 98 °F (36.7 °C)  HR:  [80] 80  BP: (116)/(70) 116/70  Resp:  [18] 18  SpO2:  [99 %] 99 %  O2 Device: None (Room air)      Physical Exam  Vitals and nursing note reviewed.   Constitutional:       General: She is not in acute distress.     Appearance: Normal appearance. She is not ill-appearing.   HENT:      Head: Normocephalic and atraumatic.      Mouth/Throat:      Mouth: Mucous membranes are moist.   Eyes:      Extraocular Movements: Extraocular movements intact.   Cardiovascular:      Rate and Rhythm: Normal rate and regular rhythm.   Pulmonary:      Effort: Pulmonary effort is normal. No respiratory distress.      Breath sounds: Normal breath sounds. No stridor.   Abdominal:      General: There is no distension.      Palpations: Abdomen is soft. There is no mass.      Tenderness: There is no abdominal tenderness.      Hernia: No hernia is present.      Comments: Well-healed port sites.   Musculoskeletal:         General: No swelling or tenderness. Normal range of motion.   Skin:     General: Skin is warm and dry.      Findings: No lesion.   Neurological:      General: No focal deficit present.      Mental Status: She is alert and oriented to person, place, and time.  "  Psychiatric:         Mood and Affect: Mood normal.         Behavior: Behavior normal.         Lab Results: I have reviewed the following results:  No results for input(s): \"WBC\", \"HGB\", \"HCT\", \"PLT\", \"BANDSPCT\", \"SODIUM\", \"K\", \"CL\", \"CO2\", \"BUN\", \"CREATININE\", \"GLUC\", \"CAIONIZED\", \"MG\", \"PHOS\", \"AST\", \"ALT\", \"ALB\", \"TBILI\", \"DBILI\", \"ALKPHOS\", \"PTT\", \"INR\", \"HSTNI0\", \"HSTNI2\", \"BNP\", \"LACTICACID\" in the last 72 hours.    Imaging Results Review: No pertinent imaging studies reviewed.  Other Study Results Review: No additional pertinent studies reviewed.    VTE Pharmacologic Prophylaxis: Sequential compression device (Venodyne)   VTE Mechanical Prophylaxis: sequential compression device      "

## 2024-12-17 NOTE — ANESTHESIA POSTPROCEDURE EVALUATION
Post-Op Assessment Note    CV Status:  Stable  Pain Score: 0    Pain management: adequate       Mental Status:  Alert, awake and sleepy   Hydration Status:  Euvolemic   PONV Controlled:  Controlled   Airway Patency:  Patent  Airway: intubated     Post Op Vitals Reviewed: Yes    No anethesia notable event occurred.    Staff: CRNA           Last Filed PACU Vitals:  Vitals Value Taken Time   Temp 97.6 °F (36.4 °C) 12/17/24 0900   Pulse 66    /70    Resp 21    SpO2 100 on 6LFM        Modified [place]:  No data recorded

## 2024-12-17 NOTE — OP NOTE
OPERATIVE REPORT  PATIENT NAME: Екатерина Godinez    :  1980  MRN: [id number]  Pt Location:  OR ROOM 15    SURGERY DATE: 2024    Surgeons and Role:     * Pradip Jeff MD - Primary     * Nieyc Chavis PA-C - Assisting    Preop Diagnosis:  Gastroparesis [K31.84]    Post-Op Diagnosis Codes:     * Gastroparesis [K31.84]    Procedure(s):  PYLOROPLASTY LAPAROSCOPIC WITH ROBOT  ESOPHAGOGASTRODUODENOSCOPY (EGD)    Specimen(s):  * No specimens in log *    Estimated Blood Loss:   Minimal    Drains:  * No LDAs found *    Anesthesia Type:   General    Operative Indications:  Gastroparesis [K31.84]  44-year-old female with medically refractory gastroparesis.  After an appropriate workup, and a discussion of risks and benefits, she opted to proceed to the operating room for planned robotic pyloroplasty.    Operative Findings:  Wide open [place] pylorus with a negative air leak test.      Complications:   None    Procedure and Technique:  The patient was seen in the Holding Area. The risks, benefits, complications, treatment options, and expected outcomes were discussed with the patient and/or family. There was concurrence with the proposed plan and informed consent was obtained. The site of surgery was properly noted/marked. The patient was taken to Operating Room, correctly identified and the procedure verified as robotic pyloroplasty.     The patient was placed in the supine position with left arm out and general anesthesia was induced, along with placement of orogastric tube. Prophylactic antibiotics were administered in concordance with the patient's stated allergies. The abdomen was prepped and draped in a sterile fashion. A Timeout was preformed with all members of the surgical team confirming the correct patient and procedure.  And esophagogastroduodenoscopies was performed with entry of an adult endoscope through the mouth, down through the esophagus and into the stomach.  There is no obvious pathology in the  esophagus, and the scope passed easily through the GE junction into the stomach.  The stomach appeared grossly normal without obvious pathology, retroflexion revealed a Hill grade 1. I was then able to pass the scope through a reasonably tight pylorus down to the 2nd portion of the duodenum.  After confirming relatively normal anatomy we began with the laparoscopic portion of the case.  A left upper quadrant stab incision was made and a Veress needle was used to insufflate the abdomen to 15mmHg.  An 8 mm robotic trocar was placed in the periumbilical area.  The abdomen was inspected, and Veress needle was removed.  Additional robotic trocars were placed in the left midclavicular line, right midclavicular line, and right flank.  The assistant 8 mm trocar was placed in the left lower quadrant.  The patient was placed in steep reverse Trendelenburg.  The [employer] Xi surgical robot was then docked, and appropriate instrumentation was introduced under direct visualization.    We identified the pylorus through laparoscopic palpation, as well as the characteristic vein of Salazar crossing over top.    I then placed 2 stay sutures 1 on the superior and 1 on the inferior aspect of the gastroduodenal junction, for retraction using 3-0 Vicryl.  We began by making a 5 cm enterotomy spanning 3 cm of stomach and 2 cm of duodenum, using Bovie electrocautery.  We clearly identified all layers including mucosa and were able to enter the bowel without injury. We then began to close the enterotomy in a Heineke-Mikulicz fashion.  This was accomplished using a running 3 of 0 V lock absorbable suture, in a 2 layer fashion.  Prior to the second layer, the endoscope was passed across the needle pylorus into the duodenum.  The patient was then flattened and irrigation was placed in the right upper quadrant and an air leak test was performed.  I performed a repeat upper endoscopy which showed a widely patent pyloroplasty with  significant reflux of bile into the stomach and the scope easily passes into the duodenum.  The air leak test was negative with no bubbles appreciated.  The robot was then undocked.     All skin incisions were closed using MonocryI suture in a subcuticular fashion.  The instrument, sponge, and needle counts were correct at the conclusion of the case. The patient was then taken to PACU in stable condition.      I was present for the entire procedure., A qualified resident physician was not available., and A physician assistant was required during the procedure for retraction, tissue handling, dissection and suturing.    Patient Disposition:  PACU  and hemodynamically stable             SIGNATURE: Pradip Jeff MD  DATE: December 17, 2024  TIME: 8:44 AM

## 2024-12-17 NOTE — PLAN OF CARE
Problem: PAIN - ADULT  Goal: Verbalizes/displays adequate comfort level or baseline comfort level  Description: Interventions:  - Encourage patient to monitor pain and request assistance  - Assess pain using appropriate pain scale  - Administer analgesics based on type and severity of pain and evaluate response  - Implement non-pharmacological measures as appropriate and evaluate response  - Consider cultural and social influences on pain and pain management  - Notify physician/advanced practitioner if interventions unsuccessful or patient reports new pain  Outcome: Progressing     Problem: INFECTION - ADULT  Goal: Absence or prevention of progression during hospitalization  Description: INTERVENTIONS:  - Assess and monitor for signs and symptoms of infection  - Monitor lab/diagnostic results  - Monitor all insertion sites, i.e. indwelling lines, tubes, and drains  - Monitor endotracheal if appropriate and nasal secretions for changes in amount and color  - [place] appropriate cooling/warming therapies per order  - Administer medications as ordered  - Instruct and encourage patient and family to use good hand hygiene technique  - Identify and instruct in appropriate isolation precautions for identified infection/condition  Outcome: Progressing     Problem: SAFETY ADULT  Goal: Maintain or return to baseline ADL function  Description: INTERVENTIONS:  -  Assess patient's ability to carry out ADLs; assess patient's baseline for ADL function and identify physical deficits which impact ability to perform ADLs (bathing, care of mouth/teeth, toileting, grooming, dressing, etc.)  - Assess/evaluate cause of self-care deficits   - Assess range of motion  - Assess patient's mobility; develop plan if impaired  - Assess patient's need for assistive devices and provide as appropriate  - Encourage maximum independence but intervene and supervise when necessary  - Involve family in performance of ADLs  - Assess for home care  needs following discharge   - Consider OT consult to assist with ADL evaluation and planning for discharge  - Provide patient education as appropriate  Outcome: Progressing

## 2024-12-18 VITALS
BODY MASS INDEX: 16.53 KG/M2 | RESPIRATION RATE: 17 BRPM | TEMPERATURE: 98.7 F | OXYGEN SATURATION: 97 % | WEIGHT: 82 LBS | DIASTOLIC BLOOD PRESSURE: 72 MMHG | HEIGHT: 59 IN | SYSTOLIC BLOOD PRESSURE: 110 MMHG | HEART RATE: 82 BPM

## 2024-12-18 LAB
ANION GAP SERPL CALCULATED.3IONS-SCNC: 7 MMOL/L (ref 4–13)
BASOPHILS # BLD AUTO: 0.03 THOUSANDS/ÂΜL (ref 0–0.1)
BASOPHILS NFR BLD AUTO: 0 % (ref 0–1)
BUN SERPL-MCNC: 7 MG/DL (ref 5–25)
CALCIUM SERPL-MCNC: 8.6 MG/DL (ref 8.4–10.2)
CHLORIDE SERPL-SCNC: 106 MMOL/L (ref 96–108)
CO2 SERPL-SCNC: 26 MMOL/L (ref 21–32)
CREAT SERPL-MCNC: 0.62 MG/DL (ref 0.6–1.3)
EOSINOPHIL # BLD AUTO: 0.04 THOUSAND/ÂΜL (ref 0–0.61)
EOSINOPHIL NFR BLD AUTO: 1 % (ref 0–6)
ERYTHROCYTE [DISTWIDTH] IN BLOOD BY AUTOMATED COUNT: 11.9 % (ref 11.6–15.1)
GFR SERPL CREATININE-BSD FRML MDRD: 110 ML/MIN/1.73SQ M
GLUCOSE SERPL-MCNC: 90 MG/DL (ref 65–140)
HCT VFR BLD AUTO: 34.3 % (ref 34.8–46.1)
HGB BLD-MCNC: 11.2 G/DL (ref 11.5–15.4)
IMM GRANULOCYTES # BLD AUTO: 0.02 THOUSAND/UL (ref 0–0.2)
IMM GRANULOCYTES NFR BLD AUTO: 0 % (ref 0–2)
LYMPHOCYTES # BLD AUTO: 2.33 THOUSANDS/ÂΜL (ref 0.6–4.47)
LYMPHOCYTES NFR BLD AUTO: 28 % (ref 14–44)
MAGNESIUM SERPL-MCNC: 1.9 MG/DL (ref 1.9–2.7)
MCH RBC QN AUTO: 31.3 PG (ref 26.8–34.3)
MCHC RBC AUTO-ENTMCNC: 32.7 G/DL (ref 31.4–37.4)
MCV RBC AUTO: 96 FL (ref 82–98)
MONOCYTES # BLD AUTO: 0.93 THOUSAND/ÂΜL (ref 0.17–1.22)
MONOCYTES NFR BLD AUTO: 11 % (ref 4–12)
NEUTROPHILS # BLD AUTO: 4.95 THOUSANDS/ÂΜL (ref 1.85–7.62)
NEUTS SEG NFR BLD AUTO: 60 % (ref 43–75)
NRBC BLD AUTO-RTO: 0 /100 WBCS
PLATELET # BLD AUTO: 177 THOUSANDS/UL (ref 149–390)
PMV BLD AUTO: 11.9 FL (ref 8.9–12.7)
POTASSIUM SERPL-SCNC: 3.8 MMOL/L (ref 3.5–5.3)
RBC # BLD AUTO: 3.58 MILLION/UL (ref 3.81–5.12)
SODIUM SERPL-SCNC: 139 MMOL/L (ref 135–147)
WBC # BLD AUTO: 8.3 THOUSAND/UL (ref 4.31–10.16)

## 2024-12-18 PROCEDURE — 85025 COMPLETE CBC W/AUTO DIFF WBC: CPT | Performed by: SURGERY

## 2024-12-18 PROCEDURE — 97162 PT EVAL MOD COMPLEX 30 MIN: CPT

## 2024-12-18 PROCEDURE — 99024 POSTOP FOLLOW-UP VISIT: CPT

## 2024-12-18 PROCEDURE — 80048 BASIC METABOLIC PNL TOTAL CA: CPT | Performed by: SURGERY

## 2024-12-18 PROCEDURE — NC001 PR NO CHARGE: Performed by: SURGERY

## 2024-12-18 PROCEDURE — 97166 OT EVAL MOD COMPLEX 45 MIN: CPT

## 2024-12-18 PROCEDURE — 83735 ASSAY OF MAGNESIUM: CPT | Performed by: SURGERY

## 2024-12-18 RX ORDER — SUCRALFATE ORAL 1 G/10ML
1 SUSPENSION ORAL 4 TIMES DAILY
Qty: 840 ML | Refills: 0 | Status: SHIPPED | OUTPATIENT
Start: 2024-12-18 | End: 2025-01-08

## 2024-12-18 RX ORDER — PANTOPRAZOLE SODIUM 40 MG/1
40 TABLET, DELAYED RELEASE ORAL 2 TIMES DAILY
Qty: 42 TABLET | Refills: 0 | Status: SHIPPED | OUTPATIENT
Start: 2024-12-18 | End: 2025-01-08

## 2024-12-18 RX ORDER — ONDANSETRON 4 MG/1
4 TABLET, ORALLY DISINTEGRATING ORAL EVERY 6 HOURS PRN
Qty: 28 TABLET | Refills: 0 | Status: SHIPPED | OUTPATIENT
Start: 2024-12-18 | End: 2024-12-25

## 2024-12-18 RX ORDER — OXYCODONE HYDROCHLORIDE 5 MG/1
5 TABLET ORAL EVERY 6 HOURS PRN
Qty: 12 TABLET | Refills: 0 | Status: SHIPPED | OUTPATIENT
Start: 2024-12-18 | End: 2024-12-21

## 2024-12-18 RX ADMIN — SUCRALFATE 1 G: 1 TABLET ORAL at 06:42

## 2024-12-18 RX ADMIN — HEPARIN SODIUM 5000 UNITS: 5000 INJECTION, SOLUTION INTRAVENOUS; SUBCUTANEOUS at 05:15

## 2024-12-18 RX ADMIN — SODIUM CHLORIDE, SODIUM LACTATE, POTASSIUM CHLORIDE, AND CALCIUM CHLORIDE 125 ML/HR: .6; .31; .03; .02 INJECTION, SOLUTION INTRAVENOUS at 05:18

## 2024-12-18 RX ADMIN — PANTOPRAZOLE SODIUM 20 MG: 20 TABLET, DELAYED RELEASE ORAL at 05:15

## 2024-12-18 RX ADMIN — ONDANSETRON 4 MG: 2 INJECTION INTRAMUSCULAR; INTRAVENOUS at 05:25

## 2024-12-18 RX ADMIN — FAMOTIDINE 40 MG: 20 TABLET, FILM COATED ORAL at 08:19

## 2024-12-18 RX ADMIN — DULOXETINE HYDROCHLORIDE 30 MG: 30 CAPSULE, DELAYED RELEASE ORAL at 08:19

## 2024-12-18 RX ADMIN — OXYCODONE HYDROCHLORIDE 10 MG: 10 TABLET ORAL at 05:15

## 2024-12-18 RX ADMIN — HYDROMORPHONE HYDROCHLORIDE 0.5 MG: 1 INJECTION, SOLUTION INTRAMUSCULAR; INTRAVENOUS; SUBCUTANEOUS at 08:19

## 2024-12-18 NOTE — PHYSICAL THERAPY NOTE
Physical Therapy Evaluation    Patient Name: Екатерина Godinez    Today's Date: 2024     Problem List  Principal Problem:    Gastroparesis       Past Medical History  Past Medical History:   Diagnosis Date    Anemia     in past    Anxiety     Arthritis 2022    Depression     Gallstones     GERD (gastroesophageal reflux disease)     Headache(784.0)     Inflammatory bowel disease     Migraine     Nausea     occ    Panic attack     Rotator cuff disorder, right     Vertigo         Past Surgical History  Past Surgical History:   Procedure Laterality Date    ABDOMINAL SURGERY      scar tissue removed     SECTION      last assessed: Oct 23, 2014     CHOLECYSTECTOMY  2018    HYSTERECTOMY      CA LAPS SURG CHOLECYSTECTOMY W/CHOLANGIOGRAPHY N/A 2018    Procedure: CHOLECYSTECTOMY LAPAROSCOPIC  WITH  ATTEMPTED IOC;  Surgeon: Silvia Woods MD;  Location: [facility];  Service: General    REMOVAL BLADDER STIMULATOR Left 2024    stimulator placed 2024-L. side, has remote    TUBAL LIGATION           24 0841   PT Last Visit   PT Visit Date 24   Note Type   Note type Evaluation   Pain Assessment   Pain Assessment Tool 0-10   Pain Score 6   Pain Location/Orientation Location: Abdomen   Patient's Stated Pain Goal No pain   Hospital Pain Intervention(s) Repositioned;Ambulation/increased activity;Emotional support   Restrictions/Precautions   Weight Bearing Precautions Per Order No   Other Precautions Chair Alarm;Bed Alarm;Fall Risk;Pain   Home Living   Type of Home House  (3 SH)   Home Layout Multi-level;Laundry in basement;Bed/bath upstairs;Stairs to enter without rails  (1 vs 2 RESHMA, bedroom on 1st floor, FF to bathroom on 2nd floor)   Bathroom Shower/Tub Tub/shower unit   Bathroom Toilet Standard   Bathroom Equipment Commode  (pt reports no use PTA)   Bathroom Accessibility Accessible   Home Equipment Other (Comment)  (denies)    Additional Comments pt reports living with   Prior Function   Level of [place] Independent with ADLs;Independent with functional mobility;Independent with IADLS   Lives With Spouse;Family;Daughter   Receives Help From Family  (pt's mom and dad are in town and can assist as needed)   IADLs Independent with medication management;Independent with meal prep;Family/Friend/Other provides transportation   Falls in the last 6 months 0   Vocational On disability   Comments pt reports independence PTA, (-) driving   General   Family/Caregiver Present No   Cognition   Overall Cognitive Status WFL   Arousal/Participation Cooperative   Orientation Level Oriented X4   Memory Within functional limits   Following Commands Follows all commands and directions without difficulty   Comments pt pleasant and cooperative   Subjective   Subjective pt agreeable to mobilize   RLE Assessment   RLE Assessment WFL   LLE Assessment   LLE Assessment WFL   Bed Mobility   Supine to Sit 5  Supervision   Additional items HOB elevated;Bedrails;Increased time required   Sit to Supine Unable to assess   Additional Comments pt OOB in chair at end of session   Transfers   Sit to Stand 5  Supervision   Additional items Increased time required;Verbal cues   Stand to Sit 5  Supervision   Additional items Armrests;Increased time required;Verbal cues   Toilet transfer 5  Supervision   Additional items Armrests;Increased time required;Standard toilet   Additional Comments no AD   Ambulation/Elevation   Gait pattern Narrow PINKY;Inconsistent gold;Short stride;Excessively slow   Gait Assistance 5  Supervision   Assistive Device Rolling walker;None   Distance 10' (no AD) + 20'x2 (RW)   Stair Management Assistance Not tested   Balance   Static Sitting Good   Dynamic Sitting Fair +   Static Standing Fair   Dynamic Standing Fair -   Ambulatory Fair -   Endurance Deficit   Endurance Deficit Yes   Endurance Deficit Description pt limited by pain and decreased  activity tolerance   Activity Tolerance   Activity Tolerance Patient limited by pain;Patient limited by fatigue   Medical Staff Made Aware ESAU Gutierrez   Nurse Made Aware yes-RN cleared   Assessment   Prognosis Good   Problem List Decreased mobility;Pain;Orthopedic restrictions   Assessment Pt is an 44 y.o. female presenting [facility] on [facility] for primary medical dx of gastroparesis. Pt s/p robotic pyloroplasty. Pt  has a past medical history of Anemia, Anxiety, Arthritis, Depression, Gallstones, GERD (gastroesophageal reflux disease), Headache(784.0), Inflammatory bowel disease, Migraine, Nausea, Panic attack, Rotator cuff disorder, right, and Vertigo. Pt presents as a moderate complexity evaluation due to Ongoing medical management for primary dx, Increased reliance on more restrictive AD compared to baseline, Decreased activity tolerance compared to baseline, Fall risk, s/p surgical intervention. Pt currently requires supervision for bed mobility, supervision for transfers and supervision for ambulating short distances with RW. Pt is primarily limited by pain however is around her functional baseline. Pt has no concerns returning home at current level of function and has supportive family to assist as needed. Pt has no further acute PT needs. Anticipate pt to return home with no post acute needs and family assistance as needed. The patient's AM-PAC Basic Mobility Inpatient Short Form Raw Score is 19. A Raw score of greater than or equal to 16 suggests the patient may benefit from discharge to home. Please also refer to the recommendation of the Physical Therapist for safe discharge planning. Pt left upright in chair with chair alarm donned, call bell and personal items within reach and all needs met.   Barriers to Discharge None   Goals   Patient Goals to go home   PT Treatment Day 0   Discharge Recommendation   Rehab Resource Intensity Level, PT No post-acute rehabilitation needs   AM-PAC Basic Mobility Inpatient    Turning in Flat Bed Without Bedrails 4   Lying on Back to Sitting on Edge of Flat Bed Without Bedrails 3   Moving Bed to Chair 3   Standing Up From Chair Using Arms 3   Walk in Room 3   Climb 3-5 Stairs With Railing 3   Basic Mobility Inpatient Raw Score 19   Basic Mobility Standardized Score 42.48   [facility] Highest Level Of Mobility   -[facility] Goal 6: Walk 10 steps or more   -HLM Achieved 7: Walk 25 feet or more   Modified [place] Scale   Modified [place] Scale 2   GEORGIE HerbertT

## 2024-12-18 NOTE — NURSING NOTE
Pt cleared for discharge home. D/C instructions reviewed with previous RN. Pt without any additional questions or concerns. IV site and Masimo previously removed. Belongings gathered. Pt escorted in wheelchair to pharmacy and then  by mother.

## 2024-12-18 NOTE — OCCUPATIONAL THERAPY NOTE
Occupational Therapy Evaluation     Patient Name: Екатерина Godinez  Today's Date: 2024  Problem List  Principal Problem:    Gastroparesis    Past Medical History  Past Medical History:   Diagnosis Date    Anemia     in past    Anxiety     Arthritis 2022    Depression     Gallstones     GERD (gastroesophageal reflux disease)     Headache(784.0)     Inflammatory bowel disease     Migraine     Nausea     occ    Panic attack     Rotator cuff disorder, right     Vertigo      Past Surgical History  Past Surgical History:   Procedure Laterality Date    ABDOMINAL SURGERY      scar tissue removed     SECTION      last assessed: Oct 23, 2014     CHOLECYSTECTOMY  2018    HYSTERECTOMY      PA LAPS SURG CHOLECYSTECTOMY W/CHOLANGIOGRAPHY N/A 2018    Procedure: CHOLECYSTECTOMY LAPAROSCOPIC  WITH  ATTEMPTED IOC;  Surgeon: Silvia Woods MD;  Location: [facility];  Service: General    REMOVAL BLADDER STIMULATOR Left 2024    stimulator placed 2024-L. side, has remote    TUBAL LIGATION          24   OT Last Visit   OT Visit Date 24   Note Type   Note type Evaluation   Pain Assessment   Pain Assessment Tool 0-10   Pain Score 6   Effect of Pain on Daily Activities limits activity tolerance and comfort   Patient's Stated Pain Goal No pain   Hospital Pain Intervention(s) Repositioned;Ambulation/increased activity;Emotional support   Restrictions/Precautions   Weight Bearing Precautions Per Order No   Other Precautions Chair Alarm;Bed Alarm;Fall Risk;Pain   Home Living   Type of Home House   Home Layout Two level  (1 RESHMA; bedroom on first floor and only bathroom on second floor)   Bathroom Shower/Tub Tub/shower unit   Bathroom Toilet Standard   Bathroom Equipment Commode  (Pt has BSC in her bedroom.)   Home Equipment   (pt denies)   Prior Function   Level of [place] Independent with ADLs;Independent with functional mobility;Independent with IADLS   Lives With Spouse;Daughter    Receives Help From Family   IADLs Independent with meal prep;Independent with medication management;Family/Friend/Other provides transportation   Falls in the last 6 months 0   Lifestyle   Autonomy Pt reports I w/ ADLs, IADLs, and fxnl mobility w/o AD at baseline; - driving.   Reciprocal Relationships Pt lives w/ her spouse and daughter. They both work third shift; however, pt's parents are staying w/ her until New Year's Marleen to assist if needed.   Intrinsic Gratification Pt enjoys spending time w/ her family and caring for her pets.   General   Family/Caregiver Present No   ADL   Eating Assistance 7  Independent   Grooming Assistance 6  Modified Independent   Grooming Deficit Setup;Wash/dry hands  (Pt washed her hands standing at the sink.)   UB Bathing Assistance 6  Modified Independent   LB Bathing Assistance 5  Supervision/Setup   UB Dressing Assistance 6  Modified independent   LB Dressing Assistance 5  Supervision/Setup   Toileting Assistance  5  Supervision/Setup   Toileting Deficit Setup;Grab bar use;Clothing management up;Clothing management down;Perineal hygiene   Bed Mobility   Supine to Sit 5  Supervision   Additional items HOB elevated;Bedrails   Sit to Supine Unable to assess   Additional Comments Pt seated OOB in chair at end of OT evaluation w/ alarm activated and all needs within reach.   Transfers   Sit to Stand 5  Supervision   Stand to Sit 5  Supervision   Toilet transfer 5  Supervision   Additional items Standard toilet  (grab bar use)   Additional Comments initial two transfers completed w/o AD, progressed to RW use   Functional Mobility   Functional Mobility 5  Supervision   Additional Comments Pt ambulated household distance w/ S using RW.   Additional items Rolling walker   Balance   Static Sitting Fair +   Dynamic Sitting Fair +   Static Standing Fair   Dynamic Standing Fair   Ambulatory Fair   Activity Tolerance   Activity Tolerance Patient limited by pain   Medical Staff Made Aware PT,  Alida, due to pt's medical complexity and multiple comorbidities   Nurse Made Aware RN clearance prior to session   RUE Assessment   RUE Assessment WFL   LUE Assessment   LUE Assessment WFL   Hand Function   Gross Motor Coordination Functional   Fine Motor Coordination Functional   Cognition   Overall Cognitive Status WFL   Arousal/Participation Alert;Responsive;Cooperative   Attention Within functional limits   Orientation Level Oriented X4   Memory Within functional limits   Following Commands Follows all commands and directions without difficulty   Comments Pt identified by name and . Pt was pleasant, cooperative, and willing to participate in OT evaluation.   Assessment   Assessment Pt is a 44 y.o. female seen for OT evaluation s/p admission to [facility] on 2024. Pt diagnosed with Gastroparesis; s/p robotic pyloroplasty on . Pt has a significant PMH impacting occupational performance including: Anemia, Anxiety, Arthritis, Depression, Gallstones, GERD (gastroesophageal reflux disease), Headache(784.0), Inflammatory bowel disease, Migraine, Nausea, Panic attack, Rotator cuff disorder, right, and Vertigo. Pt with active OT evaluation and treatment orders and activity orders. PTA, pt living with her spouse and daughter in a 2 SH w/ 1 RESHMA. Pt reports I w/ ADLs, IADLws, and fxnl mobility w/o AD at baseline; - driving. Pt agreeable and willing to participate in OT evaluation. During evaluation, pt was I for eating, mod I for grooming and UB ADLs, and S for LB ADLs and toileting. Pt also required S for bed mobility, S for transfers, and S for fxnl mobility w/o AD vs w/ RW. Pt performing ADLs at/near baseline level of independence and reports having good social support upon return home. No further acute OT needs identified at this time. Recommend continued active ADL participation and mobilization with hospital staff while in the hospital to increase pt’s endurance and strength upon D/C. From  OT standpoint, recommend D/C to home with family support when medically cleared. D/C pt from OT caseload at this time.   Goals   Patient Goals to go home   Plan   OT Frequency Eval only   Discharge Recommendation   Rehab Resource Intensity Level, OT No post-acute rehabilitation needs   AM-PAC Daily Activity Inpatient   Lower Body Dressing 3   Bathing 3   Toileting 3   Upper Body Dressing 4   Grooming 4   Eating 4   Daily Activity Raw Score 21   Daily Activity Standardized Score (Calc for Raw Score >=11) 44.27   AM-PAC Applied Cognition Inpatient   Following a Speech/Presentation 4   Understanding Ordinary Conversation 4   Taking Medications 4   Remembering Where Things Are Placed or Put Away 4   Remembering List of 4-5 Errands 4   Taking Care of Complicated Tasks 4   Applied Cognition Raw Score 24   Applied Cognition Standardized Score 62.21       The patient's raw score on the AM-PAC Daily Activity Inpatient Short Form is 21. A raw score of greater than or equal to 19 suggests the patient may benefit from discharge to home. Please refer to the recommendation of the Occupational Therapist for safe discharge planning.    UZIEL St, OTR/L

## 2024-12-18 NOTE — PROGRESS NOTES
Progress Note - Surgery-General   Name: Екатерина Godinez 44 y.o. female I MRN: [id number]  Unit/Bed#: [facility] 904-01 I Date of Admission: 12/17/2024   Date of Service: 12/18/2024 I Hospital Day: 0    Assessment & Plan  Gastroparesis  44-year-old female s/p robotic pyloroplasty on 12/17    AVSS on room air     cc nothing recorded overnight    A.m. labs pending    Plan  - CLD  - Advance to FLD this a.m.  - Analgesia and antiemetic as needed  - OOB and I-S as able  - DVT PPx while inpatient  - Appropriate home medications  - Tentative dispo today 12/18        Subjective   Patient seen and examined at bedside.  NOA.    Objective :  Temp:  [97.6 °F (36.4 °C)-98.7 °F (37.1 °C)] 98.7 °F (37.1 °C)  HR:  [64-78] 69  BP: ()/(58-74) 111/74  Resp:  [11-32] 27  SpO2:  [97 %-100 %] 98 %  O2 Device: None (Room air)    I/O         12/16 0701  12/17 0700 12/17 0701  12/18 0700    I.V. (mL/kg)  1314.6 (35.3)    IV Piggyback  100    Total Intake(mL/kg)  1414.6 (38)    Urine (mL/kg/hr)  475 (0.5)    Blood  15    Total Output  490    Net  +924.6                Physical Exam  General - no acute distress, responsive and cooperative  CV - warm, regular rate  Pulm - normal work of breathing, no respiratory distress  Abd - s soft, appropriately tender, nondistended.  Incisions C/D/I  Neuro - m/s grossly intact, cn grossly intact  Ext - moving all extremities         Lab Results: I have reviewed the following results:  Recent Labs     12/17/24  1549        Edgar Pearce, DO  Surgery PGY-3

## 2024-12-18 NOTE — DISCHARGE SUMMARY
"Discharge Summary - Surgery-General   Name: Екатерина Godinez 44 y.o. female I MRN: [id number]  Unit/Bed#: [facility] 904-01 I Date of Admission: 12/17/2024   Date of Service: 12/18/2024 I Hospital Day: 0    Admission Date: 12/17/2024 0548  Discharge Date: 12/18/24  Admitting Diagnosis: Gastroparesis [K31.84]  Discharge Diagnosis: Gastroparesis  Medical Problems       Resolved Problems  Date Reviewed: 12/18/2024   None         HPI: As per Dr. Jeff \"Екатерина Godinez is a 44 y.o. female who presents with gastroparesis.  Patient with a several year history of nausea and vomiting as well as abdominal pain.  Workup was consistent with gastroparesis.  Denies any fevers, chills, chest pain, shortness of breath.\"       Procedures Performed: No orders of the defined types were placed in this encounter.      Summary of Hospital Course:   Patient is a 44 year old F with PMH of gastroparesis who presented electively to [facility] on 12/17 to undergo surgery. Patient underwent a robotic pyloroplasty with EGD with Dr. Jeff on 12/17/2024. Postoperatively patient was maintained in the PACU before being transitioned to medical surgical floor where she received the remainder of her care. Postoperatively patient was started on a CLD. Patients diet was advanced to full liquid diet the morning of 12/18. Patients pain continued to be well controlled.     By date of discharge on 12/18, patient was deemed appropriate for discharge. Patient was tolerating a full liquid diet. Patients pain was well controlled. Patient was voiding spontaneously. Patient had bowel function. Patient was ambulatory. Patient was discharged into the care of her family on 12/18/2024.     Patient was instructed to follow up with Dr. Jeff for postoperative appointment. Patient instructed to follow up with PCP in 1-2 weeks to review hospitalization. Patient was instructed to continue full liquid diet until 12/23 then transition to soft diet. Discharge instructions were reviewed " with patient at bedside.     For further information regarding this hospitalization please refer to the medical records.    Significant Findings, Care, Treatment and Services Provided: None    Complications: None    Condition at Discharge: good       Discharge instructions/Information to patient and family:   See After Visit Summary (AVS) for information provided to patient and family.      Provisions for Follow-Up Care:  See after visit summary for information related to follow-up care and any pertinent home health orders.      PCP: ANNAMARIE Brown    Disposition: Home    Planned Readmission: No     Discharge Medications:  See after visit summary for reconciled discharge medications provided to patient and family.      Zhane Black PA-C

## 2024-12-20 ENCOUNTER — TELEPHONE (OUTPATIENT)
Age: 44
End: 2024-12-20

## 2024-12-20 NOTE — ANESTHESIA POSTPROCEDURE EVALUATION
Post-Op Assessment Note    CV Status:  Stable    Pain management: adequate       Mental Status:  Alert and awake   Hydration Status:  Euvolemic   PONV Controlled:  Controlled   Airway Patency:  Patent     Post Op Vitals Reviewed: Yes    No anethesia notable event occurred.    Staff: Anesthesiologist           Last Filed PACU Vitals:  Vitals Value Taken Time   Temp 97.8 °F (36.6 °C) 12/17/24 1000   Pulse 76 12/17/24 1203   /59 12/17/24 1200   Resp 49 12/17/24 1203   SpO2 98 % 12/17/24 1203   Vitals shown include unfiled device data.    Modified Marifer:  No data recorded

## 2024-12-20 NOTE — TELEPHONE ENCOUNTER
Patient called in to make 2 week post op from surgery on 12/17. First available appt is showing 1/16. Please contact patient for a sooner post op appt. Patient is also asking if a follow up with her GI provider would be recommended.

## 2024-12-24 ENCOUNTER — TELEMEDICINE (OUTPATIENT)
Dept: BEHAVIORAL/MENTAL HEALTH CLINIC | Facility: CLINIC | Age: 44
End: 2024-12-24
Payer: COMMERCIAL

## 2024-12-24 DIAGNOSIS — F41.1 GENERALIZED ANXIETY DISORDER: ICD-10-CM

## 2024-12-24 DIAGNOSIS — F33.1 MAJOR DEPRESSIVE DISORDER, RECURRENT, MODERATE (HCC): Primary | ICD-10-CM

## 2024-12-24 PROCEDURE — 90837 PSYTX W PT 60 MINUTES: CPT

## 2024-12-24 NOTE — PSYCH
Behavioral Health Psychotherapy Progress Note    Psychotherapy Provided: Individual Psychotherapy     1. Major depressive disorder, recurrent, moderate (HCC)        2. Generalized anxiety disorder            Goals addressed in session: Goal 1 and Goal 2     DATA: Therapist began session with completion of check in. Екатерина reported doing “good.” Therapist asked how she was recovering from her recent surgery. She expressed feeling that she was recovering well. She reported her incisions being an occasional barrier to sleeping well but otherwise feeling she was recovering well. Therapist asked as she was feeling having her parents at the house to support with her recovery. Екатерина expressed being grateful and very happy to have her parents at home for the holidays. Therapist asked to complete a New Year's bingo card. Therapist explained the bingo card being a series of goals that Екатерина would like to work towards in the new year to cross off as the year went on. Екатерина was able to identify goals such as going on the hike, being able to eat pizza, having Sunday family dinners and more.  During this session, this clinician used the following therapeutic modalities: Client-centered Therapy, Cognitive Behavioral Therapy, Solution-Focused Therapy, and Supportive Psychotherapy    Substance Abuse was not addressed during this session. If the client is diagnosed with a co-occurring substance use disorder, please indicate any changes in the frequency or amount of use: n/a. Stage of change for addressing substance use diagnoses: No substance use/Not applicable    ASSESSMENT:  Екатерина Godinez presents with a Euthymic/ normal mood.     her affect is Normal range and intensity, which is congruent, with her mood and the content of the session. The client has made progress on their goals.     Екатерина Godinez presents with a none risk of suicide, none risk of self-harm, and none risk of harm to others.    For any risk assessment that  "surpasses a \"low\" rating, a safety plan must be developed.    A safety plan was indicated: no  If yes, describe in detail n/a    PLAN: Between sessions, Екатерина Godinez will continue to make use of coping skills. At the next session, the therapist will use Client-centered Therapy, Cognitive Behavioral Therapy, Solution-Focused Therapy, and Supportive Psychotherapy to address recovery from surgery.    Behavioral Health Treatment Plan and Discharge Planning: Екатерина Godinez is aware of and agrees to continue to work on their treatment plan. They have identified and are working toward their discharge goals. yes    Depression Follow-up Plan Completed: No    Visit start and stop times:    12/24/24  Start Time: 1500  Stop Time: 1553  Total Visit Time: 53 minutes    Virtual Regular Visit    Verification of patient location:    Patient is located at Home in the following state in which I hold an active license [place]      Assessment/Plan:    Problem List Items Addressed This Visit       Generalized anxiety disorder     Other Visit Diagnoses         Major depressive disorder, recurrent, moderate (HCC)    -  Primary            Goals addressed in session: Goal 1     Depression Follow-up Plan Completed: No    Reason for visit is   Chief Complaint   Patient presents with    Virtual Regular Visit          Encounter provider Edel Gutrhie      Recent Visits  No visits were found meeting these conditions.  Showing recent visits within past 7 days and meeting all other requirements  Today's Visits  Date Type Provider Dept   12/24/24 Telemedicine Edel Guthrie [facility] Therapist [address]   Showing today's visits and meeting all other requirements  Future Appointments  No visits were found meeting these conditions.  Showing future appointments within next 150 days and meeting all other requirements       The patient was identified by name and date of birth. Екатерина Godinez was informed that this is a telemedicine visit and that " the visit is being conducted throughthe frintit platform. She agrees to proceed..  My office door was closed. No one else was in the room.  She acknowledged consent and understanding of privacy and security of the video platform. The patient has agreed to participate and understands they can discontinue the visit at any time.    Patient is aware this is a billable service.       HPI     Past Medical History:   Diagnosis Date    Anemia     in past    Anxiety     Arthritis 2022    Depression     Gallstones     GERD (gastroesophageal reflux disease)     Headache(784.0)     Inflammatory bowel disease     Migraine     Nausea     occ    Panic attack     Rotator cuff disorder, right     Vertigo        Past Surgical History:   Procedure Laterality Date    ABDOMINAL SURGERY      scar tissue removed     SECTION      last assessed: Oct 23, 2014     CHOLECYSTECTOMY  2018    HYSTERECTOMY      [place] EGD BALLOON DILATION ESOPHAGUS <30 MM DIAM N/A 2024    Procedure: ESOPHAGOGASTRODUODENOSCOPY (EGD);  Surgeon: Pradip Jeff MD;  [facility]: [facility];  Service: General    [place] LAPS SURG CHOLECYSTECTOMY W/CHOLANGIOGRAPHY N/A 2018    Procedure: CHOLECYSTECTOMY LAPAROSCOPIC  WITH  ATTEMPTED IOC;  Surgeon: Silvia Woods MD;  Location: [facility];  Service: General    PYLOROPLASTY N/A 2024    Procedure: PYLOROPLASTY LAPAROSCOPIC WITH ROBOT;  Surgeon: Pradip Jeff MD;  [facility]: [facility];  Service: General    REMOVAL BLADDER STIMULATOR Left 2024    stimulator placed 2024-L. side, has remote    TUBAL LIGATION         Current Outpatient Medications   Medication Sig Dispense Refill    ALPRAZolam (XANAX) 0.25 mg tablet May take one tablet in AM as needed for anxiety and one or two tablets at HS for anxiety/sleep as needed 90 tablet 3    cholecalciferol 1,000 units tablet Take 1 tablet (1,000 Units total) by mouth daily 90 tablet 1    docusate sodium (COLACE) 100 mg capsule Take 1 capsule  (100 mg total) by mouth every morning 100 capsule 1    DULoxetine (Cymbalta) 30 mg delayed release capsule Take 1 capsule (30 mg total) by mouth daily 90 capsule 1    Dyclonine-Glycerin (Cepacol Sore Throat Spray) 0.1-33 % LIQD Apply 1 spray to the mouth or throat every 6 (six) hours 118 mL 0    famotidine (PEPCID) 40 MG tablet TAKE 1 TABLET(40 MG) BY MOUTH DAILY 90 tablet 0    ferrous sulfate 324 (65 Fe) mg TAKE 1 TABLET BY MOUTH EVERY OTHER DAY 90 tablet 3    fexofenadine (ALLEGRA) 180 MG tablet Take 1 tablet (180 mg total) by mouth daily 90 tablet 1    fluticasone (FLONASE) 50 mcg/act nasal spray 1 spray into each nostril daily 16 g 0    lidocaine (LIDODERM) 5 % UNWRAP AND APPLY 1 PATCH TOPICALLY TO THE AFFECTED AREA EVERY DAY(DO NOT LEAVE ON FOR MORE THAN 12 HOURS) 90 patch 1    meclizine (ANTIVERT) 25 mg tablet TAKE 1 TABLET(25 MG) BY MOUTH THREE TIMES DAILY AS NEEDED FOR DIZZINESS 30 tablet 0    mirtazapine (REMERON) 30 mg tablet Take 1 tablet (30 mg total) by mouth daily at bedtime 90 tablet 1    Nutritional Supplement LIQD [employer] Standard 1.4 Medical Nutrition Shakes 3900 mL 0    ondansetron (ZOFRAN-ODT) 4 mg disintegrating tablet Take 1 tablet (4 mg total) by mouth every 6 (six) hours as needed for nausea or vomiting for up to 7 days 28 tablet 0    pantoprazole (PROTONIX) 40 mg tablet Take 1 tablet (40 mg total) by mouth 2 (two) times a day for 21 days 42 tablet 0    polyethylene glycol (GLYCOLAX) 17 GM/SCOOP powder MIX AND DRINK 17 GRAMS BY MOUTH EVERY  g 0    prochlorperazine (COMPAZINE) 5 mg tablet TAKE 1 TABLET(5 MG) BY MOUTH EVERY 6 HOURS AS NEEDED FOR NAUSEA OR VOMITING 30 tablet 0    sucralfate (CARAFATE) 1 g/10 mL suspension Take 10 mL (1 g total) by mouth 4 (four) times a day for 21 days 840 mL 0    SUMAtriptan (IMITREX) 50 mg tablet TAKE 1 TABLET(50 MG) BY MOUTH 1 TIME FOR UP TO 1 DOSE AS NEEDED FOR MIGRAINE 9 tablet 0    vitamin B-12 (VITAMIN B-12) 1,000 mcg tablet Take 1 tablet (1,000  mcg total) by mouth daily 90 tablet 2     No current facility-administered medications for this visit.        No Known Allergies    Review of Systems    Video Exam    There were no vitals filed for this visit.    Physical Exam     Visit Time    Visit Start Time: 1500  Visit Stop Time: 1553  Total Visit Duration:  53 minutes

## 2025-01-07 ENCOUNTER — TELEMEDICINE (OUTPATIENT)
Dept: BEHAVIORAL/MENTAL HEALTH CLINIC | Facility: CLINIC | Age: 45
End: 2025-01-07
Payer: COMMERCIAL

## 2025-01-07 DIAGNOSIS — F41.1 GENERALIZED ANXIETY DISORDER: ICD-10-CM

## 2025-01-07 DIAGNOSIS — F33.1 MAJOR DEPRESSIVE DISORDER, RECURRENT, MODERATE (HCC): Primary | ICD-10-CM

## 2025-01-07 PROCEDURE — 90834 PSYTX W PT 45 MINUTES: CPT

## 2025-01-07 NOTE — PSYCH
"Behavioral Health Psychotherapy Progress Note    Psychotherapy Provided: Individual Psychotherapy     1. Major depressive disorder, recurrent, moderate (HCC)        2. Generalized anxiety disorder            Goals addressed in session: Goal 1 and Goal 2     DATA: Therapist began session with completion of check in. Екатерина reported doing “okay.” Therapist asked her how the recovery from surgery has gone. Екатерина spoke on recently struggling with diarrhea for the last few days. She spoke on waiting for a response from her doctor about the side effect. Therapist validated her frustrations and concern with this. Therapist and Екатерина discussed the impact the surgery recovery was having on her, since it was going not as well as they had hoped. She spoke on her hope of things clearing out on her own after some time. She spoke on the struggles she was experiencing with the side effects. Therapist validated her emotions and provided support in processing as needed.  During this session, this clinician used the following therapeutic modalities: Client-centered Therapy, Cognitive Behavioral Therapy, Solution-Focused Therapy, and Supportive Psychotherapy    Substance Abuse was not addressed during this session. If the client is diagnosed with a co-occurring substance use disorder, please indicate any changes in the frequency or amount of use: n/a. Stage of change for addressing substance use diagnoses: No substance use/Not applicable    ASSESSMENT:  Екатерина Godinez presents with a Euthymic/ normal mood.     her affect is Normal range and intensity, which is congruent, with her mood and the content of the session. The client has made progress on their goals.     Екатерина Godinez presents with a none risk of suicide, none risk of self-harm, and none risk of harm to others.    For any risk assessment that surpasses a \"low\" rating, a safety plan must be developed.    A safety plan was indicated: no  If yes, describe in detail " n/a    PLAN: Between sessions, Екатерина Godinez will continue to use self care discussed in session. At the next session, the therapist will use Client-centered Therapy, Cognitive Behavioral Therapy, Solution-Focused Therapy, and Supportive Psychotherapy to address depression.    Behavioral Health Treatment Plan and Discharge Planning: Екатерина Godinez is aware of and agrees to continue to work on their treatment plan. They have identified and are working toward their discharge goals. yes    Depression Follow-up Plan Completed: No    Visit start and stop times:    01/07/2025  Start Time: 1500  Stop Time: 1550  Total Visit Time: 50 minutes    Virtual Regular Visit    Verification of patient location:    Patient is located at Home in the following state in which I hold an active license [place]      Assessment/Plan:    Problem List Items Addressed This Visit       Generalized anxiety disorder     Other Visit Diagnoses         Major depressive disorder, recurrent, moderate (HCC)    -  Primary            Goals addressed in session: Goal 1 and Goal 2     Depression Follow-up Plan Completed: No    Reason for visit is   Chief Complaint   Patient presents with    Virtual Regular Visit          Encounter provider Edel Guthrie      Recent Visits  Date Type Provider Dept   01/07/25 Telemedicine Edel Guthrie [facility] Therapist [address]   Showing recent visits within past 7 days and meeting all other requirements  Future Appointments  No visits were found meeting these conditions.  Showing future appointments within next 150 days and meeting all other requirements       The patient was identified by name and date of birth. Екатерина Godinez was informed that this is a telemedicine visit and that the visit is being conducted throughBlue Security platform. She agrees to proceed..  My office door was closed. No one else was in the room.  She acknowledged consent and understanding of privacy and security of the video platform.  The patient has agreed to participate and understands they can discontinue the visit at any time.    Patient is aware this is a billable service.         HPI     Past Medical History:   Diagnosis Date    Anemia     in past    Anxiety     Arthritis 2022    Depression     Gallstones     GERD (gastroesophageal reflux disease)     Headache(784.0)     Inflammatory bowel disease     Migraine     Nausea     occ    Panic attack     Rotator cuff disorder, right     Vertigo        Past Surgical History:   Procedure Laterality Date    ABDOMINAL SURGERY      scar tissue removed     SECTION      last assessed: Oct 23, 2014     CHOLECYSTECTOMY  2018    HYSTERECTOMY      [place] EGD BALLOON DILATION ESOPHAGUS <30 MM DIAM N/A 2024    Procedure: ESOPHAGOGASTRODUODENOSCOPY (EGD);  Surgeon: Pradip Jeff MD;  [facility]: [facility];  Service: General    [place] LAPS SURG CHOLECYSTECTOMY W/CHOLANGIOGRAPHY N/A 2018    Procedure: CHOLECYSTECTOMY LAPAROSCOPIC  WITH  ATTEMPTED IOC;  Surgeon: Silvia Woods MD;  [facility];  Service: General    PYLOROPLASTY N/A 2024    Procedure: PYLOROPLASTY LAPAROSCOPIC WITH ROBOT;  Surgeon: Pradip Jeff MD;  [facility];  Service: General    REMOVAL BLADDER STIMULATOR Left 2024    stimulator placed 2024-L. side, has remote    TUBAL LIGATION         Current Outpatient Medications   Medication Sig Dispense Refill    ALPRAZolam (XANAX) 0.25 mg tablet May take one tablet in AM as needed for anxiety and one or two tablets at HS for anxiety/sleep as needed 90 tablet 3    cholecalciferol 1,000 units tablet Take 1 tablet (1,000 Units total) by mouth daily 90 tablet 1    docusate sodium (COLACE) 100 mg capsule Take 1 capsule (100 mg total) by mouth every morning 100 capsule 1    DULoxetine (Cymbalta) 30 mg delayed release capsule Take 1 capsule (30 mg total) by mouth daily 90 capsule 1    Dyclonine-Glycerin (Cepacol Sore Throat Spray) 0.1-33 % LIQD  Apply 1 spray to the mouth or throat every 6 (six) hours 118 mL 0    famotidine (PEPCID) 40 MG tablet TAKE 1 TABLET(40 MG) BY MOUTH DAILY 90 tablet 0    ferrous sulfate 324 (65 Fe) mg TAKE 1 TABLET BY MOUTH EVERY OTHER DAY 90 tablet 3    fexofenadine (ALLEGRA) 180 MG tablet Take 1 tablet (180 mg total) by mouth daily 90 tablet 1    fluticasone (FLONASE) 50 mcg/act nasal spray 1 spray into each nostril daily 16 g 0    lidocaine (LIDODERM) 5 % UNWRAP AND APPLY 1 PATCH TOPICALLY TO THE AFFECTED AREA EVERY DAY(DO NOT LEAVE ON FOR MORE THAN 12 HOURS) 90 patch 1    meclizine (ANTIVERT) 25 mg tablet TAKE 1 TABLET(25 MG) BY MOUTH THREE TIMES DAILY AS NEEDED FOR DIZZINESS 30 tablet 0    mirtazapine (REMERON) 30 mg tablet Take 1 tablet (30 mg total) by mouth daily at bedtime 90 tablet 1    Nutritional Supplement LIQD [employer] Standard 1.4 Medical Nutrition Shakes 3900 mL 0    ondansetron (ZOFRAN-ODT) 4 mg disintegrating tablet Take 1 tablet (4 mg total) by mouth every 6 (six) hours as needed for nausea or vomiting for up to 7 days 28 tablet 0    pantoprazole (PROTONIX) 40 mg tablet Take 1 tablet (40 mg total) by mouth 2 (two) times a day for 21 days 42 tablet 0    polyethylene glycol (GLYCOLAX) 17 GM/SCOOP powder MIX AND DRINK 17 GRAMS BY MOUTH EVERY  g 0    prochlorperazine (COMPAZINE) 5 mg tablet TAKE 1 TABLET(5 MG) BY MOUTH EVERY 6 HOURS AS NEEDED FOR NAUSEA OR VOMITING 30 tablet 0    sucralfate (CARAFATE) 1 g/10 mL suspension Take 10 mL (1 g total) by mouth 4 (four) times a day for 21 days 840 mL 0    SUMAtriptan (IMITREX) 50 mg tablet TAKE 1 TABLET(50 MG) BY MOUTH 1 TIME FOR UP TO 1 DOSE AS NEEDED FOR MIGRAINE 9 tablet 0    vitamin B-12 (VITAMIN B-12) 1,000 mcg tablet Take 1 tablet (1,000 mcg total) by mouth daily 90 tablet 2     No current facility-administered medications for this visit.        No Known Allergies    Review of Systems    Video Exam    There were no vitals filed for this visit.    Physical Exam      Visit Time    Visit Start Time: 1500  Visit Stop Time: 1550  Total Visit Duration:  50 minutes

## 2025-01-09 DIAGNOSIS — R63.6 UNDERWEIGHT DUE TO INADEQUATE CALORIC INTAKE: ICD-10-CM

## 2025-01-09 DIAGNOSIS — K52.9 COLITIS: ICD-10-CM

## 2025-01-09 DIAGNOSIS — R63.4 UNINTENDED WEIGHT LOSS: ICD-10-CM

## 2025-01-09 RX ORDER — ZINC OXIDE 216 MG/ML
LOTION TOPICAL
Qty: 3900 ML | Refills: 0 | Status: SHIPPED | OUTPATIENT
Start: 2025-01-09

## 2025-01-13 ENCOUNTER — TELEPHONE (OUTPATIENT)
Dept: FAMILY MEDICINE CLINIC | Facility: CLINIC | Age: 45
End: 2025-01-13

## 2025-01-13 NOTE — TELEPHONE ENCOUNTER
PCP SIGNATURE NEEDED FOR [facility]  FORM RECEIVED VIA FAX AND PLACED IN PCP FOLDER TO BE DELIVERED AT ASSIGNED TIMES.    Detailed Written Order [facility] [facility]    Diagnosis (ICD-10):  R63.4  E46

## 2025-01-14 ENCOUNTER — TELEPHONE (OUTPATIENT)
Age: 45
End: 2025-01-14

## 2025-01-14 ENCOUNTER — TELEMEDICINE (OUTPATIENT)
Dept: BEHAVIORAL/MENTAL HEALTH CLINIC | Facility: CLINIC | Age: 45
End: 2025-01-14
Payer: COMMERCIAL

## 2025-01-14 DIAGNOSIS — F33.1 MAJOR DEPRESSIVE DISORDER, RECURRENT, MODERATE (HCC): Primary | ICD-10-CM

## 2025-01-14 DIAGNOSIS — F41.1 GENERALIZED ANXIETY DISORDER: ICD-10-CM

## 2025-01-14 PROCEDURE — 90834 PSYTX W PT 45 MINUTES: CPT

## 2025-01-14 NOTE — TELEPHONE ENCOUNTER
Patient called office requesting to change appt to virtual for today at 3pm. Writer changed appt and informed her the message would be sent to the therapist. Please check patient in

## 2025-01-14 NOTE — PSYCH
"Behavioral Health Psychotherapy Progress Note    Psychotherapy Provided: Individual Psychotherapy     1. Major depressive disorder, recurrent, moderate (HCC)        2. Generalized anxiety disorder            Goals addressed in session: Goal 1 and Goal 2     DATA: Therapist began session with completion of check in. Екатерина reported doing “okay.” Therapist asked how she was feeling in terms of recovery period she reported still struggling to eat mashed foods and being scared to transition to solids. She stated the side effects she was experiencing in last session were normal as reported by her doctor. She reported making a slow transition into solid foods. Therapist validated her frustrations with the healing process. She also spoke on her daughter now having her license as of today. She spoke of being proud of her daughter due to her daughter after purchasing her first car as well. Екатерина reflected on the difficult experiences that she has had with her daughter in the past couple of years for the remainder session.  During this session, this clinician used the following therapeutic modalities: Client-centered Therapy, Cognitive Behavioral Therapy, Solution-Focused Therapy, and Supportive Psychotherapy    Substance Abuse was not addressed during this session. If the client is diagnosed with a co-occurring substance use disorder, please indicate any changes in the frequency or amount of use: n/a. Stage of change for addressing substance use diagnoses: No substance use/Not applicable    ASSESSMENT:  Екатерина Godinez presents with a Euthymic/ normal mood.     her affect is Normal range and intensity, which is congruent, with her mood and the content of the session. The client has made progress on their goals.     Екатерина Godinez presents with a none risk of suicide, none risk of self-harm, and none risk of harm to others.    For any risk assessment that surpasses a \"low\" rating, a safety plan must be developed.    A safety " plan was indicated: no  If yes, describe in detail n/a    PLAN: Between sessions, Екатерина Godinez will continue to use coping skills. At the next session, the therapist will use Client-centered Therapy, Cognitive Behavioral Therapy, Solution-Focused Therapy, and Supportive Psychotherapy to address depression.    Behavioral Health Treatment Plan and Discharge Planning: Екатерина Godinez is aware of and agrees to continue to work on their treatment plan. They have identified and are working toward their discharge goals. yes    Depression Follow-up Plan Completed: No    Visit start and stop times:    01/15/25  Start Time: 1500  Stop Time: 1552  Total Visit Time: 52 minutes    Virtual Regular Visit    Verification of patient location:    Patient is located at Home in the following state in which I hold an active license [place]      Assessment/Plan:    Problem List Items Addressed This Visit       Generalized anxiety disorder     Other Visit Diagnoses         Major depressive disorder, recurrent, moderate (HCC)    -  Primary            Goals addressed in session: Goal 1     Depression Follow-up Plan Completed: No    Reason for visit is   Chief Complaint   Patient presents with    Virtual Regular Visit          Encounter provider Edel Guthrie      Recent Visits  Date Type Provider Dept   01/14/25 Telemedicine Edel Guthrie [facility] Therapist [facility]   01/13/25 Telephone ANNAMARIE [place]   Showing recent visits within past 7 days and meeting all other requirements  Future Appointments  No visits were found meeting these conditions.  Showing future appointments within next 150 days and meeting all other requirements       The patient was identified by name and date of birth. Екатерина Godinez was informed that this is a telemedicine visit and that the visit is being conducted throughthe [employer] platform. She agrees to proceed..  My office door was closed. No one else was in the room.  She  acknowledged consent and understanding of privacy and security of the video platform. The patient has agreed to participate and understands they can discontinue the visit at any time.    Patient is aware this is a billable service.       HPI     Past Medical History:   Diagnosis Date    Anemia     in past    Anxiety     Arthritis 2022    Depression     Gallstones     GERD (gastroesophageal reflux disease)     Headache(784.0)     Inflammatory bowel disease     Migraine     Nausea     occ    Panic attack     Rotator cuff disorder, right     Vertigo        Past Surgical History:   Procedure Laterality Date    ABDOMINAL SURGERY      scar tissue removed     SECTION      last assessed: Oct 23, 2014     CHOLECYSTECTOMY  2018    HYSTERECTOMY      [place] EGD BALLOON DILATION ESOPHAGUS <30 MM DIAM N/A 2024    Procedure: ESOPHAGOGASTRODUODENOSCOPY (EGD);  Surgeon: Pradip Jeff MD;  [facility];  Service: General    [place] LAPS SURG CHOLECYSTECTOMY W/CHOLANGIOGRAPHY N/A 2018    Procedure: CHOLECYSTECTOMY LAPAROSCOPIC  WITH  ATTEMPTED IOC;  Surgeon: Silvia Woods MD;  [facility];  Service: General    PYLOROPLASTY N/A 2024    Procedure: PYLOROPLASTY LAPAROSCOPIC WITH ROBOT;  Surgeon: Pradip Jeff MD;  [facility];  Service: General    REMOVAL BLADDER STIMULATOR Left 2024    stimulator placed 2024-L. side, has remote    TUBAL LIGATION         Current Outpatient Medications   Medication Sig Dispense Refill    ALPRAZolam (XANAX) 0.25 mg tablet May take one tablet in AM as needed for anxiety and one or two tablets at HS for anxiety/sleep as needed 90 tablet 3    cholecalciferol 1,000 units tablet Take 1 tablet (1,000 Units total) by mouth daily 90 tablet 1    docusate sodium (COLACE) 100 mg capsule Take 1 capsule (100 mg total) by mouth every morning 100 capsule 1    DULoxetine (Cymbalta) 30 mg delayed release capsule Take 1 capsule (30 mg total) by mouth  daily 90 capsule 1    Dyclonine-Glycerin (Cepacol Sore Throat Spray) 0.1-33 % LIQD Apply 1 spray to the mouth or throat every 6 (six) hours 118 mL 0    famotidine (PEPCID) 40 MG tablet TAKE 1 TABLET(40 MG) BY MOUTH DAILY 90 tablet 0    ferrous sulfate 324 (65 Fe) mg TAKE 1 TABLET BY MOUTH EVERY OTHER DAY 90 tablet 3    fexofenadine (ALLEGRA) 180 MG tablet Take 1 tablet (180 mg total) by mouth daily 90 tablet 1    fluticasone (FLONASE) 50 mcg/act nasal spray 1 spray into each nostril daily 16 g 0    lidocaine (LIDODERM) 5 % UNWRAP AND APPLY 1 PATCH TOPICALLY TO THE AFFECTED AREA EVERY DAY(DO NOT LEAVE ON FOR MORE THAN 12 HOURS) 90 patch 1    meclizine (ANTIVERT) 25 mg tablet TAKE 1 TABLET(25 MG) BY MOUTH THREE TIMES DAILY AS NEEDED FOR DIZZINESS 30 tablet 0    mirtazapine (REMERON) 30 mg tablet Take 1 tablet (30 mg total) by mouth daily at bedtime 90 tablet 1    Nutritional Supplement LIQD [place] Standard 1.4 Medical Nutrition [employer] 3900 mL 0    ondansetron (ZOFRAN-ODT) 4 mg disintegrating tablet Take 1 tablet (4 mg total) by mouth every 6 (six) hours as needed for nausea or vomiting for up to 7 days 28 tablet 0    pantoprazole (PROTONIX) 40 mg tablet Take 1 tablet (40 mg total) by mouth 2 (two) times a day for 21 days 42 tablet 0    polyethylene glycol (GLYCOLAX) 17 GM/SCOOP powder MIX AND DRINK 17 GRAMS BY MOUTH EVERY  g 0    prochlorperazine (COMPAZINE) 5 mg tablet TAKE 1 TABLET(5 MG) BY MOUTH EVERY 6 HOURS AS NEEDED FOR NAUSEA OR VOMITING 30 tablet 0    sucralfate (CARAFATE) 1 g/10 mL suspension Take 10 mL (1 g total) by mouth 4 (four) times a day for 21 days 840 mL 0    SUMAtriptan (IMITREX) 50 mg tablet TAKE 1 TABLET(50 MG) BY MOUTH 1 TIME FOR UP TO 1 DOSE AS NEEDED FOR MIGRAINE 9 tablet 0    vitamin B-12 (VITAMIN B-12) 1,000 mcg tablet Take 1 tablet (1,000 mcg total) by mouth daily 90 tablet 2     No current facility-administered medications for this visit.        No Known Allergies    Review of  Systems    Video Exam    There were no vitals filed for this visit.    Physical Exam     Visit Time    Visit Start Time: 1500  Visit Stop Time: 1552  Total Visit Duration:  52 minutes

## 2025-01-20 ENCOUNTER — TELEMEDICINE (OUTPATIENT)
Dept: BEHAVIORAL/MENTAL HEALTH CLINIC | Facility: CLINIC | Age: 45
End: 2025-01-20
Payer: COMMERCIAL

## 2025-01-20 DIAGNOSIS — F33.1 MAJOR DEPRESSIVE DISORDER, RECURRENT, MODERATE (HCC): Primary | ICD-10-CM

## 2025-01-20 PROCEDURE — 90834 PSYTX W PT 45 MINUTES: CPT

## 2025-01-21 NOTE — PSYCH
"Behavioral Health Psychotherapy Progress Note    Psychotherapy Provided: Individual Psychotherapy     1. Major depressive disorder, recurrent, moderate (HCC)            Goals addressed in session: Goal 1     DATA: Therapist began session with completion of check in. Екатерина reported doing “better.” Therapist asked if there had been any improvements in her recovery from the surgery since they last spoke. You nearly spoke and now introducing solids into her diet and so far having fair tolerance. Therapist expressed relief for Екатерина. You never expressed feeling really relieved that her body was now recovering better from the surgery. Екатерина spoke on recent stressors pertaining to her recovery due to still being limited in her mobility and such. Therapist validated as needed and provided support and processing.  During this session, this clinician used the following therapeutic modalities: Client-centered Therapy, Cognitive Behavioral Therapy, Solution-Focused Therapy, and Supportive Psychotherapy    Substance Abuse was not addressed during this session. If the client is diagnosed with a co-occurring substance use disorder, please indicate any changes in the frequency or amount of use: n/a. Stage of change for addressing substance use diagnoses: No substance use/Not applicable    ASSESSMENT:  Екатерина Godinez presents with a Euthymic/ normal mood.     her affect is Normal range and intensity, which is congruent, with her mood and the content of the session. The client has made progress on their goals.     Екатерина Godinez presents with a none risk of suicide, none risk of self-harm, and none risk of harm to others.    For any risk assessment that surpasses a \"low\" rating, a safety plan must be developed.    A safety plan was indicated: no  If yes, describe in detail n/a    PLAN: Between sessions, Екатерина Godinez will continue to use self care. At the next session, the therapist will use Client-centered Therapy, Cognitive " Behavioral Therapy, Solution-Focused Therapy, and Supportive Psychotherapy to address depression.    Behavioral Health Treatment Plan and Discharge Planning: Екатерина Godinez is aware of and agrees to continue to work on their treatment plan. They have identified and are working toward their discharge goals. yes    Depression Follow-up Plan Completed: No    Visit start and stop times:    01/20/2025  Start Time: 1500  Stop Time: 1550  Total Visit Time: 50 minutes    Virtual Regular Visit    Verification of patient location:    Patient is located at Home in the following state in which I hold an active license [place]      Assessment/Plan:    Problem List Items Addressed This Visit    None  Visit Diagnoses         Major depressive disorder, recurrent, moderate (HCC)    -  Primary            Goals addressed in session: Goal 1     Depression Follow-up Plan Completed: No    Reason for visit is   Chief Complaint   Patient presents with    Virtual Regular Visit          Encounter provider Edel Guthrie      Recent Visits  Date Type Provider Dept   01/20/25 Telemedicine Edel Guthrie [facility] Therapist [address]   01/14/25 Telemedicine Edel Guthrie [facility] Therapist [address]   Showing recent visits within past 7 days and meeting all other requirements  Future Appointments  No visits were found meeting these conditions.  Showing future appointments within next 150 days and meeting all other requirements       The patient was identified by name and date of birth. Екатерина Godinez was informed that this is a telemedicine visit and that the visit is being conducted throughthe [employer] platform. She agrees to proceed..  My office door was closed. No one else was in the room.  She acknowledged consent and understanding of privacy and security of the video platform. The patient has agreed to participate and understands they can discontinue the visit at any time.    Patient is aware this is a billable  service.       HPI     Past Medical History:   Diagnosis Date    Anemia     in past    Anxiety     Arthritis 2022    Depression     Gallstones     GERD (gastroesophageal reflux disease)     Headache(784.0)     Inflammatory bowel disease     Migraine     Nausea     occ    Panic attack     Rotator cuff disorder, right     Vertigo        Past Surgical History:   Procedure Laterality Date    ABDOMINAL SURGERY      scar tissue removed     SECTION      last assessed: Oct 23, 2014     CHOLECYSTECTOMY  2018    HYSTERECTOMY      [place] EGD BALLOON DILATION ESOPHAGUS <30 MM DIAM N/A 2024    Procedure: ESOPHAGOGASTRODUODENOSCOPY (EGD);  Surgeon: Pradip Jeff MD;  [facility];  Service: General    [place] LAPS SURG CHOLECYSTECTOMY W/CHOLANGIOGRAPHY N/A 2018    Procedure: CHOLECYSTECTOMY LAPAROSCOPIC  WITH  ATTEMPTED IOC;  Surgeon: Silvia Woods MD;  [facility];  Service: General    PYLOROPLASTY N/A 2024    Procedure: PYLOROPLASTY LAPAROSCOPIC WITH ROBOT;  Surgeon: Pradip Jeff MD;  [facility];  Service: General    REMOVAL BLADDER STIMULATOR Left 2024    stimulator placed 2024-L. side, has remote    TUBAL LIGATION         Current Outpatient Medications   Medication Sig Dispense Refill    ALPRAZolam (XANAX) 0.25 mg tablet May take one tablet in AM as needed for anxiety and one or two tablets at HS for anxiety/sleep as needed 90 tablet 3    cholecalciferol 1,000 units tablet Take 1 tablet (1,000 Units total) by mouth daily 90 tablet 1    docusate sodium (COLACE) 100 mg capsule Take 1 capsule (100 mg total) by mouth every morning 100 capsule 1    DULoxetine (Cymbalta) 30 mg delayed release capsule Take 1 capsule (30 mg total) by mouth daily 90 capsule 1    Dyclonine-Glycerin (Cepacol Sore Throat Spray) 0.1-33 % LIQD Apply 1 spray to the mouth or throat every 6 (six) hours 118 mL 0    famotidine (PEPCID) 40 MG tablet TAKE 1 TABLET(40 MG) BY MOUTH DAILY 90  tablet 0    ferrous sulfate 324 (65 Fe) mg TAKE 1 TABLET BY MOUTH EVERY OTHER DAY 90 tablet 3    fexofenadine (ALLEGRA) 180 MG tablet Take 1 tablet (180 mg total) by mouth daily 90 tablet 1    fluticasone (FLONASE) 50 mcg/act nasal spray 1 spray into each nostril daily 16 g 0    lidocaine (LIDODERM) 5 % UNWRAP AND APPLY 1 PATCH TOPICALLY TO THE AFFECTED AREA EVERY DAY(DO NOT LEAVE ON FOR MORE THAN 12 HOURS) 90 patch 1    meclizine (ANTIVERT) 25 mg tablet TAKE 1 TABLET(25 MG) BY MOUTH THREE TIMES DAILY AS NEEDED FOR DIZZINESS 30 tablet 0    mirtazapine (REMERON) 30 mg tablet Take 1 tablet (30 mg total) by mouth daily at bedtime 90 tablet 1    Nutritional Supplement LIQD [employer] Standard 1.4 Medical Nutrition [employer] 3900 mL 0    ondansetron (ZOFRAN-ODT) 4 mg disintegrating tablet Take 1 tablet (4 mg total) by mouth every 6 (six) hours as needed for nausea or vomiting for up to 7 days 28 tablet 0    pantoprazole (PROTONIX) 40 mg tablet Take 1 tablet (40 mg total) by mouth 2 (two) times a day for 21 days 42 tablet 0    polyethylene glycol (GLYCOLAX) 17 GM/SCOOP powder MIX AND DRINK 17 GRAMS BY MOUTH EVERY  g 0    prochlorperazine (COMPAZINE) 5 mg tablet TAKE 1 TABLET(5 MG) BY MOUTH EVERY 6 HOURS AS NEEDED FOR NAUSEA OR VOMITING 30 tablet 0    sucralfate (CARAFATE) 1 g/10 mL suspension Take 10 mL (1 g total) by mouth 4 (four) times a day for 21 days 840 mL 0    SUMAtriptan (IMITREX) 50 mg tablet TAKE 1 TABLET(50 MG) BY MOUTH 1 TIME FOR UP TO 1 DOSE AS NEEDED FOR MIGRAINE 9 tablet 0    vitamin B-12 (VITAMIN B-12) 1,000 mcg tablet Take 1 tablet (1,000 mcg total) by mouth daily 90 tablet 2     No current facility-administered medications for this visit.        No Known Allergies    Review of Systems    Video Exam    There were no vitals filed for this visit.    Physical Exam     Visit Time    Visit Start Time: 1500  Visit Stop Time: 1550  Total Visit Duration:  50 minutes

## 2025-01-23 ENCOUNTER — TELEMEDICINE (OUTPATIENT)
Dept: PSYCHIATRY | Facility: CLINIC | Age: 45
End: 2025-01-23
Payer: COMMERCIAL

## 2025-01-23 DIAGNOSIS — F41.1 GENERALIZED ANXIETY DISORDER: ICD-10-CM

## 2025-01-23 DIAGNOSIS — F32.1 CURRENT MODERATE EPISODE OF MAJOR DEPRESSIVE DISORDER WITHOUT PRIOR EPISODE (HCC): Primary | ICD-10-CM

## 2025-01-23 PROCEDURE — 99214 OFFICE O/P EST MOD 30 MIN: CPT | Performed by: NURSE PRACTITIONER

## 2025-01-23 NOTE — PSYCH
Virtual Regular Visit    Verification of patient location:    Patient is located at Home in the following state in which I hold an active license [place]      Assessment/Plan:    Problem List Items Addressed This Visit       Generalized anxiety disorder    Current moderate episode of major depressive disorder without prior episode (HCC) - Primary       Depression Follow-up Plan Completed: Yes    Reason for visit is   Chief Complaint   Patient presents with    Virtual Regular Visit          Encounter provider ANNAMARIE Hawk      Recent Visits  No visits were found meeting these conditions.  Showing recent visits within past 7 days and meeting all other requirements  Today's Visits  Date Type Provider Dept   01/23/25 Telemedicine ANNAMARIE Hawk [facility] [address]   Showing today's visits and meeting all other requirements  Future Appointments  No visits were found meeting these conditions.  Showing future appointments within next 150 days and meeting all other requirements       The patient was identified by name and date of birth. Екатерина Godinez was informed that this is a telemedicine visit and that the visit is being conducted throughthe [employer] Emb[employer]ded platform. She agrees to proceed..  My office door was closed. No one else was in the room.  She acknowledged consent and understanding of privacy and security of the video platform. The patient has agreed to participate and understands they can discontinue the visit at any time.    Patient is aware this is a billable service.     MEDICATION MANAGEMENT NOTE        [facility]    Name and Date of Birth:  Екатерина Godinez 44 y.o. 1980 MRN: [id number]    Date of Visit: January 23, 2025    Reason for Visit: Psychiatric medication management follow-up visit    No Known Allergies      Assessment & Plan  Current moderate episode of major depressive disorder without prior episode (HCC)         Generalized anxiety  disorder                SUBJECTIVE:    Екатерина is seen today for a follow up for Major Depressive Disorder. She continues to experience on and off symptoms since the last visit.  Since last visit she had surgery, a procedure to help with her gastroparesis.  She is currently only on liquid diet.  She states overall the surgery went well.  Her mom did come and stay with her for a couple of weeks she was very helpful.  She continues to struggle with eating and trying hard not to lose hope that this procedure will eventually have helped.  She is able to take her medications.  Continues to need the as needed Xanax for worsening anxiety.  Otherwise wishes to continue medications the same.  Continues to follow up in individual therapy.    She denies any side effects from current psychiatric medications.      HPI ROS Appetite Changes and Sleep:     She reports fluctuating sleep pattern, fluctuating appetite, fluctuating energy levels. Denies homicidal ideation, denies suicidal ideation    Review Of Systems:   no complaints, all other systems are negative , except as noted above         Mental Status Evaluation:    Appearance:  age appropriate   Behavior:  pleasant, cooperative   Speech:  normal rate, normal volume   Mood:  depressed   Affect:  flat   Thought Process:  goal directed   Associations: intact associations   Thought Content:  no overt delusions   Perceptual Disturbances: none   Risk Potential: Suicidal ideation - None  Homicidal ideation - None  Potential for aggression - No   Sensorium:  oriented to person, place, and time/date   Memory:  recent and remote memory grossly intact   Consciousness:  alert and awake   Attention: attention span and concentration are age appropriate   Insight:  fair   Judgment: fair   Gait/Station: normal gait/station, normal balance   Motor Activity: no abnormal movements       History Review:The following portions of the patient's history were reviewed and updated as appropriate:  psychiatric history, trauma history allergies, current medications, past family history, past medical history, past social history, past surgical history, and problem list   OBJECTIVE:     Vital signs in last 24 hours:    There were no vitals filed for this visit.  Laboratory Results: I have personally reviewed all pertinent laboratory/tests results.      Treatment Recommendations/Precautions:    Continue Remeron 30 mg at bedtime for sleep, appetite and to augment antidepressant  Continue Cymbalta 30 mg daily for depression, historically she has not been able to tolerate higher dose  Continue PRN xanax.  Aware of need to follow up with family physician for medical issues  Aware of 24 hour and weekend coverage for urgent situations accessed by calling [facility] main practice number  Medication management every 2 months  Continue psychotherapy with own therapist  Patient advised to call 911 if feeling suicidal or homicidal before acting out on their thoughts and they expressed understanding.    Medications Risks/Benefits      Risks, Benefits And Possible Side Effects Of Medications:    Discussed risks and benefits of treatment with patient including risk of suicidality, serotonin syndrome, increased QTc interval and SIADH related to treatment with antidepressants; Risk of induction of manic symptoms in certain patient populations and risks of misuse, abuse or dependence, sedation and respiratory depression related to treatment with benzodiazepine medications         Controlled Medication Discussion:     Екатерина has been filling controlled prescriptions on time as prescribed according to [place] Prescription Drug Monitoring Program    Psychotherapy Provided:     Individual psychotherapy provided: Medications, treatment progress and treatment plan reviewed with Екатерина.  Medication changes discussed with Екатерина.  Medication education provided to Екатерина.  Reassurance and supportive therapy  provided.      Treatment Plan:    Completed and signed during the session: Not applicable - Treatment Plan not due at this session  Depression Follow-up Plan Completed: Yes       Total Visit Duration:  30 minutes     The total visit duration detailed above includes: patient engagement, medication management, psychotherapy/counseling, discussion regarding treatment goals, and coordination of care.      Note Share Disclaimer:     This note was not shared with the patient due to this is a psychotherapy note    ANNAMARIE Hawk 01/23/25    This note was completed in part utilizing Dragon dictation Software. Grammatical, translation, syntax errors, random word insertions, spelling mistakes, and incomplete sentences may be an occasional consequence of this system secondary to software limitations with voice recognition, ambient noise, and hardware issues. If you have any questions or concerns about the content, text, or information contained within the body of this dictation, please contact the provider for clarification.

## 2025-02-11 ENCOUNTER — TELEMEDICINE (OUTPATIENT)
Dept: BEHAVIORAL/MENTAL HEALTH CLINIC | Facility: CLINIC | Age: 45
End: 2025-02-11
Payer: COMMERCIAL

## 2025-02-11 DIAGNOSIS — F41.1 GENERALIZED ANXIETY DISORDER: ICD-10-CM

## 2025-02-11 DIAGNOSIS — F33.1 MAJOR DEPRESSIVE DISORDER, RECURRENT, MODERATE (HCC): Primary | ICD-10-CM

## 2025-02-11 PROCEDURE — 90837 PSYTX W PT 60 MINUTES: CPT

## 2025-02-11 NOTE — PSYCH
"Behavioral Health Psychotherapy Progress Note    Psychotherapy Provided: Individual Psychotherapy     1. Major depressive disorder, recurrent, moderate (HCC)        2. Generalized anxiety disorder            Goals addressed in session: Goal 1     DATA: Therapist began session with completion of check in. Екатерина reported doing “good.” Therapist asked how she was recovering from surgery. She reported doing well but having two falls recently. She reported becoming dizzy going up the stairs in the middle of the night and falling as a result. She reported aside from some superficial bruising that she was OK. She reported now eating solid foods and having minimal side effects. Therapist made Екатерина aware of therapist leaving [facility] next month period in air became tearful and expressed being sad. Therapist validated and supported her processing her emotions. Therapist discussed her options for transfer. Екатерина expressed her recent stress of her daughter going to [place] by herself. She spoke on the ways that she was coping with the anxiousness.  During this session, this clinician used the following therapeutic modalities: Client-centered Therapy, Cognitive Behavioral Therapy, and Mindfulness-based Strategies    Substance Abuse was not addressed during this session. If the client is diagnosed with a co-occurring substance use disorder, please indicate any changes in the frequency or amount of use: n/a. Stage of change for addressing substance use diagnoses: No substance use/Not applicable    ASSESSMENT:  Екатерина Godinez presents with a Euthymic/ normal mood.     her affect is Normal range and intensity, which is congruent, with her mood and the content of the session. The client has made progress on their goals.     Екатерина Godinez presents with a none risk of suicide, none risk of self-harm, and none risk of harm to others.    For any risk assessment that surpasses a \"low\" rating, a safety plan must be developed.    A " safety plan was indicated: no  If yes, describe in detail n/a    PLAN: Between sessions, Екатерина Godinez will continue to use coping skills. At the next session, the therapist will use Client-centered Therapy, Cognitive Behavioral Therapy, Solution-Focused Therapy, and Supportive Psychotherapy to address anxiety.    Behavioral Health Treatment Plan and Discharge Planning: Екатерина Godinez is aware of and agrees to continue to work on their treatment plan. They have identified and are working toward their discharge goals. yes    Depression Follow-up Plan Completed: No    Visit start and stop times:    02/11/2025  Start Time: 1500  Stop Time: 1555  Total Visit Time: 55 minutes    Virtual Regular Visit    Verification of patient location:    Patient is located at Home in the following state in which I hold an active license [place]      Assessment/Plan:    Problem List Items Addressed This Visit       Generalized anxiety disorder     Other Visit Diagnoses         Major depressive disorder, recurrent, moderate (HCC)    -  Primary            Goals addressed in session: Goal 1     Depression Follow-up Plan Completed: No    Reason for visit is   Chief Complaint   Patient presents with    Virtual Regular Visit          Encounter provider Edel Guthrie      Recent Visits  Date Type Provider Dept   02/11/25 Telemedicine Edel Guthrie [facility] Therapist [address]   Showing recent visits within past 7 days and meeting all other requirements  Future Appointments  No visits were found meeting these conditions.  Showing future appointments within next 150 days and meeting all other requirements       The patient was identified by name and date of birth. Екатерина Godinez was informed that this is a telemedicine visit and that the visit is being conducted throughthe Any[employer] platform. She agrees to proceed..  My office door was closed. No one else was in the room.  She acknowledged consent and understanding of privacy and  security of the video platform. The patient has agreed to participate and understands they can discontinue the visit at any time.    Patient is aware this is a billable service.       HPI     Past Medical History:   Diagnosis Date    Anemia     in past    Anxiety     Arthritis 2022    Depression     Gallstones     GERD (gastroesophageal reflux disease)     Headache(784.0)     Inflammatory bowel disease     Migraine     Nausea     occ    Panic attack     Rotator cuff disorder, right     Vertigo        Past Surgical History:   Procedure Laterality Date    ABDOMINAL SURGERY      scar tissue removed     SECTION      last assessed: Oct 23, 2014     CHOLECYSTECTOMY  2018    HYSTERECTOMY      UT EGD BALLOON DILATION ESOPHAGUS <30 MM DIAM N/A 2024    Procedure: ESOPHAGOGASTRODUODENOSCOPY (EGD);  Surgeon: Pradip Jeff MD;  [facility];  Service: General    UT LAPS SURG CHOLECYSTECTOMY W/CHOLANGIOGRAPHY N/A 2018    Procedure: CHOLECYSTECTOMY LAPAROSCOPIC  WITH  ATTEMPTED IOC;  Surgeon: Silvia Woods MD;  [facility];  Service: General    PYLOROPLASTY N/A 2024    Procedure: PYLOROPLASTY LAPAROSCOPIC WITH ROBOT;  Surgeon: Pradip Jeff MD;  [facility];  Service: General    REMOVAL BLADDER STIMULATOR Left 2024    stimulator placed 2024-L. side, has remote    TUBAL LIGATION         Current Outpatient Medications   Medication Sig Dispense Refill    ALPRAZolam (XANAX) 0.25 mg tablet May take one tablet in AM as needed for anxiety and one or two tablets at HS for anxiety/sleep as needed 90 tablet 3    cholecalciferol 1,000 units tablet Take 1 tablet (1,000 Units total) by mouth daily 90 tablet 1    docusate sodium (COLACE) 100 mg capsule Take 1 capsule (100 mg total) by mouth every morning 100 capsule 1    DULoxetine (Cymbalta) 30 mg delayed release capsule Take 1 capsule (30 mg total) by mouth daily 90 capsule 1    Dyclonine-Glycerin (Cepacol Sore  Throat Spray) 0.1-33 % LIQD Apply 1 spray to the mouth or throat every 6 (six) hours 118 mL 0    famotidine (PEPCID) 40 MG tablet TAKE 1 TABLET(40 MG) BY MOUTH DAILY 90 tablet 0    ferrous sulfate 324 (65 Fe) mg TAKE 1 TABLET BY MOUTH EVERY OTHER DAY 90 tablet 3    fexofenadine (ALLEGRA) 180 MG tablet Take 1 tablet (180 mg total) by mouth daily 90 tablet 1    fluticasone (FLONASE) 50 mcg/act nasal spray 1 spray into each nostril daily 16 g 0    lidocaine (LIDODERM) 5 % UNWRAP AND APPLY 1 PATCH TOPICALLY TO THE AFFECTED AREA EVERY DAY(DO NOT LEAVE ON FOR MORE THAN 12 HOURS) 90 patch 1    meclizine (ANTIVERT) 25 mg tablet TAKE 1 TABLET(25 MG) BY MOUTH THREE TIMES DAILY AS NEEDED FOR DIZZINESS 30 tablet 0    mirtazapine (REMERON) 30 mg tablet Take 1 tablet (30 mg total) by mouth daily at bedtime 90 tablet 1    Nutritional Supplement LIQD [employer] Standard 1.4 Medical Nutrition [place] 3900 mL 0    ondansetron (ZOFRAN-ODT) 4 mg disintegrating tablet Take 1 tablet (4 mg total) by mouth every 6 (six) hours as needed for nausea or vomiting for up to 7 days 28 tablet 0    pantoprazole (PROTONIX) 40 mg tablet Take 1 tablet (40 mg total) by mouth 2 (two) times a day for 21 days 42 tablet 0    polyethylene glycol (GLYCOLAX) 17 GM/SCOOP powder MIX AND DRINK 17 GRAMS BY MOUTH EVERY  g 0    prochlorperazine (COMPAZINE) 5 mg tablet TAKE 1 TABLET(5 MG) BY MOUTH EVERY 6 HOURS AS NEEDED FOR NAUSEA OR VOMITING 30 tablet 0    sucralfate (CARAFATE) 1 g/10 mL suspension Take 10 mL (1 g total) by mouth 4 (four) times a day for 21 days 840 mL 0    SUMAtriptan (IMITREX) 50 mg tablet TAKE 1 TABLET(50 MG) BY MOUTH 1 TIME FOR UP TO 1 DOSE AS NEEDED FOR MIGRAINE 9 tablet 0    vitamin B-12 (VITAMIN B-12) 1,000 mcg tablet Take 1 tablet (1,000 mcg total) by mouth daily 90 tablet 2     No current facility-administered medications for this visit.        No Known Allergies    Review of Systems    Video Exam    There were no vitals filed for  this visit.    Physical Exam     Visit Time    Visit Start Time: 1500  Visit Stop Time: 1555  Total Visit Duration:  55 minutes

## 2025-02-14 ENCOUNTER — TELEPHONE (OUTPATIENT)
Age: 45
End: 2025-02-14

## 2025-02-14 NOTE — TELEPHONE ENCOUNTER
Patient called has appointment 02/14/25 at 12:15 pm wanted to see if any later appointment available. Advised patient no later appointment available. Patient stay with original appointment for now checking for transportation. Patient will call if can't find transportation.

## 2025-02-18 ENCOUNTER — TELEMEDICINE (OUTPATIENT)
Dept: BEHAVIORAL/MENTAL HEALTH CLINIC | Facility: CLINIC | Age: 45
End: 2025-02-18
Payer: COMMERCIAL

## 2025-02-18 DIAGNOSIS — F33.1 MAJOR DEPRESSIVE DISORDER, RECURRENT, MODERATE (HCC): Primary | ICD-10-CM

## 2025-02-18 DIAGNOSIS — F41.1 GENERALIZED ANXIETY DISORDER: ICD-10-CM

## 2025-02-18 PROCEDURE — 90834 PSYTX W PT 45 MINUTES: CPT

## 2025-02-18 NOTE — PSYCH
"Behavioral Health Psychotherapy Progress Note    Psychotherapy Provided: Individual Psychotherapy     1. Major depressive disorder, recurrent, moderate (HCC)        2. Generalized anxiety disorder            Goals addressed in session: Goal 1     DATA: Therapist began session with completion of check in. Екатерина reported doing “good.” Therapist asked how her recovery from surgery was going. Екатерина stated that she had been experimenting with a bunch of foods. She stated a lot of foods no longer hurting her stomach and being well tolerated. She spoke on being scared to eat a lot at once but taking small bites of different things. Therapist congratulated her and encouraged her to continue her progress. Екатерина spoke on feeling anxious about her daughter wanting to move to [place]. Therapist and Екатерина discussed her concerns that she expressed to her daughter about moving far away.  During this session, this clinician used the following therapeutic modalities: Client-centered Therapy, Cognitive Behavioral Therapy, Solution-Focused Therapy, and Supportive Psychotherapy    Substance Abuse was not addressed during this session. If the client is diagnosed with a co-occurring substance use disorder, please indicate any changes in the frequency or amount of use: n/a. Stage of change for addressing substance use diagnoses: No substance use/Not applicable    ASSESSMENT:  Екатерина Godinez presents with a Euthymic/ normal mood.     her affect is Normal range and intensity, which is congruent, with her mood and the content of the session. The client has made progress on their goals.     Екатерина Godinez presents with a none risk of suicide, none risk of self-harm, and none risk of harm to others.    For any risk assessment that surpasses a \"low\" rating, a safety plan must be developed.    A safety plan was indicated: no  If yes, describe in detail n/a    PLAN: Between sessions, Екатерина Godinez will continue to use coping skills. At the next " session, the therapist will use Client-centered Therapy, Cognitive Behavioral Therapy, Solution-Focused Therapy, and Supportive Psychotherapy to address anxiety.    Behavioral Health Treatment Plan and Discharge Planning: Екатерина Godinez is aware of and agrees to continue to work on their treatment plan. They have identified and are working toward their discharge goals. yes    Depression Follow-up Plan Completed: No    Visit start and stop times:    2025  Start Time: 1500  Stop Time: 1550  Total Visit Time: 50 minutes  Virtual Regular VisitName: Екатерина Godinez      : 1980      MRN: [id number]  Encounter Provider: Edel Guthrie  Encounter Date: 2025   [facility] [address]  [unfilled]  Assessment & Plan  Major depressive disorder, recurrent, moderate (HCC)         Generalized anxiety disorder         Major depressive disorder, recurrent, moderate (HCC)         Generalized anxiety disorder               Goals addressed in session: Goal 1     Depression Follow-up Plan Completed: No    Reason for visit is   Chief Complaint   Patient presents with    Virtual Regular Visit        Recent Visits  Date Type Provider Dept   25 Telemedicine Edel Guthrie [facility] Therapist [address]   Showing recent visits within past 7 days and meeting all other requirements  Future Appointments  No visits were found meeting these conditions.  Showing future appointments within next 150 days and meeting all other requirements     [unfilled]  [facility]    Past Medical History:   Diagnosis Date    Anemia     in past    Anxiety     Arthritis 2022    Depression     Gallstones     GERD (gastroesophageal reflux disease)     Headache(784.0)     Inflammatory bowel disease     Migraine     Nausea     occ    Panic attack     Rotator cuff disorder, right     Vertigo      Past Surgical History:   Procedure Laterality Date     ABDOMINAL SURGERY      scar tissue removed     SECTION      last assessed: Oct 23, 2014     CHOLECYSTECTOMY  2018    HYSTERECTOMY      [place] EGD BALLOON DILATION ESOPHAGUS <30 MM DIAM N/A 2024    Procedure: ESOPHAGOGASTRODUODENOSCOPY (EGD);  Surgeon: Pradip Jeff MD;  [facility];  Service: General    NV LAPS SURG CHOLECYSTECTOMY W/CHOLANGIOGRAPHY N/A 2018    Procedure: CHOLECYSTECTOMY LAPAROSCOPIC  WITH  ATTEMPTED IOC;  Surgeon: Silvia Woods MD;  [facility];  Service: General    PYLOROPLASTY N/A 2024    Procedure: PYLOROPLASTY LAPAROSCOPIC WITH ROBOT;  Surgeon: Pradip Jeff MD;  [facility];  Service: General    REMOVAL BLADDER STIMULATOR Left 2024    stimulator placed 2024-L. side, has remote    TUBAL LIGATION       Current Outpatient Medications   Medication Instructions    ALPRAZolam (XANAX) 0.25 mg tablet May take one tablet in AM as needed for anxiety and one or two tablets at HS for anxiety/sleep as needed    Cholecalciferol (VITAMIN D3) 1,000 Units, Oral, Daily    docusate sodium (COLACE) 100 mg, Oral, Every morning    DULoxetine (CYMBALTA) 30 mg, Oral, Daily    Dyclonine-Glycerin (Cepacol Sore Throat Spray) 0.1-33 % LIQD 1 spray, Mouth/Throat, Every 6 hours    famotidine (PEPCID) 40 MG tablet TAKE 1 TABLET(40 MG) BY MOUTH DAILY    ferrous sulfate 324 mg, Oral, Every other day    fexofenadine (ALLEGRA) 180 mg, Oral, Daily    fluticasone (FLONASE) 50 mcg/act nasal spray 1 spray, Nasal, Daily    lidocaine (LIDODERM) 5 % UNWRAP AND APPLY 1 PATCH TOPICALLY TO THE AFFECTED AREA EVERY DAY(DO NOT LEAVE ON FOR MORE THAN 12 HOURS)    meclizine (ANTIVERT) 25 mg tablet TAKE 1 TABLET(25 MG) BY MOUTH THREE TIMES DAILY AS NEEDED FOR DIZZINESS    mirtazapine (REMERON) 30 mg, Oral, Daily at bedtime    Nutritional Supplement LIQD [employer] Standard 1.4 Medical Nutrition [place]    ondansetron (ZOFRAN-ODT) 4 mg, Oral, Every 6 hours PRN    pantoprazole  (PROTONIX) 40 mg, Oral, 2 times daily    polyethylene glycol (GLYCOLAX) 17 GM/SCOOP powder MIX AND DRINK 17 GRAMS BY MOUTH EVERY DAY    prochlorperazine (COMPAZINE) 5 mg tablet TAKE 1 TABLET(5 MG) BY MOUTH EVERY 6 HOURS AS NEEDED FOR NAUSEA OR VOMITING    sucralfate (CARAFATE) 1 g, Oral, 4 times daily    SUMAtriptan (IMITREX) 50 mg tablet TAKE 1 TABLET(50 MG) BY MOUTH 1 TIME FOR UP TO 1 DOSE AS NEEDED FOR MIGRAINE    vitamin B-12 (VITAMIN B-12) 1,000 mcg, Oral, Daily     No Known Allergies    Review of Systems    Objective   LMP 07/24/2017     Video Exam  Physical Exam     [unfilled]  Visit Time    Visit Start Time: 1500  Visit Stop Time: 1550  Total Visit Duration:  50 minutes

## 2025-02-27 ENCOUNTER — TELEMEDICINE (OUTPATIENT)
Dept: PSYCHIATRY | Facility: CLINIC | Age: 45
End: 2025-02-27
Payer: COMMERCIAL

## 2025-02-27 DIAGNOSIS — F32.1 CURRENT MODERATE EPISODE OF MAJOR DEPRESSIVE DISORDER WITHOUT PRIOR EPISODE (HCC): Primary | ICD-10-CM

## 2025-02-27 DIAGNOSIS — F41.1 GENERALIZED ANXIETY DISORDER: ICD-10-CM

## 2025-02-27 PROCEDURE — 99214 OFFICE O/P EST MOD 30 MIN: CPT | Performed by: NURSE PRACTITIONER

## 2025-02-27 RX ORDER — DULOXETIN HYDROCHLORIDE 30 MG/1
30 CAPSULE, DELAYED RELEASE ORAL DAILY
Qty: 90 CAPSULE | Refills: 1 | Status: SHIPPED | OUTPATIENT
Start: 2025-02-27

## 2025-02-27 RX ORDER — MIRTAZAPINE 30 MG/1
30 TABLET, FILM COATED ORAL
Qty: 90 TABLET | Refills: 1 | Status: SHIPPED | OUTPATIENT
Start: 2025-02-27

## 2025-02-27 RX ORDER — ALPRAZOLAM 0.25 MG
TABLET ORAL
Qty: 90 TABLET | Refills: 3 | Status: SHIPPED | OUTPATIENT
Start: 2025-02-27

## 2025-02-27 NOTE — PSYCH
Medication Management    Name: Екатерина Godinez      : 1980      MRN: [id number]  Encounter Provider: ANNAMARIE Hawk  Encounter Date: 2025   Encounter department: [facility]    Insurance: Payor: [facility] / Plan: [facility] MEDICAID / Product Type: Medicaid HMO /      Reason for visit: Medication management follow-up visit:  Assessment & Plan  Generalized anxiety disorder    Orders:    ALPRAZolam (XANAX) 0.25 mg tablet; May take one tablet in AM as needed for anxiety and one or two tablets at HS for anxiety/sleep as needed    Current moderate episode of major depressive disorder without prior episode (HCC)    Orders:    DULoxetine (Cymbalta) 30 mg delayed release capsule; Take 1 capsule (30 mg total) by mouth daily    mirtazapine (REMERON) 30 mg tablet; Take 1 tablet (30 mg total) by mouth daily at bedtime        Treatment Recommendations/Precautions:    Educated about diagnosis and treatment modalities. Verbalizes understanding and agreement with the treatment plan.  Discussed self monitoring of symptoms, and symptom monitoring tools.  Discussed medications and if treatment adjustment was needed or desired.  Medication management every 3 months  Aware of 24 hour and weekend coverage for urgent situations accessed by calling [facility] main practice number  Continue psychotherapy with own therapist  I am scheduling this patient out for greater than 3 months: No    Medications Risks/Benefits:      Risks, Benefits And Possible Side Effects Of Medications:    Risks, benefits, and possible side effects of medications explained to Екатерина and she (or legal representative) verbalizes understanding and agreement for treatment.    Controlled Medication Discussion:     Екатерина has been filling controlled prescriptions on time as prescribed according to [place] Prescription Drug Monitoring Program      History of Present  Patricia Willams is a 44 y.o. female with a history of Major Depressive Disorder and Generalized Anxiety Disorder who presents for psychiatric evaluation due to depressive symptoms, anxiety symptoms, and for psychiatric medication management.    Primary complaints include DEPRESSIVE SYMPTOMS: depressed mood, low motivation, ruminations, negative thoughts. Symptoms first started gradually Several months and were unchanged over the last several months. Stressors preceding visit included health issues, medical problems, and chronic pain. Екатерина is recovering from her surgery and pleased that she is now able to eat more foods.  She is also excited today to announce that her daughter-in-law is currently in labor and expecting her fifth child.  She has been babysitting more and sometimes feeling stressed by this.  Sleep can be erratic - only a couple hours 1 night, but then better sleep the next.    She denies suicidal ideation, intent or plan at present; denies any other homicidal ideation, intent or plan at present.    She denies any auditory hallucinations, denies any visual hallucinations, denies any delusional thoughts.    She denies any side effects from medications.    HPI ROS Appetite Changes and Sleep:     She reports fluctuating sleep pattern, decreased appetite, decreased energy    Psychiatric Review Of Systems:    Appetite changes: decreased  Energy/anergy: decreased  Pertinent items are noted in HPI; all others negative    Review Of Systems: A review of systems is obtained and is negative except for the pertinent positives listed in HPI/Subjective above.      Current Rating Scores:     Last visit's PHQ-9   PHQ-2/9 Depression Screening             Areas of Improvement: reviewed in HPI/Subjective Section and reviewed in Assessment and Plan Section      Historical Information      Past Psychiatric History:   Reviewed and unchanged  Traumatic History:   Reviewed and unchanged  Family Psychiatric History:      Family History   Problem Relation Age of Onset    No Known Problems Family     Hypertension Mother     Arthritis Mother     Diabetes type II Father         mellitus     Hypertension Father     Diabetes Father     Arthritis Father        Substance Use History:    Social History     Substance and Sexual Activity   Alcohol Use No    Comment: none since 2021     Social History     Substance and Sexual Activity   Drug Use Yes    Types: Marijuana    Comment: medical marijuana       Social History:    Reviewed and unchanged    Social History     Socioeconomic History    Marital status: /Civil Union     Spouse name: Not on file    Number of children: Not on file    Years of education: Not on file    Highest education level: Not on file   Occupational History    Not on file   Tobacco Use    Smoking status: Former     Current packs/day: 0.00     Average packs/day: 0.5 packs/day for 5.0 years (2.5 ttl pk-yrs)     Types: Cigarettes     Quit date: 2013     Years since quittin.0    Smokeless tobacco: Never   Vaping Use    Vaping status: Every Day    Substances: THC   Substance and Sexual Activity    Alcohol use: No     Comment: none since 2021    Drug use: Yes     Types: Marijuana     Comment: medical marijuana    Sexual activity: Yes     Partners: Male     Comment: Hysterectomy   Other Topics Concern    Not on file   Social History Narrative    Not on file     Social Drivers of Health     Financial Resource Strain: Patient Declined (10/29/2024)    Overall Financial Resource Strain (CARDIA)     Difficulty of Paying Living Expenses: Patient declined   Recent Concern: Financial Resource Strain - Medium Risk (2024)    Received from [facility]    Overall Financial Resource Strain (CARDIA)     Difficulty of Paying Living Expenses: Somewhat hard   Food Insecurity: Patient Declined (10/29/2024)    Hunger Vital Sign     Worried About Running Out of Food in the Last Year: Patient declined      Ran Out of Food in the Last Year: Patient declined   Transportation Needs: Unmet Transportation Needs (10/29/2024)    PRAPARE - Transportation     Lack of Transportation (Medical): Yes     Lack of Transportation (Non-Medical): Yes   Physical Activity: Not on file   Stress: Not on file   Social Connections: Not on file   Intimate Partner Violence: Not At Risk (2024)    [facility]    Humiliation, Afraid, Rape, and Kick questionnaire     Fear of Current or Ex-Partner: No     Emotionally Abused: No     Physically Abused: No     Sexually Abused: No   Housing Stability: Unknown (10/29/2024)    Housing Stability Vital Sign     Unable to Pay for Housing in the Last Year: No     Number of Times Moved in the Last Year: Not on file     Homeless in the Last Year: No     Past Medical History:   Diagnosis Date    Anemia     in past    Anxiety     Arthritis 2022    Depression     Gallstones     GERD (gastroesophageal reflux disease)     Headache(784.0)     Inflammatory bowel disease     Migraine     Nausea     occ    Panic attack     Rotator cuff disorder, right     Vertigo         Past Surgical History:   Procedure Laterality Date    ABDOMINAL SURGERY      scar tissue removed     SECTION      last assessed: Oct 23, 2014     CHOLECYSTECTOMY  2018    HYSTERECTOMY      [place] EGD BALLOON DILATION ESOPHAGUS <30 MM DIAM N/A 2024    Procedure: ESOPHAGOGASTRODUODENOSCOPY (EGD);  Surgeon: Pradip Jeff MD;  [facility];  Service: General    MI LAPS SURG CHOLECYSTECTOMY W/CHOLANGIOGRAPHY N/A 2018    Procedure: CHOLECYSTECTOMY LAPAROSCOPIC  WITH  ATTEMPTED IOC;  Surgeon: Silvia Woods MD;  [facility];  Service: General    PYLOROPLASTY N/A 2024    Procedure: PYLOROPLASTY LAPAROSCOPIC WITH ROBOT;  Surgeon: Pradip Jeff MD;  [facility];  Service: General    REMOVAL BLADDER STIMULATOR Left 2024     stimulator placed 9/2024-L. side, has remote    TUBAL LIGATION       Allergies: No Known Allergies    Current Outpatient Medications   Medication Sig Dispense Refill    ALPRAZolam (XANAX) 0.25 mg tablet May take one tablet in AM as needed for anxiety and one or two tablets at HS for anxiety/sleep as needed 90 tablet 3    cholecalciferol 1,000 units tablet Take 1 tablet (1,000 Units total) by mouth daily 90 tablet 1    docusate sodium (COLACE) 100 mg capsule Take 1 capsule (100 mg total) by mouth every morning 100 capsule 1    DULoxetine (Cymbalta) 30 mg delayed release capsule Take 1 capsule (30 mg total) by mouth daily 90 capsule 1    Dyclonine-Glycerin (Cepacol Sore Throat Spray) 0.1-33 % LIQD Apply 1 spray to the mouth or throat every 6 (six) hours 118 mL 0    famotidine (PEPCID) 40 MG tablet TAKE 1 TABLET(40 MG) BY MOUTH DAILY 90 tablet 0    ferrous sulfate 324 (65 Fe) mg TAKE 1 TABLET BY MOUTH EVERY OTHER DAY 90 tablet 3    fexofenadine (ALLEGRA) 180 MG tablet Take 1 tablet (180 mg total) by mouth daily 90 tablet 1    fluticasone (FLONASE) 50 mcg/act nasal spray 1 spray into each nostril daily 16 g 0    lidocaine (LIDODERM) 5 % UNWRAP AND APPLY 1 PATCH TOPICALLY TO THE AFFECTED AREA EVERY DAY(DO NOT LEAVE ON FOR MORE THAN 12 HOURS) 90 patch 1    meclizine (ANTIVERT) 25 mg tablet TAKE 1 TABLET(25 MG) BY MOUTH THREE TIMES DAILY AS NEEDED FOR DIZZINESS 30 tablet 0    mirtazapine (REMERON) 30 mg tablet Take 1 tablet (30 mg total) by mouth daily at bedtime 90 tablet 1    Nutritional Supplement LIQD [employer] Standard 1.4 Medical Nutrition Shakes 3900 mL 0    ondansetron (ZOFRAN-ODT) 4 mg disintegrating tablet Take 1 tablet (4 mg total) by mouth every 6 (six) hours as needed for nausea or vomiting for up to 7 days 28 tablet 0    pantoprazole (PROTONIX) 40 mg tablet Take 1 tablet (40 mg total) by mouth 2 (two) times a day for 21 days 42 tablet 0    polyethylene glycol (GLYCOLAX) 17 GM/SCOOP powder MIX AND DRINK 17  GRAMS BY MOUTH EVERY  g 0    prochlorperazine (COMPAZINE) 5 mg tablet TAKE 1 TABLET(5 MG) BY MOUTH EVERY 6 HOURS AS NEEDED FOR NAUSEA OR VOMITING 30 tablet 0    sucralfate (CARAFATE) 1 g/10 mL suspension Take 10 mL (1 g total) by mouth 4 (four) times a day for 21 days 840 mL 0    SUMAtriptan (IMITREX) 50 mg tablet TAKE 1 TABLET(50 MG) BY MOUTH 1 TIME FOR UP TO 1 DOSE AS NEEDED FOR MIGRAINE 9 tablet 0    vitamin B-12 (VITAMIN B-12) 1,000 mcg tablet Take 1 tablet (1,000 mcg total) by mouth daily 90 tablet 2     No current facility-administered medications for this visit.       Medical History Reviewed by provider this encounter:  Tobacco  Allergies  Meds  Problems  Med Hx  Surg Hx  Fam Hx         Objective   LMP 07/24/2017      Mental Status Evaluation:    Appearance age appropriate, casually dressed   Behavior cooperative, calm   Speech normal rate, normal volume, normal pitch, spontaneous   Mood improved   Affect tearful   Thought Processes organized, goal directed   Thought Content no overt delusions   Perceptual Disturbances: no auditory hallucinations, no visual hallucinations   Abnormal Thoughts  Risk Potential Suicidal ideation - None  Homicidal ideation - None  Potential for aggression - No   Orientation oriented to person, place, time/date, and situation   Memory recent and remote memory grossly intact   Consciousness  , alert, and awake   Attention Span Concentration Span attention span and concentration are age appropriate   Intellect appears to be of average intelligence   Insight intact   Judgement intact   Muscle Strength and  Gait normal gait and normal balance   Motor activity no abnormal movements   Language no difficulty naming common objects, no difficulty repeating a phrase, no difficulty writing a sentence   Fund of Knowledge adequate knowledge of current events  adequate fund of knowledge regarding past history  adequate fund of knowledge regarding vocabulary    Pain none   Pain  Scale 0         Laboratory Results: I have personally reviewed all pertinent laboratory/tests results        Suicide/Homicide Risk Assessment:    Risk of Harm to Self:  The following ratings are based on assessment at the time of the interview  Current Specific Risk Factors include: diagnosis of depression, current depressive symptoms, current anxiety symptoms  Protective Factors: no current suicidal ideation, able to make plans for the future, able to manage anger well, access to mental health treatment, being a parent  Weapons/Firearms: none. The following steps have been taken to ensure weapons are properly secured: not applicable  Based on today's assessment, Екатерина presents the following risk of harm to self: minimal    Risk of Harm to Others:  The following ratings are based on assessment at the time of the interview  Recent Specific Risk Factors include: none  Protective Factors: no current homicidal ideation  Weapons/Firearms: none. The following steps have been taken to ensure weapons are properly secured: not applicable  Based on today's assessment, Екатерина presents the following risk of harm to others: none    The following interventions are recommended: Continue medication management. No other intervention changes indicated at this time.    Treatment Plan:    Completed and signed during the session: Not applicable - Treatment Plan not due at this session    Depression Follow-up Plan Completed: Yes    Note Share: This note was not shared with the patient due to this is a psychotherapy note    Administrative Statements   Administrative Statements   Encounter provider ANNAMARIE Hawk    The Patient is located at Home and in the following state in which I hold an active license [place].    The patient was identified by name and date of birth. Екатерина Godinez was informed that this is a telemedicine visit and that the visit is being conducted through the Epic Embedded platform. She agrees to proceed..  My office  door was closed. No one else was in the room.  She acknowledged consent and understanding of privacy and security of the video platform. The patient has agreed to participate and understands they can discontinue the visit at any time.    I have spent a total time of 30 minutes in caring for this patient on the day of the visit/encounter including Counseling / Coordination of care.      ANNAMARIE Hawk 02/27/25

## 2025-02-27 NOTE — ASSESSMENT & PLAN NOTE
Orders:    DULoxetine (Cymbalta) 30 mg delayed release capsule; Take 1 capsule (30 mg total) by mouth daily    mirtazapine (REMERON) 30 mg tablet; Take 1 tablet (30 mg total) by mouth daily at bedtime

## 2025-03-04 ENCOUNTER — TELEMEDICINE (OUTPATIENT)
Dept: BEHAVIORAL/MENTAL HEALTH CLINIC | Facility: CLINIC | Age: 45
End: 2025-03-04
Payer: COMMERCIAL

## 2025-03-04 DIAGNOSIS — F33.1 MAJOR DEPRESSIVE DISORDER, RECURRENT, MODERATE (HCC): Primary | ICD-10-CM

## 2025-03-04 DIAGNOSIS — R42 VERTIGO: ICD-10-CM

## 2025-03-04 PROCEDURE — 90837 PSYTX W PT 60 MINUTES: CPT

## 2025-03-04 RX ORDER — MECLIZINE HYDROCHLORIDE 25 MG/1
TABLET ORAL
Qty: 30 TABLET | Refills: 0 | Status: SHIPPED | OUTPATIENT
Start: 2025-03-04

## 2025-03-05 NOTE — PSYCH
"Behavioral Health Psychotherapy Progress Note    Psychotherapy Provided: Individual Psychotherapy     1. Major depressive disorder, recurrent, moderate (HCC)            Goals addressed in session: Goal 1     DATA: Therapist began session with completion of check in. Екатерина reported doing “good.” Therapist asked if  there have been any changes in her life since last session. Екатерина spoke on her family making the decision to move to [place] in the upcoming months. Екатерина elaborated on the reasons that led them to make this decision. She expressed being excited and anxious. Therapist validated her anxiousness and also congratulated her and they discussed the positives around the change of environment for her. Therapist and Екатерина discussed coping mechanisms to help adjust to the new change.  During this session, this clinician used the following therapeutic modalities: Client-centered Therapy, Cognitive Behavioral Therapy, Solution-Focused Therapy, and Supportive Psychotherapy    Substance Abuse was not addressed during this session. If the client is diagnosed with a co-occurring substance use disorder, please indicate any changes in the frequency or amount of use: n/a. Stage of change for addressing substance use diagnoses: No substance use/Not applicable    ASSESSMENT:  Екатерина Godinez presents with a Euthymic/ normal mood.     her affect is Normal range and intensity, which is congruent, with her mood and the content of the session. The client has made progress on their goals.     Екатерина Godinez presents with a none risk of suicide, none risk of self-harm, and none risk of harm to others.    For any risk assessment that surpasses a \"low\" rating, a safety plan must be developed.    A safety plan was indicated: no  If yes, describe in detail n/a    PLAN: Between sessions, Екатерина Godinez will continue to use coping skills. At the next session, the therapist will use Client-centered Therapy, Cognitive Behavioral Therapy, " Solution-Focused Therapy, and Supportive Psychotherapy to address anxiety.    Behavioral Health Treatment Plan and Discharge Planning: Екатерина Godinez is aware of and agrees to continue to work on their treatment plan. They have identified and are working toward their discharge goals. yes    Depression Follow-up Plan Completed: No    Visit start and stop times:    03/04/2025  Virtual Regular Visit    Verification of patient location:    Patient is located at Home in the following state in which I hold an active license [place]      Assessment/Plan:    Problem List Items Addressed This Visit    None  Visit Diagnoses         Major depressive disorder, recurrent, moderate (HCC)    -  Primary            Goals addressed in session: Goal 1     Depression Follow-up Plan Completed: No    Reason for visit is No chief complaint on file.       Encounter provider Edel [facility]      Recent Visits  Date Type Provider Dept   03/04/25 Telemedicine Edel [facility] Therapist [address]   Showing recent visits within past 7 days and meeting all other requirements  Future Appointments  No visits were found meeting these conditions.  Showing future appointments within next 150 days and meeting all other requirements       The patient was identified by name and date of birth. Екатерина Godinez was informed that this is a telemedicine visit and that the visit is being conducted throughthe [employer] platform. She agrees to proceed..  My office door was closed. No one else was in the room.  She acknowledged consent and understanding of privacy and security of the video platform. The patient has agreed to participate and understands they can discontinue the visit at any time.    Patient is aware this is a billable service.         HPI     Past Medical History:   Diagnosis Date    Anemia     in past    Anxiety     Arthritis September 2022    Depression     Gallstones     GERD (gastroesophageal reflux disease)     Headache(784.0)      Inflammatory bowel disease     Migraine     Nausea     occ    Panic attack     Rotator cuff disorder, right     Vertigo        Past Surgical History:   Procedure Laterality Date    ABDOMINAL SURGERY      scar tissue removed     SECTION      last assessed: Oct 23, 2014     CHOLECYSTECTOMY  2018    HYSTERECTOMY      [place] EGD BALLOON DILATION ESOPHAGUS <30 MM DIAM N/A 2024    Procedure: ESOPHAGOGASTRODUODENOSCOPY (EGD);  Surgeon: Pradip Jeff MD;  [facility]: [facility];  Service: General    [place] LAPS SURG CHOLECYSTECTOMY W/CHOLANGIOGRAPHY N/A 2018    Procedure: CHOLECYSTECTOMY LAPAROSCOPIC  WITH  ATTEMPTED IOC;  Surgeon: Silvia Woods MD;  [facility];  Service: General    PYLOROPLASTY N/A 2024    Procedure: PYLOROPLASTY LAPAROSCOPIC WITH ROBOT;  Surgeon: Pradip Jeff MD;  [facility];  Service: General    REMOVAL BLADDER STIMULATOR Left 2024    stimulator placed 2024-L. side, has remote    TUBAL LIGATION         Current Outpatient Medications   Medication Sig Dispense Refill    ALPRAZolam (XANAX) 0.25 mg tablet May take one tablet in AM as needed for anxiety and one or two tablets at HS for anxiety/sleep as needed 90 tablet 3    cholecalciferol 1,000 units tablet Take 1 tablet (1,000 Units total) by mouth daily 90 tablet 1    docusate sodium (COLACE) 100 mg capsule Take 1 capsule (100 mg total) by mouth every morning 100 capsule 1    DULoxetine (Cymbalta) 30 mg delayed release capsule Take 1 capsule (30 mg total) by mouth daily 90 capsule 1    Dyclonine-Glycerin (Cepacol Sore Throat Spray) 0.1-33 % LIQD Apply 1 spray to the mouth or throat every 6 (six) hours 118 mL 0    famotidine (PEPCID) 40 MG tablet TAKE 1 TABLET(40 MG) BY MOUTH DAILY 90 tablet 0    ferrous sulfate 324 (65 Fe) mg TAKE 1 TABLET BY MOUTH EVERY OTHER DAY 90 tablet 3    fexofenadine (ALLEGRA) 180 MG tablet Take 1 tablet (180 mg total) by mouth daily 90 tablet 1    fluticasone (FLONASE) 50  mcg/act nasal spray 1 spray into each nostril daily 16 g 0    lidocaine (LIDODERM) 5 % UNWRAP AND APPLY 1 PATCH TOPICALLY TO THE AFFECTED AREA EVERY DAY(DO NOT LEAVE ON FOR MORE THAN 12 HOURS) 90 patch 1    meclizine (ANTIVERT) 25 mg tablet TAKE 1 TABLET(25 MG) BY MOUTH THREE TIMES DAILY AS NEEDED FOR DIZZINESS 30 tablet 0    mirtazapine (REMERON) 30 mg tablet Take 1 tablet (30 mg total) by mouth daily at bedtime 90 tablet 1    Nutritional Supplement LIQD [employer] Standard 1.4 Medical Nutrition Shakes 3900 mL 0    ondansetron (ZOFRAN-ODT) 4 mg disintegrating tablet Take 1 tablet (4 mg total) by mouth every 6 (six) hours as needed for nausea or vomiting for up to 7 days 28 tablet 0    pantoprazole (PROTONIX) 40 mg tablet Take 1 tablet (40 mg total) by mouth 2 (two) times a day for 21 days 42 tablet 0    polyethylene glycol (GLYCOLAX) 17 GM/SCOOP powder MIX AND DRINK 17 GRAMS BY MOUTH EVERY  g 0    prochlorperazine (COMPAZINE) 5 mg tablet TAKE 1 TABLET(5 MG) BY MOUTH EVERY 6 HOURS AS NEEDED FOR NAUSEA OR VOMITING 30 tablet 0    sucralfate (CARAFATE) 1 g/10 mL suspension Take 10 mL (1 g total) by mouth 4 (four) times a day for 21 days 840 mL 0    SUMAtriptan (IMITREX) 50 mg tablet TAKE 1 TABLET(50 MG) BY MOUTH 1 TIME FOR UP TO 1 DOSE AS NEEDED FOR MIGRAINE 9 tablet 0    vitamin B-12 (VITAMIN B-12) 1,000 mcg tablet Take 1 tablet (1,000 mcg total) by mouth daily 90 tablet 2     No current facility-administered medications for this visit.        No Known Allergies    Review of Systems    Video Exam    There were no vitals filed for this visit.    Physical Exam     Visit Time    Visit Start Time: 1500  Visit Stop Time: 1556  Total Visit Duration:  56 minutes        Start Time: 1500  Stop Time: 1556  Total Visit Time: 56 minutes

## 2025-03-11 ENCOUNTER — TELEMEDICINE (OUTPATIENT)
Dept: BEHAVIORAL/MENTAL HEALTH CLINIC | Facility: CLINIC | Age: 45
End: 2025-03-11
Payer: COMMERCIAL

## 2025-03-11 DIAGNOSIS — F41.1 GENERALIZED ANXIETY DISORDER: ICD-10-CM

## 2025-03-11 DIAGNOSIS — F33.1 MAJOR DEPRESSIVE DISORDER, RECURRENT, MODERATE (HCC): Primary | ICD-10-CM

## 2025-03-11 PROCEDURE — 90834 PSYTX W PT 45 MINUTES: CPT

## 2025-03-12 NOTE — PSYCH
Virtual Regular Visit    Verification of patient location:    Patient is located at Home in the following state in which I hold an active license [place]      Assessment/Plan:    Problem List Items Addressed This Visit       Generalized anxiety disorder     Other Visit Diagnoses         Major depressive disorder, recurrent, moderate (HCC)    -  Primary            Goals addressed in session: Goal 1     Depression Follow-up Plan Completed: No    Reason for visit is No chief complaint on file.       Encounter provider Edel Guthrie      Recent Visits  Date Type Provider Dept   25 Telemedicine Edel Guthrie [facility] Therapist [address]   Showing recent visits within past 7 days and meeting all other requirements  Future Appointments  No visits were found meeting these conditions.  Showing future appointments within next 150 days and meeting all other requirements       The patient was identified by name and date of birth. Екатерина Godinez was informed that this is a telemedicine visit and that the visit is being conducted throughthe [employer] platform. She agrees to proceed..  My office door was closed. No one else was in the room.  She acknowledged consent and understanding of privacy and security of the video platform. The patient has agreed to participate and understands they can discontinue the visit at any time.    Patient is aware this is a billable service.         HPI     Past Medical History:   Diagnosis Date    Anemia     in past    Anxiety     Arthritis 2022    Depression     Gallstones     GERD (gastroesophageal reflux disease)     Headache(784.0)     Inflammatory bowel disease     Migraine     Nausea     occ    Panic attack     Rotator cuff disorder, right     Vertigo        Past Surgical History:   Procedure Laterality Date    ABDOMINAL SURGERY      scar tissue removed     SECTION      last assessed: Oct 23, 2014     CHOLECYSTECTOMY  2018    HYSTERECTOMY      [place] EGD  BALLOON DILATION ESOPHAGUS <30 MM DIAM N/A 12/17/2024    Procedure: ESOPHAGOGASTRODUODENOSCOPY (EGD);  Surgeon: Pradip Jeff MD;  [facility];  Service: General    CT LAPS SURG CHOLECYSTECTOMY W/CHOLANGIOGRAPHY N/A 02/21/2018    Procedure: CHOLECYSTECTOMY LAPAROSCOPIC  WITH  ATTEMPTED IOC;  Surgeon: Silvia Woods MD;  [facility];  Service: General    PYLOROPLASTY N/A 12/17/2024    Procedure: PYLOROPLASTY LAPAROSCOPIC WITH ROBOT;  Surgeon: Pradip Jeff MD;  [facility];  Service: General    REMOVAL BLADDER STIMULATOR Left 09/2024    stimulator placed 9/2024-L. side, has remote    TUBAL LIGATION         Current Outpatient Medications   Medication Sig Dispense Refill    ALPRAZolam (XANAX) 0.25 mg tablet May take one tablet in AM as needed for anxiety and one or two tablets at HS for anxiety/sleep as needed 90 tablet 3    cholecalciferol 1,000 units tablet Take 1 tablet (1,000 Units total) by mouth daily 90 tablet 1    docusate sodium (COLACE) 100 mg capsule Take 1 capsule (100 mg total) by mouth every morning 100 capsule 1    DULoxetine (Cymbalta) 30 mg delayed release capsule Take 1 capsule (30 mg total) by mouth daily 90 capsule 1    Dyclonine-Glycerin (Cepacol Sore Throat Spray) 0.1-33 % LIQD Apply 1 spray to the mouth or throat every 6 (six) hours 118 mL 0    famotidine (PEPCID) 40 MG tablet TAKE 1 TABLET(40 MG) BY MOUTH DAILY 90 tablet 0    ferrous sulfate 324 (65 Fe) mg TAKE 1 TABLET BY MOUTH EVERY OTHER DAY 90 tablet 3    fexofenadine (ALLEGRA) 180 MG tablet Take 1 tablet (180 mg total) by mouth daily 90 tablet 1    fluticasone (FLONASE) 50 mcg/act nasal spray 1 spray into each nostril daily 16 g 0    lidocaine (LIDODERM) 5 % UNWRAP AND APPLY 1 PATCH TOPICALLY TO THE AFFECTED AREA EVERY DAY(DO NOT LEAVE ON FOR MORE THAN 12 HOURS) 90 patch 1    meclizine (ANTIVERT) 25 mg tablet TAKE 1 TABLET(25 MG) BY MOUTH THREE TIMES DAILY AS NEEDED FOR DIZZINESS 30 tablet 0    mirtazapine  (REMERON) 30 mg tablet Take 1 tablet (30 mg total) by mouth daily at bedtime 90 tablet 1    Nutritional Supplement LIQD Decision Curve Standard 1.4 Medical Nutrition Shakes 3900 mL 0    ondansetron (ZOFRAN-ODT) 4 mg disintegrating tablet Take 1 tablet (4 mg total) by mouth every 6 (six) hours as needed for nausea or vomiting for up to 7 days 28 tablet 0    pantoprazole (PROTONIX) 40 mg tablet Take 1 tablet (40 mg total) by mouth 2 (two) times a day for 21 days 42 tablet 0    polyethylene glycol (GLYCOLAX) 17 GM/SCOOP powder MIX AND DRINK 17 GRAMS BY MOUTH EVERY  g 0    prochlorperazine (COMPAZINE) 5 mg tablet TAKE 1 TABLET(5 MG) BY MOUTH EVERY 6 HOURS AS NEEDED FOR NAUSEA OR VOMITING 30 tablet 0    sucralfate (CARAFATE) 1 g/10 mL suspension Take 10 mL (1 g total) by mouth 4 (four) times a day for 21 days 840 mL 0    SUMAtriptan (IMITREX) 50 mg tablet TAKE 1 TABLET(50 MG) BY MOUTH 1 TIME FOR UP TO 1 DOSE AS NEEDED FOR MIGRAINE 9 tablet 0    vitamin B-12 (VITAMIN B-12) 1,000 mcg tablet Take 1 tablet (1,000 mcg total) by mouth daily 90 tablet 2     No current facility-administered medications for this visit.        No Known Allergies    Review of Systems    Video Exam    There were no vitals filed for this visit.    Physical Exam     Visit Time    Visit Start Time: 1500  Visit Stop Time: 1551  Total Visit Duration:  51 minutes      Behavioral Health Psychotherapy Progress Note    Psychotherapy Provided: Individual Psychotherapy     1. Major depressive disorder, recurrent, moderate (HCC)        2. Generalized anxiety disorder            Goals addressed in session: Goal 1     DATA: Therapist began session with check in. Екатерина reported doing “good.” Therapist asked there had been any updates in her life since last session. Екатерина spoke on the ongoing process of getting pre approved for mortgage so they could begin looking at houses in [place] more seriously. Therapist asked her how she was feeling about the move after  "having some time to process the decision. Екатерина spoke on feeling more confident about the move and supported by a lot of her children who will agree that they would possibly make the move as well when she moves. Therapist and Екатерина spoke on any concerns she may have with the move such as hurricane seasons, floods and more. Екатерина spoke on the steps that she was taking to avoid these dangers in her new home.  During this session, this clinician used the following therapeutic modalities: Client-centered Therapy, Cognitive Behavioral Therapy, Solution-Focused Therapy, and Supportive Psychotherapy    Substance Abuse was not addressed during this session. If the client is diagnosed with a co-occurring substance use disorder, please indicate any changes in the frequency or amount of use: n/a. Stage of change for addressing substance use diagnoses: No substance use/Not applicable    ASSESSMENT:  Екатерина Godinez presents with a Euthymic/ normal mood.     her affect is Normal range and intensity, which is congruent, with her mood and the content of the session. The client has not made progress on their goals.     Екатерина Godinez presents with a none risk of suicide, none risk of self-harm, and none risk of harm to others.    For any risk assessment that surpasses a \"low\" rating, a safety plan must be developed.    A safety plan was indicated: no  If yes, describe in detail n/a    PLAN: Between sessions, Екатерина Godinez will continue to use self care. At the next session, the therapist will use Client-centered Therapy, Cognitive Behavioral Therapy, Solution-Focused Therapy, and Supportive Psychotherapy to address stress of move.    Behavioral Health Treatment Plan and Discharge Planning: Екатерина Godinez is aware of and agrees to continue to work on their treatment plan. They have identified and are working toward their discharge goals. yes    Depression Follow-up Plan Completed: No    Visit start and stop " times:    03/11/2025  Start Time: 1500  Stop Time: 1551  Total Visit Time: 51 minutes

## 2025-03-18 ENCOUNTER — TELEMEDICINE (OUTPATIENT)
Dept: BEHAVIORAL/MENTAL HEALTH CLINIC | Facility: CLINIC | Age: 45
End: 2025-03-18
Payer: COMMERCIAL

## 2025-03-18 DIAGNOSIS — F41.1 GENERALIZED ANXIETY DISORDER: Primary | ICD-10-CM

## 2025-03-18 PROCEDURE — 90837 PSYTX W PT 60 MINUTES: CPT

## 2025-03-19 NOTE — PSYCH
"Virtual Regular VisitName: Екатерина Godinez      : 1980      MRN: [id number]  Encounter Provider: Edel Guthrie  Encounter Date: 3/18/2025   Encounter department: [facility]ERAP[facility] [address]  :  Assessment & Plan  Generalized anxiety disorder             Goals addressed in session: Goal 1     DATA: Therapist began session with completion of check in. Екатерина reported doing “good.” Therapist asked that have been any updates in her life since last session. Екатерина excitingly stated that her son from [place] will be visiting and proposing to his girlfriend. Therapist congratulated Екатерина on the upcoming good. Екатерина spoke in detail her plans to help with the proposal and set up a photo shoot for the family. Екатерина then spoke in detail about his other her other son's partner mostly messaging her about her son and this conflicting her greatly. Therapist validated her conflicted emotion to supported her in exploring ways to react and implement appropriate boundaries to this behavior.  During this session, this clinician used the following therapeutic modalities: Client-centered Therapy, Cognitive Behavioral Therapy, Solution-Focused Therapy, and Supportive Psychotherapy    Substance Abuse was not addressed during this session. If the client is diagnosed with a co-occurring substance use disorder, please indicate any changes in the frequency or amount of use: n/a. Stage of change for addressing substance use diagnoses: No substance use/Not applicable    ASSESSMENT:  Екатерина presents with a Euthymic/ normal mood. Екатерина's affect is Normal range and intensity, which is congruent, with their mood and the content of the session. The client has made progress on their goals as evidenced by better adjustment to coping with stress.    Екатерина presents with a none risk of suicide, none risk of self-harm, and none risk of harm to others.    For any risk assessment that surpasses a \"low\" rating, a " safety plan must be developed.    A safety plan was indicated: no  If yes, describe in detail n/a    PLAN: Between sessions, Екатерина will continue to use coping skills. At the next session, the therapist will use Client-centered Therapy, Cognitive Behavioral Therapy, Solution-Focused Therapy, and Supportive Psychotherapy to address stress.    Behavioral Health Treatment Plan [facility]: Diagnosis and Treatment Plan explained to Екатерина, Екатерина relates understanding diagnosis and is agreeable to Treatment Plan. Yes     Depression Follow-up Plan Completed: No     Reason for visit is No chief complaint on file.     Recent Visits  Date Type Provider Dept   25 Telemedicine Edel Guthrie [facility] Therapist [address]   Showing recent visits within past 7 days and meeting all other requirements  Future Appointments  No visits were found meeting these conditions.  Showing future appointments within next 150 days and meeting all other requirements     History of Present Illness     HPI    Past Medical History   Past Medical History:   Diagnosis Date    Anemia     in past    Anxiety     Arthritis 2022    Depression     Gallstones     GERD (gastroesophageal reflux disease)     Headache(784.0)     Inflammatory bowel disease     Migraine     Nausea     occ    Panic attack     Rotator cuff disorder, right     Vertigo      Past Surgical History:   Procedure Laterality Date    ABDOMINAL SURGERY      scar tissue removed     SECTION      last assessed: Oct 23, 2014     CHOLECYSTECTOMY  2018    HYSTERECTOMY      [place] EGD BALLOON DILATION ESOPHAGUS <30 MM DIAM N/A 2024    Procedure: ESOPHAGOGASTRODUODENOSCOPY (EGD);  Surgeon: Pradip Jeff MD;  [facility];  Service: General    [place] LAPS SURG CHOLECYSTECTOMY W/CHOLANGIOGRAPHY N/A 2018    Procedure: CHOLECYSTECTOMY LAPAROSCOPIC  WITH  ATTEMPTED IOC;  Surgeon: Silvia Woods MD;  [facility];  Service: General     PYLOROPLASTY N/A 12/17/2024    Procedure: PYLOROPLASTY LAPAROSCOPIC WITH ROBOT;  Surgeon: Pradip Jeff MD;  [facility];  Service: General    REMOVAL BLADDER STIMULATOR Left 09/2024    stimulator placed 9/2024-L. side, has remote    TUBAL LIGATION       Current Outpatient Medications   Medication Instructions    ALPRAZolam (XANAX) 0.25 mg tablet May take one tablet in AM as needed for anxiety and one or two tablets at HS for anxiety/sleep as needed    Cholecalciferol (VITAMIN D3) 1,000 Units, Oral, Daily    docusate sodium (COLACE) 100 mg, Oral, Every morning    DULoxetine (CYMBALTA) 30 mg, Oral, Daily    Dyclonine-Glycerin (Cepacol Sore Throat Spray) 0.1-33 % LIQD 1 spray, Mouth/Throat, Every 6 hours    famotidine (PEPCID) 40 MG tablet TAKE 1 TABLET(40 MG) BY MOUTH DAILY    ferrous sulfate 324 mg, Oral, Every other day    fexofenadine (ALLEGRA) 180 mg, Oral, Daily    fluticasone (FLONASE) 50 mcg/act nasal spray 1 spray, Nasal, Daily    lidocaine (LIDODERM) 5 % UNWRAP AND APPLY 1 PATCH TOPICALLY TO THE AFFECTED AREA EVERY DAY(DO NOT LEAVE ON FOR MORE THAN 12 HOURS)    meclizine (ANTIVERT) 25 mg tablet TAKE 1 TABLET(25 MG) BY MOUTH THREE TIMES DAILY AS NEEDED FOR DIZZINESS    mirtazapine (REMERON) 30 mg, Oral, Daily at bedtime    Nutritional Supplement LIQD Net Element Standard 1.4 Medical Nutrition Shakes    ondansetron (ZOFRAN-ODT) 4 mg, Oral, Every 6 hours PRN    pantoprazole (PROTONIX) 40 mg, Oral, 2 times daily    polyethylene glycol (GLYCOLAX) 17 GM/SCOOP powder MIX AND DRINK 17 GRAMS BY MOUTH EVERY DAY    prochlorperazine (COMPAZINE) 5 mg tablet TAKE 1 TABLET(5 MG) BY MOUTH EVERY 6 HOURS AS NEEDED FOR NAUSEA OR VOMITING    sucralfate (CARAFATE) 1 g, Oral, 4 times daily    SUMAtriptan (IMITREX) 50 mg tablet TAKE 1 TABLET(50 MG) BY MOUTH 1 TIME FOR UP TO 1 DOSE AS NEEDED FOR MIGRAINE    vitamin B-12 (VITAMIN B-12) 1,000 mcg, Oral, Daily     No Known Allergies    Objective   LMP 07/24/2017     Video  Exam  Physical Exam     Administrative Statements   Encounter provider Edel Guthrie    The Patient is located at Home and in the following state in which I hold an active license [place].    The patient was identified by name and date of birth. Екатерина Godinez was informed that this is a telemedicine visit and that the visit is being conducted through the [employer] platform. She agrees to proceed..  My office door was closed. No one else was in the room.  She acknowledged consent and understanding of privacy and security of the video platform. The patient has agreed to participate and understands they can discontinue the visit at any time.    Visit Time  Start Time: 1500  Stop Time: 1556  Total Visit Time: 56 minutes

## 2025-03-25 ENCOUNTER — SOCIAL WORK (OUTPATIENT)
Dept: BEHAVIORAL/MENTAL HEALTH CLINIC | Facility: CLINIC | Age: 45
End: 2025-03-25
Payer: COMMERCIAL

## 2025-03-25 ENCOUNTER — DOCUMENTATION (OUTPATIENT)
Dept: BEHAVIORAL/MENTAL HEALTH CLINIC | Facility: CLINIC | Age: 45
End: 2025-03-25

## 2025-03-25 DIAGNOSIS — F41.1 GENERALIZED ANXIETY DISORDER: Primary | ICD-10-CM

## 2025-03-25 DIAGNOSIS — F33.1 MAJOR DEPRESSIVE DISORDER, RECURRENT, MODERATE (HCC): ICD-10-CM

## 2025-03-25 PROCEDURE — 90837 PSYTX W PT 60 MINUTES: CPT

## 2025-03-25 NOTE — PSYCH
"Behavioral Health Psychotherapy Progress Note    Psychotherapy Provided: Individual Psychotherapy     1. Generalized anxiety disorder        2. Major depressive disorder, recurrent, moderate (HCC)            Goals addressed in session: Goal 1     DATA: Therapist began session with completion of check in. Екатерина reported doing “good.” Therapist reminded Екатерина this would be their last session. Therapist asked if she had any questions pertaining to the transferring process. Екатерина denied any updates. Therapist asked Екатерина how he was feeling in regards to her many life changes occurring soon. She expressed both excitement and anxiousness about moving. Therapist validated her emotions. Екатерина spoke on most family member being made aware except her and her 's respective sisters. She spoke on the reason behind there being distance and expressed hoping for the relationships to improve. Therapist validated her emotions and supported in processing.   During this session, this clinician used the following therapeutic modalities: Client-centered Therapy, Cognitive Behavioral Therapy, Solution-Focused Therapy, and Supportive Psychotherapy    Substance Abuse was not addressed during this session. If the client is diagnosed with a co-occurring substance use disorder, please indicate any changes in the frequency or amount of use: n/a. Stage of change for addressing substance use diagnoses: No substance use/Not applicable    ASSESSMENT:  Екатерина Godinez presents with a Euthymic/ normal mood.     her affect is Normal range and intensity, which is congruent, with her mood and the content of the session. The client has made progress on their goals.     Екатерина Godinez presents with a none risk of suicide, none risk of self-harm, and none risk of harm to others.    For any risk assessment that surpasses a \"low\" rating, a safety plan must be developed.    A safety plan was indicated: no  If yes, describe in detail n/a    PLAN: " Between sessions, Екатерина Godinez will continue to use self care and coping skills. At the next session, the therapist will use Client-centered Therapy, Cognitive Behavioral Therapy, Solution-Focused Therapy, and Supportive Psychotherapy to address transition to new provider.    Behavioral Health Treatment Plan and Discharge Planning: Екатерина Godinez is aware of and agrees to continue to work on their treatment plan. They have identified and are working toward their discharge goals. yes    Depression Follow-up Plan Completed: No    Visit start and stop times:    03/25/25  Start Time: 1500  Stop Time: 1559  Total Visit Time: 59 minutes

## 2025-03-25 NOTE — PROGRESS NOTES
TRANSFER SUMMARY    [facility]    Patient Name Екатерина Godinez     Date of Birth: 44 y.o. 1980      MRN: [id number]    Admission Date:  04/25/2024    Date of Transfer: March 25, 2025    Admission Diagnosis:     Major Depressive Disorder  Generalized Anxiety Disorder    Current Diagnosis:     1. Generalized anxiety disorder        2. Major depressive disorder, recurrent, moderate (HCC)            Reason for Admission: Екатерина presented for treatment due to depression and anxiety symptoms. Primary complaints included DEPRESSIVE SYMPTOMS: unremarkable - euthymic mood and ANXIETY SYMPTOMS: unremarkable.     Progress in Treatment: Екатерина was seen for Individual Couseling. During the course of treatment she initially came in due to struggles with coping to health struggles and family dynamics. Екатерина built skills in coping with her chronic condition and was also able to heal from two surgeries that helped lessen her chronic condition. Екатерина and therapist then focused on confidence building and self worth. Екатерина gained a stronger sense of slf as well as comfort in her identity and how this impacts her relationships with others. Екатерина continues to cope with family dynamics and is now preparing for a move.     Episodes of Higher Level of Care: No    Transfer request Initiated by: Psychiatrist: None Therapist:  Edel Guthrie    Reason for Transfer Request:  therapist leaving    Does this individual need a clinician with specialized training/expertise?: No    Is this client working with any other [facility] Providers/Therapists? Psychiatrist: None Therapist: None    Other pertinent issues: None    Are there any specific individuals who would be a “best fit” or who have already agreed to accept this transfer request?      Psychiatrist: None   Therapist:  [facility]  Rationale:  n/a    Attempts to maintain the current therapeutic relationship:  n/a    Transfer request routed to  Clinical Supervisor for input and/or approval.     Comments from other involved providers and/or clinical coordinator : None    Edel Guthrie03/25/25

## 2025-03-28 ENCOUNTER — OFFICE VISIT (OUTPATIENT)
Dept: SURGERY | Facility: CLINIC | Age: 45
End: 2025-03-28
Payer: MEDICARE

## 2025-03-28 DIAGNOSIS — K31.84 GASTROPARESIS: Primary | ICD-10-CM

## 2025-03-28 PROCEDURE — 99213 OFFICE O/P EST LOW 20 MIN: CPT | Performed by: SURGERY

## 2025-03-28 NOTE — ASSESSMENT & PLAN NOTE
44-year-old female status post robotic pyloroplasty and EGD on 12/17/2024, here for follow-up.    Plan:  We discussed at length her current improvement in symptoms as well as her diet.  I would like her to be a little bit more adventurous with the soft food she is eating.  She is tolerating most things that she is trying without nausea or vomiting, and symptoms are improved from the time of surgery.  However she is limited herself for fear of having increasing discomfort.  We discussed expanding her diet over the past month to see how she does and return to clinic in 6 weeks.

## 2025-03-28 NOTE — PROGRESS NOTES
Name: Екатерина Godinez      : 1980      MRN: [id number]  Encounter Provider: Pradip Jeff MD  Encounter Date: 3/28/2025   Encounter department: [facility] [place]  :  Assessment & Plan  Gastroparesis  44-year-old female status post robotic pyloroplasty and EGD on 2024, here for follow-up.    Plan:  We discussed at length her current improvement in symptoms as well as her diet.  I would like her to be a little bit more adventurous with the soft food she is eating.  She is tolerating most things that she is trying without nausea or vomiting, and symptoms are improved from the time of surgery.  However she is limited herself for fear of having increasing discomfort.  We discussed expanding her diet over the past month to see how she does and return to clinic in 6 weeks.             History of Present Illness   HPI  Екатерина Godinez is a 44 y.o. female who presents status post robotic pyloroplasty and EGD on 2024, here for follow-up.  Overall, she feels quite well and is definitely improved from preoperatively.  She has less nausea and vomiting, and decreasing abdominal pain.  When she is sticking with her prescribed diet she is not having much difficulty.  However, when she has ventured into new foods she has had some increasing nausea and pain.  Denies any fevers, chills, chest chest pain, shortness of breath.  Is mostly eating for crushable food.  History obtained from: patient and patient's child    Review of Systems   Constitutional:  Negative for chills, fatigue and fever.   HENT:  Negative for ear pain, facial swelling, sinus pressure and sinus pain.    Eyes:  Negative for pain.   Respiratory:  Negative for cough, shortness of breath and wheezing.    Cardiovascular:  Negative for chest pain.   Gastrointestinal:  Positive for abdominal pain and nausea. Negative for constipation, diarrhea and vomiting.   Endocrine: Negative for cold intolerance and heat intolerance.    Genitourinary:  Negative for dysuria and flank pain.   Musculoskeletal:  Negative for back pain and neck pain.   Skin:  Negative for wound.   Neurological:  Negative for syncope, facial asymmetry, light-headedness and numbness.   Psychiatric/Behavioral:  Negative for behavioral problems, confusion and suicidal ideas.      Past Medical History   Past Medical History:   Diagnosis Date    Anemia     in past    Anxiety     Arthritis 2022    Depression     Gallstones     GERD (gastroesophageal reflux disease)     Headache(784.0)     Inflammatory bowel disease     Migraine     Nausea     occ    Panic attack     Rotator cuff disorder, right     Vertigo      Past Surgical History:   Procedure Laterality Date    ABDOMINAL SURGERY      scar tissue removed     SECTION      last assessed: Oct 23, 2014     CHOLECYSTECTOMY  2018    HYSTERECTOMY      IN EGD BALLOON DILATION ESOPHAGUS <30 MM DIAM N/A 2024    Procedure: ESOPHAGOGASTRODUODENOSCOPY (EGD);  Surgeon: Pradip Jeff MD;  Location: [facility];  Service: General    IN LAPS SURG CHOLECYSTECTOMY W/CHOLANGIOGRAPHY N/A 2018    Procedure: CHOLECYSTECTOMY LAPAROSCOPIC  WITH  ATTEMPTED IOC;  Surgeon: Silvia Woods MD;  Location: [facility];  Service: General    PYLOROPLASTY N/A 2024    Procedure: PYLOROPLASTY LAPAROSCOPIC WITH ROBOT;  Surgeon: Pradip Jeff MD;  Location: [facility];  Service: General    REMOVAL BLADDER STIMULATOR Left 2024    stimulator placed 2024-L. side, has remote    TUBAL LIGATION       Family History   Problem Relation Age of Onset    No Known Problems Family     Hypertension Mother     Arthritis Mother     Diabetes type II Father         mellitus     Hypertension Father     Diabetes Father     Arthritis Father       reports that she quit smoking about 12 years ago. Her smoking use included cigarettes. She has a 2.5 pack-year smoking history. She has never used smokeless tobacco. She reports current  drug use. Drug: Marijuana. She reports that she does not drink alcohol.  Current Outpatient Medications   Medication Instructions    ALPRAZolam (XANAX) 0.25 mg tablet May take one tablet in AM as needed for anxiety and one or two tablets at HS for anxiety/sleep as needed    Cholecalciferol (VITAMIN D3) 1,000 Units, Oral, Daily    docusate sodium (COLACE) 100 mg, Oral, Every morning    DULoxetine (CYMBALTA) 30 mg, Oral, Daily    Dyclonine-Glycerin (Cepacol Sore Throat Spray) 0.1-33 % LIQD 1 spray, Mouth/Throat, Every 6 hours    famotidine (PEPCID) 40 MG tablet TAKE 1 TABLET(40 MG) BY MOUTH DAILY    ferrous sulfate 324 mg, Oral, Every other day    fexofenadine (ALLEGRA) 180 mg, Oral, Daily    fluticasone (FLONASE) 50 mcg/act nasal spray 1 spray, Nasal, Daily    lidocaine (LIDODERM) 5 % UNWRAP AND APPLY 1 PATCH TOPICALLY TO THE AFFECTED AREA EVERY DAY(DO NOT LEAVE ON FOR MORE THAN 12 HOURS)    meclizine (ANTIVERT) 25 mg tablet TAKE 1 TABLET(25 MG) BY MOUTH THREE TIMES DAILY AS NEEDED FOR DIZZINESS    mirtazapine (REMERON) 30 mg, Oral, Daily at bedtime    Nutritional Supplement LIQD [employer] Standard 1.4 Medical Nutrition Shakes    ondansetron (ZOFRAN-ODT) 4 mg, Oral, Every 6 hours PRN    pantoprazole (PROTONIX) 40 mg, Oral, 2 times daily    polyethylene glycol (GLYCOLAX) 17 GM/SCOOP powder MIX AND DRINK 17 GRAMS BY MOUTH EVERY DAY    prochlorperazine (COMPAZINE) 5 mg tablet TAKE 1 TABLET(5 MG) BY MOUTH EVERY 6 HOURS AS NEEDED FOR NAUSEA OR VOMITING    sucralfate (CARAFATE) 1 g, Oral, 4 times daily    SUMAtriptan (IMITREX) 50 mg tablet TAKE 1 TABLET(50 MG) BY MOUTH 1 TIME FOR UP TO 1 DOSE AS NEEDED FOR MIGRAINE    vitamin B-12 (VITAMIN B-12) 1,000 mcg, Oral, Daily   No Known Allergies   Current Outpatient Medications on File Prior to Visit   Medication Sig Dispense Refill    ALPRAZolam (XANAX) 0.25 mg tablet May take one tablet in AM as needed for anxiety and one or two tablets at HS for anxiety/sleep as needed 90  tablet 3    cholecalciferol 1,000 units tablet Take 1 tablet (1,000 Units total) by mouth daily 90 tablet 1    docusate sodium (COLACE) 100 mg capsule Take 1 capsule (100 mg total) by mouth every morning 100 capsule 1    DULoxetine (Cymbalta) 30 mg delayed release capsule Take 1 capsule (30 mg total) by mouth daily 90 capsule 1    Dyclonine-Glycerin (Cepacol Sore Throat Spray) 0.1-33 % LIQD Apply 1 spray to the mouth or throat every 6 (six) hours 118 mL 0    famotidine (PEPCID) 40 MG tablet TAKE 1 TABLET(40 MG) BY MOUTH DAILY 90 tablet 0    ferrous sulfate 324 (65 Fe) mg TAKE 1 TABLET BY MOUTH EVERY OTHER DAY 90 tablet 3    fexofenadine (ALLEGRA) 180 MG tablet Take 1 tablet (180 mg total) by mouth daily 90 tablet 1    fluticasone (FLONASE) 50 mcg/act nasal spray 1 spray into each nostril daily 16 g 0    lidocaine (LIDODERM) 5 % UNWRAP AND APPLY 1 PATCH TOPICALLY TO THE AFFECTED AREA EVERY DAY(DO NOT LEAVE ON FOR MORE THAN 12 HOURS) 90 patch 1    meclizine (ANTIVERT) 25 mg tablet TAKE 1 TABLET(25 MG) BY MOUTH THREE TIMES DAILY AS NEEDED FOR DIZZINESS 30 tablet 0    mirtazapine (REMERON) 30 mg tablet Take 1 tablet (30 mg total) by mouth daily at bedtime 90 tablet 1    Nutritional Supplement LIQD [employer] Standard 1.4 Medical Nutrition Shakes 3900 mL 0    ondansetron (ZOFRAN-ODT) 4 mg disintegrating tablet Take 1 tablet (4 mg total) by mouth every 6 (six) hours as needed for nausea or vomiting for up to 7 days 28 tablet 0    pantoprazole (PROTONIX) 40 mg tablet Take 1 tablet (40 mg total) by mouth 2 (two) times a day for 21 days 42 tablet 0    polyethylene glycol (GLYCOLAX) 17 GM/SCOOP powder MIX AND DRINK 17 GRAMS BY MOUTH EVERY  g 0    prochlorperazine (COMPAZINE) 5 mg tablet TAKE 1 TABLET(5 MG) BY MOUTH EVERY 6 HOURS AS NEEDED FOR NAUSEA OR VOMITING 30 tablet 0    sucralfate (CARAFATE) 1 g/10 mL suspension Take 10 mL (1 g total) by mouth 4 (four) times a day for 21 days 840 mL 0    SUMAtriptan (IMITREX) 50  mg tablet TAKE 1 TABLET(50 MG) BY MOUTH 1 TIME FOR UP TO 1 DOSE AS NEEDED FOR MIGRAINE 9 tablet 0    vitamin B-12 (VITAMIN B-12) 1,000 mcg tablet Take 1 tablet (1,000 mcg total) by mouth daily 90 tablet 2     No current facility-administered medications on file prior to visit.      Social History     Tobacco Use    Smoking status: Former     Current packs/day: 0.00     Average packs/day: 0.5 packs/day for 5.0 years (2.5 ttl pk-yrs)     Types: Cigarettes     Quit date: 2013     Years since quittin.1    Smokeless tobacco: Never   Vaping Use    Vaping status: Every Day    Substances: THC   Substance and Sexual Activity    Alcohol use: No     Comment: none since 2021    Drug use: Yes     Types: Marijuana     Comment: medical marijuana    Sexual activity: Yes     Partners: Male     Comment: Hysterectomy        Objective   LMP 2017      Physical Exam  Vitals and nursing note reviewed.   Constitutional:       General: She is not in acute distress.     Appearance: Normal appearance. She is not ill-appearing.   HENT:      Head: Normocephalic and atraumatic.      Mouth/Throat:      Mouth: Mucous membranes are moist.   Eyes:      Extraocular Movements: Extraocular movements intact.   Cardiovascular:      Rate and Rhythm: Normal rate and regular rhythm.   Pulmonary:      Effort: Pulmonary effort is normal. No respiratory distress.      Breath sounds: Normal breath sounds. No stridor.   Abdominal:      General: There is no distension.      Palpations: Abdomen is soft. There is no mass.      Tenderness: There is no abdominal tenderness.      Hernia: No hernia is present.      Comments: Well-healed port sites.   Musculoskeletal:         General: No swelling or tenderness. Normal range of motion.   Skin:     General: Skin is warm and dry.      Findings: No lesion.   Neurological:      General: No focal deficit present.      Mental Status: She is alert and oriented to person, place, and time.   Psychiatric:          Mood and Affect: Mood normal.         Behavior: Behavior normal.         Administrative Statements   I have spent a total time of 30 minutes in caring for this patient on the day of the visit/encounter including Prognosis, Risks and benefits of tx options, Instructions for management, Patient and family education, and Impressions.

## 2025-04-07 DIAGNOSIS — F41.1 GENERALIZED ANXIETY DISORDER: ICD-10-CM

## 2025-04-07 NOTE — TELEPHONE ENCOUNTER
Not a duplicate  Patient needs a script sent for a quantity of 90 for a 30 day supply.    Reason for call:   [x] Refill   [] Prior Auth  [] Other:     Office:   [] PCP/Provider -   [x] Specialty/Provider - psych    Medication: ALPRAZolam (XANAX) 0.25 mg tablet     Dose/Frequency: May take one tablet in AM as needed for anxiety and one or two tablets at HS for anxiety/sleep as needed     Quantity: 90    Pharmacy: [id number] - [facility] [address] [place] [address]   Does the patient have enough for 3 days?   [x] Yes   [] No - Send as HP to POD

## 2025-04-08 NOTE — TELEPHONE ENCOUNTER
Refill cannot be delegated.    1 [id number] 03/04/2025 09/17/2024 ALPRAZolam (Tablet) 30.0 30 0.25 MG NA [employer] Medicaid 2 / 2 PA   1 [id number] 01/19/2025 11/07/2024 ALPRAZolam (Tablet) 90.0 30 0.25 MG NA [employer] Medicaid 2 / 3 PA   1 [id number] 12/18/2024 12/18/2024 oxyCODONE HCL (Tablet) 12.0 3 5 MG 30.0 [facility] Commercial Insurance 0 / 0 PA   1 [id number] 12/09/2024 11/07/2024 ALPRAZolam (Tablet) 90.0 30 0.25 MG NA [employer] Medicaid 1 / 3 PA   1 [id number] 11/07/2024 11/07/2024 ALPRAZolam (Tablet) 90.0 30 0.25 MG NA [employer] Medicaid 0 / 3 PA   1 [id number] 10/21/2024 10/21/2024 HYDROcodone BITARTRATE 5 MG ORAL TABLET/ACETAMINOPHEN 325 MG (Tablet) 8.0 2 325 MG-5 MG 20.0 COURTNEY DE LOS SANTOS [employer] Medicaid 0 / 0 PA   1 [id number] 10/18/2024 09/17/2024 ALPRAZolam (Tablet) 30.0 30 0.25 MG NA SANDRITA [employer] Medicaid 1 / 2 PA   1 [id number] 09/23/2024 09/23/2024 traMADol HCL (Tablet) 20.0 5 50 MG 40.0 JODY KARIMI [employer] Medicaid 0 / 0 PA   1 [id number] 09/17/2024 09/17/2024 ALPRAZolam (Tablet) 30.0 30 0.25 MG NA [employer] Medicaid 0 / 2 PA

## 2025-04-09 ENCOUNTER — TELEPHONE (OUTPATIENT)
Dept: PSYCHIATRY | Facility: CLINIC | Age: 45
End: 2025-04-09

## 2025-04-09 RX ORDER — ALPRAZOLAM 0.25 MG
TABLET ORAL
Qty: 90 TABLET | Refills: 0 | OUTPATIENT
Start: 2025-04-09

## 2025-04-09 NOTE — TELEPHONE ENCOUNTER
LM on Екатерина's VM informing her that there are refills available on her Xanax script.  Instructed her to call the pharmacy.

## 2025-04-09 NOTE — TELEPHONE ENCOUNTER
Reason for call:   [] Refill   [x] Prior Auth - Pt was notified by pharmacy that she needs a prior auth.  [] Other:     Office:   [] PCP/Provider -   [x] Specialty/Provider - ANNAMARIE Hawk     Medication: ALPRAZolam 0.25 mg    Dose/Frequency: 1 tab tid    Quantity: 90 tabs    Pharmacy: [id number] [address] [facility]   Does the patient have enough for 3 days?   [] Yes   [x] No - Send as HP to POD    Mail Away Pharmacy   Does the patient have enough for 10 days?   [] Yes   [] No - Send as HP to POD

## 2025-04-10 DIAGNOSIS — R42 VERTIGO: ICD-10-CM

## 2025-04-10 RX ORDER — MECLIZINE HYDROCHLORIDE 25 MG/1
TABLET ORAL
Qty: 30 TABLET | Refills: 0 | Status: SHIPPED | OUTPATIENT
Start: 2025-04-10

## 2025-04-10 NOTE — TELEPHONE ENCOUNTER
PA for Alprazolam 0.25mg SUBMITTED to [facility] [employer]     via    [x]CMM-KEY:[id number]   []Surescripts-Case ID #   []Availity-Auth ID # NDC #   []Faxed to plan   []Other website   []Phone call Case ID #     []PA sent as URGENT    All office notes, labs and other pertaining documents and studies sent. Clinical questions answered. Awaiting determination from insurance company.     Turnaround time for your insurance to make a decision on your Prior Authorization can take 7-21 business days.

## 2025-04-11 NOTE — TELEPHONE ENCOUNTER
PA for Alprazolam 0.25mg  NOT REQUIRED     Reason (screenshot if applicable)          Patient advised by          [x] MyChart Message  [] Phone call   []LMOM  []L/M to call office as no active Communication consent on file  []Unable to leave detailed message as VM not approved on Communication consent       Pharmacy advised by    [x]Fax  []Phone call

## 2025-04-14 NOTE — TELEPHONE ENCOUNTER
PA for Alprazolam 0.25 mg tablet SUBMITTED to [employer]    via    []CMM-KEY:   []Surescripts-Case ID #   []Availity-Auth ID # NDC #   [x]Faxed to plan [phone]  []Other website   []Phone call Case ID #     []PA sent as URGENT    All office notes, labs and other pertaining documents and studies sent. Clinical questions answered. Awaiting determination from insurance company.     Turnaround time for your insurance to make a decision on your Prior Authorization can take 7-21 business days.

## 2025-04-14 NOTE — TELEPHONE ENCOUNTER
Received a my chart message from the pt stating that Mariam was getting a rejection on there end fro the alprazolam because it is 3 pills per day, and insurance is not willing to cover the quantity amount, called [facility] and spoke with the pharmacy team (Amara). She is faxing over a quantity limit form, that needs to be filled out and faxed back. Waiting on fax.

## 2025-04-15 NOTE — TELEPHONE ENCOUNTER
Called [employer] [phone] twice and was on hold for over 20 mins each time.  Will tray call again later.

## 2025-04-15 NOTE — TELEPHONE ENCOUNTER
Received letter from [email or web address] via fax that PA is not required for Alprazolam 0.25 mg tablet         Reason (screenshot if applicable)           Pharmacy advised by    []Fax  [x]Phone call- spoke to pharmacist and she stated she is still getting rejection and PA is required

## 2025-04-28 ENCOUNTER — OFFICE VISIT (OUTPATIENT)
Dept: FAMILY MEDICINE CLINIC | Facility: CLINIC | Age: 45
End: 2025-04-28

## 2025-04-28 ENCOUNTER — APPOINTMENT (OUTPATIENT)
Dept: LAB | Facility: CLINIC | Age: 45
End: 2025-04-28
Payer: MEDICARE

## 2025-04-28 VITALS
HEART RATE: 92 BPM | DIASTOLIC BLOOD PRESSURE: 62 MMHG | OXYGEN SATURATION: 98 % | WEIGHT: 84.4 LBS | BODY MASS INDEX: 17.01 KG/M2 | SYSTOLIC BLOOD PRESSURE: 100 MMHG | HEIGHT: 59 IN | TEMPERATURE: 97.6 F | RESPIRATION RATE: 16 BRPM

## 2025-04-28 DIAGNOSIS — M25.50 POLYARTHRALGIA: ICD-10-CM

## 2025-04-28 DIAGNOSIS — R63.4 UNINTENDED WEIGHT LOSS: ICD-10-CM

## 2025-04-28 DIAGNOSIS — E61.1 IRON DEFICIENCY: ICD-10-CM

## 2025-04-28 DIAGNOSIS — K52.9 COLITIS: ICD-10-CM

## 2025-04-28 DIAGNOSIS — E53.8 B12 DEFICIENCY: ICD-10-CM

## 2025-04-28 DIAGNOSIS — R63.6 UNDERWEIGHT DUE TO INADEQUATE CALORIC INTAKE: ICD-10-CM

## 2025-04-28 DIAGNOSIS — R63.6 UNDERWEIGHT DUE TO INADEQUATE CALORIC INTAKE: Primary | ICD-10-CM

## 2025-04-28 DIAGNOSIS — E55.9 VITAMIN D DEFICIENCY: ICD-10-CM

## 2025-04-28 DIAGNOSIS — M16.11 OSTEOARTHRITIS OF RIGHT HIP, UNSPECIFIED OSTEOARTHRITIS TYPE: ICD-10-CM

## 2025-04-28 DIAGNOSIS — M25.532 LEFT WRIST PAIN: ICD-10-CM

## 2025-04-28 LAB
BASOPHILS # BLD AUTO: 0.04 THOUSANDS/ÂΜL (ref 0–0.1)
BASOPHILS NFR BLD AUTO: 1 % (ref 0–1)
EOSINOPHIL # BLD AUTO: 0.07 THOUSAND/ÂΜL (ref 0–0.61)
EOSINOPHIL NFR BLD AUTO: 1 % (ref 0–6)
ERYTHROCYTE [DISTWIDTH] IN BLOOD BY AUTOMATED COUNT: 12.6 % (ref 11.6–15.1)
EST. AVERAGE GLUCOSE BLD GHB EST-MCNC: 105 MG/DL
HBA1C MFR BLD: 5.3 %
HCT VFR BLD AUTO: 42.3 % (ref 34.8–46.1)
HGB BLD-MCNC: 14 G/DL (ref 11.5–15.4)
IMM GRANULOCYTES # BLD AUTO: 0.01 THOUSAND/UL (ref 0–0.2)
IMM GRANULOCYTES NFR BLD AUTO: 0 % (ref 0–2)
LYMPHOCYTES # BLD AUTO: 2.16 THOUSANDS/ÂΜL (ref 0.6–4.47)
LYMPHOCYTES NFR BLD AUTO: 29 % (ref 14–44)
MCH RBC QN AUTO: 31.3 PG (ref 26.8–34.3)
MCHC RBC AUTO-ENTMCNC: 33.1 G/DL (ref 31.4–37.4)
MCV RBC AUTO: 95 FL (ref 82–98)
MONOCYTES # BLD AUTO: 0.79 THOUSAND/ÂΜL (ref 0.17–1.22)
MONOCYTES NFR BLD AUTO: 11 % (ref 4–12)
NEUTROPHILS # BLD AUTO: 4.34 THOUSANDS/ÂΜL (ref 1.85–7.62)
NEUTS SEG NFR BLD AUTO: 58 % (ref 43–75)
NRBC BLD AUTO-RTO: 0 /100 WBCS
PLATELET # BLD AUTO: 259 THOUSANDS/UL (ref 149–390)
PMV BLD AUTO: 11 FL (ref 8.9–12.7)
RBC # BLD AUTO: 4.47 MILLION/UL (ref 3.81–5.12)
WBC # BLD AUTO: 7.41 THOUSAND/UL (ref 4.31–10.16)

## 2025-04-28 PROCEDURE — 36415 COLL VENOUS BLD VENIPUNCTURE: CPT

## 2025-04-28 PROCEDURE — 83735 ASSAY OF MAGNESIUM: CPT

## 2025-04-28 PROCEDURE — 83540 ASSAY OF IRON: CPT

## 2025-04-28 PROCEDURE — 83036 HEMOGLOBIN GLYCOSYLATED A1C: CPT

## 2025-04-28 PROCEDURE — 84590 ASSAY OF VITAMIN A: CPT

## 2025-04-28 PROCEDURE — 82746 ASSAY OF FOLIC ACID SERUM: CPT

## 2025-04-28 PROCEDURE — 99213 OFFICE O/P EST LOW 20 MIN: CPT | Performed by: NURSE PRACTITIONER

## 2025-04-28 PROCEDURE — 80053 COMPREHEN METABOLIC PANEL: CPT

## 2025-04-28 PROCEDURE — 83550 IRON BINDING TEST: CPT

## 2025-04-28 PROCEDURE — 85025 COMPLETE CBC W/AUTO DIFF WBC: CPT

## 2025-04-28 PROCEDURE — 84443 ASSAY THYROID STIM HORMONE: CPT

## 2025-04-28 PROCEDURE — 82728 ASSAY OF FERRITIN: CPT

## 2025-04-28 PROCEDURE — 80061 LIPID PANEL: CPT

## 2025-04-28 PROCEDURE — 82306 VITAMIN D 25 HYDROXY: CPT

## 2025-04-28 PROCEDURE — 84630 ASSAY OF ZINC: CPT

## 2025-04-28 PROCEDURE — 82607 VITAMIN B-12: CPT

## 2025-04-28 NOTE — ASSESSMENT & PLAN NOTE
Orders:    Comprehensive metabolic panel; Future    CBC and differential; Future    Hemoglobin A1C; Future    Lipid panel; Future    TSH, 3rd generation with Free T4 reflex; Future    Vitamin B12; Future    Iron Panel (Includes Ferritin, Iron Sat%, Iron, and TIBC); Future    Folate; Future    Vitamin D 25 hydroxy; Future    Magnesium; Future    Zinc; Future    Vitamin A; Future

## 2025-04-28 NOTE — PROGRESS NOTES
Name: Екатерина Godinez      : 1980      MRN: [id number]  Encounter Provider: ANNAMARIE Brown  Encounter Date: 2025   Encounter department: [facility]  :  Assessment & Plan  Underweight due to inadequate caloric intake    Orders:    Comprehensive metabolic panel; Future    CBC and differential; Future    Hemoglobin A1C; Future    Lipid panel; Future    TSH, 3rd generation with Free T4 reflex; Future    Vitamin B12; Future    Iron Panel (Includes Ferritin, Iron Sat%, Iron, and TIBC); Future    Folate; Future    Vitamin D 25 hydroxy; Future    Magnesium; Future    Zinc; Future    Vitamin A; Future    Iron deficiency    Orders:    Iron Panel (Includes Ferritin, Iron Sat%, Iron, and TIBC); Future    Vitamin D deficiency    Orders:    Vitamin D 25 hydroxy; Future    B12 deficiency    Orders:    Vitamin B12; Future    Unintended weight loss    Orders:    Comprehensive metabolic panel; Future    CBC and differential; Future    Hemoglobin A1C; Future    Lipid panel; Future    TSH, 3rd generation with Free T4 reflex; Future    Vitamin B12; Future    Iron Panel (Includes Ferritin, Iron Sat%, Iron, and TIBC); Future    Folate; Future    Vitamin D 25 hydroxy; Future    Magnesium; Future    Zinc; Future    Vitamin A; Future    Polyarthralgia    Orders:    XR hips bilateral 3-4 vw w pelvis if performed; Future    Osteoarthritis of right hip, unspecified osteoarthritis type    Orders:    XR hips bilateral 3-4 vw w pelvis if performed; Future    Left wrist pain    Orders:    XR wrist 3+ vw left; Future        BMI Counseling: Body mass index is 17.05 kg/m². The BMI is below normal. Dietary education for weight gain was provided. Rationale for BMI follow-up plan is due to patient being underweight.     Tobacco Cessation Counseling: Tobacco cessation counseling was provided. The patient is sincerely urged to quit consumption of tobacco. She is not ready to quit tobacco.        History of Present Illness   Екатерина Godinez is a 44 y.o. female who  has a past medical history of Anemia, Anxiety, Depression, Gallstones, Migraine, Nausea, Panic attack, Rotator cuff disorder, right, and Vertigo. who presented to the office today for follow up.      She reports that she was hospitalized in December due to severe gastroparesis. She had on 12/17/2024:   PYLOROPLASTY LAPAROSCOPIC WITH ROBOT (Abdomen)  ESOPHAGOGASTRODUODENOSCOPY (EGD) (Abdomen)    Since then sx have improved somewhat but she is still limited to very small portions of liquid or mashed foods. She also reports ongoing hip and pelvis pain. She had imaging previously which showed degenerative changes. She will be starting with a new therapist tomorrow and this is causing increased anxiety.      The following portions of the patient's history were reviewed and updated as appropriate: allergies, current medications, past family history, past medical history, past social history, past surgical history and problem list.        Review of Systems   Constitutional:  Positive for appetite change and unexpected weight change. Negative for chills and fever.   HENT:  Negative for ear pain and sore throat.    Eyes:  Negative for pain and visual disturbance.   Respiratory:  Negative for cough and shortness of breath.    Cardiovascular:  Negative for chest pain and palpitations.   Gastrointestinal:  Positive for abdominal pain, constipation, diarrhea and nausea. Negative for blood in stool and vomiting.   Genitourinary:  Negative for dysuria and hematuria.   Musculoskeletal:  Positive for arthralgias, back pain, joint swelling and myalgias.   Skin:  Negative for color change and rash.   Neurological:  Negative for seizures and syncope.   Psychiatric/Behavioral:  Negative for agitation, self-injury and suicidal ideas. The patient is nervous/anxious.    All other systems reviewed and are negative.      Objective   /62 (BP Location: Right arm,  "Patient Position: Sitting, Cuff Size: Standard)   Pulse 92   Temp 97.6 °F (36.4 °C) (Temporal)   Resp 16   Ht 4' 11\" (1.499 m)   Wt 38.3 kg (84 lb 6.4 oz)   LMP 07/24/2017   SpO2 98%   BMI 17.05 kg/m²      Physical Exam  Vitals and nursing note reviewed.   Constitutional:       General: She is not in acute distress.     Appearance: She is well-developed.   HENT:      Head: Normocephalic and atraumatic.      Right Ear: External ear normal.      Left Ear: External ear normal.      Nose: Nose normal.      Mouth/Throat:      Mouth: Mucous membranes are moist.   Eyes:      Conjunctiva/sclera: Conjunctivae normal.      Pupils: Pupils are equal, round, and reactive to light.   Cardiovascular:      Rate and Rhythm: Normal rate and regular rhythm.   Pulmonary:      Effort: Pulmonary effort is normal. No respiratory distress.      Breath sounds: Normal breath sounds.   Musculoskeletal:         General: No swelling.      Cervical back: Neck supple.   Skin:     General: Skin is warm and dry.      Capillary Refill: Capillary refill takes less than 2 seconds.   Neurological:      Mental Status: She is alert and oriented to person, place, and time.   Psychiatric:         Mood and Affect: Mood normal.         "

## 2025-04-29 ENCOUNTER — TELEPHONE (OUTPATIENT)
Dept: OTHER | Facility: OTHER | Age: 45
End: 2025-04-29

## 2025-04-29 ENCOUNTER — RESULTS FOLLOW-UP (OUTPATIENT)
Dept: FAMILY MEDICINE CLINIC | Facility: CLINIC | Age: 45
End: 2025-04-29

## 2025-04-29 ENCOUNTER — TELEPHONE (OUTPATIENT)
Dept: OTHER | Facility: HOSPITAL | Age: 45
End: 2025-04-29

## 2025-04-29 DIAGNOSIS — E16.2 HYPOGLYCEMIA: Primary | ICD-10-CM

## 2025-04-29 LAB
25(OH)D3 SERPL-MCNC: 34.1 NG/ML (ref 30–100)
ALBUMIN SERPL BCG-MCNC: 4.8 G/DL (ref 3.5–5)
ALP SERPL-CCNC: 91 U/L (ref 34–104)
ALT SERPL W P-5'-P-CCNC: 18 U/L (ref 7–52)
ANION GAP SERPL CALCULATED.3IONS-SCNC: 10 MMOL/L (ref 4–13)
AST SERPL W P-5'-P-CCNC: 16 U/L (ref 13–39)
BILIRUB SERPL-MCNC: 0.6 MG/DL (ref 0.2–1)
BUN SERPL-MCNC: 11 MG/DL (ref 5–25)
CALCIUM SERPL-MCNC: 9.5 MG/DL (ref 8.4–10.2)
CHLORIDE SERPL-SCNC: 103 MMOL/L (ref 96–108)
CHOLEST SERPL-MCNC: 149 MG/DL (ref ?–200)
CO2 SERPL-SCNC: 27 MMOL/L (ref 21–32)
CREAT SERPL-MCNC: 0.71 MG/DL (ref 0.6–1.3)
FERRITIN SERPL-MCNC: 55 NG/ML (ref 30–307)
FOLATE SERPL-MCNC: 11.9 NG/ML
GFR SERPL CREATININE-BSD FRML MDRD: 104 ML/MIN/1.73SQ M
GLUCOSE P FAST SERPL-MCNC: 42 MG/DL (ref 65–99)
HDLC SERPL-MCNC: 81 MG/DL
IRON SATN MFR SERPL: 23 % (ref 15–50)
IRON SERPL-MCNC: 81 UG/DL (ref 50–212)
LDLC SERPL CALC-MCNC: 44 MG/DL (ref 0–100)
MAGNESIUM SERPL-MCNC: 2.1 MG/DL (ref 1.9–2.7)
NONHDLC SERPL-MCNC: 68 MG/DL
POTASSIUM SERPL-SCNC: 4.4 MMOL/L (ref 3.5–5.3)
PROT SERPL-MCNC: 7.8 G/DL (ref 6.4–8.4)
SODIUM SERPL-SCNC: 140 MMOL/L (ref 135–147)
TIBC SERPL-MCNC: 348.6 UG/DL (ref 250–450)
TRANSFERRIN SERPL-MCNC: 249 MG/DL (ref 203–362)
TRIGL SERPL-MCNC: 118 MG/DL (ref ?–150)
TSH SERPL DL<=0.05 MIU/L-ACNC: 0.55 UIU/ML (ref 0.45–4.5)
UIBC SERPL-MCNC: 268 UG/DL (ref 155–355)
VIT B12 SERPL-MCNC: 449 PG/ML (ref 180–914)

## 2025-04-29 RX ORDER — BLOOD SUGAR DIAGNOSTIC
STRIP MISCELLANEOUS
Qty: 100 EACH | Refills: 3 | Status: SHIPPED | OUTPATIENT
Start: 2025-04-29

## 2025-04-29 RX ORDER — BLOOD-GLUCOSE METER
KIT MISCELLANEOUS
Qty: 1 KIT | Refills: 0 | Status: SHIPPED | OUTPATIENT
Start: 2025-04-29

## 2025-04-29 RX ORDER — LANCETS 33 GAUGE
EACH MISCELLANEOUS
Qty: 100 EACH | Refills: 3 | Status: SHIPPED | OUTPATIENT
Start: 2025-04-29

## 2025-04-29 NOTE — TELEPHONE ENCOUNTER
"Notified by RN with regards to critical lab. Recent CMP notable for glucose of 42. Spoke with patient via phone. Reports no symptoms during telephone encounter although mentioned feeling \"a bit weird\" yesterday. Reports no usage of hypoglycemic medications, illness, alcohol use or prolonged fasting. Unsure if patient's hypoglycemia is reactive considering recent pyloroplasty done in 12/17/24. Patient advised to contact clinic or report to ED if symptomatic. All questions answered.  "

## 2025-04-29 NOTE — TELEPHONE ENCOUNTER
Lab Result: Glucose 42   Date/Time Drawn: 4/28 @ 1410   Ordering Provider: ANNAMARIE Brown   Lab Personnel's Name: Roselyn       The following critical/stat result was read back to the lab as stated above and Epic Secure Chat messaged to the on-call provider. The provider confirmed receipt of the message.

## 2025-05-01 LAB — ZINC SERPL-MCNC: 87 UG/DL (ref 44–115)

## 2025-05-02 ENCOUNTER — OFFICE VISIT (OUTPATIENT)
Dept: BEHAVIORAL/MENTAL HEALTH CLINIC | Facility: CLINIC | Age: 45
End: 2025-05-02

## 2025-05-02 ENCOUNTER — HOSPITAL ENCOUNTER (OUTPATIENT)
Dept: RADIOLOGY | Facility: HOSPITAL | Age: 45
End: 2025-05-02
Payer: MEDICARE

## 2025-05-02 DIAGNOSIS — F33.1 MAJOR DEPRESSIVE DISORDER, RECURRENT, MODERATE (HCC): ICD-10-CM

## 2025-05-02 DIAGNOSIS — M25.50 POLYARTHRALGIA: ICD-10-CM

## 2025-05-02 DIAGNOSIS — M25.532 LEFT WRIST PAIN: ICD-10-CM

## 2025-05-02 DIAGNOSIS — M16.11 OSTEOARTHRITIS OF RIGHT HIP, UNSPECIFIED OSTEOARTHRITIS TYPE: ICD-10-CM

## 2025-05-02 DIAGNOSIS — F41.1 GENERALIZED ANXIETY DISORDER: Primary | ICD-10-CM

## 2025-05-02 PROCEDURE — 73110 X-RAY EXAM OF WRIST: CPT

## 2025-05-02 PROCEDURE — 73521 X-RAY EXAM HIPS BI 2 VIEWS: CPT

## 2025-05-02 NOTE — PSYCH
Behavioral Health Psychotherapy Progress Note    Psychotherapy Provided: Individual Psychotherapy     1. Generalized anxiety disorder        2. Major depressive disorder, recurrent, moderate (HCC)            Goals addressed in session: Goal 1     DATA: In this initial session Clinician and Pt got acquainted with each other. Clinician encouraged Pt to share about herself and anything she feels comfortable talking about. Pt shared about her family dynamics, recent health diagnosis and how it impacts her mental health. Pt lives with second  and 20-year-old daughter. Pt has three adult sons ages (22-25) whom all live on their own: two locally in [place] and one in [place]. Pt has 7 grandchildren and is close to them.  Pt doesn't work outside the house while  works 3rd shift daughter works 1st, she always one of them home with her. Pt has moments when she is not feeling well physically, but still manages to get up to take care of her house.  Pt recently started sharing her health issues with children, she was trying to keep it to herself, did not want to worry them. Pt has a great support with  and children. Pt shared she enjoys spending time in her yard, talking to mother daily who lives in [place] and watching movies. Pt enjoys participating in Worship service virtually on Sundays. Pt shared sleeping is good. Pt shared due to her “gastroparesis “it is difficult to have meals but does her best to eat. She eats breakfast & dinner. Clinician encouraged Pt to continue utilizing coping skills  of deep breathing, and making self-care priority to  help decrease anxiety and depressive symptoms through the week.  Clinician and Pt scheduled upcoming sessions, requested to be seen virtually and in person. Clinician praised and validated Pt efforts made in initial session together.  Next session treatment plan and assessments will be reviewed/updated as required.     During this session, this clinician used the  "following therapeutic modalities: Engagement Strategies, Client-centered Therapy, Cognitive Behavioral Therapy, Mindfulness-based Strategies, Solution-Focused Therapy, and Supportive Psychotherapy.    Substance Abuse was not addressed during this session. If the client is diagnosed with a co-occurring substance use disorder, please indicate any changes in the frequency or amount of use: N/A Stage of change for addressing substance use diagnoses: No substance use/Not applicable    ASSESSMENT:  Екатерина Godinez presents with a Euthymic/ normal and Anxious mood.     her affect is Normal range and intensity, which is congruent, with her mood and the content of the session. The client has made progress on their goals. Екатерина utilizes her deep breathing exercise when she has moments of anxiety.     Екатерина Godinez presents with a minimal risk of suicide, minimal risk of self-harm, and minimal risk of harm to others.    For any risk assessment that surpasses a \"low\" rating, a safety plan must be developed.    A safety plan was indicated: no  If yes, describe in detail N/A.    PLAN: Between sessions, Екатерина Godinez will continue to utilize deep breathing and prioritizing self care. At the next session, the therapist will use Engagement Strategies, Client-centered Therapy, Cognitive Behavioral Therapy, Mindfulness-based Strategies, Solution-Focused Therapy, and Supportive Psychotherapy to address anxious and depressive symptoms.     Behavioral Health Treatment Plan and Discharge Planning: Екатерина Godinez is aware of and agrees to continue to work on their treatment plan. They have identified and are working toward their discharge goals. yes    Depression Follow-up Plan Completed: No    Visit start and stop times:    05/02/25  Start Time: 0900  Stop Time: 1000  Total Visit Time: 60 minutes  "

## 2025-05-04 LAB — VIT A SERPL-MCNC: 34.4 UG/DL (ref 20.1–62)

## 2025-05-05 ENCOUNTER — PATIENT MESSAGE (OUTPATIENT)
Dept: FAMILY MEDICINE CLINIC | Facility: CLINIC | Age: 45
End: 2025-05-05

## 2025-05-06 NOTE — PATIENT COMMUNICATION
Called patient and informed her that [facility] needs to fax over the Emergency medical certification. SW fax number ws provided.

## 2025-05-06 NOTE — PATIENT COMMUNICATION
FAXED ON 05/06/25 TO [facility] at . FAX CONFIRMATION RECEIVED.    Patient informed.

## 2025-05-13 ENCOUNTER — TELEMEDICINE (OUTPATIENT)
Dept: PSYCHIATRY | Facility: CLINIC | Age: 45
End: 2025-05-13
Payer: COMMERCIAL

## 2025-05-13 ENCOUNTER — TELEPHONE (OUTPATIENT)
Dept: PSYCHIATRY | Facility: CLINIC | Age: 45
End: 2025-05-13

## 2025-05-13 DIAGNOSIS — F32.1 CURRENT MODERATE EPISODE OF MAJOR DEPRESSIVE DISORDER WITHOUT PRIOR EPISODE (HCC): Primary | ICD-10-CM

## 2025-05-13 DIAGNOSIS — F41.1 GENERALIZED ANXIETY DISORDER: ICD-10-CM

## 2025-05-13 PROCEDURE — 99214 OFFICE O/P EST MOD 30 MIN: CPT | Performed by: NURSE PRACTITIONER

## 2025-05-13 RX ORDER — ALPRAZOLAM 0.5 MG
TABLET ORAL
Qty: 45 TABLET | Refills: 3 | Status: SHIPPED | OUTPATIENT
Start: 2025-05-22

## 2025-05-13 NOTE — TELEPHONE ENCOUNTER
Writer called stating that patient is missing the Virtual Care BH Consent. Writer mention that this form has to be signed in order to have their visit virtual. Consent has been sent to [facility].

## 2025-05-13 NOTE — ASSESSMENT & PLAN NOTE
Continue Cymbalta 30 mg daily  Continue Remeron 30 mg at bedtime  She continues in individual therapy

## 2025-05-13 NOTE — ASSESSMENT & PLAN NOTE
Orders:    ALPRAZolam (XANAX) 0.5 mg tablet; May take 1/2 tablet (0.25 mg) PO  in AM and one tablet (0.5 mg) PO  at bed Do not start before May 22, 2025.

## 2025-05-13 NOTE — PSYCH
MEDICATION MANAGEMENT NOTE    Name: Екатерина Godinez      : 1980      MRN: [id number]  Encounter Provider: ANNAMARIE Hawk  Encounter Date: 2025   Encounter department: [facility]    Insurance: Payor: [facility] / Plan: [facility] MEDICAID / Product Type: Medicaid HMO /      Reason for Visit: No chief complaint on file.  :  Assessment & Plan  Generalized anxiety disorder    Orders:    ALPRAZolam (XANAX) 0.5 mg tablet; May take 1/2 tablet (0.25 mg) PO  in AM and one tablet (0.5 mg) PO  at bed Do not start before May 22, 2025.    Current moderate episode of major depressive disorder without prior episode (HCC)  Continue Cymbalta 30 mg daily  Continue Remeron 30 mg at bedtime  She continues in individual therapy           Treatment Recommendations:    Educated about diagnosis and treatment modalities. Verbalizes understanding and agreement with the treatment plan.  Discussed self monitoring of symptoms, and symptom monitoring tools.  Discussed medications and if treatment adjustment was needed or desired.  Medication management every 3 months  Aware of 24 hour and weekend coverage for urgent situations accessed by calling [facility] main practice number  I am scheduling this patient out for greater than 3 months: No    Medications Risks/Benefits:      Risks, Benefits And Possible Side Effects Of Medications:    Risks, benefits, and possible side effects of medications explained to Екатерина and she (or legal representative) verbalizes understanding and agreement for treatment.    Controlled Medication Discussion:     Екатерина has been filling controlled prescriptions on time as prescribed according to [place] Prescription Drug Monitoring Program.      History of Present Illness     CC: Екатерина presents today for follow up on her depression and anxiety     Екатерина presents pleasant and cooperative, decrease in anxiety  overall.  She is eating a little better, but continues to have to watch what she eats and how much she needs.  Start with new therapist, Maye, and anxious about same.  Otherwise mood continues improved.  Continues to need Xanax every day, but does state this is effective for her anxiety.  States she had a quiet Mother's Day, mom in [place] and her children spend time with their families.  But she was okay with that and enjoyed the relaxing day.  Continues to state that his current regimen of Cymbalta and Remeron has been most effective for her depressive symptoms and her sleep.    Med Compliance: yes    Since our last visit, overall symptoms have been fluctuating with recent improvement.  Continue all medications the same.    HPI ROS:     Medication Side Effects: None  Depression: 3/10 (10 worst)  Anxiety: 4/10 (10 worst)  Safety concerns (SI, HI, others): Denies suicidal thoughts or passive death wish  Sleep: Adequate  Energy: Fluctuating  Appetite: Fair  Weight Change: Stable    Екатерина denies any side effects from medications unless noted above.    Review Of Systems: A review of systems is obtained and is negative except for the pertinent positives listed in HPI/Subjective above.      Current Rating Scores:     Current PHQ-9   PHQ-2/9 Depression Screening           Current HUEY-7     Areas of Improvement: reviewed in HPI/Subjective Section and reviewed in Assessment and Plan Section      Past Medical History:   Diagnosis Date    Anemia     in past    Anxiety     Arthritis 2022    Depression     Gallstones     GERD (gastroesophageal reflux disease)     Headache(784.0)     Inflammatory bowel disease     Migraine     Nausea     occ    Panic attack     Rotator cuff disorder, right     Vertigo      Past Surgical History:   Procedure Laterality Date    ABDOMINAL SURGERY      scar tissue removed     SECTION      last assessed: Oct 23, 2014     CHOLECYSTECTOMY  2018    HYSTERECTOMY      DE EGD  BALLOON DILATION ESOPHAGUS <30 MM DIAM N/A 12/17/2024    Procedure: ESOPHAGOGASTRODUODENOSCOPY (EGD);  Surgeon: Pradip Jeff MD;  Location: [facility];  Service: General    SC LAPS SURG CHOLECYSTECTOMY W/CHOLANGIOGRAPHY N/A 02/21/2018    Procedure: CHOLECYSTECTOMY LAPAROSCOPIC  WITH  ATTEMPTED IOC;  Surgeon: Silvia Woods MD;  Location: [facility];  Service: General    PYLOROPLASTY N/A 12/17/2024    Procedure: PYLOROPLASTY LAPAROSCOPIC WITH ROBOT;  Surgeon: Pradip Jeff MD;  Location: [facility];  Service: General    REMOVAL BLADDER STIMULATOR Left 09/2024    stimulator placed 9/2024-L. side, has remote    TUBAL LIGATION       Allergies: No Known Allergies    Current Outpatient Medications   Medication Instructions    [START ON 5/22/2025] ALPRAZolam (XANAX) 0.5 mg tablet May take 1/2 tablet (0.25 mg) PO  in AM and one tablet (0.5 mg) PO  at bed    Blood Glucose Monitoring Suppl (OneTouch Verio Reflect) w/Device KIT Check blood sugars once daily. Please substitute with appropriate alternative as covered by patient's insurance. Dx: E11.65    Cholecalciferol (VITAMIN D3) 1,000 Units, Oral, Daily    docusate sodium (COLACE) 100 mg, Oral, Every morning    DULoxetine (CYMBALTA) 30 mg, Oral, Daily    Dyclonine-Glycerin (Cepacol Sore Throat Spray) 0.1-33 % LIQD 1 spray, Mouth/Throat, Every 6 hours    famotidine (PEPCID) 40 MG tablet TAKE 1 TABLET(40 MG) BY MOUTH DAILY    ferrous sulfate 324 mg, Oral, Every other day    fexofenadine (ALLEGRA) 180 mg, Oral, Daily    fluticasone (FLONASE) 50 mcg/act nasal spray 1 spray, Nasal, Daily    glucose blood (OneTouch Verio) test strip Check blood sugars once daily. Please substitute with appropriate alternative as covered by patient's insurance. Dx: E11.65    lidocaine (LIDODERM) 5 % UNWRAP AND APPLY 1 PATCH TOPICALLY TO THE AFFECTED AREA EVERY DAY(DO NOT LEAVE ON FOR MORE THAN 12 HOURS)    meclizine (ANTIVERT) 25 mg tablet TAKE 1 TABLET(25 MG) BY MOUTH THREE TIMES DAILY AS  NEEDED FOR DIZZINESS    mirtazapine (REMERON) 30 mg, Oral, Daily at bedtime    Nutritional Supplement LIQD [employer] Standard 1.4 Medical Nutrition Shakes    ondansetron (ZOFRAN-ODT) 4 mg, Oral, Every 6 hours PRN    OneTouch Delica Lancets 33G MISC Check blood sugars once daily. Please substitute with appropriate alternative as covered by patient's insurance. Dx: E11.65    pantoprazole (PROTONIX) 40 mg, Oral, 2 times daily    polyethylene glycol (GLYCOLAX) 17 GM/SCOOP powder MIX AND DRINK 17 GRAMS BY MOUTH EVERY DAY    prochlorperazine (COMPAZINE) 5 mg tablet TAKE 1 TABLET(5 MG) BY MOUTH EVERY 6 HOURS AS NEEDED FOR NAUSEA OR VOMITING    sucralfate (CARAFATE) 1 g, Oral, 4 times daily    SUMAtriptan (IMITREX) 50 mg tablet TAKE 1 TABLET(50 MG) BY MOUTH 1 TIME FOR UP TO 1 DOSE AS NEEDED FOR MIGRAINE        Substance Abuse History:    Tobacco, Alcohol and Drug Use History     Tobacco Use    Smoking status: Former     Current packs/day: 0.00     Average packs/day: 0.5 packs/day for 5.0 years (2.5 ttl pk-yrs)     Types: Cigarettes     Quit date: 2013     Years since quittin.2    Smokeless tobacco: Never   Vaping Use    Vaping status: Every Day    Substances: THC   Substance Use Topics    Alcohol use: No     Comment: none since 2021    Drug use: Yes     Types: Marijuana     Comment: medical marijuana          Social History:    Social History     Socioeconomic History    Marital status: /Civil Union     Spouse name: Not on file    Number of children: Not on file    Years of education: Not on file    Highest education level: Not on file   Occupational History    Not on file   Other Topics Concern    Not on file   Social History Narrative    Not on file        Family Psychiatric History:     Family History   Problem Relation Age of Onset    No Known Problems Family     Hypertension Mother     Arthritis Mother     Diabetes type II Father         mellitus     Hypertension Father     Diabetes Father      Arthritis Father        Medical History Reviewed by provider this encounter:  Tobacco  Allergies  Meds  Problems  Med Hx  Surg Hx  Fam Hx          Objective   LMP 07/24/2017      Mental Status Evaluation:    Appearance age appropriate, casually dressed   Behavior cooperative, calm   Speech normal rate, normal volume, normal pitch, spontaneous   Mood improved   Affect normal range and intensity, appropriate   Thought Processes organized, goal directed   Thought Content no overt delusions   Perceptual Disturbances: no auditory hallucinations, no visual hallucinations   Abnormal Thoughts  Risk Potential Suicidal ideation - None  Homicidal ideation - None  Potential for aggression - No   Orientation oriented to person, place, time/date, and situation   Memory recent and remote memory grossly intact   Consciousness alert and awake   Attention Span Concentration Span decreased attention span  decreased concentration   Intellect appears to be of average intelligence   Insight limited   Judgement limited   Muscle Strength and  Gait normal muscle strength and normal muscle tone, normal gait and normal balance   Motor activity no abnormal movements   Language no difficulty naming common objects, no difficulty repeating a phrase, no difficulty writing a sentence   Fund of Knowledge adequate knowledge of current events  adequate fund of knowledge regarding past history  adequate fund of knowledge regarding vocabulary        Laboratory Results: I have personally reviewed all pertinent laboratory/tests results        Suicide/Homicide Risk Assessment:    Risk of Harm to Self:  The following ratings are based on assessment at the time of the interview  Recent Specific Risk Factors include: current depressive symptoms, current anxiety symptoms  Protective Factors: no current suicidal ideation, ability to adapt to change, able to make plans for the future, access to mental health treatment, compliant with medications,  compliant with mental health treatment  Based on today's assessment, Екатерина presents the following risk of harm to self: minimal    Risk of Harm to Others:  The following ratings are based on assessment at the time of the interview  Recent Specific Risk Factors include: none  Based on today's assessment, Екатерина presents the following risk of harm to others: none    The following interventions are recommended: Continue medication management. No other intervention changes indicated at this time.    Psychotherapy Provided:     Individual psychotherapy provided: Yes    Reassurance and supportive therapy provided.     Treatment Plan:    Completed and signed during the session: Not applicable - Treatment Plan not due at this session.    Goals: Progress towards Treatment Plan goals - Yes, progressing, as evidenced by subjective findings in HPI/Subjective Section and in Assessment and Plan Section    Depression Follow-up Plan Completed: Yes    Note Share:    This note was not shared with the patient due to this is a psychotherapy note    Administrative Statements   Administrative Statements   Encounter provider ANNAMARIE Hwak    The Patient is located at Home and in the following state in which I hold an active license [place].    The patient was identified by name and date of birth. Екатерина Godinez was informed that this is a telemedicine visit and that the visit is being conducted through the Epic Embedded platform. She agrees to proceed..  My office door was closed. No one else was in the room.  She acknowledged consent and understanding of privacy and security of the video platform. The patient has agreed to participate and understands they can discontinue the visit at any time.    I have spent a total time of 30 minutes in caring for this patient on the day of the visit/encounter including Counseling / Coordination of care, not including the time spent for establishing the audio/video connection.      Portions of the  "record may have been created with voice recognition software. Occasional wrong word or \"sound a like\" substitutions may have occurred due to the inherent limitations of voice recognition software. Read the chart carefully and recognize, using context, where substitutions have occurred.    ANNAMARIE Hawk 05/13/25  "

## 2025-05-14 ENCOUNTER — RESULTS FOLLOW-UP (OUTPATIENT)
Dept: FAMILY MEDICINE CLINIC | Facility: CLINIC | Age: 45
End: 2025-05-14

## 2025-05-14 DIAGNOSIS — Z13.820 SCREENING FOR OSTEOPOROSIS: ICD-10-CM

## 2025-05-14 DIAGNOSIS — R63.6 UNDERWEIGHT DUE TO INADEQUATE CALORIC INTAKE: ICD-10-CM

## 2025-05-14 DIAGNOSIS — M16.11 PRIMARY OSTEOARTHRITIS OF RIGHT HIP: Primary | ICD-10-CM

## 2025-05-15 ENCOUNTER — TELEPHONE (OUTPATIENT)
Age: 45
End: 2025-05-15

## 2025-05-15 PROBLEM — F33.1 MAJOR DEPRESSIVE DISORDER, RECURRENT, MODERATE (HCC): Status: ACTIVE | Noted: 2025-05-15

## 2025-05-15 NOTE — TELEPHONE ENCOUNTER
Patient is calling regarding cancelling an appointment.    Date/Time: 5/15/2025 2pm    Reason: cannot get into [facility]    Patient was rescheduled: YES [] NO [x]  If yes, when was Patient reschedule for: patient will be at next scheduled visit    Patient requesting call back to reschedule: YES [] NO [x]      Provider was notified via secure chat

## 2025-05-19 ENCOUNTER — HOSPITAL ENCOUNTER (OUTPATIENT)
Dept: BONE DENSITY | Facility: CLINIC | Age: 45
Discharge: HOME | End: 2025-05-19
Payer: MEDICARE

## 2025-05-19 VITALS — HEIGHT: 55 IN | WEIGHT: 83 LBS | BODY MASS INDEX: 19.21 KG/M2

## 2025-05-19 DIAGNOSIS — Z13.820 SCREENING FOR OSTEOPOROSIS: ICD-10-CM

## 2025-05-19 DIAGNOSIS — R63.6 UNDERWEIGHT DUE TO INADEQUATE CALORIC INTAKE: ICD-10-CM

## 2025-05-19 DIAGNOSIS — M16.11 PRIMARY OSTEOARTHRITIS OF RIGHT HIP: ICD-10-CM

## 2025-05-19 PROCEDURE — 77080 DXA BONE DENSITY AXIAL: CPT

## 2025-05-22 ENCOUNTER — TELEMEDICINE (OUTPATIENT)
Dept: BEHAVIORAL/MENTAL HEALTH CLINIC | Facility: CLINIC | Age: 45
End: 2025-05-22

## 2025-05-22 DIAGNOSIS — F33.1 MAJOR DEPRESSIVE DISORDER, RECURRENT, MODERATE (HCC): ICD-10-CM

## 2025-05-22 DIAGNOSIS — F41.1 GENERALIZED ANXIETY DISORDER: Primary | ICD-10-CM

## 2025-05-22 NOTE — PSYCH
Virtual Regular VisitName: Екатерина Godinez      : 1980      MRN: [id number]  Encounter Provider: Jennifer Perdomo  Encounter Date: 2025   Encounter department: [facility] [address]  :  Assessment & Plan        Goals addressed in session: Goal 1     DATA: Clinician and Екатерина participated in virtual session. Clinician attentively listened to Екатерина's narrative for last two weeks.  Екатерина reports she has not been feeling well in the stomach for last two days, hoping she starts feeling better soon.  Although feeling discomfort  today, she is excited to share her  and daughter surprised her with a trip to [place], leaving week of Father's Day.  They also got ticket for sister to travel with Екатерина and they are going to surprise parents.  Екатерина shared plans of trip and is grateful she gets to see parents who she is very close to.  Екатерина shared although looking forward to trip her mood has been down, thinking of her dog that passed away, it has been 2 year but it still hurts.  Clinician and Екатерина processed her thoughts of her loss and what he meant to her. Екатерина also experienced loss of uncle and got an opportunity to share thought of their relationship and significance. Clinician and Екатерина discussed about process of grief, there is no set time especially when the anniversaries are near. Екатерина became tearful, but found talking about it beneficial. Екатерина is preparing for trip, assuring she takes all  medical  items to assure she is equipped.  Clinician and Екатерина reviewed coping skills; continues utilizing breathing exercise, distracting herself with tablet and doing her best to take things slow. Clinician praised and validated Екатерина's efforts through session, encouraged her to call if she needs additional support in between sessions. Next session will review and update treatment plan.     During this session, this clinician used the following  "therapeutic modalities: Engagement Strategies, Bereavement Therapy, Client-centered Therapy, Cognitive Behavioral Therapy, Mindfulness-based Strategies, Solution-Focused Therapy, and Supportive Psychotherapy     Substance Abuse was not addressed during this session. If the client is diagnosed with a co-occurring substance use disorder, please indicate any changes in the frequency or amount of use: N/A. Stage of change for addressing substance use diagnoses: No substance use/Not applicable    ASSESSMENT:  Екатерина presents with a Euthymic/ normal mood. Екатерина's affect is Normal range and intensity, which is congruent, with their mood and the content of the session. The client has made progress on their goals as evidenced by continuing to participate in sessions by verbalizing her thoughts and feelings well, utilizing coping skills and following medication regimen to help reduce symptoms of anxiety & depression.     Екатерина presents with a none risk of suicide, none risk of self-harm, and none risk of harm to others.    For any risk assessment that surpasses a \"low\" rating, a safety plan must be developed.    A safety plan was indicated: no  If yes, describe in detail n/a    PLAN: Between sessions, Екатерина will continue to utilize coping skills  of deep breathing and prioritizing her self-care. At the next session, the therapist will use Engagement Strategies, Bereavement Therapy, Client-centered Therapy, Cognitive Behavioral Therapy, Mindfulness-based Strategies, and Supportive Psychotherapy to address anxious and depressive moods.    Behavioral Health Treatment Plan [facility]: Diagnosis and Treatment Plan explained to Екатерина, Екатерина relates understanding diagnosis and is agreeable to Treatment Plan. Yes     Depression Follow-up Plan Completed: No     Reason for visit is   Chief Complaint   Patient presents with    Virtual Regular Visit      Recent Visits  No visits were found meeting these conditions.  Showing recent " visits within past 7 days and meeting all other requirements  Today's Visits  Date Type Provider Dept   25 Telemedicine Jennifer Perdomo [facility]rapist [address]   Showing today's visits and meeting all other requirements  Future Appointments  No visits were found meeting these conditions.  Showing future appointments within next 150 days and meeting all other requirements     History of Present Illness     HPI    Past Medical History   Past Medical History:   Diagnosis Date    Anemia     in past    Anxiety     Arthritis 2022    Depression     Gallstones     GERD (gastroesophageal reflux disease)     Headache(784.0)     Inflammatory bowel disease     Migraine     Nausea     occ    Panic attack     Rotator cuff disorder, right     Vertigo      Past Surgical History:   Procedure Laterality Date    ABDOMINAL SURGERY      scar tissue removed     SECTION      last assessed: Oct 23, 2014     CHOLECYSTECTOMY  2018    HYSTERECTOMY      AR EGD BALLOON DILATION ESOPHAGUS <30 MM DIAM N/A 2024    Procedure: ESOPHAGOGASTRODUODENOSCOPY (EGD);  Surgeon: Pradip Jeff MD;  [facility];  Service: General    AR LAPS SURG CHOLECYSTECTOMY W/CHOLANGIOGRAPHY N/A 2018    Procedure: CHOLECYSTECTOMY LAPAROSCOPIC  WITH  ATTEMPTED IOC;  Surgeon: Silvia Woods MD;  [facility];  Service: General    PYLOROPLASTY N/A 2024    Procedure: PYLOROPLASTY LAPAROSCOPIC WITH ROBOT;  Surgeon: Pradip Jeff MD;  [facility];  Service: General    REMOVAL BLADDER STIMULATOR Left 2024    stimulator placed 2024-L. side, has remote    TUBAL LIGATION       Current Outpatient Medications   Medication Instructions    ALPRAZolam (XANAX) 0.5 mg tablet May take 1/2 tablet (0.25 mg) PO  in AM and one tablet (0.5 mg) PO  at bed    Blood Glucose Monitoring Suppl (OneTouch Verio Reflect) w/Device KIT Check blood sugars once daily. Please substitute with appropriate  alternative as covered by patient's insurance. Dx: E11.65    Cholecalciferol (VITAMIN D3) 1,000 Units, Oral, Daily    docusate sodium (COLACE) 100 mg, Oral, Every morning    DULoxetine (CYMBALTA) 30 mg, Oral, Daily    Dyclonine-Glycerin (Cepacol Sore Throat Spray) 0.1-33 % LIQD 1 spray, Mouth/Throat, Every 6 hours    famotidine (PEPCID) 40 MG tablet TAKE 1 TABLET(40 MG) BY MOUTH DAILY    ferrous sulfate 324 mg, Oral, Every other day    fexofenadine (ALLEGRA) 180 mg, Oral, Daily    fluticasone (FLONASE) 50 mcg/act nasal spray 1 spray, Nasal, Daily    glucose blood (OneTouch Verio) test strip Check blood sugars once daily. Please substitute with appropriate alternative as covered by patient's insurance. Dx: E11.65    lidocaine (LIDODERM) 5 % UNWRAP AND APPLY 1 PATCH TOPICALLY TO THE AFFECTED AREA EVERY DAY(DO NOT LEAVE ON FOR MORE THAN 12 HOURS)    meclizine (ANTIVERT) 25 mg tablet TAKE 1 TABLET(25 MG) BY MOUTH THREE TIMES DAILY AS NEEDED FOR DIZZINESS    mirtazapine (REMERON) 30 mg, Oral, Daily at bedtime    Nutritional Supplement LIQD NGRAIN Standard 1.4 Medical Nutrition Shakes    ondansetron (ZOFRAN-ODT) 4 mg, Oral, Every 6 hours PRN    OneTouch Delica Lancets 33G MISC Check blood sugars once daily. Please substitute with appropriate alternative as covered by patient's insurance. Dx: E11.65    pantoprazole (PROTONIX) 40 mg, Oral, 2 times daily    polyethylene glycol (GLYCOLAX) 17 GM/SCOOP powder MIX AND DRINK 17 GRAMS BY MOUTH EVERY DAY    prochlorperazine (COMPAZINE) 5 mg tablet TAKE 1 TABLET(5 MG) BY MOUTH EVERY 6 HOURS AS NEEDED FOR NAUSEA OR VOMITING    sucralfate (CARAFATE) 1 g, Oral, 4 times daily    SUMAtriptan (IMITREX) 50 mg tablet TAKE 1 TABLET(50 MG) BY MOUTH 1 TIME FOR UP TO 1 DOSE AS NEEDED FOR MIGRAINE     No Known Allergies    Objective   LMP 07/24/2017     Video Exam  Physical Exam     Administrative Statements   Encounter provider Jennifer Perdomo    The Patient is located at Home and in  the following state in which I hold an active license [place].    The patient was identified by name and date of birth. Екатерина Godinez was informed that this is a telemedicine visit and that the visit is being conducted through the Epic Embedded platform. She agrees to proceed..  My office door was closed. No one else was in the room.  She acknowledged consent and understanding of privacy and security of the video platform. The patient has agreed to participate and understands they can discontinue the visit at any time.    I have spent a total time of 45 minutes in caring for this patient on the day of the visit/encounter including Counseling / Coordination of care, not including the time spent for establishing the audio/video connection.    Visit Time

## 2025-05-27 ENCOUNTER — RESULTS FOLLOW-UP (OUTPATIENT)
Dept: FAMILY MEDICINE CLINIC | Facility: CLINIC | Age: 45
End: 2025-05-27

## 2025-05-29 ENCOUNTER — TELEMEDICINE (OUTPATIENT)
Dept: FAMILY MEDICINE CLINIC | Facility: CLINIC | Age: 45
End: 2025-05-29

## 2025-05-29 ENCOUNTER — TELEPHONE (OUTPATIENT)
Dept: PSYCHIATRY | Facility: CLINIC | Age: 45
End: 2025-05-29

## 2025-05-29 DIAGNOSIS — M16.11 PRIMARY OSTEOARTHRITIS OF RIGHT HIP: Primary | ICD-10-CM

## 2025-05-29 DIAGNOSIS — T14.8XXA WOUND PAIN: ICD-10-CM

## 2025-05-29 DIAGNOSIS — E55.9 VITAMIN D DEFICIENCY: ICD-10-CM

## 2025-05-29 PROCEDURE — 99213 OFFICE O/P EST LOW 20 MIN: CPT | Performed by: NURSE PRACTITIONER

## 2025-05-29 RX ORDER — METHOCARBAMOL 500 MG/1
500 TABLET, FILM COATED ORAL 4 TIMES DAILY
Qty: 40 TABLET | Refills: 0 | Status: SHIPPED | OUTPATIENT
Start: 2025-05-29

## 2025-05-29 RX ORDER — PHENOL 1.4 %
600 AEROSOL, SPRAY (ML) MUCOUS MEMBRANE 2 TIMES DAILY WITH MEALS
Qty: 180 TABLET | Refills: 1 | Status: SHIPPED | OUTPATIENT
Start: 2025-05-29

## 2025-05-29 NOTE — PROGRESS NOTES
Virtual Regular Visit  Name: Екатерина Godinez      : 1980      MRN: [id number]  Encounter Provider: ANNAMARIE Brown  Encounter Date: 2025   Encounter department: [facility]  :  Assessment & Plan  Vitamin D deficiency    Orders:    Cholecalciferol (VITAMIN D3) 1,000 units tablet; Take 1 tablet (1,000 Units total) by mouth daily    calcium carbonate (OS-MARIELLA) 600 MG tablet; Take 1 tablet (600 mg total) by mouth 2 (two) times a day with meals    Primary osteoarthritis of right hip    Orders:    Diclofenac Sodium (VOLTAREN) 1 %; Apply 2 g topically 4 (four) times a day    Ambulatory referral to Spine & Pain Management; Future    Wound pain    Orders:    US abdomen complete; Future    methocarbamol (ROBAXIN) 500 mg tablet; Take 1 tablet (500 mg total) by mouth 4 (four) times a day          History of Present Illness     Екатерина Godinez is a 44 y.o. female who  has a past medical history of Anemia, Anxiety, Depression, Gallstones, Migraine, Nausea, Panic attack, Rotator cuff disorder, right, and Vertigo. who presented for virtual follow up.      She reports that she is having right sided pain where her previous surgical scar is. The area is very tender to touch. No drainage, vomiting, c/d, fever.        The following portions of the patient's history were reviewed and updated as appropriate: allergies, current medications, past family history, past medical history, past social history, past surgical history and problem list.        Review of Systems   Constitutional:  Negative for chills and fever.   HENT:  Negative for ear pain and sore throat.    Eyes:  Negative for pain and visual disturbance.   Respiratory:  Negative for cough and shortness of breath.    Cardiovascular:  Negative for chest pain and palpitations.   Gastrointestinal:  Positive for abdominal pain. Negative for vomiting.   Genitourinary:  Negative for dysuria and hematuria.   Musculoskeletal:  Negative  for arthralgias and back pain.   Skin:  Negative for color change and rash.   Neurological:  Negative for seizures and syncope.   All other systems reviewed and are negative.      Objective   LMP 07/24/2017     Physical Exam  Constitutional:       General: She is not in acute distress.  HENT:      Right Ear: External ear normal.      Left Ear: External ear normal.   Pulmonary:      Effort: Respiratory distress present.     Neurological:      Mental Status: She is alert and oriented to person, place, and time.     Psychiatric:         Mood and Affect: Mood normal.         Behavior: Behavior normal.         Administrative Statements   Encounter provider ANNAMARIE Brown    The Patient is located at Home and in the following state in which I hold an active license [place].    The patient was identified by name and date of birth. Екатерина Godinez was informed that this is a telemedicine visit and that the visit is being conducted through the Epic Embedded platform. She agrees to proceed..  My office door was closed. No one else was in the room.  She acknowledged consent and understanding of privacy and security of the video platform. The patient has agreed to participate and understands they can discontinue the visit at any time.    I have spent a total time of 12 minutes in caring for this patient on the day of the visit/encounter including Instructions for management, Patient and family education, Importance of tx compliance, and Risk factor reductions, not including the time spent for establishing the audio/video connection.

## 2025-05-29 NOTE — TELEPHONE ENCOUNTER
Writer called and left message to cancel follow up appointment or 5/30 with Jennifer Perdomo due to Therapist being out of office

## 2025-06-02 ENCOUNTER — HOSPITAL ENCOUNTER (OUTPATIENT)
Dept: ULTRASOUND IMAGING | Facility: HOSPITAL | Age: 45
Discharge: HOME | End: 2025-06-02
Payer: MEDICARE

## 2025-06-02 DIAGNOSIS — T14.8XXA WOUND PAIN: ICD-10-CM

## 2025-06-02 PROCEDURE — 76705 ECHO EXAM OF ABDOMEN: CPT

## 2025-06-05 ENCOUNTER — TELEMEDICINE (OUTPATIENT)
Dept: BEHAVIORAL/MENTAL HEALTH CLINIC | Facility: CLINIC | Age: 45
End: 2025-06-05
Payer: COMMERCIAL

## 2025-06-05 ENCOUNTER — TELEPHONE (OUTPATIENT)
Age: 45
End: 2025-06-05

## 2025-06-05 DIAGNOSIS — F41.1 GENERALIZED ANXIETY DISORDER: Primary | ICD-10-CM

## 2025-06-05 DIAGNOSIS — F33.1 MAJOR DEPRESSIVE DISORDER, RECURRENT, MODERATE (HCC): ICD-10-CM

## 2025-06-05 PROCEDURE — 90837 PSYTX W PT 60 MINUTES: CPT

## 2025-06-05 NOTE — PSYCH
Virtual Regular VisitName: Екатерина Godinez      : 1980      MRN: [id number]  Encounter Provider: Jennifer Perdomo  Encounter Date: 2025   Encounter department: [facility] [address]  :  Assessment & Plan  Generalized anxiety disorder         Major depressive disorder, recurrent, moderate (HCC)             Goals addressed in session: Goal 2     DATA: Clinician and Екатерина met virtually for session.  Clinician  attentively  listened to Екатерина share narrative of last two weeks, she apologized for missing previous session, got mixed up by the day and time. Екатерина admits she has been forgetting a lot of things recently; lost garage keys, has missed a few appointment and is very hard on herself. Clinician and Екатерина discussed  utilizing paper calendar and phone calendar where she can set reminders, also utilize notes on phone to make to do lists.  Екатерина plans to update phone and start documenting appointments and important dates on calendar.  Екатерина will be traveling to [place] in the next two weeks, will return for in person session, to update treatment plan.  Екатерина has been noticing difference in energy. Is experiencing pain in hip and knee. Екатерина shared she is looking forward to spending time quality time with parents and  sister.  Екатерина shared her comfort is her  and being home due to her consistent stomach issues. Екатерина continues to utilize deep breathing and distracting herself with her tablet to help reduce intensity of anxiety.  Clinician and Екатерина processed her thoughts and feelings regarding her last two weeks dealing with health and family. Clinician encouraged Екатерина to continue utilizing coping skills  to help her manage anxious and depressive moods. Clinician reminded Екатерина if she needs support in between sessions, call office and she agreed. Clinician praised and validated Екатерина for efforts made in session, next session will continue  "building rapport and therapeutic alliance.  During this session, this clinician used the following therapeutic modalities: Engagement Strategies, Client-centered Therapy, Cognitive Behavioral Therapy, Mindfulness-based Strategies, Solution-Focused Therapy, and Supportive Psychotherapy    Substance Abuse was not addressed during this session. If the client is diagnosed with a co-occurring substance use disorder, please indicate any changes in the frequency or amount of use: N/A. Stage of change for addressing substance use diagnoses: No substance use/Not applicable    ASSESSMENT:  Екатерина presents with a Euthymic/ normal and Anxious mood. Екатерина's affect is Normal range and intensity, which is congruent, with their mood and the content of the session. The client has made progress on their goals as evidenced by her abilities to utilize coping skills  and following medication regimen.    Екатерина presents with a none risk of suicide, none risk of self-harm, and none risk of harm to others.    For any risk assessment that surpasses a \"low\" rating, a safety plan must be developed.    A safety plan was indicated: no  If yes, describe in detail n/a    PLAN: Between sessions, Екатерина will continue utilizing coping skills. At the next session, the therapist will use Engagement Strategies, Client-centered Therapy, Cognitive Behavioral Therapy, Cognitive Processing Therapy, Mindfulness-based Strategies, Solution-Focused Therapy, and Supportive Psychotherapy to address anxiety and depressive symptoms. .    Behavioral Health Treatment Plan [facility]: Diagnosis and Treatment Plan explained to Екатерина Willams relates understanding diagnosis and is agreeable to Treatment Plan. Yes     Depression Follow-up Plan Completed: No     Reason for visit is   Chief Complaint   Patient presents with    Virtual Regular Visit      Recent Visits  Date Type Provider Dept   05/29/25 Telephone Jennifer Perdomo [facility] [address]   05/29/25 " Telemedicine ANNAMARIE Brown   Showing recent visits within past 7 days and meeting all other requirements  Today's Visits  Date Type Provider Dept   06/05/25 Telemedicine Jennifer Perdomo [facility]ist [address]   Showing today's visits and meeting all other requirements  Future Appointments  No visits were found meeting these conditions.  Showing future appointments within next 150 days and meeting all other requirements     History of Present Illness     HPI    Past Medical History   Past Medical History[1]  Past Surgical History[2]  Current Outpatient Medications   Medication Instructions    ALPRAZolam (XANAX) 0.5 mg tablet May take 1/2 tablet (0.25 mg) PO  in AM and one tablet (0.5 mg) PO  at bed    Blood Glucose Monitoring Suppl (OneTouch Verio Reflect) w/Device KIT Check blood sugars once daily. Please substitute with appropriate alternative as covered by patient's insurance. Dx: E11.65    calcium carbonate (OS-MARIELLA) 600 mg, Oral, 2 times daily with meals    Cholecalciferol (VITAMIN D3) 1,000 Units, Oral, Daily    Diclofenac Sodium (VOLTAREN) 2 g, Topical, 4 times daily    docusate sodium (COLACE) 100 mg, Oral, Every morning    DULoxetine (CYMBALTA) 30 mg, Oral, Daily    Dyclonine-Glycerin (Cepacol Sore Throat Spray) 0.1-33 % LIQD 1 spray, Mouth/Throat, Every 6 hours    famotidine (PEPCID) 40 MG tablet TAKE 1 TABLET(40 MG) BY MOUTH DAILY    ferrous sulfate 324 mg, Oral, Every other day    fexofenadine (ALLEGRA) 180 mg, Oral, Daily    fluticasone (FLONASE) 50 mcg/act nasal spray 1 spray, Nasal, Daily    glucose blood (OneTouch Verio) test strip Check blood sugars once daily. Please substitute with appropriate alternative as covered by patient's insurance. Dx: E11.65    lidocaine (LIDODERM) 5 % UNWRAP AND APPLY 1 PATCH TOPICALLY TO THE AFFECTED AREA EVERY DAY(DO NOT LEAVE ON FOR MORE THAN 12 HOURS)    meclizine (ANTIVERT) 25 mg tablet TAKE 1 TABLET(25 MG) BY MOUTH THREE  TIMES DAILY AS NEEDED FOR DIZZINESS    methocarbamol (ROBAXIN) 500 mg, Oral, 4 times daily    mirtazapine (REMERON) 30 mg, Oral, Daily at bedtime    Nutritional Supplement DAXA Leon Rock[employer] Standard 1.4 Medical Nutrition Shakes    ondansetron (ZOFRAN-ODT) 4 mg, Oral, Every 6 hours PRN    OneTouch Delica Lancets 33G MISC Check blood sugars once daily. Please substitute with appropriate alternative as covered by patient's insurance. Dx: E11.65    pantoprazole (PROTONIX) 40 mg, Oral, 2 times daily    polyethylene glycol (GLYCOLAX) 17 GM/SCOOP powder MIX AND DRINK 17 GRAMS BY MOUTH EVERY DAY    prochlorperazine (COMPAZINE) 5 mg tablet TAKE 1 TABLET(5 MG) BY MOUTH EVERY 6 HOURS AS NEEDED FOR NAUSEA OR VOMITING    sucralfate (CARAFATE) 1 g, Oral, 4 times daily    SUMAtriptan (IMITREX) 50 mg tablet TAKE 1 TABLET(50 MG) BY MOUTH 1 TIME FOR UP TO 1 DOSE AS NEEDED FOR MIGRAINE     Allergies[3]    Objective   LMP 07/24/2017     Video Exam  Physical Exam     Administrative Statements   Encounter provider Jennifer Perdomo    The Patient is located at Home and in the following state in which I hold an active license [place].    The patient was identified by name and date of birth. Екатерина Godinez was informed that this is a telemedicine visit and that the visit is being conducted through the Epic Embedded platform. She agrees to proceed..  My office door was closed. No one else was in the room.  She acknowledged consent and understanding of privacy and security of the video platform. The patient has agreed to participate and understands they can discontinue the visit at any time.    I have spent a total time of 62 minutes in caring for this patient on the day of the visit/encounter including Counseling / Coordination of care, not including the time spent for establishing the audio/video connection.    Visit Time  Start Time: 1410  Stop Time: 1512  Total Visit Time: 62 minutes       [1]   Past Medical History:  Diagnosis Date     Anemia     in past    Anxiety     Arthritis 2022    Depression     Gallstones     GERD (gastroesophageal reflux disease)     Headache(784.0)     Inflammatory bowel disease     Migraine     Nausea     occ    Panic attack     Rotator cuff disorder, right     Vertigo    [2]   Past Surgical History:  Procedure Laterality Date    ABDOMINAL SURGERY      scar tissue removed     SECTION      last assessed: Oct 23, 2014     CHOLECYSTECTOMY  2018    HYSTERECTOMY      WI EGD BALLOON DILATION ESOPHAGUS <30 MM DIAM N/A 2024    Procedure: ESOPHAGOGASTRODUODENOSCOPY (EGD);  Surgeon: Pradip Jeff MD;  [facility];  Service: General    WI LAPS SURG CHOLECYSTECTOMY W/CHOLANGIOGRAPHY N/A 2018    Procedure: CHOLECYSTECTOMY LAPAROSCOPIC  WITH  ATTEMPTED IOC;  Surgeon: Silvia Woods MD;  [facility];  Service: General    PYLOROPLASTY N/A 2024    Procedure: PYLOROPLASTY LAPAROSCOPIC WITH ROBOT;  Surgeon: Pradip Jeff MD;  [facility];  Service: General    REMOVAL BLADDER STIMULATOR Left 2024    stimulator placed 2024-L. side, has remote    TUBAL LIGATION     [3] No Known Allergies

## 2025-06-05 NOTE — TELEPHONE ENCOUNTER
Patient called to switch todays appt 6/5/2025 at 2pm to a  virtual visit, writer advised a virtual care behavioral health consent needs to be filled out prior to start of appt or appt will be cancelled       Provider was notified via secure chat

## 2025-06-06 ENCOUNTER — CONSULT (OUTPATIENT)
Dept: PAIN MEDICINE | Facility: CLINIC | Age: 45
End: 2025-06-06
Payer: MEDICARE

## 2025-06-06 VITALS — HEIGHT: 55 IN | BODY MASS INDEX: 19.21 KG/M2 | WEIGHT: 83 LBS

## 2025-06-06 DIAGNOSIS — M25.552 BILATERAL HIP PAIN: Primary | ICD-10-CM

## 2025-06-06 DIAGNOSIS — M25.551 BILATERAL HIP PAIN: Primary | ICD-10-CM

## 2025-06-06 PROCEDURE — 99204 OFFICE O/P NEW MOD 45 MIN: CPT | Performed by: ANESTHESIOLOGY

## 2025-06-06 NOTE — PROGRESS NOTES
Name: Екатерина Godinez      : 1980      MRN: [id number]  Encounter Provider: Sage Estrada MD  Encounter Date: 2025   Encounter department: [facility] [place]  Assessment & Plan  Bilateral hip pain    Orders:    Ambulatory referral to Physical Therapy; Future    Pain is consistent with bilateral hip arthropathy accompanied by consistent moderate to severe pain on the pain scale (5+/10) with inability to participate in IADLs for >6 weeks.     Patient previously had right hip bursa injection with Dr. Marie with relief but no intra-articular interventions.    Discussed and offered bilateral intra-articular hip injections.     She states she will trial PT first and contact our office if she wants to proceed with hip injections.    The procedures, its risks, and benefits were explained in detail to the patient. Risks include but are not limited to bleeding, infection, hematoma formation, abscess formation, weakness, headache, failure the pain to improve, nerve irritation or damage, and potential worsening of the pain.  The approach was demonstrated using models.    Reviewed family medicine, Orthopedics office notes.    Reviewed hemoglobin A1c, renal function, CBC and/or PT/INR prior to discussing/offering interventional modalities.    My impressions and treatment recommendations were discussed in detail with the patient who verbalized understanding and had no further questions.  Discharge instructions were provided. I personally saw and examined the patient and I agree with the above discussed plan of care.    History of Present Illness     Екатерина Godinez is a 44 y.o. female presenting for consultation at [facility] for exam and evaluation of chronic bilateral hip pain for greater than 1 year, worsening over the past several months. Pain started without clear inciting event. Over the past month, the intensity of pain has been Moderate. Pain is currently 0/10 and  "described as an intermittent shooting, sharp, throbbing, aching pain. Pain does interfere with age appropriate activities of daily living. Patient denies weakness in the lower extremities. Assistance device used: None.    Worsening factors noted: lying down, standing, walking, bending, sitting, exercise.   Improving factors noted: unknown.    Treatments tried:   Heat/ice: yes  PT: no  Chiropractic therapy: no  Injections: hip bursa injections   Previous spine surgery: No    Anticoagulation: no    Medications tried:   Topical meds, Tylenol, muscle relaxants    Review of Systems   Constitutional:  Negative for chills and fever.   HENT:  Negative for ear pain and sore throat.    Eyes:  Negative for pain and visual disturbance.   Respiratory:  Negative for cough and shortness of breath.    Cardiovascular:  Negative for chest pain and palpitations.   Gastrointestinal:  Negative for abdominal pain and vomiting.   Genitourinary:  Negative for dysuria and hematuria.   Musculoskeletal:  Positive for arthralgias, gait problem and myalgias. Negative for back pain.   Skin:  Negative for color change and rash.   Neurological:  Positive for dizziness, weakness, numbness and headaches. Negative for seizures and syncope.   Psychiatric/Behavioral:  Positive for agitation, decreased concentration and dysphoric mood.    All other systems reviewed and are negative.    Medical History Reviewed by provider this encounter:     .  Medications Ordered Prior to Encounter[1]      Objective   Ht 4' 6.75\" (1.391 m)   Wt 37.6 kg (83 lb)   LMP 07/24/2017   BMI 19.47 kg/m²      Pain Score: 0-No pain  Physical Exam  Constitutional: normal, well developed, well nourished, alert, in no distress and non-toxic and no overt pain behavior.  Eyes: anicteric  HEENT: grossly intact  Neck: supple, symmetric, trachea midline and no masses   Pulmonary: even and unlabored  Cardiovascular: No edema or pitting edema present  Skin: Normal without rashes or " lesions and well hydrated  Psychiatric: Mood and affect appropriate  Neurologic:  Motor function is grossly intact with no focal neurologic deficits   Musculoskeletal: internal rotation of hips elicits pain    Radiology Results Review: I personally reviewed the following image studies in PACS and associated radiology reports: xray(s). My interpretation of the radiology images/reports is: moderate hip OA bilaterally.         [1]   Current Outpatient Medications on File Prior to Visit   Medication Sig Dispense Refill    ALPRAZolam (XANAX) 0.5 mg tablet May take 1/2 tablet (0.25 mg) PO  in AM and one tablet (0.5 mg) PO  at bed Do not start before May 22, 2025. 45 tablet 3    Blood Glucose Monitoring Suppl (OneTouch Verio Reflect) w/Device KIT Check blood sugars once daily. Please substitute with appropriate alternative as covered by patient's insurance. Dx: E11.65 1 kit 0    calcium carbonate (OS-MARIELLA) 600 MG tablet Take 1 tablet (600 mg total) by mouth 2 (two) times a day with meals 180 tablet 1    Cholecalciferol (VITAMIN D3) 1,000 units tablet Take 1 tablet (1,000 Units total) by mouth daily 90 tablet 1    Diclofenac Sodium (VOLTAREN) 1 % Apply 2 g topically 4 (four) times a day 350 g 2    docusate sodium (COLACE) 100 mg capsule Take 1 capsule (100 mg total) by mouth every morning 100 capsule 1    DULoxetine (Cymbalta) 30 mg delayed release capsule Take 1 capsule (30 mg total) by mouth daily 90 capsule 1    Dyclonine-Glycerin (Cepacol Sore Throat Spray) 0.1-33 % LIQD Apply 1 spray to the mouth or throat every 6 (six) hours 118 mL 0    famotidine (PEPCID) 40 MG tablet TAKE 1 TABLET(40 MG) BY MOUTH DAILY 90 tablet 0    ferrous sulfate 324 (65 Fe) mg TAKE 1 TABLET BY MOUTH EVERY OTHER DAY (Patient not taking: Reported on 4/28/2025) 90 tablet 3    fexofenadine (ALLEGRA) 180 MG tablet Take 1 tablet (180 mg total) by mouth daily 90 tablet 1    fluticasone (FLONASE) 50 mcg/act nasal spray 1 spray into each nostril daily 16  g 0    glucose blood (OneTouch Verio) test strip Check blood sugars once daily. Please substitute with appropriate alternative as covered by patient's insurance. Dx: E11.65 100 each 3    lidocaine (LIDODERM) 5 % UNWRAP AND APPLY 1 PATCH TOPICALLY TO THE AFFECTED AREA EVERY DAY(DO NOT LEAVE ON FOR MORE THAN 12 HOURS) 90 patch 1    meclizine (ANTIVERT) 25 mg tablet TAKE 1 TABLET(25 MG) BY MOUTH THREE TIMES DAILY AS NEEDED FOR DIZZINESS 30 tablet 0    methocarbamol (ROBAXIN) 500 mg tablet Take 1 tablet (500 mg total) by mouth 4 (four) times a day 40 tablet 0    mirtazapine (REMERON) 30 mg tablet Take 1 tablet (30 mg total) by mouth daily at bedtime 90 tablet 1    Nutritional Supplement LIQD [employer] Standard 1.4 Medical Nutrition Shakes 3900 mL 0    ondansetron (ZOFRAN-ODT) 4 mg disintegrating tablet Take 1 tablet (4 mg total) by mouth every 6 (six) hours as needed for nausea or vomiting for up to 7 days 28 tablet 0    OneTouch Delica Lancets 33G MISC Check blood sugars once daily. Please substitute with appropriate alternative as covered by patient's insurance. Dx: E11.65 100 each 3    pantoprazole (PROTONIX) 40 mg tablet Take 1 tablet (40 mg total) by mouth 2 (two) times a day for 21 days 42 tablet 0    polyethylene glycol (GLYCOLAX) 17 GM/SCOOP powder MIX AND DRINK 17 GRAMS BY MOUTH EVERY  g 0    prochlorperazine (COMPAZINE) 5 mg tablet TAKE 1 TABLET(5 MG) BY MOUTH EVERY 6 HOURS AS NEEDED FOR NAUSEA OR VOMITING 30 tablet 0    sucralfate (CARAFATE) 1 g/10 mL suspension Take 10 mL (1 g total) by mouth 4 (four) times a day for 21 days 840 mL 0    SUMAtriptan (IMITREX) 50 mg tablet TAKE 1 TABLET(50 MG) BY MOUTH 1 TIME FOR UP TO 1 DOSE AS NEEDED FOR MIGRAINE 9 tablet 0     No current facility-administered medications on file prior to visit.

## 2025-06-10 ENCOUNTER — TELEPHONE (OUTPATIENT)
Age: 45
End: 2025-06-10

## 2025-06-10 ENCOUNTER — RESULTS FOLLOW-UP (OUTPATIENT)
Dept: FAMILY MEDICINE CLINIC | Facility: CLINIC | Age: 45
End: 2025-06-10

## 2025-06-10 NOTE — TELEPHONE ENCOUNTER
Patient called to make sure her appts were cancelled due to vacations.  Cancelled the 6/12/25 appt per patient request.

## 2025-07-07 ENCOUNTER — TELEPHONE (OUTPATIENT)
Dept: BEHAVIORAL/MENTAL HEALTH CLINIC | Facility: CLINIC | Age: 45
End: 2025-07-07

## 2025-07-07 NOTE — TELEPHONE ENCOUNTER
This writer attempted to call Екатерина castorena voicemail requesting call back regarding upcoming appointment fo 7/10/25 @ 2:00PM

## 2025-07-08 NOTE — TELEPHONE ENCOUNTER
The patient called in to reschedule the appointment:    Date/Time: 7.10.25 @ 2:00pm    Reason: Schedule Conflict with MM appt    Patient was rescheduled: YES [x] NO []  If yes, when was Patient reschedule for: 7.11.25 @ 2:00pm-Virtually  *Provider schedule verified/In office /[place]    -Virtual consent on file  -Insurance verified    Encounter routed for communication purposes.

## 2025-07-10 ENCOUNTER — TELEPHONE (OUTPATIENT)
Age: 45
End: 2025-07-10

## 2025-07-10 ENCOUNTER — TELEMEDICINE (OUTPATIENT)
Dept: PSYCHIATRY | Facility: CLINIC | Age: 45
End: 2025-07-10
Payer: COMMERCIAL

## 2025-07-10 DIAGNOSIS — G47.00 INSOMNIA, UNSPECIFIED TYPE: ICD-10-CM

## 2025-07-10 DIAGNOSIS — F33.1 MAJOR DEPRESSIVE DISORDER, RECURRENT, MODERATE (HCC): Primary | ICD-10-CM

## 2025-07-10 DIAGNOSIS — F41.1 GENERALIZED ANXIETY DISORDER: ICD-10-CM

## 2025-07-10 DIAGNOSIS — F32.1 CURRENT MODERATE EPISODE OF MAJOR DEPRESSIVE DISORDER WITHOUT PRIOR EPISODE (HCC): ICD-10-CM

## 2025-07-10 PROBLEM — M85.80 OSTEOPENIA: Status: ACTIVE | Noted: 2025-05-27

## 2025-07-10 PROCEDURE — 99214 OFFICE O/P EST MOD 30 MIN: CPT | Performed by: NURSE PRACTITIONER

## 2025-07-10 RX ORDER — ALPRAZOLAM 0.5 MG
TABLET ORAL
Qty: 45 TABLET | Refills: 3 | Status: SHIPPED | OUTPATIENT
Start: 2025-07-10

## 2025-07-10 RX ORDER — MIRTAZAPINE 30 MG/1
30 TABLET, FILM COATED ORAL
Qty: 90 TABLET | Refills: 1 | Status: SHIPPED | OUTPATIENT
Start: 2025-07-10

## 2025-07-10 RX ORDER — DULOXETIN HYDROCHLORIDE 30 MG/1
30 CAPSULE, DELAYED RELEASE ORAL DAILY
Qty: 90 CAPSULE | Refills: 1 | Status: SHIPPED | OUTPATIENT
Start: 2025-07-10

## 2025-07-10 NOTE — ASSESSMENT & PLAN NOTE
Orders:    mirtazapine (REMERON) 30 mg tablet; Take 1 tablet (30 mg total) by mouth daily at bedtime    DULoxetine (Cymbalta) 30 mg delayed release capsule; Take 1 capsule (30 mg total) by mouth daily

## 2025-07-10 NOTE — PSYCH
MEDICATION MANAGEMENT NOTE    Name: Екатерина Godinez      : 1980      MRN: [id number]  Encounter Provider: ANNAMARIE Hawk  Encounter Date: 7/10/2025   Encounter department: [facility]    Insurance: Payor: [facility] [place] / Plan: [facility] MEDICAID / Product Type: Medicaid HMO /      Reason for Visit: medication management follow up visit  Assessment & Plan  Generalized anxiety disorder    Orders:    ALPRAZolam (XANAX) 0.5 mg tablet; May take 1/2 tablet (0.25 mg) PO  in AM and one tablet (0.5 mg) PO  at bed    Current moderate episode of major depressive disorder without prior episode (HCC)         Major depressive disorder, recurrent, moderate (HCC)    Orders:    mirtazapine (REMERON) 30 mg tablet; Take 1 tablet (30 mg total) by mouth daily at bedtime    DULoxetine (Cymbalta) 30 mg delayed release capsule; Take 1 capsule (30 mg total) by mouth daily    Insomnia, unspecified type    Orders:    ALPRAZolam (XANAX) 0.5 mg tablet; May take 1/2 tablet (0.25 mg) PO  in AM and one tablet (0.5 mg) PO  at bed    mirtazapine (REMERON) 30 mg tablet; Take 1 tablet (30 mg total) by mouth daily at bedtime        Treatment Recommendations:    Educated about diagnosis and treatment modalities. Verbalizes understanding and agreement with the treatment plan.  Discussed self monitoring of symptoms, and symptom monitoring tools.  Discussed medications and if treatment adjustment was needed or desired.  Medication management every 3 months  Aware of 24 hour and weekend coverage for urgent situations accessed by calling [facility] main practice number  I am scheduling this patient out for greater than 3 months: No    Medications Risks/Benefits:      Risks, Benefits And Possible Side Effects Of Medications:    Risks, benefits, and possible side effects of medications explained to Екатерина and she (or legal representative) verbalizes understanding and  agreement for treatment.    Controlled Medication Discussion:     Екатерина has been filling controlled prescriptions on time as prescribed according to [place] Prescription Drug Monitoring Program.      History of Present Illness     CC: Екатерина presents today for follow up on depression and anxiety and insomnia     Екатерина continues to maintain her gains.  Since having surgery to help her with her gastroparesis and since starting individual therapy, I noted a great improvement in her mood and helplessness and anxiety.  She agrees and we will continue medications the same and follow up in 3 months.    Med Compliance: yes    Since our last visit, overall symptoms have been fluctuating with recent improvement.  Continue all medications the same as noted above    HPI ROS:     Medication Side Effects: Denies  Depression: 2/10 (10 worst)  Anxiety: 2/10 (10 worst)  Safety concerns (SI, HI, others): Denies  Sleep: Adequate  Energy: Improved  Appetite: Improved  Weight Change: Stable    Екатерина denies any side effects from medications unless noted above.    Review Of Systems: A review of systems is obtained and is negative except for the pertinent positives listed in HPI/Subjective above.      Current Rating Scores:     Current PHQ-9   PHQ-2/9 Depression Screening    Little interest or pleasure in doing things: 2 - more than half the days  Feeling down, depressed, or hopeless: 1 - several days  Trouble falling or staying asleep, or sleeping too much: 1 - several days  Feeling tired or having little energy: 3 - nearly every day  Poor appetite or overeating: 3 - nearly every day  Feeling bad about yourself - or that you are a failure or have let yourself or your family down: 1 - several days  Trouble concentrating on things, such as reading the newspaper or watching television: 2 - more than half the days  Moving or speaking so slowly that other people could have noticed. Or the opposite - being so fidgety or restless that  you have been moving around a lot more than usual: 1 - several days       Current HUEY-7   HUEY-7 Flowsheet Screening      Flowsheet Row Most Recent Value   Over the last two weeks, how often have you been bothered by the following problems?     Feeling nervous, anxious, or on edge 2    Not being able to stop or control worrying 1    Worrying too much about different things 1    Trouble relaxing  1    Being so restless that it's hard to sit still 1    Becoming easily annoyed or irritable  2    Feeling afraid as if something awful might happen 1    How difficult have these problems made it for you to do your work, take care of things at home, or get along with other people?  Somewhat difficult    HUEY Score  9             Areas of Improvement: reviewed in HPI/Subjective Section and reviewed in Assessment and Plan Section      Past Medical History[1]  Past Surgical History[2]  Allergies: Allergies[3]    Current Outpatient Medications   Medication Instructions    ALPRAZolam (XANAX) 0.5 mg tablet May take 1/2 tablet (0.25 mg) PO  in AM and one tablet (0.5 mg) PO  at bed    Blood Glucose Monitoring Suppl (OneTouch Verio Reflect) w/Device KIT Check blood sugars once daily. Please substitute with appropriate alternative as covered by patient's insurance. Dx: E11.65    calcium carbonate (OS-MARIELLA) 600 mg, Oral, 2 times daily with meals    Calcium Carbonate 1500 (600 Ca) MG TABS     Cholecalciferol (VITAMIN D3) 1,000 Units, Oral, Daily    Diclofenac Sodium (VOLTAREN) 2 g, Topical, 4 times daily    docusate sodium (COLACE) 100 mg, Oral, Every morning    DULoxetine (CYMBALTA) 30 mg, Oral, Daily    famotidine (PEPCID) 40 MG tablet TAKE 1 TABLET(40 MG) BY MOUTH DAILY    ferrous sulfate 324 mg, Oral, Every other day    glucose blood (OneTouch Verio) test strip Check blood sugars once daily. Please substitute with appropriate alternative as covered by patient's insurance. Dx: E11.65    lidocaine (LIDODERM) 5 % UNWRAP AND APPLY 1 PATCH  TOPICALLY TO THE AFFECTED AREA EVERY DAY(DO NOT LEAVE ON FOR MORE THAN 12 HOURS)    meclizine (ANTIVERT) 25 mg tablet TAKE 1 TABLET(25 MG) BY MOUTH THREE TIMES DAILY AS NEEDED FOR DIZZINESS    methocarbamol (ROBAXIN) 500 mg, Oral, 4 times daily    mirtazapine (REMERON) 30 mg, Oral, Daily at bedtime    Nutritional Supplement LIQD [employer] Standard 1.4 Medical Nutrition Shakes    ondansetron (ZOFRAN-ODT) 4 mg, Oral, Every 6 hours PRN    OneTouch Delica Lancets 33G MISC Check blood sugars once daily. Please substitute with appropriate alternative as covered by patient's insurance. Dx: E11.65    pantoprazole (PROTONIX) 40 mg, Oral, 2 times daily    polyethylene glycol (GLYCOLAX) 17 GM/SCOOP powder MIX AND DRINK 17 GRAMS BY MOUTH EVERY DAY    prochlorperazine (COMPAZINE) 5 mg tablet TAKE 1 TABLET(5 MG) BY MOUTH EVERY 6 HOURS AS NEEDED FOR NAUSEA OR VOMITING    sucralfate (CARAFATE) 1 g, Oral, 4 times daily    SUMAtriptan (IMITREX) 50 mg tablet TAKE 1 TABLET(50 MG) BY MOUTH 1 TIME FOR UP TO 1 DOSE AS NEEDED FOR MIGRAINE        Substance Abuse History:    Tobacco, Alcohol and Drug Use History     Tobacco Use    Smoking status: Former     Current packs/day: 0.00     Average packs/day: 0.5 packs/day for 5.0 years (2.5 ttl pk-yrs)     Types: Cigarettes     Quit date: 2013     Years since quittin.3    Smokeless tobacco: Never   Vaping Use    Vaping status: Every Day    Substances: THC   Substance Use Topics    Alcohol use: No     Comment: none since 2021    Drug use: Yes     Types: Marijuana     Comment: medical marijuana          Social History:    Social History     Socioeconomic History    Marital status: /Civil Union     Spouse name: Not on file    Number of children: Not on file    Years of education: Not on file    Highest education level: Not on file   Occupational History    Not on file   Other Topics Concern    Not on file   Social History Narrative    Not on file        Family Psychiatric  History:     Family History[4]    Medical History Reviewed by provider this encounter:          Objective   LMP 07/24/2017      Mental Status Evaluation:    Appearance age appropriate, casually dressed   Behavior cooperative, calm   Speech normal rate, normal volume, normal pitch, spontaneous   Mood improved   Affect normal range and intensity, appropriate, mood-congruent   Thought Processes organized, goal directed   Thought Content no overt delusions   Perceptual Disturbances: no auditory hallucinations, no visual hallucinations   Abnormal Thoughts  Risk Potential Suicidal ideation - None  Homicidal ideation - None  Potential for aggression - No   Orientation oriented to person, place, time/date, and situation   Memory recent and remote memory grossly intact   Consciousness alert and awake   Attention Span Concentration Span attention span and concentration are age appropriate   Intellect appears to be of average intelligence   Insight intact and improving   Judgement intact and improving   Muscle Strength and  Gait normal muscle strength and normal muscle tone, normal gait and normal balance   Motor activity no abnormal movements   Language no difficulty naming common objects, no difficulty repeating a phrase, no difficulty writing a sentence   Fund of Knowledge adequate knowledge of current events  adequate fund of knowledge regarding past history  adequate fund of knowledge regarding vocabulary        Laboratory Results: I have personally reviewed all pertinent laboratory/tests results        Suicide/Homicide Risk Assessment:    Risk of Harm to Self:  The following ratings are based on assessment at the time of the interview  Recent Specific Risk Factors include: diagnosis of depression  Protective Factors: no current suicidal ideation, ability to adapt to change, able to make plans for the future, access to mental health treatment, being a parent, being , compliant with medications  Based on today's  assessment, Екатерина presents the following risk of harm to self: minimal    Risk of Harm to Others:  The following ratings are based on assessment at the time of the interview  Recent Specific Risk Factors include: none  Based on today's assessment, Екатерина presents the following risk of harm to others: none    The following interventions are recommended: Continue medication management. No other intervention changes indicated at this time.    Psychotherapy Provided:     Individual psychotherapy provided: Yes    Reassurance and supportive therapy provided.     Treatment Plan:    Completed and signed during the session: Not applicable - Treatment Plan not due at this session.    Goals: Progress towards Treatment Plan goals - Yes, progressing, as evidenced by subjective findings in HPI/Subjective Section and in Assessment and Plan Section    Depression Follow-up Plan Completed: Yes    Note Share:    This note was not shared with the patient due to this is a psychotherapy note    Administrative Statements   Administrative Statements   Encounter provider ANNAMARIE Hawk    The Patient is located at Home and in the following state in which I hold an active license [place].    The patient was identified by name and date of birth. Екатерина Godinez was informed that this is a telemedicine visit and that the visit is being conducted through the Epic Embedded platform. She agrees to proceed..  My office door was closed. No one else was in the room.  She acknowledged consent and understanding of privacy and security of the video platform. The patient has agreed to participate and understands they can discontinue the visit at any time.    I have spent a total time of 30 minutes in caring for this patient on the day of the visit/encounter including Counseling / Coordination of care, not including the time spent for establishing the audio/video connection.      Portions of the record may have been created with voice recognition software.  "Occasional wrong word or \"sound a like\" substitutions may have occurred due to the inherent limitations of voice recognition software. Read the chart carefully and recognize, using context, where substitutions have occurred.    ANNAMARIE Hawk 07/10/25         [1]   Past Medical History:  Diagnosis Date    Anemia     in past    Anxiety     Arthritis 2022    Depression     Gallstones     GERD (gastroesophageal reflux disease)     Headache(784.0)     Inflammatory bowel disease     Migraine     Nausea     occ    Panic attack     Rotator cuff disorder, right     Vertigo    [2]   Past Surgical History:  Procedure Laterality Date    ABDOMINAL SURGERY      scar tissue removed     SECTION      last assessed: Oct 23, 2014     CHOLECYSTECTOMY  2018    HYSTERECTOMY      MD EGD BALLOON DILATION ESOPHAGUS <30 MM DIAM N/A 2024    Procedure: ESOPHAGOGASTRODUODENOSCOPY (EGD);  Surgeon: Pradip Jeff MD;  Location: [facility];  Service: General    MD LAPS SURG CHOLECYSTECTOMY W/CHOLANGIOGRAPHY N/A 2018    Procedure: CHOLECYSTECTOMY LAPAROSCOPIC  WITH  ATTEMPTED IOC;  Surgeon: Silvia Woods MD;  Location: [facility];  Service: General    PYLOROPLASTY N/A 2024    Procedure: PYLOROPLASTY LAPAROSCOPIC WITH ROBOT;  Surgeon: Pradip Jeff MD;  Location: [facility];  Service: General    REMOVAL BLADDER STIMULATOR Left 2024    stimulator placed 2024-L. side, has remote    TUBAL LIGATION     [3] No Known Allergies  [4]   Family History  Problem Relation Name Age of Onset    No Known Problems Family      Hypertension Mother Yoli Velasco     Arthritis Mother Yoli Velasco     Diabetes type II Father Prudencio Godinez         mellitus     Hypertension Father Prudencio Godinez     Diabetes Father Prudencio Godinez     Arthritis Father Prudencio Godinez      "

## 2025-07-10 NOTE — ASSESSMENT & PLAN NOTE
Orders:    ALPRAZolam (XANAX) 0.5 mg tablet; May take 1/2 tablet (0.25 mg) PO  in AM and one tablet (0.5 mg) PO  at bed

## 2025-07-10 NOTE — TELEPHONE ENCOUNTER
Pt called to get her appt for 7/10/25 at 3pm switched to a virtual visit.  Pt has [employer] and will connect with provider through [employer]. Writer switched appt and checked it in.     Provider has been advised via secure chat.

## 2025-07-11 ENCOUNTER — TELEMEDICINE (OUTPATIENT)
Dept: BEHAVIORAL/MENTAL HEALTH CLINIC | Facility: CLINIC | Age: 45
End: 2025-07-11
Payer: COMMERCIAL

## 2025-07-11 DIAGNOSIS — F41.1 GENERALIZED ANXIETY DISORDER: Primary | ICD-10-CM

## 2025-07-11 DIAGNOSIS — F33.1 MAJOR DEPRESSIVE DISORDER, RECURRENT, MODERATE (HCC): ICD-10-CM

## 2025-07-11 PROCEDURE — 90837 PSYTX W PT 60 MINUTES: CPT

## 2025-07-11 NOTE — PSYCH
Virtual Regular VisitName: Екатерина Godinez      : 1980      MRN: [id number]  Encounter Provider: Jennifer Perdomo  Encounter Date: 2025   Encounter department: [facility] [address]  :    Goals addressed in session: Goal 1 and Goal 2     DATA: Clinician and Екатерина met through secure platform. Clinician and Екатерина began with check in.  Clinician listened attentively to Екатерина share about her last few weeks where she traveled to [place] to visit parents with sister and sister-in-law. Екатерина had a nice time visiting with parents, however on the way home plane was delayed by 24 hrs making it a long time for her to get home and spent night at airport. She ended up caring for sister-in-law who had a difficult time walking and she had to push her through airport which became a big stressor.  Екатерина also lost her SSI benefits, hoping to get it corrected,  needs to supply 's income and is hoping it gets reinstated.  They also had water heater issue and check was short this week due to  having off for holiday.  Екатерина spent time with family for holiday, hoping things start to improve. Clinician and Екатерина processed her thoughts and feelings regarding current stressors. Екатерина shared she continues to find utilizing her coping skills has helped her to get through difficult things. Clinician and Екатерина discussed how important it is for her to take inventory of the things that she can control vs things she cannot control; can help reduce intensity of anxiety.  Екатерина found it is helpful to her to take the inventory.  Clinician praised and validated Екатерина's efforts she in session. Clinician encouraged Екатерина to call if she needs additional support in between sessions, she agreed.    During this session, this clinician used the following therapeutic modalities: Engagement Strategies, Client-centered Therapy, Cognitive Behavioral Therapy, and Supportive  "Psychotherapy    Substance Abuse was not addressed during this session. If the client is diagnosed with a co-occurring substance use disorder, please indicate any changes in the frequency or amount of use: N/A. Stage of change for addressing substance use diagnoses: No substance use/Not applicable    ASSESSMENT:  Екатерина presents with a Euthymic/ normal, Anxious, and Depressed mood. Екатерина's affect is Normal range and intensity and Bright, which is congruent, with their mood and the content of the session. The client has made progress on their goals as evidenced by her expressing thoughts and feelings when she is experiencing stressors she is able to utilize learned coping skills to help manage moods.    Екатерина presents with a none risk of suicide, none risk of self-harm, and none risk of harm to others.    For any risk assessment that surpasses a \"low\" rating, a safety plan must be developed.    A safety plan was indicated: no  If yes, describe in detail N/A    PLAN: Between sessions, Екатерина will continue utilizing coping skills and take medications as prescribed to help reduce intensity of anxiety and depressive moods. At the next session, the therapist will use Engagement Strategies, Client-centered Therapy, Dialectical Behavior Therapy, Mindfulness-based Strategies, and Supportive Psychotherapy to address anxious and depressive moods.    Behavioral Health Treatment Plan [facility]: Diagnosis and Treatment Plan explained to Екатерина, Екатерина relates understanding diagnosis and is agreeable to Treatment Plan. Yes     Depression Follow-up Plan Completed: No     Reason for visit is   Chief Complaint   Patient presents with    Virtual Regular Visit      Recent Visits  Date Type Provider Dept   07/11/25 Telemedicine Jennifer Perdomo [facility]   07/07/25 Telephone Jennifer Perdomo [facility] Therapist [address]   Showing recent visits within past 7 days and meeting all other " requirements  Future Appointments  No visits were found meeting these conditions.  Showing future appointments within next 150 days and meeting all other requirements     History of Present Illness     HPI    Past Medical History   Past Medical History:   Diagnosis Date    Anemia     in past    Anxiety     Arthritis 2022    Depression     Gallstones     GERD (gastroesophageal reflux disease)     Headache(784.0)     Inflammatory bowel disease     Migraine     Nausea     occ    Panic attack     Rotator cuff disorder, right     Vertigo      Past Surgical History:   Procedure Laterality Date    ABDOMINAL SURGERY      scar tissue removed     SECTION      last assessed: Oct 23, 2014     CHOLECYSTECTOMY  2018    HYSTERECTOMY      NV EGD BALLOON DILATION ESOPHAGUS <30 MM DIAM N/A 2024    Procedure: ESOPHAGOGASTRODUODENOSCOPY (EGD);  Surgeon: Pradip Jeff MD;  Location: [facility];  Service: General    NV LAPS SURG CHOLECYSTECTOMY W/CHOLANGIOGRAPHY N/A 2018    Procedure: CHOLECYSTECTOMY LAPAROSCOPIC  WITH  ATTEMPTED IOC;  Surgeon: Silvia Woods MD;  Location: [facility];  Service: General    PYLOROPLASTY N/A 2024    Procedure: PYLOROPLASTY LAPAROSCOPIC WITH ROBOT;  Surgeon: Pradip Jeff MD;  Location: [facility];  Service: General    REMOVAL BLADDER STIMULATOR Left 2024    stimulator placed 2024-L. side, has remote    TUBAL LIGATION       Current Outpatient Medications   Medication Instructions    ALPRAZolam (XANAX) 0.5 mg tablet May take 1/2 tablet (0.25 mg) PO  in AM and one tablet (0.5 mg) PO  at bed    Blood Glucose Monitoring Suppl (OneTouch Verio Reflect) w/Device KIT Check blood sugars once daily. Please substitute with appropriate alternative as covered by patient's insurance. Dx: E11.65    calcium carbonate (OS-MARIELLA) 600 mg, Oral, 2 times daily with meals    Calcium Carbonate 1500 (600 Ca) MG TABS     Cholecalciferol (VITAMIN D3) 1,000 Units, Oral, Daily     Diclofenac Sodium (VOLTAREN) 2 g, Topical, 4 times daily    docusate sodium (COLACE) 100 mg, Oral, Every morning    DULoxetine (CYMBALTA) 30 mg, Oral, Daily    famotidine (PEPCID) 40 MG tablet TAKE 1 TABLET(40 MG) BY MOUTH DAILY    glucose blood (OneTouch Verio) test strip Check blood sugars once daily. Please substitute with appropriate alternative as covered by patient's insurance. Dx: E11.65    lidocaine (LIDODERM) 5 % UNWRAP AND APPLY 1 PATCH TOPICALLY TO THE AFFECTED AREA EVERY DAY(DO NOT LEAVE ON FOR MORE THAN 12 HOURS)    meclizine (ANTIVERT) 25 mg tablet TAKE 1 TABLET(25 MG) BY MOUTH THREE TIMES DAILY AS NEEDED FOR DIZZINESS    methocarbamol (ROBAXIN) 500 mg, Oral, 4 times daily    mirtazapine (REMERON) 30 mg, Oral, Daily at bedtime    Nutritional Supplement LIQD "[employer] Standard 1.4 Medical Nutrition Shakes    ondansetron (ZOFRAN-ODT) 4 mg, Oral, Every 6 hours PRN    OneTouch Delica Lancets 33G MISC Check blood sugars once daily. Please substitute with appropriate alternative as covered by patient's insurance. Dx: E11.65    pantoprazole (PROTONIX) 40 mg, Oral, 2 times daily    polyethylene glycol (GLYCOLAX) 17 GM/SCOOP powder MIX AND DRINK 17 GRAMS BY MOUTH EVERY DAY    prochlorperazine (COMPAZINE) 5 mg tablet TAKE 1 TABLET(5 MG) BY MOUTH EVERY 6 HOURS AS NEEDED FOR NAUSEA OR VOMITING    SUMAtriptan (IMITREX) 50 mg tablet TAKE 1 TABLET(50 MG) BY MOUTH 1 TIME FOR UP TO 1 DOSE AS NEEDED FOR MIGRAINE     No Known Allergies    Objective   LMP 07/24/2017     Video Exam  Physical Exam     Administrative Statements   Encounter provider Jennifer Perdomo    The Patient is located at Home and in the following state in which I hold an active license [place].    The patient was identified by name and date of birth. Екатерина Godinez was informed that this is a telemedicine visit and that the visit is being conducted through the Epic Embedded platform. She agrees to proceed..  My office door was closed. No one else was  in the room.  She acknowledged consent and understanding of privacy and security of the video platform. The patient has agreed to participate and understands they can discontinue the visit at any time.    I have spent a total time of 65 minutes in caring for this patient on the day of the visit/encounter including Counseling / Coordination of care, not including the time spent for establishing the audio/video connection.    7/11/25  Visit Time  Start Time: 1400  Stop Time: 1505  Total Visit Time: 65 minutes

## 2025-07-15 ENCOUNTER — PATIENT MESSAGE (OUTPATIENT)
Dept: FAMILY MEDICINE CLINIC | Facility: CLINIC | Age: 45
End: 2025-07-15

## 2025-07-15 ENCOUNTER — TELEPHONE (OUTPATIENT)
Dept: FAMILY MEDICINE CLINIC | Facility: CLINIC | Age: 45
End: 2025-07-15

## 2025-07-17 ENCOUNTER — TELEPHONE (OUTPATIENT)
Age: 45
End: 2025-07-17

## 2025-07-22 ENCOUNTER — TELEPHONE (OUTPATIENT)
Dept: PSYCHIATRY | Facility: CLINIC | Age: 45
End: 2025-07-22

## 2025-07-22 NOTE — TELEPHONE ENCOUNTER
Writer attempt to contact this patient due to therapist not seeing that patient was connected to virtual visit, writer was unable to reach patient.

## 2025-07-23 ENCOUNTER — TELEPHONE (OUTPATIENT)
Age: 45
End: 2025-07-23

## 2025-07-23 NOTE — TELEPHONE ENCOUNTER
Patient is calling regarding cancelling an appointment.    Date/Time: 7/24 and 7/31    Reason: pt will be travelling to [place] due to a death in her family     Patient was rescheduled: YES [] NO [x]  Pt ok with next appt being 8/7    Provider notified via secure chat

## 2025-07-30 DIAGNOSIS — K59.00 CONSTIPATION, UNSPECIFIED CONSTIPATION TYPE: ICD-10-CM

## 2025-07-30 RX ORDER — DOCUSATE SODIUM 100 MG/1
100 CAPSULE, LIQUID FILLED ORAL EVERY MORNING
Qty: 100 CAPSULE | Refills: 1 | Status: SHIPPED | OUTPATIENT
Start: 2025-07-30

## 2025-08-06 DIAGNOSIS — E55.9 VITAMIN D DEFICIENCY: ICD-10-CM

## (undated) DEVICE — SUT PDS II 1 CT-1 27 IN Z347H

## (undated) DEVICE — PAD GROUNDING DUAL ADULT

## (undated) DEVICE — TRAVELKIT CONTAINS FIRST STEP KIT (200ML EP-4 KIT) AND SOILED SCOPE BAG - 1 KIT: Brand: TRAVELKIT CONTAINS FIRST STEP KIT AND SOILED SCOPE BAG

## (undated) DEVICE — NEEDLE HYPO 23G X 1-1/2 IN

## (undated) DEVICE — INSUFFLATION NEEDLE TO ESTABLISH PNEUMOPERITONEUM.: Brand: INSUFFLATION NEEDLE

## (undated) DEVICE — 40601 PROLONGED POSITIONING SYSTEM: Brand: 40601 PROLONGED POSITIONING SYSTEM

## (undated) DEVICE — ENDOPOUCH RETRIEVER SPECIMEN RETRIEVAL BAGS: Brand: ENDOPOUCH RETRIEVER

## (undated) DEVICE — TROCAR: Brand: KII SLEEVE

## (undated) DEVICE — GLOVE INDICATOR PI UNDERGLOVE SZ 7 BLUE

## (undated) DEVICE — KIT ENDO BUTTON

## (undated) DEVICE — BLADELESS OBTURATOR: Brand: WECK VISTA

## (undated) DEVICE — MARYLAND BIPOLAR FORCEPS: Brand: ENDOWRIST

## (undated) DEVICE — ALLENTOWN LAP CHOLE APP PACK: Brand: CARDINAL HEALTH

## (undated) DEVICE — DRAPE TOWEL: Brand: CONVERTORS

## (undated) DEVICE — 3M™ STERI-STRIP™ REINFORCED ADHESIVE SKIN CLOSURES, R1547, 1/2 IN X 4 IN (12 MM X 100 MM), 6 STRIPS/ENVELOPE: Brand: 3M™ STERI-STRIP™

## (undated) DEVICE — PMI DISPOSABLE PUNCTURE CLOSURE DEVICE / SUTURE GRASPER: Brand: PMI

## (undated) DEVICE — TIP-UP FENESTRATED GRASPER: Brand: ENDOWRIST

## (undated) DEVICE — SUT MONOCRYL 4-0 PS-2 27 IN Y426H

## (undated) DEVICE — GLOVE INDICATOR PI UNDERGLOVE SZ 6.5 BLUE

## (undated) DEVICE — INTENDED FOR TISSUE SEPARATION, AND OTHER PROCEDURES THAT REQUIRE A SHARP SURGICAL BLADE TO PUNCTURE OR CUT.: Brand: BARD-PARKER SAFETY BLADES SIZE 11, STERILE

## (undated) DEVICE — ENDOPATH 5MM CURVED SCISSORS WITH MONOPOLAR CAUTERY: Brand: ENDOPATH

## (undated) DEVICE — SYRINGE 20ML LL

## (undated) DEVICE — ENDOPATH XCEL BLADELESS TROCARS WITH STABILITY SLEEVES: Brand: ENDOPATH XCEL

## (undated) DEVICE — TAUT INTRODUCER KIT

## (undated) DEVICE — EXOFIN PRECISION PEN HIGH VISCOSITY TOPICAL SKIN ADHESIVE: Brand: EXOFIN PRECISION PEN, 1G

## (undated) DEVICE — 2963 MEDIPORE SOFT CLOTH TAPE 3 IN X 10 YD 12 RLS/CS: Brand: 3M™ MEDIPORE™

## (undated) DEVICE — TROCARS: Brand: KII® OPTICAL ACCESS SYSTEM

## (undated) DEVICE — VISUALIZATION SYSTEM: Brand: CLEARIFY

## (undated) DEVICE — DRAPE C-ARM X-RAY

## (undated) DEVICE — 2000CC GUARDIAN II: Brand: GUARDIAN

## (undated) DEVICE — SUT MONOCRYL 4-0 PS-2 18 IN Y496G

## (undated) DEVICE — 5 MM CURVED DISSECTORS WITH MONOPOLAR CAUTERY: Brand: ENDOPATH

## (undated) DEVICE — ANTIBACTERIAL UNDYED BRAIDED (POLYGLACTIN 910), SYNTHETIC ABSORBABLE SUTURE: Brand: COATED VICRYL

## (undated) DEVICE — FIRST STEP BEDSIDE KIT - STAND-UP POUCH, ENDOSCOPIC CLEANING PAD - 1 POUCH: Brand: FIRST STEP BEDSIDE KIT - STAND-UP POUCH, ENDOSCOPIC CLEANING PAD

## (undated) DEVICE — MEGA SUTURECUT ND: Brand: ENDOWRIST

## (undated) DEVICE — TELFA NON-ADHERENT ABSORBENT DRESSING: Brand: TELFA

## (undated) DEVICE — TAUT CATH INTRODUCER 4.5 FR

## (undated) DEVICE — SCD SEQUENTIAL COMPRESSION COMFORT SLEEVE MEDIUM KNEE LENGTH: Brand: KENDALL SCD

## (undated) DEVICE — ENDOPATH XCEL UNIVERSAL TROCAR STABLILITY SLEEVES: Brand: ENDOPATH XCEL

## (undated) DEVICE — PERMANENT CAUTERY HOOK: Brand: ENDOWRIST

## (undated) DEVICE — IRRIG ENDO FLO TUBING

## (undated) DEVICE — CANNULA SEAL

## (undated) DEVICE — AIR AND WATER TUBING/CAP SET FOR OLYMPUS® SCOPES: Brand: ERBE

## (undated) DEVICE — SUT VLOC 90 3-0 V-20 9IN VLOCM0644

## (undated) DEVICE — SYRINGE 30ML LL

## (undated) DEVICE — GLOVE SRG BIOGEL ECLIPSE 7.5

## (undated) DEVICE — CHLORAPREP HI-LITE 26ML ORANGE

## (undated) DEVICE — GLOVE SRG BIOGEL 6.5

## (undated) DEVICE — Device: Brand: DEFENDO AIR/WATER/SUCTION AND BIOPSY VALVE

## (undated) DEVICE — COLUMN DRAPE

## (undated) DEVICE — ARM DRAPE

## (undated) DEVICE — INTENDED FOR TISSUE SEPARATION, AND OTHER PROCEDURES THAT REQUIRE A SHARP SURGICAL BLADE TO PUNCTURE OR CUT.: Brand: BARD-PARKER SAFETY BLADES SIZE 15, STERILE

## (undated) DEVICE — IV EXTENSION TUBING 33 IN

## (undated) DEVICE — KIT, BETHLEHEM THORACIC ROBOT: Brand: CARDINAL HEALTH

## (undated) DEVICE — BLUE HEAT SCOPE WARMER

## (undated) DEVICE — ELECTRODE LAP J HOOK SPLIT STEM E-Z CLEAN 33CM -0021S

## (undated) DEVICE — IV CATH 14 G SAFETY

## (undated) DEVICE — 3M™ STERI-STRIP™ COMPOUND BENZOIN TINCTURE 40 BAGS/CARTON 4 CARTONS/CASE C1544: Brand: 3M™ STERI-STRIP™

## (undated) DEVICE — LAPAROSCOPIC SMOKE EVAC TUBING

## (undated) DEVICE — TUBING SUCTION 5MM X 12 FT

## (undated) DEVICE — STOPCOCK 3-WAY

## (undated) DEVICE — [HIGH FLOW INSUFFLATOR,  DO NOT USE IF PACKAGE IS DAMAGED,  KEEP DRY,  KEEP AWAY FROM SUNLIGHT,  PROTECT FROM HEAT AND RADIOACTIVE SOURCES.]: Brand: PNEUMOSURE

## (undated) DEVICE — LIGACLIP 10-M/L, 10MM ENDOSCOPIC ROTATING MULTIPLE CLIP APPLIERS: Brand: LIGACLIP

## (undated) DEVICE — HARMONIC ACE 5MM DIAMETER SHEARS 36CM SHAFT LENGTH + ADAPTIVE TISSUE TECHNOLOGY FOR USE WITH GENERATOR G11: Brand: HARMONIC ACE